# Patient Record
Sex: MALE | Race: WHITE | NOT HISPANIC OR LATINO | Employment: OTHER | URBAN - NONMETROPOLITAN AREA
[De-identification: names, ages, dates, MRNs, and addresses within clinical notes are randomized per-mention and may not be internally consistent; named-entity substitution may affect disease eponyms.]

---

## 2016-04-25 LAB
ALT SERPL-CCNC: 64 U/L (ref 18–65)
CREAT SERPL-MCNC: 0.85 MG/DL (ref 0.8–1.5)
HBA1C MFR BLD: 7.2 % (ref 4–6)
POTASSIUM SERPL-SCNC: 4.1 MEQ/L (ref 3.5–5.1)
TSH SERPL-ACNC: 0.34 MIU/ML (ref 0.35–4.8)

## 2017-03-22 ENCOUNTER — TRANSFERRED RECORDS (OUTPATIENT)
Dept: HEALTH INFORMATION MANAGEMENT | Facility: HOSPITAL | Age: 67
End: 2017-03-22

## 2017-04-12 ENCOUNTER — OFFICE VISIT (OUTPATIENT)
Dept: INTERNAL MEDICINE | Facility: OTHER | Age: 67
End: 2017-04-12
Attending: INTERNAL MEDICINE
Payer: COMMERCIAL

## 2017-04-12 VITALS
OXYGEN SATURATION: 95 % | HEIGHT: 74 IN | DIASTOLIC BLOOD PRESSURE: 60 MMHG | WEIGHT: 315 LBS | BODY MASS INDEX: 40.43 KG/M2 | SYSTOLIC BLOOD PRESSURE: 120 MMHG | HEART RATE: 85 BPM | TEMPERATURE: 99.9 F

## 2017-04-12 DIAGNOSIS — Z71.89 ACP (ADVANCE CARE PLANNING): Chronic | ICD-10-CM

## 2017-04-12 DIAGNOSIS — M25.562 CHRONIC PAIN OF BOTH KNEES: ICD-10-CM

## 2017-04-12 DIAGNOSIS — L98.9 SKIN LESION: Primary | ICD-10-CM

## 2017-04-12 DIAGNOSIS — G89.29 CHRONIC PAIN OF BOTH KNEES: ICD-10-CM

## 2017-04-12 DIAGNOSIS — M25.561 CHRONIC PAIN OF BOTH KNEES: ICD-10-CM

## 2017-04-12 PROBLEM — I10 BENIGN ESSENTIAL HYPERTENSION: Status: ACTIVE | Noted: 2017-04-12

## 2017-04-12 PROBLEM — E66.01 MORBID OBESITY DUE TO EXCESS CALORIES (H): Status: ACTIVE | Noted: 2017-04-12

## 2017-04-12 PROBLEM — E11.40 TYPE 2 DIABETES MELLITUS WITH DIABETIC NEUROPATHY (H): Status: ACTIVE | Noted: 2017-04-12

## 2017-04-12 PROBLEM — E89.0 POSTOPERATIVE HYPOTHYROIDISM: Status: ACTIVE | Noted: 2017-04-12

## 2017-04-12 PROCEDURE — 99213 OFFICE O/P EST LOW 20 MIN: CPT

## 2017-04-12 PROCEDURE — 99205 OFFICE O/P NEW HI 60 MIN: CPT | Performed by: INTERNAL MEDICINE

## 2017-04-12 RX ORDER — NAPROXEN 500 MG/1
500 TABLET ORAL 2 TIMES DAILY PRN
Qty: 60 TABLET | Refills: 1 | Status: SHIPPED | OUTPATIENT
Start: 2017-04-12 | End: 2017-07-18

## 2017-04-12 RX ORDER — ALLOPURINOL 300 MG/1
300 TABLET ORAL
COMMUNITY
End: 2021-08-16

## 2017-04-12 ASSESSMENT — ANXIETY QUESTIONNAIRES
2. NOT BEING ABLE TO STOP OR CONTROL WORRYING: NOT AT ALL
6. BECOMING EASILY ANNOYED OR IRRITABLE: NOT AT ALL
1. FEELING NERVOUS, ANXIOUS, OR ON EDGE: NOT AT ALL
5. BEING SO RESTLESS THAT IT IS HARD TO SIT STILL: NOT AT ALL
7. FEELING AFRAID AS IF SOMETHING AWFUL MIGHT HAPPEN: NOT AT ALL
3. WORRYING TOO MUCH ABOUT DIFFERENT THINGS: MORE THAN HALF THE DAYS
GAD7 TOTAL SCORE: 2
IF YOU CHECKED OFF ANY PROBLEMS ON THIS QUESTIONNAIRE, HOW DIFFICULT HAVE THESE PROBLEMS MADE IT FOR YOU TO DO YOUR WORK, TAKE CARE OF THINGS AT HOME, OR GET ALONG WITH OTHER PEOPLE: NOT DIFFICULT AT ALL

## 2017-04-12 ASSESSMENT — PATIENT HEALTH QUESTIONNAIRE - PHQ9: 5. POOR APPETITE OR OVEREATING: NOT AT ALL

## 2017-04-12 NOTE — PROGRESS NOTES
"SUBJECTIVE:    Chief Complaint   Patient presents with     Establish Care     no chest pain, some SOB due to \"being overweight and over exhersion\"      Derm Problem     right side bulging vein would like checked out, scab on right ear bleeds, pt also states has a \"bludge\" under right arm pit      Musculoskeletal Problem     pain in both knees - pain 10+ /10 has seen Dr. Esparza and has had cortisone shots along with gel injections- both wearing off        Gunnar SABINE Roberta is a 67 year old male with pmh of diabetes type 2 last A1C 6.6, Thyroid cancer with subsequent hypothyroidism, neuropathy along with OA of the knees, Obesity, HTN and alcohol abuse presents today for establishment of care.  He was previously seen by IM in [place] but no longer is able to make that drive routinely.  He has ongoing bilateral knee pain despite steroid and Synvisc injections.  He also has a lesion on his right ear for the pat 2 years that keeps scabbing but never resolves.  He also wants me to look at a lump under his right arm -he reports a hx of lipomas.  Lastly he asks for a refill of Naproxen- as he uses it quite sparingly and has no hx of ulcers but does have a hx of gastric polyps.           Past Medical History:   Diagnosis Date     Alcohol abuse      Diabetes mellitus with neuropathy (H)      Gastric polyps      Gout      HTN (hypertension)      Hx of thyroid cancer      Neuropathy (H)      Obesity      Osteoarthritis        Past Surgical History:   Procedure Laterality Date     HERNIA REPAIR  2014        Allergies   Allergen Reactions     Food      Onions, peppers, olives        Medications:  Current Outpatient Prescriptions   Medication Sig Dispense Refill     LEVOTHYROXINE SODIUM PO Take 300 mcg by mouth daily       LEVOTHYROXINE SODIUM PO Take 50 mcg by mouth Take one-half of 100 mcg tablet daily       POTASSIUM CITRATE PO Take 20 mEq by mouth Take one tablet twice daily       PANTOPRAZOLE SODIUM PO Take 40 mg by mouth Take " "one tablet daily       TORSEMIDE PO Take 20 mg by mouth Take tow tablets twice daily       ALLOPURINOL PO Take 300 mg by mouth Take one tablet daily       METOPROLOL SUCCINATE ER PO Take 100 mg by mouth daily       METFORMIN HCL PO Take 1,000 mg by mouth 2 times daily (with meals)       QUETIAPINE FUMARATE PO Take 50 mg by mouth Take one tablet every night       insulin glargine (LANTUS) 100 UNIT/ML injection Inject 18 Units Subcutaneous 18 units daily       Liraglutide (VICTOZA SC) Inject 0.6 mg Subcutaneous 18mg/3ml prefilled pen       naproxen (NAPROSYN) 500 MG tablet Take 1 tablet (500 mg) by mouth 2 times daily as needed for moderate pain 60 tablet 1       Family History   Problem Relation Age of Onset     Unknown/Adopted Sister        Social History   Substance Use Topics     Smoking status: Never Smoker     Smokeless tobacco: Not on file     Alcohol use Yes      Comment: beer daily      Social History     Social History Narrative     No narrative on file        Review Of Systems  Constitutional: Weight Gain  Eyes: negative  Ears/Nose/Throat: negative  Cardiovascular: negative  Respiratory: No shortness of breath, dyspnea on exertion, cough, or hemoptysis  Gastrointestinal: negative  Genitourinary: frequency  Musculoskeletal: arthritis, joint pain, joint swelling, joint stiffness and gout  Skin: as above  Neurologic: neuropathy and local weakness  Endocrine: Diabetes  Hematologic/Lymphatic/Immunologic: negative  Psychiatric: negative    OBJECTIVE:  /60 (BP Location: Left arm, Patient Position: Chair, Cuff Size: Adult Large)  Pulse 85  Temp 99.9  F (37.7  C) (Tympanic)  Ht 6' 1.5\" (1.867 m)  Wt (!) 322 lb (146.1 kg)  SpO2 95%  BMI 41.91 kg/m2  Body mass index is 41.91 kg/(m^2).   Gen: Obese-Well-developed, well-nourished and in no apparent distress  HEENT:  Normocephalic, atraumatic, PERRL, no scleral icterus, TMs visualized bilaterally without effusion/erythema, external auditory canals clear.  " Cranial nerves grossly intact.  Neck: supple, no LAD, no thyromegaly  CV: regular rate and rhythm, normal S1 S2, no S3 or S4 and no murmur, click, or rub  Resp: Clear to ausculation bilaterally, normal respiratory effort  Abd: Obese- Bowel sounds present, soft NT/ND,  no masses or hepatosplenomegaly  Ext: +2 LE edema.    Lymph: Lipoma under right axilla.    Neuro:  No focal deficits noted  Skin: warm and dry  Psych: normal mood/affect, appropriately oriented    ASSESSMENT/PLAN:  Pt is a 67 year old male here to establish care    Gunnar was seen today for establish care, derm problem and musculoskeletal problem.    Diagnoses and all orders for this visit:    Skin lesion:  Right ear lesion concerning for basal or squamous cell skin cancer.    -     DERMATOLOGY REFERRAL    ACP (advance care planning)    Chronic pain of both knees  -     ORTHOPEDICS ADULT REFERRAL  -     naproxen (NAPROSYN) 500 MG tablet; Take 1 tablet (500 mg) by mouth 2 times daily as needed for moderate pain      70 minutes was spent on this patient's care today.  Greater than 50% of the time was spent face to face with the patient and his wife regarding the concerns as noted in the HPI.  In addition we discussed a care plan and frequent follow up until we get a handle on his ongoing issues.  Questions were answered.  Approximately 30 additional minutes were spent reviewing records from [facility].      Return to clinic in 3 weeks.     Cc; Tru Bethea D.O.

## 2017-04-12 NOTE — PATIENT INSTRUCTIONS
3 weeks-Follow up        Clinic and Lab Hours:    I have office hours on Monday's and Wednesday's at the [facility].  I have office hours Tuesdays and Fridays at the [facility] in [facility] site.    OfficeHours:  8:00am- 4:00 pm    Following your visit, when your labs and diagnostic testing have returned and I have reviewed them, you will be contacted by my nurse.  If you are on My Chart on Epic, you can also view results there, and call or message me with questions and or concerns.    For refills, notify your pharmacy regarding what you need and the pharmacy will generate a refill request. Please plan ahead and allow 3-5 days for refill requests.    You will generally receive a reminder call the day prior to your appointment.  If you cannot attend your appointment, please cancel within days prior.  If there is a pattern of failure to present for your appointments, I cannot provide consistent, meaningful, ongoing care for you. It is very important to me that you come in for your care, so we can best assist you with your health care needs.    IMPORTANT:  Please note that it is my standard of practice to NOT participate in prescribing ongoing requested Narcotic Analgesic therapy, and/or participate in the prescribing of other controlled substances.  My nurse and I am happy to assist you with the process of referral for alternative pain management as needed, and other treatment modalities including but not limited to:  Physical Therapy, Physical Medicine and Rehab, Counseling, Chiropractic Care, Orthopedic Care, and non-narcotic medication management.     I am out of the office on Thursday's. If you have labs that return while I am out,   our office will contact you when I return on my next scheduled clinic day.  If there is a concerning lab, you will be contacted.  Medication refills will generally be addressed upon my return the next day.     In the event that you need to be seen for  emergent concerns and I am out of office,  please see one of my colleagues for acute concerns.  You may also present to UC or ER.    Thank you for allowing me to participate in your care.  I look forward to addressing your  Internal Medicine Level health care needs, and assisting you to achieve optimal health.     Your note and results will be copied to your referral source, and any other specialists involved in your care, as well as any providers you are referred to for additional care.        Sincerely,  Dr. You Bethea

## 2017-04-12 NOTE — NURSING NOTE
"Chief Complaint   Patient presents with     Establish Care     no chest pain, some SOB due to \"being overweight and over exhersion\"      Derm Problem     right side bulging vein would like checked out, scab on right ear bleeds, pt also states has a \"bludge\" under right arm pit      Musculoskeletal Problem     pain in both knees - pain 10+ /10 has seen Dr. Esparza and has had cortisone shots along with gel injections- both wearing off        Initial /60 (BP Location: Left arm, Patient Position: Chair, Cuff Size: Adult Large)  Pulse 85  Ht 6' 1.5\" (1.867 m)  Wt (!) 322 lb (146.1 kg)  SpO2 95%  BMI 41.91 kg/m2 Estimated body mass index is 41.91 kg/(m^2) as calculated from the following:    Height as of this encounter: 6' 1.5\" (1.867 m).    Weight as of this encounter: 322 lb (146.1 kg).  Medication Reconciliation: complete   Ginger Small LPN      "

## 2017-04-12 NOTE — MR AVS SNAPSHOT
After Visit Summary   4/12/2017    Gunnar Granados    MRN: [id number]           Patient Information     Date Of Birth          1950        Visit Information        Provider Department      4/12/2017 3:00 PM You Bethea, DO [facility]        Today's Diagnoses     Skin lesion    -  1    ACP (advance care planning)        Chronic pain of both knees          Care Instructions    3 weeks-Follow up        Clinic and Lab Hours:    I have office hours on Monday's and Wednesday's at the [facility].  I have office hours Tuesdays and Fridays at [facility] in [facility] site.    OfficeHours:  8:00am- 4:00 pm    Following your visit, when your labs and diagnostic testing have returned and I have reviewed them, you will be contacted by my nurse.  If you are on My Chart on Epic, you can also view results there, and call or message me with questions and or concerns.    For refills, notify your pharmacy regarding what you need and the pharmacy will generate a refill request. Please plan ahead and allow 3-5 days for refill requests.    You will generally receive a reminder call the day prior to your appointment.  If you cannot attend your appointment, please cancel within days prior.  If there is a pattern of failure to present for your appointments, I cannot provide consistent, meaningful, ongoing care for you. It is very important to me that you come in for your care, so we can best assist you with your health care needs.    IMPORTANT:  Please note that it is my standard of practice to NOT participate in prescribing ongoing requested Narcotic Analgesic therapy, and/or participate in the prescribing of other controlled substances.  My nurse and I am happy to assist you with the process of referral for alternative pain management as needed, and other treatment modalities including but not limited to:  Physical Therapy, Physical Medicine and Rehab,  Counseling, Chiropractic Care, Orthopedic Care, and non-narcotic medication management.     I am out of the office on Thursday's. If you have labs that return while I am out,   our office will contact you when I return on my next scheduled clinic day.  If there is a concerning lab, you will be contacted.  Medication refills will generally be addressed upon my return the next day.     In the event that you need to be seen for emergent concerns and I am out of office,  please see one of my colleagues for acute concerns.  You may also present to UC or ER.    Thank you for allowing me to participate in your care.  I look forward to addressing your  Internal Medicine Level health care needs, and assisting you to achieve optimal health.     Your note and results will be copied to your referral source, and any other specialists involved in your care, as well as any providers you are referred to for additional care.        Sincerely,  Dr. You Bethea            Follow-ups after your visit        Additional Services     DERMATOLOGY REFERRAL       Your provider has referred you to: [facility]   Recurrent scabbing lesion on ear.      Please be aware that coverage of these services is subject to the terms and limitations of your health insurance plan.  Call member services at your health plan with any benefit or coverage questions.      Please bring the following with you to your appointment:    (1) Any X-Rays, CTs or MRIs which have been performed.  Contact the facility where they were done to arrange for  prior to your scheduled appointment.    (2) List of current medications  (3) This referral request   (4) Any documents/labs given to you for this referral            ORTHOPEDICS ADULT REFERRAL       Your provider has referred you to: Dr Esparza-follow up.      Please be aware that coverage of these services is subject to the terms and limitations of your health insurance plan.  Call member services  "at your health plan with any benefit or coverage questions.      Please bring the following to your appointment:    >>   Any x-rays, CTs or MRIs which have been performed.  Contact the facility where they were done to arrange for  prior to your scheduled appointment.    >>   List of current medications   >>   This referral request   >>   Any documents/labs given to you for this referral                  Who to contact     If you have questions or need follow up information about today's clinic visit or your schedule please contact [facility] directly at [phone].  Normal or non-critical lab and imaging results will be communicated to you by [employer], letter or phone within 4 business days after the clinic has received the results. If you do not hear from us within 7 days, please contact the clinic through [employer] or phone. If you have a critical or abnormal lab result, we will notify you by phone as soon as possible.  Submit refill requests through [employer] or call your pharmacy and they will forward the refill request to us. Please allow 3 business days for your refill to be completed.          Additional Information About Your Visit        CPUsage Information     CPUsage lets you send messages to your doctor, view your test results, renew your prescriptions, schedule appointments and more. To sign up, go to [email or web address] . Click on \"Log in\" on the left side of the screen, which will take you to the Welcome page. Then click on \"Sign up Now\" on the right side of the page.     You will be asked to enter the access code listed below, as well as some personal information. Please follow the directions to create your username and password.     Your access code is: [email or web address]  Expires: 2017  3:55 PM     Your access code will  in 90 days. If you need help or a new code, please call your [facility] or [phone].        Care EveryWhere ID     This is your Care EveryWhere " "ID. This could be used by other organizations to access your [place] medical records  [id number]        Your Vitals Were     Pulse Temperature Height Pulse Oximetry BMI (Body Mass Index)       85 99.9  F (37.7  C) (Tympanic) 6' 1.5\" (1.867 m) 95% 41.91 kg/m2        Blood Pressure from Last 3 Encounters:   04/12/17 120/60    Weight from Last 3 Encounters:   04/12/17 (!) 322 lb (146.1 kg)              We Performed the Following     DERMATOLOGY REFERRAL     ORTHOPEDICS ADULT REFERRAL          Today's Medication Changes          These changes are accurate as of: 4/12/17  3:56 PM.  If you have any questions, ask your nurse or doctor.               Start taking these medicines.        Dose/Directions    naproxen 500 MG tablet   Commonly known as:  NAPROSYN   Used for:  Chronic pain of both knees   Started by:  You Bethea DO        Dose:  500 mg   Take 1 tablet (500 mg) by mouth 2 times daily as needed for moderate pain   Quantity:  60 tablet   Refills:  1            Where to get your medicines      These medications were sent to [facility] [address] [place] [address]  AT [facility] [address]  [address] [facility] [address]     Phone:  [phone]     naproxen 500 MG tablet                Primary Care Provider    None Specified       No primary provider on file.        Thank you!     Thank you for choosing [facility]  for your care. Our goal is always to provide you with excellent care. Hearing back from our patients is one way we can continue to improve our services. Please take a few minutes to complete the written survey that you may receive in the mail after your visit with us. Thank you!             Your Updated Medication List - Protect others around you: Learn how to safely use, store and throw away your medicines at www.disposemymeds.org.          This list is accurate as of: 4/12/17  3:56 PM.  Always use your most recent med list.             "       Brand Name Dispense Instructions for use    ALLOPURINOL PO      Take 300 mg by mouth Take one tablet daily       insulin glargine 100 UNIT/ML injection    LANTUS     Inject 18 Units Subcutaneous 18 units daily       * LEVOTHYROXINE SODIUM PO      Take 300 mcg by mouth daily       * LEVOTHYROXINE SODIUM PO      Take 50 mcg by mouth Take one-half of 100 mcg tablet daily       METFORMIN HCL PO      Take 1,000 mg by mouth 2 times daily (with meals)       METOPROLOL SUCCINATE ER PO      Take 100 mg by mouth daily       naproxen 500 MG tablet    NAPROSYN    60 tablet    Take 1 tablet (500 mg) by mouth 2 times daily as needed for moderate pain       PANTOPRAZOLE SODIUM PO      Take 40 mg by mouth Take one tablet daily       POTASSIUM CITRATE PO      Take 20 mEq by mouth Take one tablet twice daily       QUETIAPINE FUMARATE PO      Take 50 mg by mouth Take one tablet every night       TORSEMIDE PO      Take 20 mg by mouth Take tow tablets twice daily       VICTOZA SC      Inject 0.6 mg Subcutaneous 18mg/3ml prefilled pen       * Notice:  This list has 2 medication(s) that are the same as other medications prescribed for you. Read the directions carefully, and ask your doctor or other care provider to review them with you.

## 2017-04-13 ASSESSMENT — PATIENT HEALTH QUESTIONNAIRE - PHQ9: SUM OF ALL RESPONSES TO PHQ QUESTIONS 1-9: 11

## 2017-04-13 ASSESSMENT — ANXIETY QUESTIONNAIRES: GAD7 TOTAL SCORE: 2

## 2017-04-25 ENCOUNTER — TRANSFERRED RECORDS (OUTPATIENT)
Dept: HEALTH INFORMATION MANAGEMENT | Facility: HOSPITAL | Age: 67
End: 2017-04-25

## 2017-04-27 DIAGNOSIS — E11.40 TYPE 2 DIABETES MELLITUS WITH DIABETIC NEUROPATHY (H): Primary | ICD-10-CM

## 2017-04-27 NOTE — TELEPHONE ENCOUNTER
Accucheck Fastclix lancets 102's       Last Written Prescription Date: 03-27-17  Last Fill Quantity: 102, # refills: 0  Last Office Visit with G, P or [facility] prescribing provider:  04-12-17   Next 5 appointments (look out 90 days)     May 03, 2017  3:00 PM CDT   (Arrive by 2:45 PM)   Office Visit with You Bethea, [facility] ([facility] )    [address] [address] [address] [phone]                   BP Readings from Last 3 Encounters:   04/12/17 120/60     No results found for: MICROL  No results found for: UMALCR  No results found for: CR]  No results found for: GFRESTIMATED  No results found for: GFRESTBLACK  No results found for: CHOL  No results found for: HDL  No results found for: LDL  No results found for: TRIG  No results found for: CHOLHDLRATIO  No results found for: AST  No results found for: ALT  No results found for: A1C  No results found for: POTASSIUM

## 2017-04-28 ENCOUNTER — TRANSFERRED RECORDS (OUTPATIENT)
Dept: HEALTH INFORMATION MANAGEMENT | Facility: HOSPITAL | Age: 67
End: 2017-04-28

## 2017-04-28 RX ORDER — LANCETS
EACH MISCELLANEOUS
Qty: 1 BOX | Refills: 5 | Status: SHIPPED | OUTPATIENT
Start: 2017-04-28 | End: 2018-01-26

## 2017-05-03 ENCOUNTER — OFFICE VISIT (OUTPATIENT)
Dept: INTERNAL MEDICINE | Facility: OTHER | Age: 67
End: 2017-05-03
Attending: INTERNAL MEDICINE
Payer: MEDICARE

## 2017-05-03 VITALS
DIASTOLIC BLOOD PRESSURE: 68 MMHG | WEIGHT: 315 LBS | SYSTOLIC BLOOD PRESSURE: 132 MMHG | BODY MASS INDEX: 41.91 KG/M2 | HEART RATE: 70 BPM | OXYGEN SATURATION: 98 % | RESPIRATION RATE: 20 BRPM | TEMPERATURE: 98.9 F

## 2017-05-03 DIAGNOSIS — M17.0 PRIMARY OSTEOARTHRITIS OF BOTH KNEES: ICD-10-CM

## 2017-05-03 DIAGNOSIS — I10 BENIGN ESSENTIAL HYPERTENSION: ICD-10-CM

## 2017-05-03 DIAGNOSIS — Z12.5 SCREENING FOR PROSTATE CANCER: ICD-10-CM

## 2017-05-03 DIAGNOSIS — Z13.9 SCREENING FOR CONDITION: ICD-10-CM

## 2017-05-03 DIAGNOSIS — Z79.4 TYPE 2 DIABETES MELLITUS WITH COMPLICATION, WITH LONG-TERM CURRENT USE OF INSULIN (H): Primary | ICD-10-CM

## 2017-05-03 DIAGNOSIS — E89.0 POSTOPERATIVE HYPOTHYROIDISM: ICD-10-CM

## 2017-05-03 DIAGNOSIS — Z13.6 SCREENING FOR AAA (ABDOMINAL AORTIC ANEURYSM): ICD-10-CM

## 2017-05-03 DIAGNOSIS — C44.310 BCC (BASAL CELL CARCINOMA), FACE: ICD-10-CM

## 2017-05-03 DIAGNOSIS — E66.01 MORBID OBESITY, UNSPECIFIED OBESITY TYPE (H): ICD-10-CM

## 2017-05-03 DIAGNOSIS — E11.8 TYPE 2 DIABETES MELLITUS WITH COMPLICATION, WITH LONG-TERM CURRENT USE OF INSULIN (H): Primary | ICD-10-CM

## 2017-05-03 PROCEDURE — 40000788 ZZHCL STATISTIC ESTIMATED AVERAGE GLUCOSE: Mod: ZL | Performed by: INTERNAL MEDICINE

## 2017-05-03 PROCEDURE — 99214 OFFICE O/P EST MOD 30 MIN: CPT | Performed by: INTERNAL MEDICINE

## 2017-05-03 PROCEDURE — 84439 ASSAY OF FREE THYROXINE: CPT | Mod: ZL | Performed by: INTERNAL MEDICINE

## 2017-05-03 PROCEDURE — 99212 OFFICE O/P EST SF 10 MIN: CPT

## 2017-05-03 PROCEDURE — 36415 COLL VENOUS BLD VENIPUNCTURE: CPT | Mod: ZL | Performed by: INTERNAL MEDICINE

## 2017-05-03 PROCEDURE — 99000 SPECIMEN HANDLING OFFICE-LAB: CPT | Mod: ZL | Performed by: INTERNAL MEDICINE

## 2017-05-03 PROCEDURE — 84443 ASSAY THYROID STIM HORMONE: CPT | Mod: ZL | Performed by: INTERNAL MEDICINE

## 2017-05-03 PROCEDURE — 86803 HEPATITIS C AB TEST: CPT | Mod: ZL | Performed by: INTERNAL MEDICINE

## 2017-05-03 PROCEDURE — 80053 COMPREHEN METABOLIC PANEL: CPT | Mod: ZL | Performed by: INTERNAL MEDICINE

## 2017-05-03 PROCEDURE — 83036 HEMOGLOBIN GLYCOSYLATED A1C: CPT | Mod: ZL | Performed by: INTERNAL MEDICINE

## 2017-05-03 ASSESSMENT — PAIN SCALES - GENERAL: PAINLEVEL: EXTREME PAIN (9)

## 2017-05-03 NOTE — PROGRESS NOTES
Internal Medicine:    Chief Complaint   Patient presents with     Diabetes     Knee Pain     f/u knee referral     Cancer     basal cell carcinoma on head and ear     Gunnar presents today for follow up.  I first saw Gunnar 23 weeks ago and gave him a referral to Dermatology due to lesions on nose and right ear.  He was diagnosed with Basal cell cancer and will continue to follwo with them.  He was also seen by Dr. Esparza regarding ongoing OA of knees-injections were placed but no mention of replacements.  He would like a second opinion.  In regard to his other issues he remains stable  He has numerous questions pertaining to labs and tests today.  He denies any chest pain or change in SOB.  He does agree to Hep C screening and AAA screening today           Patient Active Problem List   Diagnosis     ACP (advance care planning)     Type 2 diabetes mellitus with diabetic neuropathy (H)     Morbid obesity due to excess calories (H)     Postoperative hypothyroidism     Benign essential hypertension            Past Medical History:   Diagnosis Date     Alcohol abuse      Diabetes mellitus with neuropathy (H)      Gastric polyps      Gout      HTN (hypertension)      Hx of thyroid cancer      Neuropathy (H)      Obesity      Osteoarthritis             Past Surgical History:   Procedure Laterality Date     basal cell carcinoma  2017     HERNIA REPAIR  2014            Social History   Substance Use Topics     Smoking status: Never Smoker     Smokeless tobacco: Not on file     Alcohol use Yes      Comment: beer daily             Family History   Problem Relation Age of Onset     Unknown/Adopted Sister                Allergies   Allergen Reactions     Food      Onions, peppers, olives             Current Outpatient Prescriptions   Medication Sig Dispense Refill     blood glucose monitoring (ACCU-CHEK FASTCLIX) lancets Use to test blood sugar 2 times daily or as directed. 1 Box 5     LEVOTHYROXINE SODIUM PO Take 300 mcg by  mouth daily       LEVOTHYROXINE SODIUM PO Take 50 mcg by mouth Take one-half of 100 mcg tablet daily       POTASSIUM CITRATE PO Take 20 mEq by mouth Take one tablet twice daily       PANTOPRAZOLE SODIUM PO Take 40 mg by mouth Take one tablet daily       TORSEMIDE PO Take 20 mg by mouth Take tow tablets twice daily       ALLOPURINOL PO Take 300 mg by mouth Take one tablet daily       METOPROLOL SUCCINATE ER PO Take 100 mg by mouth daily       METFORMIN HCL PO Take 1,000 mg by mouth 2 times daily (with meals)       QUETIAPINE FUMARATE PO Take 50 mg by mouth Take one tablet every night       insulin glargine (LANTUS) 100 UNIT/ML injection Inject 18 Units Subcutaneous 18 units daily       Liraglutide (VICTOZA SC) Inject 0.6 mg Subcutaneous 18mg/3ml prefilled pen       naproxen (NAPROSYN) 500 MG tablet Take 1 tablet (500 mg) by mouth 2 times daily as needed for moderate pain 60 tablet 1       Review Of Systems:    Skin: as above  Eyes: negative  Ears/Nose/Throat: negative  Respiratory: Shortness of breath- stable  Cardiovascular: negative  Gastrointestinal: negative  Genitourinary: frequency  Musculoskeletal: back pain and weakness  Neurologic: numbness or tingling of feet  Psychiatric: negative  Hematologic/Lymphatic/Immunologic: negative  Endocrine: DM    Objective:   /68 (BP Location: Left arm, Patient Position: Chair, Cuff Size: Adult Large)  Pulse 70  Temp 98.9  F (37.2  C) (Tympanic)  Resp 20  Wt (!) 322 lb (146.1 kg)  SpO2 98%  BMI 41.91 kg/m2  EXAM:  Constitutional: alert and no distress   Cardiovascular: PMI normal. No lifts, heaves, or thrills. RRR. No murmurs, clicks gallops or rub  Respiratory: Percussion normal. Good diaphragmatic excursion. Lungs clear  Psychiatric: mentation appears normal and affect normal/bright  Head: Normocephalic. No masses, lesions, tenderness or abnormalities  Abdomen: Abdomen soft, non-tender. BS normal. No masses, organomegaly  NEURO: Ambulates with walker.    SKIN:  Lesions removed on right ear, nose and scalp(dermatology)        Orders placed or performed in visit on 04/27/17     blood glucose monitoring (ACCU-CHEK FASTCLIX) lancets       Assessment and Plan:    (E11.8,  Z79.4) Type 2 diabetes mellitus with complication, with long-term current use of insulin (H)  (primary encounter diagnosis)  Comment:   Plan: Hemoglobin A1c, TSH with free T4 reflex,         Albumin Random Urine Quantitative      (I10) Benign essential hypertension  Comment:   Plan: Comprehensive metabolic panel      (Z13.9) Screening for condition  Comment:   Plan: Hepatitis C antibody      (E89.0) Postoperative hypothyroidism  Comment:   Plan: T4, free      (C44.310) BCC (basal cell carcinoma), face  Comment: Follows with [facility] dermatology   Plan: As above     (M17.0) Primary osteoarthritis of both knees  Comment: Second opinion  Plan: ORTHOPEDICS ADULT REFERRAL-2nd opinion        (Z12.5) Screening for prostate cancer  Comment: 1.7 March 2017 at [facility]    Plan: CANCELED: PSA, screen      (Z13.6) Screening for AAA (abdominal aortic aneurysm)  Comment:   Plan: US Aortic Imaging          You Bethea, DO

## 2017-05-03 NOTE — NURSING NOTE
"Chief Complaint   Patient presents with     Diabetes     Knee Pain     f/u knee referral     Cancer     basal cell carcinoma on head and ear       Initial /68 (BP Location: Left arm, Patient Position: Chair, Cuff Size: Adult Large)  Pulse 70  Temp 98.9  F (37.2  C) (Tympanic)  Resp 20  Wt (!) 322 lb (146.1 kg)  SpO2 98%  BMI 41.91 kg/m2 Estimated body mass index is 41.91 kg/(m^2) as calculated from the following:    Height as of 4/12/17: 6' 1.5\" (1.867 m).    Weight as of this encounter: 322 lb (146.1 kg).  Medication Reconciliation: complete   Nahomy Padilla LPN      "

## 2017-05-03 NOTE — MR AVS SNAPSHOT
After Visit Summary   5/3/2017    Gunnar Granados    MRN: [id number]           Patient Information     Date Of Birth          1950        Visit Information        Provider Department      5/3/2017 3:00 PM You Bethea DO [facility]        Today's Diagnoses     Type 2 diabetes mellitus with complication, with long-term current use of insulin (H)    -  1    Benign essential hypertension        Screening for condition        Morbid obesity, unspecified obesity type (H)        Postoperative hypothyroidism        BCC (basal cell carcinoma), face        Primary osteoarthritis of both knees        Screening for prostate cancer        Screening for AAA (abdominal aortic aneurysm)           Follow-ups after your visit        Additional Services     ORTHOPEDICS ADULT REFERRAL       Your provider has referred you to: Dr Freedman   [facility]      Please be aware that coverage of these services is subject to the terms and limitations of your health insurance plan.  Call member services at your health plan with any benefit or coverage questions.      Please bring the following to your appointment:    >>   Any x-rays, CTs or MRIs which have been performed.  Contact the facility where they were done to arrange for  prior to your scheduled appointment.    >>   List of current medications   >>   This referral request   >>   Any documents/labs given to you for this referral                  Who to contact     If you have questions or need follow up information about today's clinic visit or your schedule please contact [facility] directly at [phone].  Normal or non-critical lab and imaging results will be communicated to you by MyChart, letter or phone within 4 business days after the clinic has received the results. If you do not hear from us within 7 days, please contact the clinic through MyChart or phone. If you have a critical or abnormal lab result, we will notify  "you by phone as soon as possible.  Submit refill requests through [employer] or call your pharmacy and they will forward the refill request to us. Please allow 3 business days for your refill to be completed.          Additional Information About Your Visit        earthminehart Information     [employer] lets you send messages to your doctor, view your test results, renew your prescriptions, schedule appointments and more. To sign up, go to [email or web address] . Click on \"Log in\" on the left side of the screen, which will take you to the Welcome page. Then click on \"Sign up Now\" on the right side of the page.     You will be asked to enter the access code listed below, as well as some personal information. Please follow the directions to create your username and password.     Your access code is: [email or web address]  Expires: 2017  3:55 PM     Your access code will  in 90 days. If you need help or a new code, please call your [place] clinic or [phone].        Care EveryWhere ID     This is your Care EveryWhere ID. This could be used by other organizations to access your [place] medical records  [id number]        Your Vitals Were     Pulse Temperature Respirations Pulse Oximetry BMI (Body Mass Index)       70 98.9  F (37.2  C) (Tympanic) 20 98% 41.91 kg/m2        Blood Pressure from Last 3 Encounters:   17 132/68   17 120/60    Weight from Last 3 Encounters:   17 (!) 322 lb (146.1 kg)   17 (!) 322 lb (146.1 kg)              We Performed the Following     Albumin Random Urine Quantitative     Comprehensive metabolic panel     Hemoglobin A1c     Hepatitis C antibody     ORTHOPEDICS ADULT REFERRAL     T4, free     TSH with free T4 reflex     US Aortic Imaging        Primary Care Provider    None Specified       No primary provider on file.        Thank you!     Thank you for choosing [facility]  for your care. Our goal is always to provide you with excellent care. Hearing back " from our patients is one way we can continue to improve our services. Please take a few minutes to complete the written survey that you may receive in the mail after your visit with us. Thank you!             Your Updated Medication List - Protect others around you: Learn how to safely use, store and throw away your medicines at www.disposemymeds.org.          This list is accurate as of: 5/3/17  3:36 PM.  Always use your most recent med list.                   Brand Name Dispense Instructions for use    ALLOPURINOL PO      Take 300 mg by mouth Take one tablet daily       blood glucose monitoring lancets     1 Box    Use to test blood sugar 2 times daily or as directed.       insulin glargine 100 UNIT/ML injection    LANTUS     Inject 18 Units Subcutaneous 18 units daily       * LEVOTHYROXINE SODIUM PO      Take 300 mcg by mouth daily       * LEVOTHYROXINE SODIUM PO      Take 50 mcg by mouth Take one-half of 100 mcg tablet daily       METFORMIN HCL PO      Take 1,000 mg by mouth 2 times daily (with meals)       METOPROLOL SUCCINATE ER PO      Take 100 mg by mouth daily       naproxen 500 MG tablet    NAPROSYN    60 tablet    Take 1 tablet (500 mg) by mouth 2 times daily as needed for moderate pain       PANTOPRAZOLE SODIUM PO      Take 40 mg by mouth Take one tablet daily       POTASSIUM CITRATE PO      Take 20 mEq by mouth Take one tablet twice daily       QUETIAPINE FUMARATE PO      Take 50 mg by mouth Take one tablet every night       TORSEMIDE PO      Take 20 mg by mouth Take tow tablets twice daily       VICTOZA SC      Inject 0.6 mg Subcutaneous 18mg/3ml prefilled pen       * Notice:  This list has 2 medication(s) that are the same as other medications prescribed for you. Read the directions carefully, and ask your doctor or other care provider to review them with you.

## 2017-05-04 DIAGNOSIS — I10 BENIGN ESSENTIAL HYPERTENSION: ICD-10-CM

## 2017-05-04 LAB
ALBUMIN SERPL-MCNC: 3.1 G/DL (ref 3.4–5)
ALP SERPL-CCNC: 69 U/L (ref 40–150)
ALT SERPL W P-5'-P-CCNC: 37 U/L (ref 0–70)
ANION GAP SERPL CALCULATED.3IONS-SCNC: 14 MMOL/L (ref 3–14)
AST SERPL W P-5'-P-CCNC: 23 U/L (ref 0–45)
BILIRUB SERPL-MCNC: 0.6 MG/DL (ref 0.2–1.3)
BUN SERPL-MCNC: 8 MG/DL (ref 7–30)
CALCIUM SERPL-MCNC: 8.2 MG/DL (ref 8.5–10.1)
CHLORIDE SERPL-SCNC: 89 MMOL/L (ref 94–109)
CO2 SERPL-SCNC: 28 MMOL/L (ref 20–32)
CREAT SERPL-MCNC: 0.7 MG/DL (ref 0.66–1.25)
CREAT UR-MCNC: 35 MG/DL
EST. AVERAGE GLUCOSE BLD GHB EST-MCNC: 137 MG/DL
GFR SERPL CREATININE-BSD FRML MDRD: ABNORMAL ML/MIN/1.7M2
GLUCOSE SERPL-MCNC: 112 MG/DL (ref 70–99)
HBA1C MFR BLD: 6.4 % (ref 4.3–6)
MICROALBUMIN UR-MCNC: <5 MG/L
MICROALBUMIN/CREAT UR: NORMAL MG/G CR (ref 0–17)
POTASSIUM SERPL-SCNC: 3.3 MMOL/L (ref 3.4–5.3)
PROT SERPL-MCNC: 7.1 G/DL (ref 6.8–8.8)
SODIUM SERPL-SCNC: 131 MMOL/L (ref 133–144)
T4 FREE SERPL-MCNC: 1.84 NG/DL (ref 0.76–1.46)
TSH SERPL DL<=0.005 MIU/L-ACNC: 0.1 MU/L (ref 0.4–4)

## 2017-05-04 PROCEDURE — 82043 UR ALBUMIN QUANTITATIVE: CPT | Mod: ZL | Performed by: INTERNAL MEDICINE

## 2017-05-05 DIAGNOSIS — E87.6 HYPOKALEMIA: ICD-10-CM

## 2017-05-05 DIAGNOSIS — E87.1 HYPONATREMIA: ICD-10-CM

## 2017-05-05 DIAGNOSIS — E03.4 HYPOTHYROIDISM DUE TO ACQUIRED ATROPHY OF THYROID: Primary | ICD-10-CM

## 2017-05-05 LAB — HCV AB SERPL QL IA: NORMAL

## 2017-05-09 ENCOUNTER — OFFICE VISIT (OUTPATIENT)
Dept: ORTHOPEDICS | Facility: OTHER | Age: 67
End: 2017-05-09
Attending: INTERNAL MEDICINE
Payer: MEDICARE

## 2017-05-09 ENCOUNTER — APPOINTMENT (OUTPATIENT)
Dept: GENERAL RADIOLOGY | Facility: OTHER | Age: 67
End: 2017-05-09
Attending: ORTHOPAEDIC SURGERY
Payer: MEDICARE

## 2017-05-09 VITALS
DIASTOLIC BLOOD PRESSURE: 70 MMHG | TEMPERATURE: 99.1 F | RESPIRATION RATE: 18 BRPM | HEART RATE: 80 BPM | BODY MASS INDEX: 40.43 KG/M2 | OXYGEN SATURATION: 94 % | HEIGHT: 74 IN | SYSTOLIC BLOOD PRESSURE: 118 MMHG | WEIGHT: 315 LBS

## 2017-05-09 DIAGNOSIS — M25.562 ACUTE PAIN OF BOTH KNEES: Primary | ICD-10-CM

## 2017-05-09 DIAGNOSIS — M25.561 ACUTE PAIN OF BOTH KNEES: Primary | ICD-10-CM

## 2017-05-09 DIAGNOSIS — M17.32 POST-TRAUMATIC OSTEOARTHRITIS OF LEFT KNEE: ICD-10-CM

## 2017-05-09 DIAGNOSIS — M17.11 PRIMARY OSTEOARTHRITIS OF RIGHT KNEE: Primary | ICD-10-CM

## 2017-05-09 PROCEDURE — 99213 OFFICE O/P EST LOW 20 MIN: CPT

## 2017-05-09 PROCEDURE — 73562 X-RAY EXAM OF KNEE 3: CPT | Mod: TC

## 2017-05-09 PROCEDURE — 99203 OFFICE O/P NEW LOW 30 MIN: CPT | Performed by: ORTHOPAEDIC SURGERY

## 2017-05-09 ASSESSMENT — PAIN SCALES - GENERAL: PAINLEVEL: EXTREME PAIN (8)

## 2017-05-09 NOTE — PROGRESS NOTES
"New Patient Visit  Referral Source: Dr. Bethea  Chief Complaint: Bilateral knee arthritis    History of Present Illness/Injury: This 67-year-old right-handed retired  sustained an injury to his left knee while deer hunting in 1989 that required arthroscopic intervention which included a patellar chondroplasty.  Since then he has had progressively increasing anterior left knee pain.  For the last few years he has insidiously developed similar but less severe symptoms in the right knee.    Treatment up to now has consisted of the occasional use of Naprosyn 500 mg tablets (he uses them infrequently and sparingly because he does not think it is good to take \"pain medication\" regularly), steroid injections (which only helped for 3 weeks), ambulatory appliance is (cane in the house, walker out of the house), and to Synvisc injections on the left (the last given on 5/13/17) and 1 on the right (5/13/17).  The 1st Synvisc injection on the left gave him 5 months of benefit.  They were given by Dr. Esparza.  The patient recently switched his PCP to Dr. Bethea who recommended he see me for a \"2nd opinion\" on his knee arthritis.    His left knee pain is constant, anterior, often grade 10 in severity, sharp, and provoked by weightbearing.    His right knee pain is intermittent, anterior moderate in severity, sharp, and also provoked by activity.  Both knees buckle on him but neither knee locks.  He claims both knees swell and are swollen today, but on examination they are not.    His medical history was reviewed.  Of significance is he is a diabetic with peripheral neuropathy involving both his feet and hands, is morbidly obese, and has chronic lower extremity edema.     Past Medical History:   Diagnosis Date     Alcohol abuse      Diabetes mellitus with neuropathy (H)      Gastric polyps      Gout      HTN (hypertension)      Hx of thyroid cancer      Neuropathy (H)      Obesity      Osteoarthritis      Past " Surgical History:   Procedure Laterality Date     basal cell carcinoma  2017     HERNIA REPAIR  2014     Allergies   Allergen Reactions     Food      Onions, peppers, olives      Social History     Social History     Marital status:      Spouse name: N/A     Number of children: N/A     Years of education: N/A     Occupational History     Not on file.     Social History Main Topics     Smoking status: Never Smoker     Smokeless tobacco: Not on file     Alcohol use Yes      Comment: beer daily      Drug use: No     Sexual activity: Not on file     Other Topics Concern     Not on file     Social History Narrative     ROS: his relevant neurological and musculoskeletal review of systems elements are mentioned above.    Examination: Morbidly obese male with appropriate mood and affect.  He is awake, alert and oriented.  Vital signs are stable and he is afebrile.  He weighs 322 pounds with a BMI of 42.  He ambulated into the office with a walker.  The skin around each knee is intact.  Neither knee has an effusion.  No lymph node or Baker's cyst is palpable behind either knee.  Both knees are tender along both joint lines and over the patellar tendon.  Both have a range of motion of 0-120 .  Both are stable to ligamentous stressing.  Extension strength is grade 4+ bilaterally.  Both knees have negative Dayo's maneuvers.  Both lower extremities are swollen below the knee in a pattern suggesting venous stasis disease.  Light touch sensation is diminished in both feet.  Ankle pulses cannot be palpated due to his obesity and edema.    X-ray; plain films of both knees taken today show mild degenerative changes in all 3 compartments, slightly worse on the left.    Impression: Bilateral DJD of the knees: Posttraumatic on the left, primarily on the right.    Recommendations:  #1.  I educated him on Synvisc and gave him a brochure about it.  I pointed out that it takes one to 3 months to get all the benefit from a Synvisc  injection, so he needs to give the injection he received last week more time to work.  #2.  I suggested he accept a referral to a nutritional counselor but he refused.  #3.  I suggested he accept a referral to physical therapy to strengthen his quadriceps and core muscles but he declined.  I told him his PCP can order PT in the future if he changes his mind.  #4.  I suggested that he use his naproxen more regularly.  I explained that it is not a pain medication but an anti-inflammatory medication.  By decreasing inflammation he can decrease his pain.  I reassured him that he cannot become addicted to an NSAID.I also discussed with him the risks of NSAIDs.  He expressed a willingness to take his Naprosyn at least daily with food, and twice daily when his pains are worse.  #5.  I explained that his disease is not severe enough on x-ray to warrant arthroplasty.  His lack of mechanical symptoms makes arthroscopy unwarranted.  #6.  He would like me to do his next Synvisc injection in 6 months.  We will therefore return in November.

## 2017-05-09 NOTE — MR AVS SNAPSHOT
After Visit Summary   5/9/2017    Gunnar Granados    MRN: [id number]           Patient Information     Date Of Birth          1950        Visit Information        Provider Department      5/9/2017 10:20 AM Mateusz Dupree MD [facility]        Today's Diagnoses     Primary osteoarthritis of right knee    -  1    Post-traumatic osteoarthritis of left knee           Follow-ups after your visit        Follow-up notes from your care team     Return in about 6 months (around 11/9/2017).      Your next 10 appointments already scheduled     May 15, 2017  2:00 PM CDT   Radiology with [facility] ULTRASOUND 2   [facility] Ultrasound ([facility] )    [address] [place] [address]   [phone]            Nov 07, 2017 10:20 AM CST   (Arrive by 10:05 AM)   Return Visit with Mateusz Dupree MD   [facility] ([facility] )    [address] [facility] [address] [phone]              Who to contact     If you have questions or need follow up information about today's clinic visit or your schedule please contact [facility] directly at [phone].  Normal or non-critical lab and imaging results will be communicated to you by MyChart, letter or phone within 4 business days after the clinic has received the results. If you do not hear from us within 7 days, please contact the clinic through MyChart or phone. If you have a critical or abnormal lab result, we will notify you by phone as soon as possible.  Submit refill requests through [employer] or call your pharmacy and they will forward the refill request to us. Please allow 3 business days for your refill to be completed.          Additional Information About Your Visit        MyChart Information     [employer] lets you send messages to your doctor, view your test results, renew your prescriptions, schedule appointments and more. To sign up, go to [email or web address] . Click on  "\"Log in\" on the left side of the screen, which will take you to the Welcome page. Then click on \"Sign up Now\" on the right side of the page.     You will be asked to enter the access code listed below, as well as some personal information. Please follow the directions to create your username and password.     Your access code is: [email or web address]  Expires: 2017  3:55 PM     Your access code will  in 90 days. If you need help or a new code, please call your [place] clinic or [phone].        Care EveryWhere ID     This is your Care EveryWhere ID. This could be used by other organizations to access your [place] medical records  [id number]        Your Vitals Were     Pulse Temperature Respirations Height Pulse Oximetry BMI (Body Mass Index)    80 99.1  F (37.3  C) (Tympanic) 18 6' 1.5\" (1.867 m) 94% 41.91 kg/m2       Blood Pressure from Last 3 Encounters:   17 118/70   17 132/68   17 120/60    Weight from Last 3 Encounters:   17 (!) 322 lb (146.1 kg)   17 (!) 322 lb (146.1 kg)   17 (!) 322 lb (146.1 kg)              Today, you had the following     No orders found for display       Primary Care Provider    None Specified       No primary provider on file.        Thank you!     Thank you for choosing [facility]  for your care. Our goal is always to provide you with excellent care. Hearing back from our patients is one way we can continue to improve our services. Please take a few minutes to complete the written survey that you may receive in the mail after your visit with us. Thank you!             Your Updated Medication List - Protect others around you: Learn how to safely use, store and throw away your medicines at www.disposemymeds.org.          This list is accurate as of: 17 11:27 AM.  Always use your most recent med list.                   Brand Name Dispense Instructions for use    ALLOPURINOL PO      Take 300 mg by mouth Take one tablet daily "       blood glucose monitoring lancets     1 Box    Use to test blood sugar 2 times daily or as directed.       insulin glargine 100 UNIT/ML injection    LANTUS     Inject 18 Units Subcutaneous 18 units daily       * LEVOTHYROXINE SODIUM PO      Take 300 mcg by mouth daily       * LEVOTHYROXINE SODIUM PO      Take 50 mcg by mouth Take one-half of 100 mcg tablet daily       METFORMIN HCL PO      Take 1,000 mg by mouth 2 times daily (with meals)       METOPROLOL SUCCINATE ER PO      Take 100 mg by mouth daily       naproxen 500 MG tablet    NAPROSYN    60 tablet    Take 1 tablet (500 mg) by mouth 2 times daily as needed for moderate pain       PANTOPRAZOLE SODIUM PO      Take 40 mg by mouth Take one tablet daily       POTASSIUM CITRATE PO      Take 20 mEq by mouth Take one tablet twice daily       QUETIAPINE FUMARATE PO      Take 50 mg by mouth Take one tablet every night       TORSEMIDE PO      Take 20 mg by mouth Take tow tablets twice daily       VICTOZA SC      Inject 0.6 mg Subcutaneous 18mg/3ml prefilled pen       * Notice:  This list has 2 medication(s) that are the same as other medications prescribed for you. Read the directions carefully, and ask your doctor or other care provider to review them with you.

## 2017-05-09 NOTE — NURSING NOTE
"No chief complaint on file.      Initial /70 (BP Location: Right arm, Patient Position: Chair, Cuff Size: Adult Large)  Pulse 80  Temp 99.1  F (37.3  C) (Tympanic)  Resp 18  Ht 6' 1.5\" (1.867 m)  Wt (!) 322 lb (146.1 kg)  SpO2 94%  BMI 41.91 kg/m2 Estimated body mass index is 41.91 kg/(m^2) as calculated from the following:    Height as of this encounter: 6' 1.5\" (1.867 m).    Weight as of this encounter: 322 lb (146.1 kg).  Medication Reconciliation: unable or not appropriate to perform   Suzie Luna LPN      "

## 2017-05-15 ENCOUNTER — HOSPITAL ENCOUNTER (OUTPATIENT)
Dept: ULTRASOUND IMAGING | Facility: HOSPITAL | Age: 67
Discharge: HOME | End: 2017-05-15
Attending: INTERNAL MEDICINE | Admitting: INTERNAL MEDICINE
Payer: MEDICARE

## 2017-05-15 PROCEDURE — 76775 US EXAM ABDO BACK WALL LIM: CPT | Mod: TC

## 2017-05-18 ENCOUNTER — TELEPHONE (OUTPATIENT)
Dept: INTERNAL MEDICINE | Facility: OTHER | Age: 67
End: 2017-05-18

## 2017-05-18 DIAGNOSIS — K21.9 GASTROESOPHAGEAL REFLUX DISEASE WITHOUT ESOPHAGITIS: ICD-10-CM

## 2017-05-18 DIAGNOSIS — K21.9 GASTROESOPHAGEAL REFLUX DISEASE WITHOUT ESOPHAGITIS: Primary | ICD-10-CM

## 2017-05-18 RX ORDER — PANTOPRAZOLE SODIUM 40 MG/1
TABLET, DELAYED RELEASE ORAL
Qty: 90 TABLET | Refills: 1 | Status: SHIPPED | OUTPATIENT
Start: 2017-05-18 | End: 2017-06-23

## 2017-05-18 RX ORDER — PANTOPRAZOLE SODIUM 40 MG/1
40 TABLET, DELAYED RELEASE ORAL DAILY
Qty: 30 TABLET | Refills: 1 | Status: SHIPPED | OUTPATIENT
Start: 2017-05-18 | End: 2017-05-18

## 2017-05-18 NOTE — TELEPHONE ENCOUNTER
Patient called again, and would like to speak to Nathen Powell or her nurse regarding medication [phone]

## 2017-05-18 NOTE — TELEPHONE ENCOUNTER
Called the patient and let him know that Dr. Bethea was out of the office today, returning tomorrow.  Patient said that he will wait for a call back from Lake's nurse on Friday.

## 2017-05-18 NOTE — TELEPHONE ENCOUNTER
Called pt back- states he saw Dr. Fuentes yesterday and they found an expanded prostate - also he went to the dermatologist and needs to go back for a second carving- would like us to watch for his appointment notes from Dr. Fuentes and also make a follow up apt with Dr. Bethea in June. Advised pt to call back and make an appointment to get on his schedule (unable to transfer to scheduling from the phone I was using.) Ginger Small LPN

## 2017-05-18 NOTE — TELEPHONE ENCOUNTER
PCP Dr. Ham. Last office visit 05/03/17.  Protonix never filled in EPIC. Please associate dx and sign if appropriate.

## 2017-05-18 NOTE — TELEPHONE ENCOUNTER
10:41 AM    Reason for Call: Phone Call    Description: Pt called and would like to talk to the nurse about a urology appointment he had yesterday at [facility]    Was an appointment offered for this call? No    Preferred method for responding to this message: Telephone Call [phone]    If we cannot reach you directly, may we leave a detailed response at the number you provided? Yes    Can this message wait until your PCP/provider returns, if available today? Almita Hung

## 2017-05-26 DIAGNOSIS — E03.4 HYPOTHYROIDISM DUE TO ACQUIRED ATROPHY OF THYROID: Primary | ICD-10-CM

## 2017-05-26 NOTE — TELEPHONE ENCOUNTER
Levothyroxine      Last Written Prescription Date: 2/20/2017  Last Quantity: 90, # refills: 0  Last Office Visit with G, P or [facility] prescribing provider: 5/9/2017        TSH   Date Value Ref Range Status   05/03/2017 0.10 (L) 0.40 - 4.00 mU/L Final

## 2017-05-30 RX ORDER — LEVOTHYROXINE SODIUM 300 UG/1
300 TABLET ORAL DAILY
Qty: 90 TABLET | Refills: 3 | Status: SHIPPED | OUTPATIENT
Start: 2017-05-30 | End: 2018-02-14

## 2017-05-31 ENCOUNTER — TRANSFERRED RECORDS (OUTPATIENT)
Dept: HEALTH INFORMATION MANAGEMENT | Facility: HOSPITAL | Age: 67
End: 2017-05-31

## 2017-06-20 DIAGNOSIS — K21.9 GASTROESOPHAGEAL REFLUX DISEASE WITHOUT ESOPHAGITIS: ICD-10-CM

## 2017-06-22 RX ORDER — PANTOPRAZOLE SODIUM 40 MG/1
TABLET, DELAYED RELEASE ORAL
Qty: 90 TABLET | Refills: 0 | OUTPATIENT
Start: 2017-06-22

## 2017-06-22 RX ORDER — PANTOPRAZOLE SODIUM 40 MG/1
TABLET, DELAYED RELEASE ORAL
Qty: 90 TABLET | Refills: 2 | Status: SHIPPED | OUTPATIENT
Start: 2017-06-22 | End: 2018-08-28

## 2017-06-23 DIAGNOSIS — K21.9 GASTROESOPHAGEAL REFLUX DISEASE WITHOUT ESOPHAGITIS: ICD-10-CM

## 2017-06-23 DIAGNOSIS — E11.65 TYPE 2 DIABETES MELLITUS WITH HYPERGLYCEMIA, WITH LONG-TERM CURRENT USE OF INSULIN (H): Primary | ICD-10-CM

## 2017-06-23 DIAGNOSIS — Z79.4 TYPE 2 DIABETES MELLITUS WITH HYPERGLYCEMIA, WITH LONG-TERM CURRENT USE OF INSULIN (H): Primary | ICD-10-CM

## 2017-06-23 RX ORDER — PANTOPRAZOLE SODIUM 40 MG/1
TABLET, DELAYED RELEASE ORAL
Qty: 90 TABLET | Refills: 1 | Status: SHIPPED | OUTPATIENT
Start: 2017-06-23 | End: 2017-12-13

## 2017-06-23 RX ORDER — LIRAGLUTIDE 6 MG/ML
0.6 INJECTION SUBCUTANEOUS DAILY
Qty: 6 ML | Refills: 1 | Status: SHIPPED | OUTPATIENT
Start: 2017-06-23 | End: 2017-07-07

## 2017-06-23 NOTE — TELEPHONE ENCOUNTER
Anabella  Last office visit: 5/3/17  Last refill: 4/12/17 historically noted.  No diagnosis associated or frequency.  Please advise and sign if appropriate.  PCP [place].  Thank you.

## 2017-06-26 ENCOUNTER — TELEPHONE (OUTPATIENT)
Dept: WOUND CARE | Facility: OTHER | Age: 67
End: 2017-06-26

## 2017-06-26 NOTE — TELEPHONE ENCOUNTER
Patient is wondering if an appointment can be done over the phone? He is disabled and it is hard for him to get over here. He is willing to work with you, it's just a hardship for him to come to [place]. Please advise.  Megha Martinez LPN

## 2017-06-26 NOTE — TELEPHONE ENCOUNTER
----- Message from Ginger Contreras NP sent at 6/26/2017  8:25 AM CDT -----  Gunnar Conley needs an appointment, next available (appt: Victoza adjust).      Thanks.      Ginger

## 2017-06-26 NOTE — TELEPHONE ENCOUNTER
Patient would like to know why he needs to see Ginger Contreras NP? He was never notified that he needed to see Ginger. He did have diabetes education at [facility] in the past. He just started seeing Dr Bethea a couple of months ago. Patient wants to speak to either the diabetes staff or Dr Bethea nurse regarding this.

## 2017-06-29 ENCOUNTER — TRANSFERRED RECORDS (OUTPATIENT)
Dept: HEALTH INFORMATION MANAGEMENT | Facility: HOSPITAL | Age: 67
End: 2017-06-29

## 2017-07-05 DIAGNOSIS — G47.00 INSOMNIA, UNSPECIFIED TYPE: Primary | ICD-10-CM

## 2017-07-05 RX ORDER — QUETIAPINE FUMARATE 50 MG/1
TABLET, FILM COATED ORAL
Qty: 135 TABLET | Refills: 0 | Status: SHIPPED | OUTPATIENT
Start: 2017-07-05 | End: 2017-10-12

## 2017-07-05 NOTE — TELEPHONE ENCOUNTER
Seroquel  Last visit: 5.3.17  Last refill: Never filled in epic. Please advise. Thank you  PCP [place]

## 2017-07-06 ENCOUNTER — TELEPHONE (OUTPATIENT)
Dept: INTERNAL MEDICINE | Facility: OTHER | Age: 67
End: 2017-07-06

## 2017-07-06 NOTE — TELEPHONE ENCOUNTER
NO RESTRICTIONS/APPROVAL - On 7/6/2017, received PA request from [facility] for Quetiapine Fumurate 50 mg tablets.  Submitted PA request thru CoverMyMeds on 7/6/2017.  Received Notice of Approved Formulary No Restriction of Medicare Prescription Drug Coverage.  Depending on the strength and/or formulation of the drug prescribed by your physician, quantity limits apply.  Called [facility] with information of no restrictions.  Forms scanned to Epic.  Chloe Francis, HIS Specialist.

## 2017-07-07 DIAGNOSIS — E11.40 TYPE 2 DIABETES MELLITUS WITH DIABETIC NEUROPATHY, WITH LONG-TERM CURRENT USE OF INSULIN (H): Primary | ICD-10-CM

## 2017-07-07 DIAGNOSIS — E11.65 TYPE 2 DIABETES MELLITUS WITH HYPERGLYCEMIA, WITH LONG-TERM CURRENT USE OF INSULIN (H): ICD-10-CM

## 2017-07-07 DIAGNOSIS — Z79.4 TYPE 2 DIABETES MELLITUS WITH DIABETIC NEUROPATHY, WITH LONG-TERM CURRENT USE OF INSULIN (H): Primary | ICD-10-CM

## 2017-07-07 DIAGNOSIS — I10 BENIGN ESSENTIAL HYPERTENSION: ICD-10-CM

## 2017-07-07 DIAGNOSIS — Z79.4 TYPE 2 DIABETES MELLITUS WITH HYPERGLYCEMIA, WITH LONG-TERM CURRENT USE OF INSULIN (H): ICD-10-CM

## 2017-07-07 DIAGNOSIS — I10 BENIGN ESSENTIAL HYPERTENSION: Primary | ICD-10-CM

## 2017-07-07 DIAGNOSIS — G47.00 INSOMNIA, UNSPECIFIED TYPE: ICD-10-CM

## 2017-07-07 RX ORDER — LIRAGLUTIDE 6 MG/ML
1.2 INJECTION SUBCUTANEOUS DAILY
Qty: 6 ML | Refills: 3 | Status: SHIPPED | OUTPATIENT
Start: 2017-07-07 | End: 2017-11-20

## 2017-07-07 RX ORDER — TORSEMIDE 20 MG/1
40 TABLET ORAL 2 TIMES DAILY
Qty: 120 TABLET | Refills: 1 | Status: SHIPPED | OUTPATIENT
Start: 2017-07-07 | End: 2018-06-27

## 2017-07-07 NOTE — TELEPHONE ENCOUNTER
Torsemide  Last office visit: 05/03/17  Last refill: Never filled in EPIC  Medication never filled in EPIC. Please   PCP Dr. Ham  Thank you.

## 2017-07-07 NOTE — TELEPHONE ENCOUNTER
"Called Gunnar.      DM medications:  1.  Metformin 1000 mg twice a day  2.  Victoza 0.6 mg at 2100.  Started 4-5 years ago.      3.  Lantus 18 units at 2100.      BGs have been running higher, 130-170s, sometimes in 200s.    \"Chicken fat\" placed in both knees, over one month ago.      Last A1c:  Lab Results   Component Value Date    A1C 6.4 05/03/2017       Eats one meal a day.      Recommendations:  1.  Increase Victoza 1.2 mg daily  2.  Try to eat more than one meal.      Please call if any questions/concerns and/or problems.      Ginger Contreras RN FNP-BC  Disease Management      "

## 2017-07-10 RX ORDER — TORSEMIDE 20 MG/1
TABLET ORAL
Qty: 360 TABLET | Refills: 1 | OUTPATIENT
Start: 2017-07-10

## 2017-07-14 ENCOUNTER — TELEPHONE (OUTPATIENT)
Dept: INTERNAL MEDICINE | Facility: OTHER | Age: 67
End: 2017-07-14

## 2017-07-14 DIAGNOSIS — E11.8 TYPE 2 DIABETES MELLITUS WITH COMPLICATION, UNSPECIFIED LONG TERM INSULIN USE STATUS: Primary | ICD-10-CM

## 2017-07-14 NOTE — TELEPHONE ENCOUNTER
Reason for call:  Medication    1. Medication Name? LAntus   2. Is this request for a refill? yes  3. What Pharmacy do you use? [facility]  4. Have you contacted your pharmacy? Yes    5. If yes, when?  (Please note that the turn-around-time for prescriptions is 72 business hours; I am sending your request at this time. SEND TO  [facility]  )  Description: LAntus changed providers needs new  Was an appointment offered for this a call? No   Preferred method for responding to this messagephone call  If we cannot reach you directly, may we leave a detailed response at the number you provided? Yes  Can this message wait until your PCP/Provider returns if not available today? no

## 2017-07-18 ENCOUNTER — MEDICAL CORRESPONDENCE (OUTPATIENT)
Dept: HEALTH INFORMATION MANAGEMENT | Facility: HOSPITAL | Age: 67
End: 2017-07-18

## 2017-07-18 DIAGNOSIS — E03.4 HYPOTHYROIDISM DUE TO ACQUIRED ATROPHY OF THYROID: Primary | ICD-10-CM

## 2017-07-18 DIAGNOSIS — G89.29 CHRONIC PAIN OF BOTH KNEES: ICD-10-CM

## 2017-07-18 DIAGNOSIS — M25.561 CHRONIC PAIN OF BOTH KNEES: ICD-10-CM

## 2017-07-18 DIAGNOSIS — M25.562 CHRONIC PAIN OF BOTH KNEES: ICD-10-CM

## 2017-07-18 RX ORDER — NAPROXEN 500 MG/1
500 TABLET ORAL 2 TIMES DAILY PRN
Qty: 60 TABLET | Refills: 5 | Status: SHIPPED | OUTPATIENT
Start: 2017-07-18 | End: 2018-06-04

## 2017-07-18 RX ORDER — LEVOTHYROXINE SODIUM 50 UG/1
50 CAPSULE ORAL DAILY
Qty: 30 CAPSULE | Refills: 0 | Status: SHIPPED | OUTPATIENT
Start: 2017-07-18 | End: 2017-12-06

## 2017-07-18 NOTE — TELEPHONE ENCOUNTER
Levothyroxine 50 MG  Last office visit: 05/03/17  Last refill: Historical  Medication historically entered on patient's current medication list.   Levothyroxine 300 MG also listed on patient's current medication list. Please advise.    Thank you.

## 2017-08-02 ENCOUNTER — TELEPHONE (OUTPATIENT)
Dept: INTERNAL MEDICINE | Facility: OTHER | Age: 67
End: 2017-08-02

## 2017-08-02 DIAGNOSIS — E03.4 HYPOTHYROIDISM DUE TO ACQUIRED ATROPHY OF THYROID: Primary | ICD-10-CM

## 2017-08-02 RX ORDER — LEVOTHYROXINE SODIUM 100 UG/1
100 TABLET ORAL DAILY
Qty: 90 TABLET | Refills: 1 | Status: SHIPPED | OUTPATIENT
Start: 2017-08-02 | End: 2018-07-11

## 2017-08-02 NOTE — TELEPHONE ENCOUNTER
Received a phone call from pt- would like my and Dr. Bethea's personal email addressed. Pt was told we can not give those out and suggested My Chart is a more appropriate way to communicate. Gave him his My Chart code along with the web site. Pt would also like his levothyroxine prescription changed back to 100mcg tablets- he is taking 50mcg and he cuts the 100mcgs in half to get 180 day supply- would like 90 tablets of the 100mcg. He would like this sent to [facility]. Please sign prescription. Thank you. Ginger Small LPN

## 2017-08-14 ENCOUNTER — DOCUMENTATION ONLY (OUTPATIENT)
Dept: OTHER | Facility: CLINIC | Age: 67
End: 2017-08-14

## 2017-08-14 DIAGNOSIS — Z71.89 ACP (ADVANCE CARE PLANNING): Chronic | ICD-10-CM

## 2017-08-16 DIAGNOSIS — E11.40 TYPE 2 DIABETES MELLITUS WITH DIABETIC NEUROPATHY (H): Primary | ICD-10-CM

## 2017-08-16 DIAGNOSIS — E11.40 TYPE 2 DIABETES MELLITUS WITH DIABETIC NEUROPATHY, UNSPECIFIED LONG TERM INSULIN USE STATUS: ICD-10-CM

## 2017-08-16 NOTE — TELEPHONE ENCOUNTER
Metformin request from pharmacy. Last office visit on 5.9.17. Last A1C 6.4 on 5.3.17. Medication historical in Epic medication list. Medication pended at this time. Thank you

## 2017-08-17 DIAGNOSIS — E11.40 TYPE 2 DIABETES MELLITUS WITH DIABETIC NEUROPATHY, UNSPECIFIED LONG TERM INSULIN USE STATUS: ICD-10-CM

## 2017-08-17 NOTE — TELEPHONE ENCOUNTER
metformin would like a 90 day supply        Last Written Prescription Date: 8/16/17  Last Fill Quantity: 60, # refills: 0  Last Office Visit with FMG, [facility] or [facility] prescribing provider:  5/9/17   Next 5 appointments (look out 90 days)     Nov 07, 2017 10:20 AM CST   (Arrive by 10:05 AM)   Return Visit with Mateusz Dupree MD   [facility] ([facility] )    [address]  [facility] [id number] [phone]                   BP Readings from Last 3 Encounters:   05/09/17 118/70   05/03/17 132/68   04/12/17 120/60     Lab Results   Component Value Date    MICROL <5 05/04/2017     Lab Results   Component Value Date    UMALCR Unable to calculate due to low value 05/04/2017     Creatinine   Date Value Ref Range Status   05/03/2017 0.70 0.66 - 1.25 mg/dL Final   ]  GFR Estimate   Date Value Ref Range Status   05/03/2017 >90  Non  GFR Calc   >60 mL/min/1.7m2 Final     GFR Estimate If Black   Date Value Ref Range Status   05/03/2017 >90   GFR Calc   >60 mL/min/1.7m2 Final     No results found for: CHOL  No results found for: HDL  No results found for: LDL  No results found for: TRIG  No results found for: CHOLHDLRATIO  Lab Results   Component Value Date    AST 23 05/03/2017     Lab Results   Component Value Date    ALT 37 05/03/2017     Lab Results   Component Value Date    A1C 6.4 05/03/2017     Potassium   Date Value Ref Range Status   05/03/2017 3.3 (L) 3.4 - 5.3 mmol/L Final

## 2017-08-20 ENCOUNTER — MYC MEDICAL ADVICE (OUTPATIENT)
Dept: INTERNAL MEDICINE | Facility: OTHER | Age: 67
End: 2017-08-20

## 2017-08-20 DIAGNOSIS — E11.40 TYPE 2 DIABETES MELLITUS WITH DIABETIC NEUROPATHY, UNSPECIFIED LONG TERM INSULIN USE STATUS: ICD-10-CM

## 2017-08-22 NOTE — TELEPHONE ENCOUNTER
Please see notes below.  Glucophage 1000 mg, 1 tablet 2 times daily #180 pended.  Patient due for lipid profile.  Please review and sign if appropriate.  Thank you.

## 2017-09-05 DIAGNOSIS — E11.8 TYPE 2 DIABETES MELLITUS WITH COMPLICATION, UNSPECIFIED LONG TERM INSULIN USE STATUS: Primary | ICD-10-CM

## 2017-10-02 DIAGNOSIS — E11.40 TYPE 2 DIABETES MELLITUS WITH DIABETIC NEUROPATHY, WITH LONG-TERM CURRENT USE OF INSULIN (H): ICD-10-CM

## 2017-10-02 DIAGNOSIS — Z79.4 TYPE 2 DIABETES MELLITUS WITH DIABETIC NEUROPATHY, WITH LONG-TERM CURRENT USE OF INSULIN (H): ICD-10-CM

## 2017-10-12 DIAGNOSIS — G47.00 INSOMNIA, UNSPECIFIED TYPE: ICD-10-CM

## 2017-10-13 NOTE — TELEPHONE ENCOUNTER
Quetipine      Last Written Prescription Date: 7/5/17  Last Fill Quantity: 135,  # refills: 0   Last Office Visit with FMG, [facility] or [facility] prescribing provider: 5/9/17                                         Next 5 appointments (look out 90 days)     Nov 07, 2017 10:20 AM CST   (Arrive by 10:05 AM)   Return Visit with Mateusz Dupree MD   [facility] ([facility] )    [address]  [place] [address] [phone]

## 2017-10-16 RX ORDER — QUETIAPINE FUMARATE 50 MG/1
TABLET, FILM COATED ORAL
Qty: 135 TABLET | Refills: 0 | Status: SHIPPED | OUTPATIENT
Start: 2017-10-16 | End: 2018-02-14

## 2017-10-16 NOTE — TELEPHONE ENCOUNTER
Please see note below.  5/3/17 last office visit.  Please see below as patient is due for labs.    Medication pended.  PCP [place].  Please advise.  Thank you.    Next 5 appointments (look out 90 days)     Nov 07, 2017 10:20 AM CST   (Arrive by 10:05 AM)   Return Visit with Mateusz Dupree MD   [facility] ([facility] [place] [facility] )    [address]  [facility] [address]   [phone]                   BP Readings from Last 3 Encounters:   05/09/17 118/70   05/03/17 132/68   04/12/17 120/60     Pulse Readings from Last 2 Encounters:   05/09/17 80   05/03/17 70     Lab Results   Component Value Date     05/03/2017     No results found for: WBC  No results found for: RBC  No results found for: HGB  No results found for: HCT  No components found for: MCT  No results found for: MCV  No results found for: MCH  No results found for: MCHC  No results found for: RDW  No results found for: PLT  No results found for: CHOL  No results found for: HDL  No results found for: LDL  No results found for: TRIG  No results found for: CHOLHDLRATIO

## 2017-11-06 ENCOUNTER — TELEPHONE (OUTPATIENT)
Dept: INTERNAL MEDICINE | Facility: OTHER | Age: 67
End: 2017-11-06

## 2017-11-06 DIAGNOSIS — Z12.5 SCREENING FOR PROSTATE CANCER: ICD-10-CM

## 2017-11-06 DIAGNOSIS — E78.49 OTHER HYPERLIPIDEMIA: Primary | ICD-10-CM

## 2017-11-06 DIAGNOSIS — E11.40 TYPE 2 DIABETES MELLITUS WITH DIABETIC NEUROPATHY, WITHOUT LONG-TERM CURRENT USE OF INSULIN (H): ICD-10-CM

## 2017-11-06 NOTE — TELEPHONE ENCOUNTER
Patient will be seeing Dr Dupree tomorrow morning and patient was wondering if their is lab orders from Dr Bethea that he can have done on the same day? Juan F would like a return call.

## 2017-11-06 NOTE — TELEPHONE ENCOUNTER
Patient notified lab orders placed via [employer] as phones are down in [place] today. Ginger Small LPN

## 2017-11-07 ENCOUNTER — RADIANT APPOINTMENT (OUTPATIENT)
Dept: GENERAL RADIOLOGY | Facility: OTHER | Age: 67
End: 2017-11-07
Attending: ORTHOPAEDIC SURGERY
Payer: MEDICARE

## 2017-11-07 ENCOUNTER — OFFICE VISIT (OUTPATIENT)
Dept: ORTHOPEDICS | Facility: OTHER | Age: 67
End: 2017-11-07
Attending: ORTHOPAEDIC SURGERY
Payer: MEDICARE

## 2017-11-07 VITALS
OXYGEN SATURATION: 93 % | TEMPERATURE: 98.7 F | DIASTOLIC BLOOD PRESSURE: 62 MMHG | HEIGHT: 74 IN | HEART RATE: 75 BPM | SYSTOLIC BLOOD PRESSURE: 128 MMHG | RESPIRATION RATE: 18 BRPM

## 2017-11-07 DIAGNOSIS — E03.4 HYPOTHYROIDISM DUE TO ACQUIRED ATROPHY OF THYROID: ICD-10-CM

## 2017-11-07 DIAGNOSIS — Z12.5 SCREENING FOR PROSTATE CANCER: ICD-10-CM

## 2017-11-07 DIAGNOSIS — M17.32 POST-TRAUMATIC OSTEOARTHRITIS OF LEFT KNEE: ICD-10-CM

## 2017-11-07 DIAGNOSIS — E11.40 TYPE 2 DIABETES MELLITUS WITH DIABETIC NEUROPATHY, WITHOUT LONG-TERM CURRENT USE OF INSULIN (H): ICD-10-CM

## 2017-11-07 DIAGNOSIS — E87.1 HYPONATREMIA: ICD-10-CM

## 2017-11-07 DIAGNOSIS — M79.642 BILATERAL HAND PAIN: Primary | ICD-10-CM

## 2017-11-07 DIAGNOSIS — E78.49 OTHER HYPERLIPIDEMIA: ICD-10-CM

## 2017-11-07 DIAGNOSIS — M17.11 PRIMARY OSTEOARTHRITIS OF RIGHT KNEE: ICD-10-CM

## 2017-11-07 DIAGNOSIS — M79.641 BILATERAL HAND PAIN: Primary | ICD-10-CM

## 2017-11-07 DIAGNOSIS — E87.6 HYPOKALEMIA: ICD-10-CM

## 2017-11-07 LAB
ALBUMIN SERPL-MCNC: 2.9 G/DL (ref 3.4–5)
ALP SERPL-CCNC: 69 U/L (ref 40–150)
ALT SERPL W P-5'-P-CCNC: 36 U/L (ref 0–70)
ANION GAP SERPL CALCULATED.3IONS-SCNC: 8 MMOL/L (ref 3–14)
AST SERPL W P-5'-P-CCNC: 33 U/L (ref 0–45)
BILIRUB DIRECT SERPL-MCNC: 0.2 MG/DL (ref 0–0.2)
BILIRUB SERPL-MCNC: 0.6 MG/DL (ref 0.2–1.3)
BUN SERPL-MCNC: 9 MG/DL (ref 7–30)
CALCIUM SERPL-MCNC: 8.5 MG/DL (ref 8.5–10.1)
CHLORIDE SERPL-SCNC: 97 MMOL/L (ref 94–109)
CHOLEST SERPL-MCNC: 191 MG/DL
CO2 SERPL-SCNC: 30 MMOL/L (ref 20–32)
CREAT SERPL-MCNC: 0.84 MG/DL (ref 0.66–1.25)
EST. AVERAGE GLUCOSE BLD GHB EST-MCNC: 131 MG/DL
GFR SERPL CREATININE-BSD FRML MDRD: >90 ML/MIN/1.7M2
GLUCOSE SERPL-MCNC: 122 MG/DL (ref 70–99)
HBA1C MFR BLD: 6.2 % (ref 4.3–6)
HDLC SERPL-MCNC: 51 MG/DL
LDLC SERPL CALC-MCNC: 106 MG/DL
NONHDLC SERPL-MCNC: 140 MG/DL
POTASSIUM SERPL-SCNC: 4 MMOL/L (ref 3.4–5.3)
PROT SERPL-MCNC: 6.8 G/DL (ref 6.8–8.8)
PSA SERPL-ACNC: 2.24 UG/L (ref 0–4)
SODIUM SERPL-SCNC: 135 MMOL/L (ref 133–144)
T4 FREE SERPL-MCNC: 1.89 NG/DL (ref 0.76–1.46)
TRIGL SERPL-MCNC: 171 MG/DL
TSH SERPL DL<=0.005 MIU/L-ACNC: 0.16 MU/L (ref 0.4–4)

## 2017-11-07 PROCEDURE — 80048 BASIC METABOLIC PNL TOTAL CA: CPT | Mod: ZL | Performed by: INTERNAL MEDICINE

## 2017-11-07 PROCEDURE — 80061 LIPID PANEL: CPT | Mod: ZL | Performed by: INTERNAL MEDICINE

## 2017-11-07 PROCEDURE — 20610 DRAIN/INJ JOINT/BURSA W/O US: CPT | Mod: 50 | Performed by: ORTHOPAEDIC SURGERY

## 2017-11-07 PROCEDURE — 83036 HEMOGLOBIN GLYCOSYLATED A1C: CPT | Mod: ZL | Performed by: INTERNAL MEDICINE

## 2017-11-07 PROCEDURE — 36415 COLL VENOUS BLD VENIPUNCTURE: CPT | Mod: ZL | Performed by: INTERNAL MEDICINE

## 2017-11-07 PROCEDURE — 73130 X-RAY EXAM OF HAND: CPT | Mod: TC,RT

## 2017-11-07 PROCEDURE — G0103 PSA SCREENING: HCPCS | Mod: ZL | Performed by: INTERNAL MEDICINE

## 2017-11-07 PROCEDURE — 84439 ASSAY OF FREE THYROXINE: CPT | Mod: ZL | Performed by: INTERNAL MEDICINE

## 2017-11-07 PROCEDURE — 84443 ASSAY THYROID STIM HORMONE: CPT | Mod: ZL | Performed by: INTERNAL MEDICINE

## 2017-11-07 PROCEDURE — 80076 HEPATIC FUNCTION PANEL: CPT | Mod: ZL | Performed by: INTERNAL MEDICINE

## 2017-11-07 PROCEDURE — 40000788 ZZHCL STATISTIC ESTIMATED AVERAGE GLUCOSE: Mod: ZL | Performed by: INTERNAL MEDICINE

## 2017-11-07 PROCEDURE — 99212 OFFICE O/P EST SF 10 MIN: CPT | Mod: 25 | Performed by: ORTHOPAEDIC SURGERY

## 2017-11-07 PROCEDURE — 99212 OFFICE O/P EST SF 10 MIN: CPT

## 2017-11-07 RX ORDER — TORSEMIDE 5 MG
40 TABLET ORAL
COMMUNITY
End: 2018-08-28

## 2017-11-07 ASSESSMENT — PAIN SCALES - GENERAL: PAINLEVEL: EXTREME PAIN (9)

## 2017-11-07 NOTE — NURSING NOTE
"Chief Complaint   Patient presents with     Musculoskeletal Problem     Bilateral knee pain follow up        Initial /62 (BP Location: Right arm, Patient Position: Sitting, Cuff Size: Adult Large)  Pulse 75  Temp 98.7  F (37.1  C) (Tympanic)  Resp 18  Ht 6' 2\" (1.88 m)  SpO2 93% Estimated body mass index is 41.91 kg/(m^2) as calculated from the following:    Height as of 5/9/17: 6' 1.5\" (1.867 m).    Weight as of 5/9/17: 322 lb (146.1 kg).  Medication Reconciliation: complete   Suzie Luna LPN      "

## 2017-11-07 NOTE — MR AVS SNAPSHOT
After Visit Summary   11/7/2017    Gunnar Granados    MRN: [id number]           Patient Information     Date Of Birth          1950        Visit Information        Provider Department      11/7/2017 10:20 AM Mateusz Dupree MD [facility]        Today's Diagnoses     Bilateral hand pain    -  1    Primary osteoarthritis of right knee        Post-traumatic osteoarthritis of left knee        Hypothyroidism due to acquired atrophy of thyroid        Hypokalemia        Hyponatremia        Other hyperlipidemia        Type 2 diabetes mellitus with diabetic neuropathy, without long-term current use of insulin (H)        Screening for prostate cancer           Follow-ups after your visit        Follow-up notes from your care team     Return in about 6 months (around 5/7/2018).      Your next 10 appointments already scheduled     Nov 28, 2017  9:20 AM CST   (Arrive by 9:05 AM)   New Visit with Mateusz Dupree MD   [facility] ([facility] )    [address]  [place] [id number] [phone]            May 15, 2018  9:00 AM CDT   (Arrive by 8:45 AM)   Return Visit with Mateusz Dupree MD   [facility] ([facility] )    [address]  [place] [id number] [phone]              Who to contact     If you have questions or need follow up information about today's clinic visit or your schedule please contact [facility] directly at [phone].  Normal or non-critical lab and imaging results will be communicated to you by MyChart, letter or phone within 4 business days after the clinic has received the results. If you do not hear from us within 7 days, please contact the clinic through MyChart or phone. If you have a critical or abnormal lab result, we will notify you by phone as soon as possible.  Submit refill requests through [employer] or call your pharmacy  "and they will forward the refill request to us. Please allow 3 business days for your refill to be completed.          Additional Information About Your Visit        [employer] Information     [employer] gives you secure access to your electronic health record. If you see a primary care provider, you can also send messages to your care team and make appointments. If you have questions, please call your primary care clinic.  If you do not have a primary care provider, please call [phone] and they will assist you.        Care EveryWhere ID     This is your Care EveryWhere ID. This could be used by other organizations to access your [facility] medical records  [id number]        Your Vitals Were     Pulse Temperature Respirations Height Pulse Oximetry       75 98.7  F (37.1  C) (Tympanic) 18 6' 2\" (1.88 m) 93%        Blood Pressure from Last 3 Encounters:   11/07/17 128/62   05/09/17 118/70   05/03/17 132/68    Weight from Last 3 Encounters:   05/09/17 (!) 322 lb (146.1 kg)   05/03/17 (!) 322 lb (146.1 kg)   04/12/17 (!) 322 lb (146.1 kg)              We Performed the Following     Basic metabolic panel     Hemoglobin A1c     Hepatic panel (Albumin, ALT, AST, Bili, Alk Phos, TP)     Large Joint/Bursa injection and/or drainage (Shoulder, Knee)     Lipid Profile (Chol, Trig, HDL, LDL calc)     PSA, screen     T4, free     TSH     XR HAND LT G/E 3 VW (Clinic Performed)     XR HAND RT G/E 3 VW (Clinic Performed)          Today's Medication Changes          These changes are accurate as of: 11/7/17 11:45 AM.  If you have any questions, ask your nurse or doctor.               Start taking these medicines.        Dose/Directions    hyaluronate 30 MG/3ML injection   Commonly known as:  GEL-ONE   Used for:  Primary osteoarthritis of right knee, Post-traumatic osteoarthritis of left knee   Started by:  Mateusz Dupree MD        Dose:  30 mg   3 mLs (30 mg) by INTRA-ARTICULAR route once for 1 dose   Quantity:  6 mL   Refills:  " 0            Where to get your medicines      Some of these will need a paper prescription and others can be bought over the counter.  Ask your nurse if you have questions.     You don't need a prescription for these medications     hyaluronate 30 MG/3ML injection                Primary Care Provider Office Phone # Fax #    You Bethea [phone]       [facility] [address] [address]        Equal Access to Services     VICKI RASHEED : Hadii aad ku hadasho Soomaali, waaxda luqadaha, qaybta kaalmada adeegyada, waxay idiin hayaan adeeg kharash karen gonzalez. So [facility] [phone].    ATENCIÓN: Si hola roblero, tiene a basurto disposición servicios gratuitos de asistencia lingüística. Peter al [phone].    We comply with applicable federal civil rights laws and [place] laws. We do not discriminate on the basis of race, color, national origin, age, disability, sex, sexual orientation, or gender identity.            Thank you!     Thank you for choosing [facility]  for your care. Our goal is always to provide you with excellent care. Hearing back from our patients is one way we can continue to improve our services. Please take a few minutes to complete the written survey that you may receive in the mail after your visit with us. Thank you!             Your Updated Medication List - Protect others around you: Learn how to safely use, store and throw away your medicines at www.disposemymeds.org.          This list is accurate as of: 11/7/17 11:45 AM.  Always use your most recent med list.                   Brand Name Dispense Instructions for use Diagnosis    ALLOPURINOL PO      Take 300 mg by mouth Take one tablet daily        blood glucose monitoring lancets     1 Box    Use to test blood sugar 2 times daily or as directed.    Type 2 diabetes mellitus with diabetic neuropathy (H)       cholecalciferol 200 unit Tabs half-tab    vitamin D     Take 2,000 Int'l  Units by mouth        hyaluronate 30 MG/3ML injection    GEL-ONE    6 mL    3 mLs (30 mg) by INTRA-ARTICULAR route once for 1 dose    Primary osteoarthritis of right knee, Post-traumatic osteoarthritis of left knee       insulin glargine 100 UNIT/ML injection    LANTUS    30 mL    Inject 18 Units Subcutaneous At Bedtime 18 units daily    Type 2 diabetes mellitus with complication, unspecified long term insulin use status (H)       insulin pen needle 32G X 5 MM    NOVOTWIST    100 each    FOR USE WITH VICTOZA PEN    Type 2 diabetes mellitus with diabetic neuropathy, with long-term current use of insulin (H)       * levothyroxine 300 MCG tablet    SYNTHROID/LEVOTHROID    90 tablet    Take 1 tablet (300 mcg) by mouth daily    Hypothyroidism due to acquired atrophy of thyroid       * Levothyroxine Sodium 50 MCG Caps     30 capsule    Take 50 mcg by mouth daily Take one-half of 100 mcg tablet daily    Hypothyroidism due to acquired atrophy of thyroid       * levothyroxine 100 MCG tablet    SYNTHROID/LEVOTHROID    90 tablet    Take 1 tablet (100 mcg) by mouth daily    Hypothyroidism due to acquired atrophy of thyroid       liraglutide 18 MG/3ML soln    VICTOZA PEN    6 mL    Inject 1.2 mg Subcutaneous daily 18mg/3ml prefilled pen    Type 2 diabetes mellitus with hyperglycemia, with long-term current use of insulin (H)       metFORMIN 1000 MG tablet    GLUCOPHAGE    180 tablet    Take 1 tablet (1,000 mg) by mouth 2 times daily (with meals)    Type 2 diabetes mellitus with diabetic neuropathy, unspecified long term insulin use status (H)       METOPROLOL SUCCINATE ER PO      Take 100 mg by mouth daily        MULTI VITAMIN PO      Take 1 Tab by mouth one time a day.        naproxen 500 MG tablet    NAPROSYN    60 tablet    Take 1 tablet (500 mg) by mouth 2 times daily as needed for moderate pain    Chronic pain of both knees       * pantoprazole 40 MG EC tablet    PROTONIX    90 tablet    TAKE 1 TABLET(40 MG) BY MOUTH DAILY  30 TO 60 MINUTES BEFORE A MEAL    Gastroesophageal reflux disease without esophagitis       * pantoprazole 40 MG EC tablet    PROTONIX    90 tablet    TAKE 1 TABLET(40 MG) BY MOUTH DAILY 30 TO 60 MINUTES BEFORE A MEAL    Gastroesophageal reflux disease without esophagitis       POTASSIUM CITRATE PO      Take 20 mEq by mouth Take one tablet twice daily        QUEtiapine 50 MG tablet    SEROquel    135 tablet    TAKE 1/2 - 1 TABLET BY MOUTH AT BEDTIME    Insomnia, unspecified type       STATIN NOT PRESCRIBED (INTENTIONAL)      Please choose reason not prescribed, below    Type 2 diabetes mellitus with complication, unspecified long term insulin use status (H)       * torsemide suspension    DEMADEX     Take 40 mg by mouth        * torsemide 20 MG tablet    DEMADEX    120 tablet    Take 2 tablets (40 mg) by mouth 2 times daily    Benign essential hypertension       * Notice:  This list has 7 medication(s) that are the same as other medications prescribed for you. Read the directions carefully, and ask your doctor or other care provider to review them with you.

## 2017-11-07 NOTE — PROGRESS NOTES
Chief complaint: Bilateral DJD of the knees    Patient profile: 67-year-old right-handed morbidly obese insulin-dependent diabetic nonsmoking retired , patient of Dr. Bethea.    HPI: Gunnar injured his left knee while deer hunting in 1989.  The injury resulted in an arthroscopic procedure performed which included a patellar chondroplasty.  Subsequently he had  progressively increasing bilateral anterior knee pain.  Before presenting here for the 1st time on 5/9/17 he saw Dr. Esparza who treated him on 2 occasions 6 months apart with bilateral Synvisc injections, the last in each knee performed on 5/4/17 (my note of 5/9/17 has a typographical error stating that the injections had been on 5/13/17).  Also, his PCP had put him on naproxen. X-rays taken on 5/9/17 showed mild to moderate DJD involving all 3 compartments of each knee.    I suggested he continue conservative treatment.    Subjective: Today he reports that the Synvisc injections helped both knees very much but are starting to wear off.  He feels intermittent bilateral medial knee pain.  He denies a recent injury to either knee.  He desires repeat Visco supplementation injections.  He also states that another physician told him he may also have rheumatoid arthritis but the diagnosis was not made by a blood test.  His stomach is tolerating the Naprosyn well.    His musculoskeletal and neurologic review of systems were reviewed.  He states that his neuropathy is progressing rapidly.  He states his legs are weakening quickly and his hands are numb bilaterally.  He also complains of increasing pain and deformity at the basilar joint of each thumb and asked to have those joints evaluated today.  He was not scheduled for a new problem evaluation today but we told him he could set up an appointment for another day with x-rays taken in advance.    Examination: Morbidly obese male sitting in a wheelchair in no obvious distress.  Neither knee has an  effusion.  He was unable to get onto the exam table due to leg weakness. Both lower legs have skin changes consistent with venous stasis disease.    Impression:  #1.  Bilateral DJD of the knees  #2.  New problem not yet evaluated: Bilateral thumb pain    Procedure: After obtaining informed written consent and conducting a timeout, sterile technique was employed as the right knee was injected with 2 mL of Gel 1 through an anterolateral approach.  Topical ethyl chloride spray was used as a local anesthetic.  Following removal of the needle a Band-Aid was applied.  The patient tolerated the procedure well and there were no complications.    Procedure: After obtaining informed written consent and conducting a timeout, sterile technique was employed as the left knee was injected with 2 mL of Gel 1 through an anterolateral approach.  Topical ethyl chloride spray was used as a local anesthetic.  Following removal of the needle a Band-Aid was applied.  The patient tolerated the procedure well and there were no complications.    Gel 1 was used in both knees today rather than Synvisc for multiple reasons.  1st, I anticipate he will need multiple future viscosupplementation injections, and multiple Synvisc injections (being an alexis product ) run the risk (15% at each injection) of causing a Synvisc synovitis but Gel 1, being a non-alexis product, does not.  While the risk of Synvisc synovitis can be lessened by mixing the Synvisc with steroid, steroid increases blood sugars in diabetics, especially when bilateral injections are given.  Secondly, Gel 1 is a smaller volume (3ml) and can be injected into the anterior aspect of a flexed knee of a seated patient whereas Synvisc is a larger volume (6ml)  requiring injection into the suprapatellar pouch so the patient needs to lie supine on the examination table, which he could not do today.    Today's injections were the 3rd Visco supplementation injection into each  knee.    Plan:  #1.  Knees: He'll return in a little over 6 months for repeat injections.  #2.  Hands: We will set up an appointment for bilateral hand evaluations with x-rays prior to arrival. He chooses to have those x-rays take on his way out.

## 2017-11-20 DIAGNOSIS — E11.40 TYPE 2 DIABETES MELLITUS WITH DIABETIC NEUROPATHY, UNSPECIFIED LONG TERM INSULIN USE STATUS: ICD-10-CM

## 2017-11-20 DIAGNOSIS — Z79.4 TYPE 2 DIABETES MELLITUS WITH HYPERGLYCEMIA, WITH LONG-TERM CURRENT USE OF INSULIN (H): ICD-10-CM

## 2017-11-20 DIAGNOSIS — E11.65 TYPE 2 DIABETES MELLITUS WITH HYPERGLYCEMIA, WITH LONG-TERM CURRENT USE OF INSULIN (H): ICD-10-CM

## 2017-11-21 RX ORDER — LIRAGLUTIDE 6 MG/ML
INJECTION SUBCUTANEOUS
Qty: 6 ML | Refills: 1 | Status: SHIPPED | OUTPATIENT
Start: 2017-11-21 | End: 2018-02-19

## 2017-11-21 NOTE — TELEPHONE ENCOUNTER
Victoza Pen      Last Written Prescription Date: 7/07/2017  Last Fill Quantity: 6mL,  # refills: 3   Last Office Visit with FMG, [facility] or [facility] prescribing provider: 5/03/2017                                         Next 5 appointments (look out 90 days)     Dec 06, 2017  3:00 PM CST   (Arrive by 2:45 PM)   Office Visit with You Bethea,    [facility] ([facility] )    [address]  [place] [address] [phone]

## 2017-11-21 NOTE — TELEPHONE ENCOUNTER
Metformin       Last Written Prescription Date: 8/22/2017  Last Fill Quantity: 180,  # refills: 1   Last Office Visit with FMG, [facility] or [facility] prescribing provider: 5/03/2017                                         Next 5 appointments (look out 90 days)     Dec 06, 2017  3:00 PM CST   (Arrive by 2:45 PM)   Office Visit with You Bethea,    [facility] ([facility] )    [address]  [place] [address] [phone]

## 2017-11-22 DIAGNOSIS — I10 BENIGN ESSENTIAL HYPERTENSION: Primary | ICD-10-CM

## 2017-11-22 RX ORDER — METOPROLOL SUCCINATE 100 MG/1
100 TABLET, EXTENDED RELEASE ORAL DAILY
Qty: 30 TABLET | Refills: 5 | Status: SHIPPED | OUTPATIENT
Start: 2017-11-22 | End: 2018-02-14

## 2017-11-22 NOTE — TELEPHONE ENCOUNTER
Metoprolol  Last office visit: 05/03/17  Last refill: historical  Medication never filled in EPIC.  Medication last filled by [facility] provider.     Thank you.    Office visit note from 05/03/17 states the following.  BP at 05/03/17 office visit was 132/68.     (I10) Benign essential hypertension  Comment:   Plan: Comprehensive metabolic panel  PCP Dr. Ham  Please review and sign if appropriate.

## 2017-11-28 ENCOUNTER — OFFICE VISIT (OUTPATIENT)
Dept: ORTHOPEDICS | Facility: OTHER | Age: 67
End: 2017-11-28
Attending: ORTHOPAEDIC SURGERY
Payer: COMMERCIAL

## 2017-11-28 VITALS
HEART RATE: 81 BPM | WEIGHT: 315 LBS | DIASTOLIC BLOOD PRESSURE: 64 MMHG | TEMPERATURE: 98.2 F | OXYGEN SATURATION: 90 % | BODY MASS INDEX: 40.43 KG/M2 | HEIGHT: 74 IN | RESPIRATION RATE: 18 BRPM | SYSTOLIC BLOOD PRESSURE: 117 MMHG

## 2017-11-28 DIAGNOSIS — M19.041 PRIMARY OSTEOARTHRITIS OF BOTH HANDS: Primary | ICD-10-CM

## 2017-11-28 DIAGNOSIS — M19.042 PRIMARY OSTEOARTHRITIS OF BOTH HANDS: Primary | ICD-10-CM

## 2017-11-28 PROCEDURE — 99213 OFFICE O/P EST LOW 20 MIN: CPT

## 2017-11-28 PROCEDURE — 99213 OFFICE O/P EST LOW 20 MIN: CPT | Performed by: ORTHOPAEDIC SURGERY

## 2017-11-28 ASSESSMENT — PAIN SCALES - GENERAL: PAINLEVEL: WORST PAIN (10)

## 2017-11-28 NOTE — MR AVS SNAPSHOT
After Visit Summary   11/28/2017    Gunnar Granados    MRN: [id number]           Patient Information     Date Of Birth          1950        Visit Information        Provider Department      11/28/2017 9:20 AM Mateusz Dupree MD [facility]        Today's Diagnoses     Primary osteoarthritis of both hands    -  1       Follow-ups after your visit        Follow-up notes from your care team     Return if symptoms worsen or fail to improve.      Your next 10 appointments already scheduled     Dec 06, 2017  3:00 PM CST   (Arrive by 2:45 PM)   Office Visit with You Bethea DO   [facility] ([facility] )    [address]  [facility] [id number] [phone]           Bring a current list of meds and any records pertaining to this visit. For Physicals, please bring immunization records and any forms needing to be filled out. Please arrive 10 minutes early to complete paperwork.            May 15, 2018  9:00 AM CDT   (Arrive by 8:45 AM)   Return Visit with Mateusz Dupree MD   [facility] ([facility] )    [address]  [facility] [id number] [phone]              Who to contact     If you have questions or need follow up information about today's clinic visit or your schedule please contact [facility] directly at [phone].  Normal or non-critical lab and imaging results will be communicated to you by MyChart, letter or phone within 4 business days after the clinic has received the results. If you do not hear from us within 7 days, please contact the clinic through MyChart or phone. If you have a critical or abnormal lab result, we will notify you by phone as soon as possible.  Submit refill requests through [employer] or call your pharmacy and they will forward the refill request to us. Please allow 3 business days for your refill to be completed.  "         Additional Information About Your Visit        MyChart Information     [employer] gives you secure access to your electronic health record. If you see a primary care provider, you can also send messages to your care team and make appointments. If you have questions, please call your primary care clinic.  If you do not have a primary care provider, please call [phone] and they will assist you.        Care EveryWhere ID     This is your Care EveryWhere ID. This could be used by other organizations to access your [facility] medical records  [id number]        Your Vitals Were     Pulse Temperature Respirations Height Pulse Oximetry BMI (Body Mass Index)    81 98.2  F (36.8  C) (Tympanic) 18 6' 2\" (1.88 m) 90% 41.21 kg/m2       Blood Pressure from Last 3 Encounters:   11/28/17 117/64   11/07/17 128/62   05/09/17 118/70    Weight from Last 3 Encounters:   11/28/17 (!) 321 lb (145.6 kg)   05/09/17 (!) 322 lb (146.1 kg)   05/03/17 (!) 322 lb (146.1 kg)              Today, you had the following     No orders found for display         Today's Medication Changes          These changes are accurate as of: 11/28/17 12:17 PM.  If you have any questions, ask your nurse or doctor.               Start taking these medicines.        Dose/Directions    order for DME   Used for:  Primary osteoarthritis of both hands   Started by:  Mateusz Dupree MD        Equipment being ordered: Left Rebecca Splint   Quantity:  1 Units   Refills:  0            Where to get your medicines      Some of these will need a paper prescription and others can be bought over the counter.  Ask your nurse if you have questions.     Bring a paper prescription for each of these medications     order for DME                Primary Care Provider Office Phone # Fax #    You Bethea [phone]       [facility] [address] [place] [address]        Equal Access to Services     VICKI RASHEED AH: Vincent bhatt " cullen Bennett, waderrickda lufabianadaha, qaybta kalambert chavez, jacob hinds horaciotheresa darwintk lacieramingo lisa. So [facility] [phone].    ATENCIÓN: Si habla annika, tiene a basurto disposición servicios gratuitos de asistencia lingüística. Peter al [phone].    We comply with applicable federal civil rights laws and [place] laws. We do not discriminate on the basis of race, color, national origin, age, disability, sex, sexual orientation, or gender identity.            Thank you!     Thank you for choosing [facility]  for your care. Our goal is always to provide you with excellent care. Hearing back from our patients is one way we can continue to improve our services. Please take a few minutes to complete the written survey that you may receive in the mail after your visit with us. Thank you!             Your Updated Medication List - Protect others around you: Learn how to safely use, store and throw away your medicines at www.disposemymeds.org.          This list is accurate as of: 11/28/17 12:17 PM.  Always use your most recent med list.                   Brand Name Dispense Instructions for use Diagnosis    ALLOPURINOL PO      Take 300 mg by mouth Take one tablet daily        blood glucose monitoring lancets     1 Box    Use to test blood sugar 2 times daily or as directed.    Type 2 diabetes mellitus with diabetic neuropathy (H)       cholecalciferol 200 unit Tabs half-tab    vitamin D     Take 2,000 Int'l Units by mouth        insulin glargine 100 UNIT/ML injection    LANTUS    30 mL    Inject 18 Units Subcutaneous At Bedtime 18 units daily    Type 2 diabetes mellitus with complication, unspecified long term insulin use status (H)       insulin pen needle 32G X 5 MM    NOVOTWIST    100 each    FOR USE WITH VICTOZA PEN    Type 2 diabetes mellitus with diabetic neuropathy, with long-term current use of insulin (H)       * levothyroxine 300 MCG tablet    SYNTHROID/LEVOTHROID    90 tablet    Take 1 tablet  (300 mcg) by mouth daily    Hypothyroidism due to acquired atrophy of thyroid       * Levothyroxine Sodium 50 MCG Caps     30 capsule    Take 50 mcg by mouth daily Take one-half of 100 mcg tablet daily    Hypothyroidism due to acquired atrophy of thyroid       * levothyroxine 100 MCG tablet    SYNTHROID/LEVOTHROID    90 tablet    Take 1 tablet (100 mcg) by mouth daily    Hypothyroidism due to acquired atrophy of thyroid       metFORMIN 1000 MG tablet    GLUCOPHAGE    180 tablet    TAKE 1 TABLET (1,000 MG) BY MOUTH 2 TIMES DAILY (WITH MEALS)    Type 2 diabetes mellitus with diabetic neuropathy, unspecified long term insulin use status (H)       metoprolol 100 MG 24 hr tablet    TOPROL-XL    30 tablet    Take 1 tablet (100 mg) by mouth daily    Benign essential hypertension       MULTI VITAMIN PO      Take 1 Tab by mouth one time a day.        naproxen 500 MG tablet    NAPROSYN    60 tablet    Take 1 tablet (500 mg) by mouth 2 times daily as needed for moderate pain    Chronic pain of both knees       order for DME     1 Units    Equipment being ordered: Left Rebecca Splint    Primary osteoarthritis of both hands       * pantoprazole 40 MG EC tablet    PROTONIX    90 tablet    TAKE 1 TABLET(40 MG) BY MOUTH DAILY 30 TO 60 MINUTES BEFORE A MEAL    Gastroesophageal reflux disease without esophagitis       * pantoprazole 40 MG EC tablet    PROTONIX    90 tablet    TAKE 1 TABLET(40 MG) BY MOUTH DAILY 30 TO 60 MINUTES BEFORE A MEAL    Gastroesophageal reflux disease without esophagitis       POTASSIUM CITRATE PO      Take 20 mEq by mouth Take one tablet twice daily        QUEtiapine 50 MG tablet    SEROquel    135 tablet    TAKE 1/2 - 1 TABLET BY MOUTH AT BEDTIME    Insomnia, unspecified type       STATIN NOT PRESCRIBED (INTENTIONAL)      Please choose reason not prescribed, below    Type 2 diabetes mellitus with complication, unspecified long term insulin use status (H)       * torsemide suspension    DEMADEX     Take 40 mg  by mouth        * torsemide 20 MG tablet    DEMADEX    120 tablet    Take 2 tablets (40 mg) by mouth 2 times daily    Benign essential hypertension       VICTOZA PEN 18 MG/3ML soln   Generic drug:  liraglutide     6 mL    INJECT 1.2 MG SUBCUTANEOUS DAILY 18MG/3ML PREFILLED PEN    Type 2 diabetes mellitus with hyperglycemia, with long-term current use of insulin (H)       * Notice:  This list has 7 medication(s) that are the same as other medications prescribed for you. Read the directions carefully, and ask your doctor or other care provider to review them with you.

## 2017-11-28 NOTE — PROGRESS NOTES
Established Patient, New Problem    Chief complaint: New problem Bilateral hand pain    Patient profile: 67-year-old right-handed morbidly obese insulin-dependent diabetic nonsmoking retired , patient of Dr. Bethea.  He has a history of gout.    HPI: He has had  3 years of bilateral hand pain, greater on the left, of insidious onset and slow progression.  The pains involve multiple joints in both hands, but are worse at the basilar joint of each thumb.  At present he reportss constant burning up to grade 10 pain, greatest with activity, least with rest.  He also feels his hands are weak.  The symptoms affect activities of daily living.    He has a history of gout and believes he has had gouty flareups in his hands.  He is also status post bilateral 5th finger Dupuytren's contracture surgery by Dr. Rivas, but still has a rigid flexion contracture of the left 5th finger.    He takes prescription Naprosyn PRN for other joint problems, but is afraid to take it regularly, so does not take it often.  When he takes it his stomach tolerates it well and it helps his hands.    Nothing else has changed with respect to his musculoskeletal or neurologic review of systems since seen last.    Examination: Morbidly obese male in a wheelchair, in no obvious distress.  Vital signs stable and afebrile.  He is alert and oriented.  The skin around each hand is intact.  His fingers show stigmata of osteoarthritis with ubiquitous Heberden's and Bety's nodes.  There is a flexion contracture of the left 5th finger PIP joint.  There is tenderness to palpation at the basilar joint of the left thumb but not the right.  The grind test is negative bilaterally at the thumb basilar joints.  The range of motion of the right thumb is normal.  The left thumb has only 60  of flexion of the MCP joints but normal range of motion of the IP joint.   strength is weak in both hands.  There is no thenar eminence atrophy or  hyperthenar eminence atrophy in either hand.  Capillary refill is intact in all the fingers of both hands.  Sensation is intact in all the fingers of both hands.    X-ray; films of both hands taken today show diffuse PIP and DIP degenerative changes, flexion of the left 5th PIP joint, and mild degenerative changes of the thumb MCP joints bilaterally, greater on the left.    Impression: Primary osteoarthritis both hands including bilateral thumb CMC joint    Plan: I educated him and his wife on his treatment options.  These include taking his naproxen more frequently, doing warm water soaks, attending occupational therapy, wearing Rebecca splints, and steroid injections.  His basilar joint arthritis is not severe enough to warrant surgery.    He said that logistically he cannot make it to occupational therapy.  He is willing to increase the frequency of the Naprosyn, do warm water soaks, and wear a Rebecca splint on the left.  If it helps on the left he may opt to get one for the right later.  We'll reserve steroid injections as a future option.  He'll return as needed.

## 2017-11-28 NOTE — NURSING NOTE
"Chief Complaint   Patient presents with     Musculoskeletal Problem     Bilateral Hand pain        Initial /64 (BP Location: Right arm, Patient Position: Sitting, Cuff Size: Adult Large)  Pulse 81  Temp 98.2  F (36.8  C) (Tympanic)  Resp 18  Ht 6' 2\" (1.88 m)  Wt (!) 321 lb (145.6 kg)  SpO2 90%  BMI 41.21 kg/m2 Estimated body mass index is 41.21 kg/(m^2) as calculated from the following:    Height as of this encounter: 6' 2\" (1.88 m).    Weight as of this encounter: 321 lb (145.6 kg).  Medication Reconciliation: complete   Suzie Luna LPN      "

## 2017-11-29 ENCOUNTER — TELEPHONE (OUTPATIENT)
Dept: ORTHOPEDICS | Facility: OTHER | Age: 67
End: 2017-11-29

## 2017-11-29 NOTE — TELEPHONE ENCOUNTER
Patient notified that Left Rebecca splint has been shipped to the [facility] and writer called [facility] and verified , patient notified to call [facility] to make sure they received brace.  Suzie Luna LPN

## 2017-12-06 ENCOUNTER — OFFICE VISIT (OUTPATIENT)
Dept: INTERNAL MEDICINE | Facility: OTHER | Age: 67
End: 2017-12-06
Attending: INTERNAL MEDICINE
Payer: COMMERCIAL

## 2017-12-06 VITALS
TEMPERATURE: 98.6 F | WEIGHT: 315 LBS | SYSTOLIC BLOOD PRESSURE: 126 MMHG | BODY MASS INDEX: 40.43 KG/M2 | HEIGHT: 74 IN | OXYGEN SATURATION: 96 % | HEART RATE: 80 BPM | DIASTOLIC BLOOD PRESSURE: 68 MMHG

## 2017-12-06 DIAGNOSIS — Z74.09 MOBILITY IMPAIRED: ICD-10-CM

## 2017-12-06 DIAGNOSIS — M79.645 THUMB PAIN, LEFT: ICD-10-CM

## 2017-12-06 DIAGNOSIS — M25.562 CHRONIC PAIN OF LEFT KNEE: ICD-10-CM

## 2017-12-06 DIAGNOSIS — G89.29 CHRONIC PAIN OF LEFT KNEE: ICD-10-CM

## 2017-12-06 DIAGNOSIS — E11.40 TYPE 2 DIABETES MELLITUS WITH DIABETIC NEUROPATHY, UNSPECIFIED LONG TERM INSULIN USE STATUS: Primary | ICD-10-CM

## 2017-12-06 DIAGNOSIS — E03.8 OTHER SPECIFIED HYPOTHYROIDISM: ICD-10-CM

## 2017-12-06 PROCEDURE — 99212 OFFICE O/P EST SF 10 MIN: CPT

## 2017-12-06 PROCEDURE — 36415 COLL VENOUS BLD VENIPUNCTURE: CPT | Mod: ZL | Performed by: INTERNAL MEDICINE

## 2017-12-06 PROCEDURE — 84439 ASSAY OF FREE THYROXINE: CPT | Mod: ZL | Performed by: INTERNAL MEDICINE

## 2017-12-06 PROCEDURE — 84443 ASSAY THYROID STIM HORMONE: CPT | Mod: ZL | Performed by: INTERNAL MEDICINE

## 2017-12-06 PROCEDURE — 99215 OFFICE O/P EST HI 40 MIN: CPT | Performed by: INTERNAL MEDICINE

## 2017-12-06 RX ORDER — GABAPENTIN 300 MG/1
300 CAPSULE ORAL 3 TIMES DAILY
Qty: 90 CAPSULE | Refills: 1 | Status: SHIPPED | OUTPATIENT
Start: 2017-12-06 | End: 2018-04-23

## 2017-12-06 ASSESSMENT — ANXIETY QUESTIONNAIRES
5. BEING SO RESTLESS THAT IT IS HARD TO SIT STILL: NOT AT ALL
GAD7 TOTAL SCORE: 0
2. NOT BEING ABLE TO STOP OR CONTROL WORRYING: NOT AT ALL
1. FEELING NERVOUS, ANXIOUS, OR ON EDGE: NOT AT ALL
3. WORRYING TOO MUCH ABOUT DIFFERENT THINGS: NOT AT ALL
7. FEELING AFRAID AS IF SOMETHING AWFUL MIGHT HAPPEN: NOT AT ALL
6. BECOMING EASILY ANNOYED OR IRRITABLE: NOT AT ALL

## 2017-12-06 ASSESSMENT — PAIN SCALES - GENERAL: PAINLEVEL: EXTREME PAIN (9)

## 2017-12-06 ASSESSMENT — PATIENT HEALTH QUESTIONNAIRE - PHQ9
SUM OF ALL RESPONSES TO PHQ QUESTIONS 1-9: 0
5. POOR APPETITE OR OVEREATING: NOT AT ALL

## 2017-12-06 NOTE — NURSING NOTE
"Chief Complaint   Patient presents with     RECHECK     no conerns today        Initial /68 (BP Location: Left arm, Patient Position: Supine, Cuff Size: Adult Large)  Pulse 80  Temp 98.6  F (37  C) (Tympanic)  Ht 6' 2\" (1.88 m)  Wt (!) 321 lb (145.6 kg)  SpO2 96%  BMI 41.21 kg/m2 Estimated body mass index is 41.21 kg/(m^2) as calculated from the following:    Height as of this encounter: 6' 2\" (1.88 m).    Weight as of this encounter: 321 lb (145.6 kg).  Medication Reconciliation: complete   Ginger Small LPN      "

## 2017-12-06 NOTE — PROGRESS NOTES
Internal Medicine:    Chief Complaint   Patient presents with     RECHECK     no conerns today      Fall     pt fell 3 times and hit head- no injury- was not assessed after any fall      Gunnar presents today for routine follow up of his recent labs.  In addition he reports some falls as of late due to his knees giving out.  He reports ongoing bilateral knee pain.  He also reports neuropathic pain in his left hand and LEs which also contribute to his falls.  He states he has never tried anything for the neuropathy and he doesn't think the injections in his knees are helping.  He denies chest pain and SOB.  He has questions as to medication refills, left thumb brace and knee replacement surgery today.  In regard to his diabetes he states his glucose has been stable in 100s.  They also have various questions about insurance coverage and knee replacement.  We reviewed labs today and discussed repeat thyroid tests.          Patient Active Problem List   Diagnosis     ACP (advance care planning)     Type 2 diabetes mellitus with diabetic neuropathy (H)     Morbid obesity due to excess calories (H)     Postoperative hypothyroidism     Benign essential hypertension            Past Medical History:   Diagnosis Date     Alcohol abuse      Diabetes mellitus with neuropathy (H)      Gastric polyps      Gout      HTN (hypertension)      Hx of thyroid cancer      Neuropathy      Obesity      Osteoarthritis             Past Surgical History:   Procedure Laterality Date     basal cell carcinoma  2017     HERNIA REPAIR  2014            Social History   Substance Use Topics     Smoking status: Never Smoker     Smokeless tobacco: Never Used     Alcohol use Yes      Comment: beer daily             Family History   Problem Relation Age of Onset     Unknown/Adopted Sister                Allergies   Allergen Reactions     Food      Onions, peppers, olives             Current Outpatient Prescriptions   Medication Sig Dispense Refill      gabapentin (NEURONTIN) 300 MG capsule Take 1 capsule (300 mg) by mouth 3 times daily 90 capsule 1     order for DME Equipment being ordered: Left Rebecca Splint 1 Units 0     metoprolol (TOPROL-XL) 100 MG 24 hr tablet Take 1 tablet (100 mg) by mouth daily 30 tablet 5     VICTOZA PEN 18 MG/3ML soln INJECT 1.2 MG SUBCUTANEOUS DAILY 18MG/3ML PREFILLED PEN 6 mL 1     metFORMIN (GLUCOPHAGE) 1000 MG tablet TAKE 1 TABLET (1,000 MG) BY MOUTH 2 TIMES DAILY (WITH MEALS) 180 tablet 0     cholecalciferol (VITAMIN D) 200 unit TABS half-tab Take 2,000 Int'l Units by mouth       Multiple Vitamin (MULTI VITAMIN PO) Take 1 Tab by mouth one time a day.       torsemide (DEMADEX) suspension Take 40 mg by mouth       QUEtiapine (SEROQUEL) 50 MG tablet TAKE 1/2 - 1 TABLET BY MOUTH AT BEDTIME 135 tablet 0     insulin pen needle (NOVOTWIST) 32G X 5 MM FOR USE WITH VICTOZA  each 2     STATIN NOT PRESCRIBED, INTENTIONAL, Please choose reason not prescribed, below       levothyroxine (SYNTHROID/LEVOTHROID) 100 MCG tablet Take 1 tablet (100 mcg) by mouth daily (Patient taking differently: Take 100 mcg by mouth daily Take 1/2 tablet (50mcg) daily) 90 tablet 1     naproxen (NAPROSYN) 500 MG tablet Take 1 tablet (500 mg) by mouth 2 times daily as needed for moderate pain 60 tablet 5     insulin glargine (LANTUS) 100 UNIT/ML injection Inject 18 Units Subcutaneous At Bedtime 18 units daily 30 mL 3     torsemide (DEMADEX) 20 MG tablet Take 2 tablets (40 mg) by mouth 2 times daily 120 tablet 1     pantoprazole (PROTONIX) 40 MG EC tablet TAKE 1 TABLET(40 MG) BY MOUTH DAILY 30 TO 60 MINUTES BEFORE A MEAL 90 tablet 1     pantoprazole (PROTONIX) 40 MG EC tablet TAKE 1 TABLET(40 MG) BY MOUTH DAILY 30 TO 60 MINUTES BEFORE A MEAL 90 tablet 2     levothyroxine (SYNTHROID/LEVOTHROID) 300 MCG tablet Take 1 tablet (300 mcg) by mouth daily 90 tablet 3     blood glucose monitoring (ACCU-CHEK FASTCLIX) lancets Use to test blood sugar 2 times daily or as  "directed. 1 Box 5     POTASSIUM CITRATE PO Take 20 mEq by mouth Take one tablet twice daily       ALLOPURINOL PO Take 300 mg by mouth Take one tablet daily       [DISCONTINUED] Levothyroxine Sodium 50 MCG CAPS Take 50 mcg by mouth daily Take one-half of 100 mcg tablet daily 30 capsule 0       Review Of Systems:    Skin: negative  Respiratory: Shortness of breath  Cardiovascular: negative  Gastrointestinal: negative  Genitourinary: negative  Musculoskeletal: Osteoarthritis    Neurologic: neuropathy    Psychiatric: negative  Hematologic/Lymphatic/Immunologic: negative  Endocrine: DM and hypothyroid    Objective:   /68 (BP Location: Left arm, Patient Position: Supine, Cuff Size: Adult Large)  Pulse 80  Temp 98.6  F (37  C) (Tympanic)  Ht 6' 2\" (1.88 m)  Wt (!) 321 lb (145.6 kg)  SpO2 96%  BMI 41.21 kg/m2  EXAM:  Constitutional: alert and no distress   Cardiovascular:RRR. No murmurs, clicks gallops or rub  Respiratory:Lungs clear  Psychiatric: mentation appears normal and affect normal/bright  SKIN: no suspicious lesions or rashes  JOINT/EXTREMITIES: +2 LE edema,  OA of knees and hands.          Orders placed or performed in visit on 12/06/17     gabapentin (NEURONTIN) 300 MG capsule       Assessment and Plan:    (E11.40) Type 2 diabetes mellitus with diabetic neuropathy, unspecified long term insulin use status (H)  (primary encounter diagnosis)  Comment: Trial of Neurontin    Plan: gabapentin (NEURONTIN) 300 MG capsule    (E03.8) Other specified hypothyroidism  Comment: Repeat tests today.  If remains over replaced consider dropping Synthroid to 325 mcg PO daily.    Plan: TSH, T4, free    (M79.645) Thumb pain, left  Comment: Osteoarthritis of left thumb.    Plan: Brace prn if desired.      (M25.562,  G89.29) Chronic pain of left knee  Comment: See below.  Currently schedule for knee replacement classes.    Plan: See below-Mobility impaired.      (Z74.09) Mobility impaired  Comment: Wife questions need for " wheelchair.    Plan: Discussed the fact that I would prefer he have surgery and try his best to maintain mobility as if he become more wheelchair dependent he will be more deconditioned affecting his DM and ability to participate in Rehab.      40 minutes was spent on this patient's care today.  Greater than 50% of the time was spent face to face with the patient and his wife.  Numerous questions were answered regarding knee pain, left thumb pain and need for thumb brace and need for knee surgery.  In addition we discussed trial of Neurontin.  We also discussed the recent labs that were completed in detail and lastly discussed his Thyroid testing and current Synthroid dosing.         You Bethea, DO

## 2017-12-06 NOTE — MR AVS SNAPSHOT
After Visit Summary   12/6/2017    Gunnar Granados    MRN: [id number]           Patient Information     Date Of Birth          1950        Visit Information        Provider Department      12/6/2017 2:00 PM You Bethea DO [facility]        Today's Diagnoses     Type 2 diabetes mellitus with diabetic neuropathy, unspecified long term insulin use status (H)    -  1    Other specified hypothyroidism          Care Instructions    Repeat thyroid tests today  Trial of Neurontin 300 mg PO three times daily            Follow-ups after your visit        Your next 10 appointments already scheduled     May 15, 2018  9:00 AM CDT   (Arrive by 8:45 AM)   Return Visit with Mateusz Dupree MD   [facility] ([facility] )    [address]  [facility] [address] [phone]              Who to contact     If you have questions or need follow up information about today's clinic visit or your schedule please contact [facility] directly at [phone].  Normal or non-critical lab and imaging results will be communicated to you by FanKavehart, letter or phone within 4 business days after the clinic has received the results. If you do not hear from us within 7 days, please contact the clinic through FanKavehart or phone. If you have a critical or abnormal lab result, we will notify you by phone as soon as possible.  Submit refill requests through ezNetPay or call your pharmacy and they will forward the refill request to us. Please allow 3 business days for your refill to be completed.          Additional Information About Your Visit        FanKavehart Information     ezNetPay gives you secure access to your electronic health record. If you see a primary care provider, you can also send messages to your care team and make appointments. If you have questions, please call your primary care clinic.  If you do not have a primary care  "provider, please call [phone] and they will assist you.        Care EveryWhere ID     This is your Care EveryWhere ID. This could be used by other organizations to access your [facility] medical records  [id number]        Your Vitals Were     Pulse Temperature Height Pulse Oximetry BMI (Body Mass Index)       80 98.6  F (37  C) (Tympanic) 6' 2\" (1.88 m) 96% 41.21 kg/m2        Blood Pressure from Last 3 Encounters:   12/06/17 126/68   11/28/17 117/64   11/07/17 128/62    Weight from Last 3 Encounters:   12/06/17 (!) 321 lb (145.6 kg)   11/28/17 (!) 321 lb (145.6 kg)   05/09/17 (!) 322 lb (146.1 kg)              We Performed the Following     T4, free     TSH          Today's Medication Changes          These changes are accurate as of: 12/6/17  2:38 PM.  If you have any questions, ask your nurse or doctor.               Start taking these medicines.        Dose/Directions    gabapentin 300 MG capsule   Commonly known as:  NEURONTIN   Used for:  Type 2 diabetes mellitus with diabetic neuropathy, unspecified long term insulin use status (H)   Started by:  You Bethea DO        Dose:  300 mg   Take 1 capsule (300 mg) by mouth 3 times daily   Quantity:  90 capsule   Refills:  1         These medicines have changed or have updated prescriptions.        Dose/Directions    * levothyroxine 300 MCG tablet   Commonly known as:  SYNTHROID/LEVOTHROID   This may have changed:  Another medication with the same name was changed. Make sure you understand how and when to take each.   Used for:  Hypothyroidism due to acquired atrophy of thyroid   Changed by:  You Bethea DO        Dose:  300 mcg   Take 1 tablet (300 mcg) by mouth daily   Quantity:  90 tablet   Refills:  3       * levothyroxine 100 MCG tablet   Commonly known as:  SYNTHROID/LEVOTHROID   This may have changed:  additional instructions   Used for:  Hypothyroidism due to acquired atrophy of thyroid   Changed by:  You Bethea DO     "    Dose:  100 mcg   Take 1 tablet (100 mcg) by mouth daily   Quantity:  90 tablet   Refills:  1       * Notice:  This list has 2 medication(s) that are the same as other medications prescribed for you. Read the directions carefully, and ask your doctor or other care provider to review them with you.         Where to get your medicines      These medications were sent to [facility] - [place], [place] - [address]  [address] [place] [address]     Phone:  [phone]     gabapentin 300 MG capsule                Primary Care Provider Office Phone # Fax #    You ABDI Bethea, [phone]       [facility] [address]  [place] [address]        Equal Access to Services     VICKI RASHEED : Hadii lali mejiao Sophan, waaxda luqadaha, qaybta kaalmada adeegyada, jacob gonzalez. So [facility] [phone].    ATENCIÓN: Si habla español, tiene a basurto disposición servicios gratuitos de asistencia lingüística. Llame al [phone].    We comply with applicable federal civil rights laws and [place] laws. We do not discriminate on the basis of race, color, national origin, age, disability, sex, sexual orientation, or gender identity.            Thank you!     Thank you for choosing [facility]  for your care. Our goal is always to provide you with excellent care. Hearing back from our patients is one way we can continue to improve our services. Please take a few minutes to complete the written survey that you may receive in the mail after your visit with us. Thank you!             Your Updated Medication List - Protect others around you: Learn how to safely use, store and throw away your medicines at www.disposemymeds.org.          This list is accurate as of: 12/6/17  2:38 PM.  Always use your most recent med list.                   Brand Name Dispense Instructions for use Diagnosis    ALLOPURINOL PO      Take 300 mg by mouth Take one tablet daily         blood glucose monitoring lancets     1 Box    Use to test blood sugar 2 times daily or as directed.    Type 2 diabetes mellitus with diabetic neuropathy (H)       cholecalciferol 200 unit Tabs half-tab    vitamin D     Take 2,000 Int'l Units by mouth        gabapentin 300 MG capsule    NEURONTIN    90 capsule    Take 1 capsule (300 mg) by mouth 3 times daily    Type 2 diabetes mellitus with diabetic neuropathy, unspecified long term insulin use status (H)       insulin glargine 100 UNIT/ML injection    LANTUS    30 mL    Inject 18 Units Subcutaneous At Bedtime 18 units daily    Type 2 diabetes mellitus with complication, unspecified long term insulin use status (H)       insulin pen needle 32G X 5 MM    NOVOTWIST    100 each    FOR USE WITH VICTOZA PEN    Type 2 diabetes mellitus with diabetic neuropathy, with long-term current use of insulin (H)       * levothyroxine 300 MCG tablet    SYNTHROID/LEVOTHROID    90 tablet    Take 1 tablet (300 mcg) by mouth daily    Hypothyroidism due to acquired atrophy of thyroid       * levothyroxine 100 MCG tablet    SYNTHROID/LEVOTHROID    90 tablet    Take 1 tablet (100 mcg) by mouth daily    Hypothyroidism due to acquired atrophy of thyroid       metFORMIN 1000 MG tablet    GLUCOPHAGE    180 tablet    TAKE 1 TABLET (1,000 MG) BY MOUTH 2 TIMES DAILY (WITH MEALS)    Type 2 diabetes mellitus with diabetic neuropathy, unspecified long term insulin use status (H)       metoprolol 100 MG 24 hr tablet    TOPROL-XL    30 tablet    Take 1 tablet (100 mg) by mouth daily    Benign essential hypertension       MULTI VITAMIN PO      Take 1 Tab by mouth one time a day.        naproxen 500 MG tablet    NAPROSYN    60 tablet    Take 1 tablet (500 mg) by mouth 2 times daily as needed for moderate pain    Chronic pain of both knees       order for DME     1 Units    Equipment being ordered: Left Rebecca Splint    Primary osteoarthritis of both hands       * pantoprazole 40 MG EC tablet    PROTONIX     90 tablet    TAKE 1 TABLET(40 MG) BY MOUTH DAILY 30 TO 60 MINUTES BEFORE A MEAL    Gastroesophageal reflux disease without esophagitis       * pantoprazole 40 MG EC tablet    PROTONIX    90 tablet    TAKE 1 TABLET(40 MG) BY MOUTH DAILY 30 TO 60 MINUTES BEFORE A MEAL    Gastroesophageal reflux disease without esophagitis       POTASSIUM CITRATE PO      Take 20 mEq by mouth Take one tablet twice daily        QUEtiapine 50 MG tablet    SEROquel    135 tablet    TAKE 1/2 - 1 TABLET BY MOUTH AT BEDTIME    Insomnia, unspecified type       STATIN NOT PRESCRIBED (INTENTIONAL)      Please choose reason not prescribed, below    Type 2 diabetes mellitus with complication, unspecified long term insulin use status (H)       * torsemide suspension    DEMADEX     Take 40 mg by mouth        * torsemide 20 MG tablet    DEMADEX    120 tablet    Take 2 tablets (40 mg) by mouth 2 times daily    Benign essential hypertension       VICTOZA PEN 18 MG/3ML soln   Generic drug:  liraglutide     6 mL    INJECT 1.2 MG SUBCUTANEOUS DAILY 18MG/3ML PREFILLED PEN    Type 2 diabetes mellitus with hyperglycemia, with long-term current use of insulin (H)       * Notice:  This list has 6 medication(s) that are the same as other medications prescribed for you. Read the directions carefully, and ask your doctor or other care provider to review them with you.

## 2017-12-07 LAB
T4 FREE SERPL-MCNC: 1.69 NG/DL (ref 0.76–1.46)
TSH SERPL DL<=0.005 MIU/L-ACNC: 0.18 MU/L (ref 0.4–4)

## 2017-12-07 ASSESSMENT — ANXIETY QUESTIONNAIRES: GAD7 TOTAL SCORE: 0

## 2017-12-13 DIAGNOSIS — K21.9 GASTROESOPHAGEAL REFLUX DISEASE WITHOUT ESOPHAGITIS: ICD-10-CM

## 2017-12-14 RX ORDER — PANTOPRAZOLE SODIUM 40 MG/1
TABLET, DELAYED RELEASE ORAL
Qty: 90 TABLET | Refills: 3 | Status: SHIPPED | OUTPATIENT
Start: 2017-12-14 | End: 2018-09-17

## 2018-01-26 DIAGNOSIS — E11.40 TYPE 2 DIABETES MELLITUS WITH DIABETIC NEUROPATHY (H): ICD-10-CM

## 2018-01-26 RX ORDER — LANCETS
EACH MISCELLANEOUS
Qty: 102 EACH | Refills: 4 | Status: SHIPPED | OUTPATIENT
Start: 2018-01-26 | End: 2018-11-01

## 2018-02-14 DIAGNOSIS — E03.4 HYPOTHYROIDISM DUE TO ACQUIRED ATROPHY OF THYROID: ICD-10-CM

## 2018-02-14 DIAGNOSIS — I10 BENIGN ESSENTIAL HYPERTENSION: ICD-10-CM

## 2018-02-14 DIAGNOSIS — G47.00 INSOMNIA, UNSPECIFIED TYPE: ICD-10-CM

## 2018-02-14 RX ORDER — LEVOTHYROXINE SODIUM 300 UG/1
TABLET ORAL
Qty: 90 TABLET | Refills: 0 | Status: SHIPPED | OUTPATIENT
Start: 2018-02-14 | End: 2018-08-29

## 2018-02-14 NOTE — TELEPHONE ENCOUNTER
Metoprolol      Last Written Prescription Date:  11/22/2017  Last Fill Quantity: 30,   # refills: 5  Last Office Visit: 12/06/2017  Future Office visit:    Next 5 appointments (look out 90 days)     May 15, 2018  9:00 AM CDT   (Arrive by 8:45 AM)   Return Visit with Mateusz Dupree MD   [facility] ([facility] )    [address]  [place] [address]   [phone]                 Seroquel      Last Written Prescription Date:  10/16/2017  Last Fill Quantity: 135,   # refills: 0  Last Office Visit: 12/06/2017  /Future Office visit:    /Next 5 appointments (look out 90 days)     May 15, 2018  9:00 AM CDT   (Arrive by 8:45 AM)   Return Visit with Mateusz Dupree MD   [facility] ([facility] )    [address]  [place] [address]   [phone]

## 2018-02-14 NOTE — TELEPHONE ENCOUNTER
Pharmacy request for Synthroid 300mcg.Current Epic order is for 100mcg. Last TSH and office visit on 12.6.17.Medication pended per pharmacy request.Thank you.

## 2018-02-16 RX ORDER — METOPROLOL SUCCINATE 100 MG/1
TABLET, EXTENDED RELEASE ORAL
Qty: 30 TABLET | Refills: 5 | Status: SHIPPED | OUTPATIENT
Start: 2018-02-16 | End: 2018-08-03

## 2018-02-16 RX ORDER — QUETIAPINE FUMARATE 50 MG/1
TABLET, FILM COATED ORAL
Qty: 90 TABLET | Refills: 0 | Status: SHIPPED | OUTPATIENT
Start: 2018-02-16 | End: 2018-05-18

## 2018-02-19 DIAGNOSIS — Z79.4 TYPE 2 DIABETES MELLITUS WITH HYPERGLYCEMIA, WITH LONG-TERM CURRENT USE OF INSULIN (H): ICD-10-CM

## 2018-02-19 DIAGNOSIS — E11.65 TYPE 2 DIABETES MELLITUS WITH HYPERGLYCEMIA, WITH LONG-TERM CURRENT USE OF INSULIN (H): ICD-10-CM

## 2018-02-19 NOTE — TELEPHONE ENCOUNTER
Victoza  Last Written Prescription Date: 11/21/17  Last Fill Quantity: 6ml # of Refills: 1  Last Office Visit: 12/6/17

## 2018-02-21 RX ORDER — LIRAGLUTIDE 6 MG/ML
INJECTION SUBCUTANEOUS
Qty: 6 ML | Refills: 0 | Status: SHIPPED | OUTPATIENT
Start: 2018-02-21 | End: 2018-02-23

## 2018-02-23 DIAGNOSIS — E11.8 TYPE 2 DIABETES MELLITUS WITH COMPLICATION, UNSPECIFIED LONG TERM INSULIN USE STATUS: ICD-10-CM

## 2018-02-23 DIAGNOSIS — Z79.4 TYPE 2 DIABETES MELLITUS WITH HYPERGLYCEMIA, WITH LONG-TERM CURRENT USE OF INSULIN (H): ICD-10-CM

## 2018-02-23 DIAGNOSIS — E11.65 TYPE 2 DIABETES MELLITUS WITH HYPERGLYCEMIA, WITH LONG-TERM CURRENT USE OF INSULIN (H): ICD-10-CM

## 2018-02-23 RX ORDER — LIRAGLUTIDE 6 MG/ML
INJECTION SUBCUTANEOUS
Qty: 18 ML | Refills: 0 | Status: SHIPPED | OUTPATIENT
Start: 2018-02-23 | End: 2018-05-18

## 2018-02-23 NOTE — TELEPHONE ENCOUNTER
insulin glargine (LANTUS) 100 UNIT/ML injection     Last Written Prescription Date:  7/14/17  Last Fill Quantity: 30ml,   # refills: 3  Last Office Visit: 12/6/17  Future Office visit:    Next 5 appointments (look out 90 days)     May 15, 2018  9:00 AM CDT   (Arrive by 8:45 AM)   Return Visit with Mateusz Dupree MD   [facility] ([facility] )    [address] [place] [address] [address]  [facility] [id number] [phone]

## 2018-02-23 NOTE — PROGRESS NOTES
Received a message from [employer].     Patient wants a 90 day supply.     Order sent.      Ginger ALANIS [facility]  Diabetes and Wound Care

## 2018-04-23 ENCOUNTER — MYC MEDICAL ADVICE (OUTPATIENT)
Dept: INTERNAL MEDICINE | Facility: OTHER | Age: 68
End: 2018-04-23

## 2018-04-23 DIAGNOSIS — E87.6 HYPOKALEMIA: Primary | ICD-10-CM

## 2018-04-23 DIAGNOSIS — E11.40 TYPE 2 DIABETES MELLITUS WITH DIABETIC NEUROPATHY, UNSPECIFIED LONG TERM INSULIN USE STATUS: ICD-10-CM

## 2018-04-23 RX ORDER — POTASSIUM CHLORIDE 1500 MG/1
20 TABLET, EXTENDED RELEASE ORAL DAILY
Qty: 90 TABLET | Refills: 3 | Status: SHIPPED | OUTPATIENT
Start: 2018-04-23 | End: 2018-04-23

## 2018-04-23 RX ORDER — POTASSIUM CHLORIDE 1500 MG/1
20 TABLET, EXTENDED RELEASE ORAL
Qty: 90 TABLET | Refills: 3 | Status: SHIPPED | OUTPATIENT
Start: 2018-04-23 | End: 2018-09-19

## 2018-04-23 NOTE — TELEPHONE ENCOUNTER
It is twice a day per patient-called and updated patient that is sent to pharmacy.Re-pended.Thank you

## 2018-04-24 NOTE — TELEPHONE ENCOUNTER
gabapentin (NEURONTIN) 300 MG capsule     Last Written Prescription Date:  12/6/17  Last Fill Quantity: 90,   # refills: 1  Last Office Visit: 12/6/17  Future Office visit:    Next 5 appointments (look out 90 days)     May 15, 2018  9:00 AM CDT   (Arrive by 8:45 AM)   Return Visit with Mateusz Dupree MD   [facility] ([facility] )    [address]  [place] [address] [phone]

## 2018-04-25 RX ORDER — GABAPENTIN 300 MG/1
CAPSULE ORAL
Qty: 90 CAPSULE | Refills: 0 | Status: SHIPPED | OUTPATIENT
Start: 2018-04-25 | End: 2018-06-06

## 2018-05-15 ENCOUNTER — OFFICE VISIT (OUTPATIENT)
Dept: ORTHOPEDICS | Facility: OTHER | Age: 68
End: 2018-05-15
Attending: ORTHOPAEDIC SURGERY
Payer: MEDICARE

## 2018-05-15 VITALS
DIASTOLIC BLOOD PRESSURE: 62 MMHG | SYSTOLIC BLOOD PRESSURE: 118 MMHG | TEMPERATURE: 97.3 F | RESPIRATION RATE: 18 BRPM | HEART RATE: 80 BPM | HEIGHT: 74 IN | OXYGEN SATURATION: 94 %

## 2018-05-15 DIAGNOSIS — M72.0 DUPUYTREN CONTRACTURE: ICD-10-CM

## 2018-05-15 DIAGNOSIS — M19.042 PRIMARY OSTEOARTHRITIS OF BOTH HANDS: Primary | ICD-10-CM

## 2018-05-15 DIAGNOSIS — M19.041 PRIMARY OSTEOARTHRITIS OF BOTH HANDS: Primary | ICD-10-CM

## 2018-05-15 DIAGNOSIS — M17.32 POST-TRAUMATIC OSTEOARTHRITIS OF LEFT KNEE: ICD-10-CM

## 2018-05-15 DIAGNOSIS — M17.11 PRIMARY OSTEOARTHRITIS OF RIGHT KNEE: ICD-10-CM

## 2018-05-15 PROCEDURE — G0463 HOSPITAL OUTPT CLINIC VISIT: HCPCS | Mod: 25

## 2018-05-15 PROCEDURE — 20610 DRAIN/INJ JOINT/BURSA W/O US: CPT | Mod: 50 | Performed by: ORTHOPAEDIC SURGERY

## 2018-05-15 PROCEDURE — 99213 OFFICE O/P EST LOW 20 MIN: CPT | Mod: 25 | Performed by: ORTHOPAEDIC SURGERY

## 2018-05-15 ASSESSMENT — PAIN SCALES - GENERAL: PAINLEVEL: EXTREME PAIN (9)

## 2018-05-15 NOTE — MR AVS SNAPSHOT
After Visit Summary   5/15/2018    Gunnar Granados    MRN: [id number]           Patient Information     Date Of Birth          1950        Visit Information        Provider Department      5/15/2018 9:00 AM Mateusz Dupree MD [facility]        Today's Diagnoses     Primary osteoarthritis of both hands    -  1    Dupuytren contracture        Primary osteoarthritis of right knee        Post-traumatic osteoarthritis of left knee           Follow-ups after your visit        Additional Services     ORTHOPEDICS ADULT REFERRAL       Your provider has referred you to: Dr Rivas of [facility]    Please be aware that coverage of these services is subject to the terms and limitations of your health insurance plan.  Call member services at your health plan with any benefit or coverage questions.      Please bring the following to your appointment:    >>   Any x-rays, CTs or MRIs which have been performed.  Contact the facility where they were done to arrange for  prior to your scheduled appointment.    >>   List of current medications   >>   This referral request   >>   Any documents/labs given to you for this referral                  Follow-up notes from your care team     Return in about 6 months (around 11/15/2018).      Your next 10 appointments already scheduled     Nov 20, 2018 10:00 AM CST   (Arrive by 9:45 AM)   Return Visit with Mateusz Dupree MD   [facility] ([facility]    [address]  [place] [address] [phone]              Who to contact     If you have questions or need follow up information about today's clinic visit or your schedule please contact [facility] directly at [phone].  Normal or non-critical lab and imaging results will be communicated to you by MyChart, letter or phone within 4 business days after the clinic has received the results. If you do not hear from  "us within 7 days, please contact the clinic through ePod Solar or phone. If you have a critical or abnormal lab result, we will notify you by phone as soon as possible.  Submit refill requests through [employer] Solar or call your pharmacy and they will forward the refill request to us. Please allow 3 business days for your refill to be completed.          Additional Information About Your Visit        [email or web address] Information     [employer] gives you secure access to your electronic health record. If you see a primary care provider, you can also send messages to your care team and make appointments. If you have questions, please call your primary care clinic.  If you do not have a primary care provider, please call [phone] and they will assist you.        Care EveryWhere ID     This is your Care EveryWhere ID. This could be used by other organizations to access your [facility] medical records  [id number]        Your Vitals Were     Pulse Temperature Respirations Height Pulse Oximetry       80 97.3  F (36.3  C) (Tympanic) 18 6' 1.5\" (1.867 m) 94%        Blood Pressure from Last 3 Encounters:   05/15/18 118/62   12/06/17 126/68   11/28/17 117/64    Weight from Last 3 Encounters:   12/06/17 (!) 321 lb (145.6 kg)   11/28/17 (!) 321 lb (145.6 kg)   05/09/17 (!) 322 lb (146.1 kg)              We Performed the Following     Large Joint/Bursa injection and/or drainage (Shoulder, Knee)     ORTHOPEDICS ADULT REFERRAL          Today's Medication Changes          These changes are accurate as of 5/15/18 10:00 AM.  If you have any questions, ask your nurse or doctor.               Start taking these medicines.        Dose/Directions    hylan 48 MG/6ML injection   Commonly known as:  SYNVISC ONE   Used for:  Primary osteoarthritis of right knee, Post-traumatic osteoarthritis of left knee   Started by:  Mateusz Dupree MD        Dose:  16 mg   2 mLs (16 mg) by INTRA-ARTICULAR route once for 1 dose   Quantity:  4 mL   Refills:  0       "   These medicines have changed or have updated prescriptions.        Dose/Directions    * levothyroxine 100 MCG tablet   Commonly known as:  SYNTHROID/LEVOTHROID   This may have changed:  additional instructions   Used for:  Hypothyroidism due to acquired atrophy of thyroid        Dose:  100 mcg   Take 1 tablet (100 mcg) by mouth daily   Quantity:  90 tablet   Refills:  1       * levothyroxine 300 MCG tablet   Commonly known as:  SYNTHROID/LEVOTHROID   This may have changed:  Another medication with the same name was changed. Make sure you understand how and when to take each.   Used for:  Hypothyroidism due to acquired atrophy of thyroid        TAKE 1 TABLET BY MOUTH EVERY DAY   Quantity:  90 tablet   Refills:  0       * Notice:  This list has 2 medication(s) that are the same as other medications prescribed for you. Read the directions carefully, and ask your doctor or other care provider to review them with you.         Where to get your medicines      Some of these will need a paper prescription and others can be bought over the counter.  Ask your nurse if you have questions.     You don't need a prescription for these medications     hylan 48 MG/6ML injection                Primary Care Provider Office Phone # Fax #    You Bethea,  [phone]       [address] [place] [address]        Equal Access to Services     [facility] KATHYA : Hadii lali Bennett, waderrickda praveena, qamargotta kaalhenry chavez, jacob gonzalez. So [facility] [phone].    ATENCIÓN: Si habla español, tiene a basurto disposición servicios gratuitos de asistencia lingüística. [facility] [phone].    We comply with applicable federal civil rights laws and [place] laws. We do not discriminate on the basis of race, color, national origin, age, disability, sex, sexual orientation, or gender identity.            Thank you!     Thank you for choosing [facility]  for your  care. Our goal is always to provide you with excellent care. Hearing back from our patients is one way we can continue to improve our services. Please take a few minutes to complete the written survey that you may receive in the mail after your visit with us. Thank you!             Your Updated Medication List - Protect others around you: Learn how to safely use, store and throw away your medicines at www.disposemymeds.org.          This list is accurate as of 5/15/18 10:00 AM.  Always use your most recent med list.                   Brand Name Dispense Instructions for use Diagnosis    ALLOPURINOL PO      Take 300 mg by mouth Take one tablet daily        blood glucose monitoring lancets     102 each    USE TO TEST BLOOD SUGAR TWICE A DAY OR AS DIRECTED    Type 2 diabetes mellitus with diabetic neuropathy (H)       cholecalciferol 200 unit Tabs half-tab    vitamin D     Take 2,000 Int'l Units by mouth        gabapentin 300 MG capsule    NEURONTIN    90 capsule    TAKE 1 CAPSULE (300 MG) BY MOUTH 3 TIMES DAILY    Type 2 diabetes mellitus with diabetic neuropathy, unspecified long term insulin use status (H)       hylan 48 MG/6ML injection    SYNVISC ONE    4 mL    2 mLs (16 mg) by INTRA-ARTICULAR route once for 1 dose    Primary osteoarthritis of right knee, Post-traumatic osteoarthritis of left knee       insulin glargine 100 UNIT/ML injection    LANTUS    30 mL    Inject 18 Units Subcutaneous At Bedtime 18 units daily    Type 2 diabetes mellitus with complication, unspecified long term insulin use status (H)       insulin pen needle 32G X 5 MM    NOVOTWIST    100 each    FOR USE WITH VICTOZA PEN    Type 2 diabetes mellitus with diabetic neuropathy, with long-term current use of insulin (H)       * levothyroxine 100 MCG tablet    SYNTHROID/LEVOTHROID    90 tablet    Take 1 tablet (100 mcg) by mouth daily    Hypothyroidism due to acquired atrophy of thyroid       * levothyroxine 300 MCG tablet    SYNTHROID/LEVOTHROID     90 tablet    TAKE 1 TABLET BY MOUTH EVERY DAY    Hypothyroidism due to acquired atrophy of thyroid       liraglutide 18 MG/3ML soln    VICTOZA PEN    18 mL    INJECT 1.2 MG SUBCUTANEOUS DAILY 18MG/3ML PREFILLED PEN    Type 2 diabetes mellitus with hyperglycemia, with long-term current use of insulin (H)       metFORMIN 1000 MG tablet    GLUCOPHAGE    180 tablet    TAKE 1 TABLET (1,000 MG) BY MOUTH 2 TIMES DAILY (WITH MEALS)    Type 2 diabetes mellitus with diabetic neuropathy, unspecified long term insulin use status (H)       metoprolol succinate 100 MG 24 hr tablet    TOPROL-XL    30 tablet    TAKE 1 TABLET (100 MG) BY MOUTH DAILY    Benign essential hypertension       MULTI VITAMIN PO      Take 1 Tab by mouth one time a day.        naproxen 500 MG tablet    NAPROSYN    60 tablet    Take 1 tablet (500 mg) by mouth 2 times daily as needed for moderate pain    Chronic pain of both knees       order for DME     1 Units    Equipment being ordered: Left Rebecca Splint    Primary osteoarthritis of both hands       * pantoprazole 40 MG EC tablet    PROTONIX    90 tablet    TAKE 1 TABLET(40 MG) BY MOUTH DAILY 30 TO 60 MINUTES BEFORE A MEAL    Gastroesophageal reflux disease without esophagitis       * pantoprazole 40 MG EC tablet    PROTONIX    90 tablet    TAKE 1 TABLET BY MOUTH EVERY DAY 30-60 MIN BEFORE A MEAL    Gastroesophageal reflux disease without esophagitis       Potassium Chloride ER 20 MEQ Tbcr     90 tablet    Take 1 tablet (20 mEq) by mouth 2 times daily    Hypokalemia       POTASSIUM CITRATE PO      Take 20 mEq by mouth Take one tablet twice daily        QUEtiapine 50 MG tablet    SEROquel    90 tablet    TAKE 1/2 - 1 TABLET BY MOUTH AT BEDTIME    Insomnia, unspecified type       STATIN NOT PRESCRIBED (INTENTIONAL)      Please choose reason not prescribed, below    Type 2 diabetes mellitus with complication, unspecified long term insulin use status (H)       * torsemide suspension    DEMADEX     Take 40  mg by mouth        * torsemide 20 MG tablet    DEMADEX    120 tablet    Take 2 tablets (40 mg) by mouth 2 times daily    Benign essential hypertension       * Notice:  This list has 6 medication(s) that are the same as other medications prescribed for you. Read the directions carefully, and ask your doctor or other care provider to review them with you.

## 2018-05-15 NOTE — PROGRESS NOTES
Chief complaints:  #1.  Bilateral DJD of the knees  #2.  Bilateral DJD thumb CMC joints  #3.  Recurrent Dupuytren's contracture both hands    Patient profile: 68-year-old right-handed morbidly obese non-smoking insulin-dependent diabetic, retired , patient of Dr. Btehea.    Bilateral knees: Gunnar injured his left knee while deer hunting in 1989.  The injury required an arthroscopic procedure which included a patellar chondroplasty.  Subsequently he had progressively increasing bilateral knee pain.  Before presenting here for the first time on 5/9/2017 he saw Dr. Esparza who treated him on 2 occasions 6 months apart with bilateral Synvisc injections, and oral naproxen, with good results.  Upon presentation here on 5/9/2017 x-ray showed mild to moderate DJD involving all 3 compartments of each knee.  I repeated the Synvisc injections.  6 months later the benefits of the injection had worn off and both knees were injected again, this time with Gel 1 as the patient was beginning to consider TKA.  Today he reports that the Gel 1 injections did not help as much as the Synvisc injections did.  Since seen last he attended the joint class of the hospital.  At this point he states he is not ready to commit to knee replacement.  He would like both knees injected again, this time with Synvisc.  He understands that Synvisc has a significant risk of postinjection synovitis which can be minimized with steroid but the steroid causes blood sugar spikes and, due to localized immune system impairment would prevent him from having knee replacements for the next 6 months.  He stated an understanding.    Bilateral hands: He is status post fifth finger Dupuytren's cord excisions from the fifth ray of both hands by Dr. Rivas in the past.  The disease has recurred in both sides.  He is interested in having the surgery repeated.    In addition, when I saw him last on 11/28/2017, he was complaining of bilateral thumb CMC  joint pain.  X-ray showed arthritis at both joints.  I prescribed a column splint which he states did not help.    With respect to his musculoskeletal and neurologic review of systems, he claims that his legs are becoming noticeably weaker as time goes by.  He states he ambulates very little.  He is unwilling, however, to go to physical therapy.    Examination: Morbidly obese (BMI 41) male in a wheelchair in no obvious distress.  His vital signs are stable and he is afebrile.  His respiratory efforts are unlabored.  Neither knee has an effusion.  Both hands have step-off deformities at the CMC joint of the thumb.  Both hands have pretendinous cords running from the palm into the fifth finger under surgical scars.  The neurovascular status of both fifth fingers is intact.    Impression:  #1.  Posttraumatic DJD left knee  #2.  Primary osteoarthritis right knee  #3.  Primary osteoarthritis bilateral thumb CMC joints  #4.  Constrict fracture fifth finger both hands, recurrent  #5.  Generalized deconditioning    Plan:  #1.  Both knees will be injected with Synvisc and steroid.  He will return in 6 months to see if he is in need of repeat injections. Today's injection were the fourth Visco supplementation in each knee.  #2.  He was referred back to Dr. Rivas for surgical consideration for his recurrent bilateral Dupuytren's disease, and possibly for simultaneous procedures on the thumb CMC joints.  #3.  I offered to refer him to physical therapy but he declined stating it is too hard for him to get there.  He is not motivated to exercise on his own.    Procedure: After obtaining written informed consent both knees were sterilely prepped.  A timeout was held.  The skin over each knee was anesthetized with topical ethyl chloride spray followed by 1% Xylocaine without epinephrine.  Each knee was injected with 6 mL of Synvisc 1 and 4 mg of dexamethasone through a superolateral approach.  The needles were removed and  Band-Aids were applied.  The patient tolerated the procedure well and there were no complications.  The dexamethasone was to prevent a Synvisc synovitis which can occur after 15-20% of Synvisc 1 injections.

## 2018-05-15 NOTE — NURSING NOTE
"Chief Complaint   Patient presents with     Musculoskeletal Problem     bilateral knee Gel 1 injection today and follow up on bilateral hand pain       Initial /62 (BP Location: Right arm, Patient Position: Sitting, Cuff Size: Adult Large)  Pulse 80  Temp 97.3  F (36.3  C) (Tympanic)  Resp 18  Ht 6' 1.5\" (1.867 m)  SpO2 94% Estimated body mass index is 41.21 kg/(m^2) as calculated from the following:    Height as of 12/6/17: 6' 2\" (1.88 m).    Weight as of 12/6/17: 321 lb (145.6 kg).  Medication Reconciliation: complete    Suzie Luna LPN    "

## 2018-05-23 DIAGNOSIS — G47.00 INSOMNIA, UNSPECIFIED TYPE: ICD-10-CM

## 2018-05-23 RX ORDER — QUETIAPINE FUMARATE 50 MG/1
TABLET, FILM COATED ORAL
Qty: 90 TABLET | Refills: 3 | Status: SHIPPED | OUTPATIENT
Start: 2018-05-23 | End: 2019-05-21

## 2018-05-23 NOTE — TELEPHONE ENCOUNTER
"Pt states he would like a 360 day supply of all medications. He would like a prescription sent to [facility] for Seroquel for 1 year. Note to pharmacy should read- to \"put on file not to be filled at this time\" per pt. Ginger Small LPN    "

## 2018-06-05 ENCOUNTER — TRANSFERRED RECORDS (OUTPATIENT)
Dept: HEALTH INFORMATION MANAGEMENT | Facility: CLINIC | Age: 68
End: 2018-06-05

## 2018-06-05 DIAGNOSIS — E11.40 TYPE 2 DIABETES MELLITUS WITH DIABETIC NEUROPATHY, UNSPECIFIED LONG TERM INSULIN USE STATUS: ICD-10-CM

## 2018-06-05 RX ORDER — GABAPENTIN 300 MG/1
CAPSULE ORAL
Qty: 90 CAPSULE | Refills: 0 | Status: CANCELLED | OUTPATIENT
Start: 2018-06-05

## 2018-06-05 NOTE — TELEPHONE ENCOUNTER
Gabapentin  Last Written Prescription Date: 4/25/18  Last Fill Quantity: 90 # of Refills: 0  Last Office Visit: 12/6/17  Pt would like 3 month supply

## 2018-06-06 ENCOUNTER — TELEPHONE (OUTPATIENT)
Dept: INTERNAL MEDICINE | Facility: OTHER | Age: 68
End: 2018-06-06

## 2018-06-06 DIAGNOSIS — E11.40 TYPE 2 DIABETES MELLITUS WITH DIABETIC NEUROPATHY, UNSPECIFIED LONG TERM INSULIN USE STATUS: ICD-10-CM

## 2018-06-06 RX ORDER — GABAPENTIN 300 MG/1
300 CAPSULE ORAL 3 TIMES DAILY
Qty: 270 CAPSULE | Refills: 3 | Status: SHIPPED | OUTPATIENT
Start: 2018-06-06 | End: 2019-03-07

## 2018-06-06 NOTE — TELEPHONE ENCOUNTER
"Received voicemail from patient stating he needs a refill of his gabapentin. Message states he called refill to [facility] and also states someone from [place] called him and said it was ready. Pt states \"ball was dropped again\" with the refills. Patient also states \"I don't care what the policy is down the cities with [place] but I am willing to work with you but this will not work if this is only one sided.\" Ginger Small LPN    "

## 2018-06-06 NOTE — TELEPHONE ENCOUNTER
Patient has been advised of refill process 3 times before, I remain getting angered voice mails from patient almost monthly. Supervisors have been notified of this issue twice before. Ginger Small LPN

## 2018-06-27 DIAGNOSIS — I10 BENIGN ESSENTIAL HYPERTENSION: ICD-10-CM

## 2018-06-27 RX ORDER — TORSEMIDE 20 MG/1
40 TABLET ORAL 2 TIMES DAILY
Qty: 120 TABLET | Refills: 3 | Status: SHIPPED | OUTPATIENT
Start: 2018-06-27 | End: 2018-07-10

## 2018-07-09 ENCOUNTER — TELEPHONE (OUTPATIENT)
Dept: INTERNAL MEDICINE | Facility: OTHER | Age: 68
End: 2018-07-09

## 2018-07-09 NOTE — TELEPHONE ENCOUNTER
1:53 PM    Reason for Call: Phone Call    Description: Marcos from Dr Chilel's office called and would like to get the office notes from pt's last visit. She stated it just needs to be within the past year. She would like this faxed to them at [phone]. If you have any questions you can reach her back at [phone]    Was an appointment offered for this call? No  If yes : Appointment type              Date    Preferred method for responding to this message: Telephone Call  What is your phone number ?    If we cannot reach you directly, may we leave a detailed response at the number you provided? Yes    Can this message wait until your PCP/provider returns, if available today? Not applicable    Jenny Grimes

## 2018-07-10 ENCOUNTER — TELEPHONE (OUTPATIENT)
Dept: INTERNAL MEDICINE | Facility: OTHER | Age: 68
End: 2018-07-10

## 2018-07-10 DIAGNOSIS — I10 BENIGN ESSENTIAL HYPERTENSION: ICD-10-CM

## 2018-07-10 DIAGNOSIS — E11.40 TYPE 2 DIABETES MELLITUS WITH DIABETIC NEUROPATHY, UNSPECIFIED LONG TERM INSULIN USE STATUS: Primary | ICD-10-CM

## 2018-07-10 RX ORDER — TORSEMIDE 20 MG/1
40 TABLET ORAL 2 TIMES DAILY
Qty: 120 TABLET | Refills: 11 | Status: SHIPPED | OUTPATIENT
Start: 2018-07-10 | End: 2019-04-11

## 2018-07-11 DIAGNOSIS — E03.4 HYPOTHYROIDISM DUE TO ACQUIRED ATROPHY OF THYROID: ICD-10-CM

## 2018-07-12 RX ORDER — LEVOTHYROXINE SODIUM 100 UG/1
TABLET ORAL
Qty: 90 TABLET | Refills: 0 | Status: SHIPPED | OUTPATIENT
Start: 2018-07-12 | End: 2018-09-13

## 2018-07-19 ENCOUNTER — TRANSFERRED RECORDS (OUTPATIENT)
Dept: HEALTH INFORMATION MANAGEMENT | Facility: CLINIC | Age: 68
End: 2018-07-19

## 2018-08-03 DIAGNOSIS — Z79.4 TYPE 2 DIABETES MELLITUS WITH DIABETIC NEUROPATHY, WITH LONG-TERM CURRENT USE OF INSULIN (H): ICD-10-CM

## 2018-08-03 DIAGNOSIS — E11.40 TYPE 2 DIABETES MELLITUS WITH DIABETIC NEUROPATHY, WITH LONG-TERM CURRENT USE OF INSULIN (H): ICD-10-CM

## 2018-08-03 DIAGNOSIS — I10 BENIGN ESSENTIAL HYPERTENSION: ICD-10-CM

## 2018-08-03 RX ORDER — METOPROLOL SUCCINATE 100 MG/1
TABLET, EXTENDED RELEASE ORAL
Qty: 30 TABLET | Refills: 3 | Status: SHIPPED | OUTPATIENT
Start: 2018-08-03 | End: 2018-08-28

## 2018-08-03 NOTE — TELEPHONE ENCOUNTER
Metoprolol succinate      Last Written Prescription Date:  2/16/18  Last Fill Quantity: 30,   # refills: 5  Last Office Visit: 12/6/17  Future Office visit:

## 2018-08-11 ENCOUNTER — MYC MEDICAL ADVICE (OUTPATIENT)
Dept: INTERNAL MEDICINE | Facility: OTHER | Age: 68
End: 2018-08-11

## 2018-08-14 ENCOUNTER — OFFICE VISIT (OUTPATIENT)
Dept: INTERNAL MEDICINE | Facility: OTHER | Age: 68
End: 2018-08-14
Attending: INTERNAL MEDICINE
Payer: MEDICARE

## 2018-08-14 VITALS
TEMPERATURE: 98.6 F | OXYGEN SATURATION: 93 % | HEART RATE: 75 BPM | DIASTOLIC BLOOD PRESSURE: 70 MMHG | SYSTOLIC BLOOD PRESSURE: 138 MMHG | BODY MASS INDEX: 43.21 KG/M2 | WEIGHT: 315 LBS

## 2018-08-14 DIAGNOSIS — E11.40 TYPE 2 DIABETES MELLITUS WITH DIABETIC NEUROPATHY, UNSPECIFIED LONG TERM INSULIN USE STATUS: ICD-10-CM

## 2018-08-14 DIAGNOSIS — E03.9 ACQUIRED HYPOTHYROIDISM: Primary | ICD-10-CM

## 2018-08-14 DIAGNOSIS — R60.1 GENERALIZED EDEMA: ICD-10-CM

## 2018-08-14 LAB
BASOPHILS # BLD AUTO: 0.1 10E9/L (ref 0–0.2)
BASOPHILS NFR BLD AUTO: 0.7 %
DIFFERENTIAL METHOD BLD: ABNORMAL
EOSINOPHIL # BLD AUTO: 1 10E9/L (ref 0–0.7)
EOSINOPHIL NFR BLD AUTO: 11.8 %
ERYTHROCYTE [DISTWIDTH] IN BLOOD BY AUTOMATED COUNT: 17.2 % (ref 10–15)
HCT VFR BLD AUTO: 33.9 % (ref 40–53)
HGB BLD-MCNC: 10.7 G/DL (ref 13.3–17.7)
LYMPHOCYTES # BLD AUTO: 1.9 10E9/L (ref 0.8–5.3)
LYMPHOCYTES NFR BLD AUTO: 21.6 %
MCH RBC QN AUTO: 23.9 PG (ref 26.5–33)
MCHC RBC AUTO-ENTMCNC: 31.6 G/DL (ref 31.5–36.5)
MCV RBC AUTO: 76 FL (ref 78–100)
MONOCYTES # BLD AUTO: 1.1 10E9/L (ref 0–1.3)
MONOCYTES NFR BLD AUTO: 12.9 %
NEUTROPHILS # BLD AUTO: 4.7 10E9/L (ref 1.6–8.3)
NEUTROPHILS NFR BLD AUTO: 53 %
PLATELET # BLD AUTO: 282 10E9/L (ref 150–450)
RBC # BLD AUTO: 4.47 10E12/L (ref 4.4–5.9)
WBC # BLD AUTO: 8.8 10E9/L (ref 4–11)

## 2018-08-14 PROCEDURE — G0463 HOSPITAL OUTPT CLINIC VISIT: HCPCS

## 2018-08-14 PROCEDURE — 36415 COLL VENOUS BLD VENIPUNCTURE: CPT | Mod: ZL | Performed by: INTERNAL MEDICINE

## 2018-08-14 PROCEDURE — 80053 COMPREHEN METABOLIC PANEL: CPT | Mod: ZL | Performed by: INTERNAL MEDICINE

## 2018-08-14 PROCEDURE — 83036 HEMOGLOBIN GLYCOSYLATED A1C: CPT | Mod: ZL | Performed by: INTERNAL MEDICINE

## 2018-08-14 PROCEDURE — 99214 OFFICE O/P EST MOD 30 MIN: CPT | Performed by: INTERNAL MEDICINE

## 2018-08-14 PROCEDURE — 85025 COMPLETE CBC W/AUTO DIFF WBC: CPT | Mod: ZL | Performed by: INTERNAL MEDICINE

## 2018-08-14 PROCEDURE — 40000788 ZZHCL STATISTIC ESTIMATED AVERAGE GLUCOSE: Mod: ZL | Performed by: INTERNAL MEDICINE

## 2018-08-14 PROCEDURE — 84443 ASSAY THYROID STIM HORMONE: CPT | Mod: ZL | Performed by: INTERNAL MEDICINE

## 2018-08-14 PROCEDURE — 84439 ASSAY OF FREE THYROXINE: CPT | Mod: ZL | Performed by: INTERNAL MEDICINE

## 2018-08-14 RX ORDER — METOLAZONE 5 MG/1
5 TABLET ORAL DAILY
Qty: 30 TABLET | Refills: 3 | Status: SHIPPED | OUTPATIENT
Start: 2018-08-14 | End: 2018-09-04

## 2018-08-14 RX ORDER — METOLAZONE 5 MG/1
5 TABLET ORAL DAILY
Qty: 30 TABLET | Refills: 3 | Status: SHIPPED | OUTPATIENT
Start: 2018-08-14 | End: 2018-08-14

## 2018-08-14 ASSESSMENT — ANXIETY QUESTIONNAIRES
3. WORRYING TOO MUCH ABOUT DIFFERENT THINGS: NOT AT ALL
5. BEING SO RESTLESS THAT IT IS HARD TO SIT STILL: SEVERAL DAYS
2. NOT BEING ABLE TO STOP OR CONTROL WORRYING: NOT AT ALL
6. BECOMING EASILY ANNOYED OR IRRITABLE: NOT AT ALL
7. FEELING AFRAID AS IF SOMETHING AWFUL MIGHT HAPPEN: NOT AT ALL
4. TROUBLE RELAXING: NEARLY EVERY DAY
1. FEELING NERVOUS, ANXIOUS, OR ON EDGE: NOT AT ALL
GAD7 TOTAL SCORE: 4

## 2018-08-14 ASSESSMENT — PAIN SCALES - GENERAL: PAINLEVEL: MODERATE PAIN (5)

## 2018-08-14 NOTE — MR AVS SNAPSHOT
After Visit Summary   8/14/2018    Gunnar Granados    MRN: [id number]           Patient Information     Date Of Birth          1950        Visit Information        Provider Department      8/14/2018 3:30 PM You Bethea,  [facility]        Today's Diagnoses     Acquired hypothyroidism    -  1    Generalized edema        Type 2 diabetes mellitus with diabetic neuropathy, unspecified long term insulin use status (H)           Follow-ups after your visit        Your next 10 appointments already scheduled     Aug 15, 2018 12:00 PM CDT   (Arrive by 11:45 AM)   Ech Complete with HIECH1   [facility] ([facility] )    [address] [facility] [id number] [phone]           1.  Please bring or wear a comfortable two-piece outfit. 2.  You may eat, drink and take your normal medicines. 3.  For any questions that cannot be answered, please contact the ordering physician 4.  Please do not wear perfumes or scented lotions on the day of your exam.            Nov 20, 2018 10:00 AM CST   (Arrive by 9:45 AM)   Return Visit with Mateusz Dupree MD   [facility] ([facility] )    [address]  [facility] [id number] [phone]              Future tests that were ordered for you today     Open Future Orders        Priority Expected Expires Ordered    Echocardiogram Complete ASAP 8/15/2018 8/14/2019 8/14/2018            Who to contact     If you have questions or need follow up information about today's clinic visit or your schedule please contact [facility] directly at [phone].  Normal or non-critical lab and imaging results will be communicated to you by MyChart, letter or phone within 4 business days after the clinic has received the results. If you do not hear from us within 7 days, please contact the clinic through MyChart or phone. If you have a critical or abnormal lab result, we will notify  you by phone as soon as possible.  Submit refill requests through [employer] or call your pharmacy and they will forward the refill request to us. Please allow 3 business days for your refill to be completed.          Additional Information About Your Visit        [employer] Information     [employer] gives you secure access to your electronic health record. If you see a primary care provider, you can also send messages to your care team and make appointments. If you have questions, please call your primary care clinic.  If you do not have a primary care provider, please call [phone] and they will assist you.        Care EveryWhere ID     This is your Care EveryWhere ID. This could be used by other organizations to access your [place] medical records  [id number]        Your Vitals Were     Pulse Temperature Pulse Oximetry BMI (Body Mass Index)          75 98.6  F (37  C) (Tympanic) 93% 43.21 kg/m2         Blood Pressure from Last 3 Encounters:   08/14/18 138/70   05/15/18 118/62   12/06/17 126/68    Weight from Last 3 Encounters:   08/14/18 (!) 332 lb (150.6 kg)   12/06/17 (!) 321 lb (145.6 kg)   11/28/17 (!) 321 lb (145.6 kg)              We Performed the Following     CBC with platelets and differential     Comprehensive metabolic panel (BMP + Alb, Alk Phos, ALT, AST, Total. Bili, TP)     Hemoglobin A1c     T4, free     TSH     UA with Microscopic reflex to Culture - [facility]          Today's Medication Changes          These changes are accurate as of 8/14/18  3:37 PM.  If you have any questions, ask your nurse or doctor.               Start taking these medicines.        Dose/Directions    metolazone 5 MG tablet   Commonly known as:  ZAROXOLYN   Used for:  Generalized edema   Started by:  You Bethea DO        Dose:  5 mg   Take 1 tablet (5 mg) by mouth daily Take 30 minutes prior to first dose of Demadex daily  Please deliver today to home   Quantity:  30 tablet   Refills:  3            Where  to get your medicines      Some of these will need a paper prescription and others can be bought over the counter.  Ask your nurse if you have questions.     Bring a paper prescription for each of these medications     metolazone 5 MG tablet                Primary Care Provider Office Phone # Fax #    You Bethea [phone]       [address] [place] [address]        Equal Access to Services     VICKI RASHEED : Hadii aad ku hadasho Soomaali, waaxda luqadaha, qaybta kaalmada adeegyada, waxay idiin hayaan adeeg kharash lajessica ah. So [facility] [phone].    ATENCIÓN: Si habla espalexia, tiene a basurto disposición servicios gratuitos de asistencia lingüística. Ll[facility] [phone].    We comply with applicable federal civil rights laws and [place] laws. We do not discriminate on the basis of race, color, national origin, age, disability, sex, sexual orientation, or gender identity.            Thank you!     Thank you for choosing [facility]  for your care. Our goal is always to provide you with excellent care. Hearing back from our patients is one way we can continue to improve our services. Please take a few minutes to complete the written survey that you may receive in the mail after your visit with us. Thank you!             Your Updated Medication List - Protect others around you: Learn how to safely use, store and throw away your medicines at www.disposemymeds.org.          This list is accurate as of 8/14/18  3:37 PM.  Always use your most recent med list.                   Brand Name Dispense Instructions for use Diagnosis    ALLOPURINOL PO      Take 300 mg by mouth Take one tablet daily        blood glucose monitoring lancets     102 each    USE TO TEST BLOOD SUGAR TWICE A DAY OR AS DIRECTED    Type 2 diabetes mellitus with diabetic neuropathy (H)       blood glucose monitoring test strip    no brand specified    100 strip    Use to test blood sugars 2 times daily or  as directed (accu check smart view)    Type 2 diabetes mellitus with diabetic neuropathy, unspecified long term insulin use status (H)       cholecalciferol 200 unit Tabs half-tab    vitamin D     Take 2,000 Int'l Units by mouth        gabapentin 300 MG capsule    NEURONTIN    270 capsule    Take 1 capsule (300 mg) by mouth 3 times daily    Type 2 diabetes mellitus with diabetic neuropathy, unspecified long term insulin use status (H)       insulin glargine 100 UNIT/ML injection    LANTUS    30 mL    Inject 18 Units Subcutaneous At Bedtime 18 units daily    Type 2 diabetes mellitus with complication, unspecified long term insulin use status (H)       * levothyroxine 300 MCG tablet    SYNTHROID/LEVOTHROID    90 tablet    TAKE 1 TABLET BY MOUTH EVERY DAY    Hypothyroidism due to acquired atrophy of thyroid       * levothyroxine 100 MCG tablet    SYNTHROID/LEVOTHROID    90 tablet    TAKE 1 TABLET (100 MCG) BY MOUTH DAILY    Hypothyroidism due to acquired atrophy of thyroid       metFORMIN 1000 MG tablet    GLUCOPHAGE    180 tablet    TAKE 1 TABLET (1,000 MG) BY MOUTH 2 TIMES DAILY (WITH MEALS)    Type 2 diabetes mellitus with diabetic neuropathy, unspecified long term insulin use status (H)       metolazone 5 MG tablet    ZAROXOLYN    30 tablet    Take 1 tablet (5 mg) by mouth daily Take 30 minutes prior to first dose of Demadex daily  Please deliver today to home    Generalized edema       metoprolol succinate 100 MG 24 hr tablet    TOPROL-XL    30 tablet    TAKE 1 TABLET (100 MG) BY MOUTH DAILY    Benign essential hypertension       MULTI VITAMIN PO      Take 1 Tab by mouth one time a day.        naproxen 500 MG tablet    NAPROSYN    60 tablet    TAKE 1 TABLET (500 MG) BY MOUTH 2 TIMES DAILY AS NEEDED FOR MODERATE PAIN    Chronic pain of both knees       NOVOTWIST 32G X 5 MM   Generic drug:  insulin pen needle     100 each    FOR USE WITH VICTOZA PEN    Type 2 diabetes mellitus with diabetic neuropathy, with  long-term current use of insulin (H)       order for DME     1 Units    Equipment being ordered: Left Rebecca Splint    Primary osteoarthritis of both hands       * pantoprazole 40 MG EC tablet    PROTONIX    90 tablet    TAKE 1 TABLET(40 MG) BY MOUTH DAILY 30 TO 60 MINUTES BEFORE A MEAL    Gastroesophageal reflux disease without esophagitis       * pantoprazole 40 MG EC tablet    PROTONIX    90 tablet    TAKE 1 TABLET BY MOUTH EVERY DAY 30-60 MIN BEFORE A MEAL    Gastroesophageal reflux disease without esophagitis       Potassium Chloride ER 20 MEQ Tbcr     90 tablet    Take 1 tablet (20 mEq) by mouth 2 times daily    Hypokalemia       POTASSIUM CITRATE PO      Take 20 mEq by mouth Take one tablet twice daily        QUEtiapine 50 MG tablet    SEROquel    90 tablet    Take 1/2- 1 tablet by mouth at bedtime    Insomnia, unspecified type       STATIN NOT PRESCRIBED (INTENTIONAL)      Please choose reason not prescribed, below    Type 2 diabetes mellitus with complication, unspecified long term insulin use status (H)       * torsemide suspension    DEMADEX     Take 40 mg by mouth        * torsemide 20 MG tablet    DEMADEX    120 tablet    Take 2 tablets (40 mg) by mouth 2 times daily    Benign essential hypertension       VICTOZA PEN 18 MG/3ML soln   Generic drug:  liraglutide     18 mL    INJECT 1.2 MG SUBCUTANEOUS DAILY 18MG/3ML PREFILLED PEN    Type 2 diabetes mellitus with hyperglycemia, with long-term current use of insulin (H)       * Notice:  This list has 6 medication(s) that are the same as other medications prescribed for you. Read the directions carefully, and ask your doctor or other care provider to review them with you.

## 2018-08-14 NOTE — PROGRESS NOTES
SUBJECTIVE:   Gunnar Granados is a 68 year old male who presents to clinic today for the following health issues:      swelling      Duration: 2 weeks     Description (location/character/radiation): feet, ankles and calves    Intensity:  severe    Accompanying signs and symptoms: decreased urination     History (similar episodes/previous evaluation): has been hospitalized in the past for edema    Precipitating or alleviating factors: None    Therapies tried and outcome: lasix- has added an additional dose daily- no relief      Gunnar presents today with worsening edema.  He denies any associated chest pain or SOB.  He reports his edema has been worsening over the last 2-3 weeks.  HE is unable to keep his feet elevated most of the time.  He does report decreased urine output as of late also along with some abdominal distension.  Denies any palpitations.  He does drink about 24 beers a day. He does report about 12# weight gain as of late.      Problem list and histories reviewed & adjusted, as indicated.  Additional history: as documented    Patient Active Problem List   Diagnosis     ACP (advance care planning)     Type 2 diabetes mellitus with diabetic neuropathy (H)     Morbid obesity due to excess calories (H)     Postoperative hypothyroidism     Benign essential hypertension     Past Surgical History:   Procedure Laterality Date     basal cell carcinoma  2017     HERNIA REPAIR  2014       Social History   Substance Use Topics     Smoking status: Never Smoker     Smokeless tobacco: Never Used     Alcohol use Yes      Comment: beer daily      Family History   Problem Relation Age of Onset     Unknown/Adopted Sister          Current Outpatient Prescriptions   Medication Sig Dispense Refill     metolazone (ZAROXOLYN) 5 MG tablet Take 1 tablet (5 mg) by mouth daily Take 30 minutes prior to first dose of Demadex daily    Please deliver today to home 30 tablet 3     ALLOPURINOL PO Take 300 mg by mouth Take one tablet  daily       blood glucose monitoring (ACCU-CHEK FASTCLIX) lancets USE TO TEST BLOOD SUGAR TWICE A DAY OR AS DIRECTED 102 each 4     blood glucose monitoring (NO BRAND SPECIFIED) test strip Use to test blood sugars 2 times daily or as directed (accu check smart view) 100 strip 11     cholecalciferol (VITAMIN D) 200 unit TABS half-tab Take 2,000 Int'l Units by mouth       gabapentin (NEURONTIN) 300 MG capsule Take 1 capsule (300 mg) by mouth 3 times daily 270 capsule 3     insulin glargine (LANTUS) 100 UNIT/ML injection Inject 18 Units Subcutaneous At Bedtime 18 units daily 30 mL 2     levothyroxine (SYNTHROID/LEVOTHROID) 100 MCG tablet TAKE 1 TABLET (100 MCG) BY MOUTH DAILY 90 tablet 0     levothyroxine (SYNTHROID/LEVOTHROID) 300 MCG tablet TAKE 1 TABLET BY MOUTH EVERY DAY 90 tablet 0     metFORMIN (GLUCOPHAGE) 1000 MG tablet TAKE 1 TABLET (1,000 MG) BY MOUTH 2 TIMES DAILY (WITH MEALS) 180 tablet 0     metoprolol succinate (TOPROL-XL) 100 MG 24 hr tablet TAKE 1 TABLET (100 MG) BY MOUTH DAILY 30 tablet 3     Multiple Vitamin (MULTI VITAMIN PO) Take 1 Tab by mouth one time a day.       naproxen (NAPROSYN) 500 MG tablet TAKE 1 TABLET (500 MG) BY MOUTH 2 TIMES DAILY AS NEEDED FOR MODERATE PAIN 60 tablet 1     NOVOTWIST 32G X 5 MM insulin pen needle FOR USE WITH VICTOZA  each 0     order for DME Equipment being ordered: Left Rebecca Splint 1 Units 0     pantoprazole (PROTONIX) 40 MG EC tablet TAKE 1 TABLET BY MOUTH EVERY DAY 30-60 MIN BEFORE A MEAL 90 tablet 3     pantoprazole (PROTONIX) 40 MG EC tablet TAKE 1 TABLET(40 MG) BY MOUTH DAILY 30 TO 60 MINUTES BEFORE A MEAL 90 tablet 2     Potassium Chloride ER 20 MEQ TBCR Take 1 tablet (20 mEq) by mouth 2 times daily 90 tablet 3     POTASSIUM CITRATE PO Take 20 mEq by mouth Take one tablet twice daily       QUEtiapine (SEROQUEL) 50 MG tablet Take 1/2- 1 tablet by mouth at bedtime 90 tablet 3     STATIN NOT PRESCRIBED, INTENTIONAL, Please choose reason not prescribed,  below       torsemide (DEMADEX) 20 MG tablet Take 2 tablets (40 mg) by mouth 2 times daily 120 tablet 11     torsemide (DEMADEX) suspension Take 40 mg by mouth       VICTOZA PEN 18 MG/3ML soln INJECT 1.2 MG SUBCUTANEOUS DAILY 18MG/3ML PREFILLED PEN 18 mL 0     Allergies   Allergen Reactions     Food      Onions, peppers, olives      Recent Labs   Lab Test  12/06/17   1442  11/07/17   1100  05/03/17   1541 04/25/16   A1C   --   6.2*  6.4*   --    LDL   --   106*   --    --    HDL   --   51   --    --    TRIG   --   171*   --    --    ALT   --   36  37  64   CR   --   0.84  0.70  0.86   GFRESTIMATED   --   >90  >90  Non African American GFR Calc     --    GFRESTBLACK   --   >90  >90  African American GFR Calc     --    POTASSIUM   --   4.0  3.3*  4.1   TSH  0.18*  0.16*  0.10*  0.338*      BP Readings from Last 3 Encounters:   08/14/18 138/70   05/15/18 118/62   12/06/17 126/68    Wt Readings from Last 3 Encounters:   08/14/18 (!) 332 lb (150.6 kg)   12/06/17 (!) 321 lb (145.6 kg)   11/28/17 (!) 321 lb (145.6 kg)           Reviewed and updated as needed this visit by clinical staff       Reviewed and updated as needed this visit by Provider       ROS:  Constitutional, HEENT, cardiovascular, pulmonary, GI, , musculoskeletal, neuro, skin, endocrine and psych systems are negative, except as otherwise noted.    OBJECTIVE:     /70 (BP Location: Left arm, Patient Position: Sitting, Cuff Size: Adult Large)  Pulse 75  Temp 98.6  F (37  C) (Tympanic)  Wt (!) 332 lb (150.6 kg)  SpO2 93%  BMI 43.21 kg/m2  Body mass index is 43.21 kg/(m^2).   GENERAL: Alert and no distress  NECK: No JVD.    RESP: lungs clear to auscultation - no rales, rhonchi or wheezes  CV: regular rate and rhythm, normal S1 S2, no S3 or S4, no murmur, click or rub, no peripheral edema and peripheral pulses strong  ABDOMEN: Obese-slightly distended with positive BS.  MS: +4 LE edema up to his thighs bilaterally.    SKIN: Actinic keratosis of  anterior calves.    NEURO: Normal strength and tone, mentation intact and speech normal  PSYCH: mentation appears normal, affect normal/bright    Diagnostic Test Results:  Pending    ASSESSMENT/PLAN:     Problem List Items Addressed This Visit     Type 2 diabetes mellitus with diabetic neuropathy (H)    Relevant Orders    Hemoglobin A1c (Completed)    UA with Microscopic reflex to Culture - [facility]      Other Visit Diagnoses     Acquired hypothyroidism    -  Primary    Relevant Orders    TSH (Completed)    T4, free (Completed)    Generalized edema        Relevant Medications    metolazone (ZAROXOLYN) 5 MG tablet-to start today and take 30 minutes prior to his Demadex.    Other Relevant Orders    Comprehensive metabolic panel (BMP + Alb, Alk Phos, ALT, AST, Total. Bili, TP) (Completed)    CBC with platelets and differential (Completed)    Echocardiogram Complete           You Bethea,   [facility]

## 2018-08-15 ENCOUNTER — HOSPITAL ENCOUNTER (OUTPATIENT)
Dept: CARDIOLOGY | Facility: HOSPITAL | Age: 68
Discharge: HOME | End: 2018-08-15
Attending: INTERNAL MEDICINE | Admitting: INTERNAL MEDICINE
Payer: MEDICARE

## 2018-08-15 ENCOUNTER — MYC MEDICAL ADVICE (OUTPATIENT)
Dept: INTERNAL MEDICINE | Facility: OTHER | Age: 68
End: 2018-08-15

## 2018-08-15 DIAGNOSIS — E11.40 TYPE 2 DIABETES MELLITUS WITH DIABETIC NEUROPATHY, UNSPECIFIED LONG TERM INSULIN USE STATUS: ICD-10-CM

## 2018-08-15 DIAGNOSIS — R60.1 GENERALIZED EDEMA: ICD-10-CM

## 2018-08-15 LAB
ALBUMIN SERPL-MCNC: 3.4 G/DL (ref 3.4–5)
ALBUMIN UR-MCNC: NEGATIVE MG/DL
ALP SERPL-CCNC: 60 U/L (ref 40–150)
ALT SERPL W P-5'-P-CCNC: 29 U/L (ref 0–70)
ANION GAP SERPL CALCULATED.3IONS-SCNC: 12 MMOL/L (ref 3–14)
APPEARANCE UR: CLEAR
AST SERPL W P-5'-P-CCNC: 22 U/L (ref 0–45)
BILIRUB SERPL-MCNC: 0.6 MG/DL (ref 0.2–1.3)
BILIRUB UR QL STRIP: NEGATIVE
BUN SERPL-MCNC: 11 MG/DL (ref 7–30)
CALCIUM SERPL-MCNC: 8.2 MG/DL (ref 8.5–10.1)
CHLORIDE SERPL-SCNC: 95 MMOL/L (ref 94–109)
CO2 SERPL-SCNC: 25 MMOL/L (ref 20–32)
COLOR UR AUTO: YELLOW
CREAT SERPL-MCNC: 0.82 MG/DL (ref 0.66–1.25)
EST. AVERAGE GLUCOSE BLD GHB EST-MCNC: 128 MG/DL
GFR SERPL CREATININE-BSD FRML MDRD: >90 ML/MIN/1.7M2
GLUCOSE SERPL-MCNC: 100 MG/DL (ref 70–99)
GLUCOSE UR STRIP-MCNC: NEGATIVE MG/DL
HBA1C MFR BLD: 6.1 % (ref 0–5.6)
HGB UR QL STRIP: NEGATIVE
KETONES UR STRIP-MCNC: NEGATIVE MG/DL
LEUKOCYTE ESTERASE UR QL STRIP: NEGATIVE
NITRATE UR QL: NEGATIVE
PH UR STRIP: 6.5 PH (ref 5–7)
POTASSIUM SERPL-SCNC: 3.9 MMOL/L (ref 3.4–5.3)
PROT SERPL-MCNC: 7 G/DL (ref 6.8–8.8)
RBC #/AREA URNS AUTO: NORMAL /HPF
SODIUM SERPL-SCNC: 132 MMOL/L (ref 133–144)
SOURCE: NORMAL
SP GR UR STRIP: 1.01 (ref 1–1.03)
T4 FREE SERPL-MCNC: 1.56 NG/DL (ref 0.76–1.46)
TSH SERPL DL<=0.005 MIU/L-ACNC: 0.09 MU/L (ref 0.4–4)
UROBILINOGEN UR STRIP-ACNC: 0.2 EU/DL (ref 0.2–1)
WBC #/AREA URNS AUTO: NORMAL /HPF

## 2018-08-15 PROCEDURE — 81001 URINALYSIS AUTO W/SCOPE: CPT | Mod: ZL | Performed by: INTERNAL MEDICINE

## 2018-08-15 PROCEDURE — 93306 TTE W/DOPPLER COMPLETE: CPT | Mod: TC

## 2018-08-15 PROCEDURE — 93306 TTE W/DOPPLER COMPLETE: CPT | Mod: 26 | Performed by: INTERNAL MEDICINE

## 2018-08-15 ASSESSMENT — PATIENT HEALTH QUESTIONNAIRE - PHQ9: SUM OF ALL RESPONSES TO PHQ QUESTIONS 1-9: 13

## 2018-08-15 ASSESSMENT — ANXIETY QUESTIONNAIRES: GAD7 TOTAL SCORE: 4

## 2018-08-15 NOTE — TELEPHONE ENCOUNTER
Called patient and he states that he is okay with that but just wanted you to be aware that UA  Is being dropped off today

## 2018-08-20 ENCOUNTER — TELEPHONE (OUTPATIENT)
Dept: INTERNAL MEDICINE | Facility: OTHER | Age: 68
End: 2018-08-20

## 2018-08-20 NOTE — TELEPHONE ENCOUNTER
"Pt states he can not get off the couch, his knees are giving him problems, he is nauseous, swelling went down but he also states he has not taken his metolazone since Thursday thinking that is why he was so nauseous but it is not going away. Was offered apt tomorrow morning but pt states he can not physically get of the couch, he needs help. Pt was notified to call EMS and go to ER for labs and possible CT scan. Pt states he will \"figure it out\". Pt was again notified that he will need to be evaluated and it is important due to the liver failure.  Ginger Small LPN     "

## 2018-08-21 ENCOUNTER — TRANSFERRED RECORDS (OUTPATIENT)
Dept: HEALTH INFORMATION MANAGEMENT | Facility: CLINIC | Age: 68
End: 2018-08-21

## 2018-08-23 ENCOUNTER — MYC MEDICAL ADVICE (OUTPATIENT)
Dept: INTERNAL MEDICINE | Facility: OTHER | Age: 68
End: 2018-08-23

## 2018-08-24 ENCOUNTER — TELEPHONE (OUTPATIENT)
Dept: INTERNAL MEDICINE | Facility: OTHER | Age: 68
End: 2018-08-24

## 2018-08-24 NOTE — TELEPHONE ENCOUNTER
4:31 PM    Reason for Call: Phone Call    Description: pt's wife called and was wondering if pt could get an afternoon appt on Tuesday 08/28/2018 instead of the morning appt. He has now.    Was an appointment offered for this call? No  If yes : Appointment type              Date    Preferred method for responding to this message: Telephone Call  What is your phone number ?[phone]    If we cannot reach you directly, may we leave a detailed response at the number you provided? Yes    Can this message wait until your PCP/provider returns, if available today? Not applicable, provider is in    Isabel Quesada

## 2018-08-28 ENCOUNTER — OFFICE VISIT (OUTPATIENT)
Dept: INTERNAL MEDICINE | Facility: OTHER | Age: 68
End: 2018-08-28
Attending: INTERNAL MEDICINE
Payer: COMMERCIAL

## 2018-08-28 VITALS
BODY MASS INDEX: 40.43 KG/M2 | HEIGHT: 74 IN | SYSTOLIC BLOOD PRESSURE: 130 MMHG | HEART RATE: 81 BPM | OXYGEN SATURATION: 98 % | TEMPERATURE: 98.6 F | WEIGHT: 315 LBS | DIASTOLIC BLOOD PRESSURE: 68 MMHG

## 2018-08-28 DIAGNOSIS — R60.0 LOCALIZED EDEMA: ICD-10-CM

## 2018-08-28 DIAGNOSIS — F10.230 ALCOHOL DEPENDENCE WITH UNCOMPLICATED WITHDRAWAL (H): ICD-10-CM

## 2018-08-28 DIAGNOSIS — E87.6 HYPOKALEMIA: Primary | ICD-10-CM

## 2018-08-28 DIAGNOSIS — K70.30 ALCOHOLIC CIRRHOSIS OF LIVER WITHOUT ASCITES (H): ICD-10-CM

## 2018-08-28 LAB
ALBUMIN SERPL-MCNC: 3.4 G/DL (ref 3.4–5)
ALP SERPL-CCNC: 69 U/L (ref 40–150)
ALT SERPL W P-5'-P-CCNC: 32 U/L (ref 0–70)
ANION GAP SERPL CALCULATED.3IONS-SCNC: 11 MMOL/L (ref 3–14)
AST SERPL W P-5'-P-CCNC: 13 U/L (ref 0–45)
BILIRUB SERPL-MCNC: 0.4 MG/DL (ref 0.2–1.3)
BUN SERPL-MCNC: 11 MG/DL (ref 7–30)
CALCIUM SERPL-MCNC: 7.8 MG/DL (ref 8.5–10.1)
CHLORIDE SERPL-SCNC: 100 MMOL/L (ref 94–109)
CO2 SERPL-SCNC: 27 MMOL/L (ref 20–32)
CREAT SERPL-MCNC: 0.93 MG/DL (ref 0.66–1.25)
GFR SERPL CREATININE-BSD FRML MDRD: 81 ML/MIN/1.7M2
GLUCOSE SERPL-MCNC: 86 MG/DL (ref 70–99)
POTASSIUM SERPL-SCNC: 4.5 MMOL/L (ref 3.4–5.3)
PROT SERPL-MCNC: 7.2 G/DL (ref 6.8–8.8)
SODIUM SERPL-SCNC: 138 MMOL/L (ref 133–144)

## 2018-08-28 PROCEDURE — 99215 OFFICE O/P EST HI 40 MIN: CPT | Performed by: INTERNAL MEDICINE

## 2018-08-28 PROCEDURE — 80053 COMPREHEN METABOLIC PANEL: CPT | Mod: ZL | Performed by: INTERNAL MEDICINE

## 2018-08-28 PROCEDURE — 36415 COLL VENOUS BLD VENIPUNCTURE: CPT | Mod: ZL | Performed by: INTERNAL MEDICINE

## 2018-08-28 PROCEDURE — G0463 HOSPITAL OUTPT CLINIC VISIT: HCPCS

## 2018-08-28 RX ORDER — METOPROLOL SUCCINATE 100 MG/1
50 TABLET, EXTENDED RELEASE ORAL
COMMUNITY
Start: 2018-08-24 | End: 2019-07-29

## 2018-08-28 ASSESSMENT — ANXIETY QUESTIONNAIRES
4. TROUBLE RELAXING: NOT AT ALL
7. FEELING AFRAID AS IF SOMETHING AWFUL MIGHT HAPPEN: NOT AT ALL
5. BEING SO RESTLESS THAT IT IS HARD TO SIT STILL: NOT AT ALL
6. BECOMING EASILY ANNOYED OR IRRITABLE: NOT AT ALL
1. FEELING NERVOUS, ANXIOUS, OR ON EDGE: NOT AT ALL
3. WORRYING TOO MUCH ABOUT DIFFERENT THINGS: NOT AT ALL
2. NOT BEING ABLE TO STOP OR CONTROL WORRYING: NOT AT ALL
GAD7 TOTAL SCORE: 0

## 2018-08-28 ASSESSMENT — PAIN SCALES - GENERAL: PAINLEVEL: NO PAIN (0)

## 2018-08-28 NOTE — PROGRESS NOTES
SUBJECTIVE:   Gunnar Granados is a 68 year old male who presents to clinic today for the following health issues:          Hospital Follow-up Visit:    Hospital/Nursing Home/IP Rehab Facility: [facility]   Date of Admission: 8/21/18  Date of Discharge: 8/24/18  Reason(s) for Admission: hyponatriema; hypokalemia weakness             Problems taking medications regularly:  None       Medication changes since discharge: 1/2 tablet of metoprolol 100mg daily and two tablets of potassium CLERE 20 MEQ twice daily        Problems adhering to non-medication therapy:  None    Summary of hospitalization:  CareEverywhere information obtained and reviewed  Diagnostic Tests/Treatments reviewed.  Follow up needed: none  Other Healthcare Providers Involved in Patient s Care:         None  Update since discharge: improved.     Post Discharge Medication Reconciliation: discharge medications reconciled, continue medications without change.  Plan of care communicated with patient and family     Coding guidelines for this visit:  Type of Medical   Decision Making Face-to-Face Visit       within 7 Days of discharge Face-to-Face Visit        within 14 days of discharge   Moderate Complexity [id number]   High Complexity 60582 [id number]            Gunnar presents today for follow up from recent hospitalization at [facility].  He initially presented to clinic here with profoundly increased edema in his LEs up to his thighs.  He was noted to continue to drink 20+ beers daily but edema was getting worse.  He did complete an Echo and routine labs which did not clearly identify the cause of the edema.  We started him on Metolazone and there was no improvement, he became week at home and could not get up.  He was instructed to go to the ED and call EMTs but he didn't do so for a couple days.  Upon his arrival to [place] ED he was noted to be hyponatremic and hypokalemic.  The electrolyte abnormalities were addressed and he  was discharged home on his baseline Demadex dose and Potassium supplements.  Metolazone was discontinued.  However no cause as to the edema was identified.  He present to clinic today with +4 LE which remains.  No chest pain and no SOB.  HE is still drinking beer.        Problem list and histories reviewed & adjusted, as indicated.  Additional history: as documented    Patient Active Problem List   Diagnosis     ACP (advance care planning)     Type 2 diabetes mellitus with diabetic neuropathy (H)     Morbid obesity due to excess calories (H)     Postoperative hypothyroidism     Benign essential hypertension     Past Surgical History:   Procedure Laterality Date     basal cell carcinoma  2017     HERNIA REPAIR  2014       Social History   Substance Use Topics     Smoking status: Never Smoker     Smokeless tobacco: Never Used     Alcohol use Yes      Comment: beer daily      Family History   Problem Relation Age of Onset     Unknown/Adopted Sister          Current Outpatient Prescriptions   Medication Sig Dispense Refill     ALLOPURINOL PO Take 300 mg by mouth Take one tablet daily       blood glucose monitoring (ACCU-CHEK FASTCLIX) lancets USE TO TEST BLOOD SUGAR TWICE A DAY OR AS DIRECTED 102 each 4     blood glucose monitoring (NO BRAND SPECIFIED) test strip Use to test blood sugars 2 times daily or as directed (accu check smart view) 100 strip 11     cholecalciferol (VITAMIN D) 200 unit TABS half-tab Take 2,000 Int'l Units by mouth       gabapentin (NEURONTIN) 300 MG capsule Take 1 capsule (300 mg) by mouth 3 times daily 270 capsule 3     insulin glargine (LANTUS) 100 UNIT/ML injection Inject 18 Units Subcutaneous At Bedtime 18 units daily 30 mL 2     levothyroxine (SYNTHROID/LEVOTHROID) 100 MCG tablet TAKE 1 TABLET (100 MCG) BY MOUTH DAILY 90 tablet 0     levothyroxine (SYNTHROID/LEVOTHROID) 300 MCG tablet TAKE 1 TABLET BY MOUTH EVERY DAY 90 tablet 0     metFORMIN (GLUCOPHAGE) 1000 MG tablet TAKE 1 TABLET (1,000  MG) BY MOUTH 2 TIMES DAILY (WITH MEALS) 180 tablet 0     metolazone (ZAROXOLYN) 5 MG tablet Take 1 tablet (5 mg) by mouth daily Take 30 minutes prior to first dose of Demadex daily    Please deliver today to home 30 tablet 3     metoprolol succinate (TOPROL-XL) 100 MG 24 hr tablet Take 50 mg by mouth       Multiple Vitamin (MULTI VITAMIN PO) Take 1 Tab by mouth one time a day.       naproxen (NAPROSYN) 500 MG tablet TAKE 1 TABLET (500 MG) BY MOUTH 2 TIMES DAILY AS NEEDED FOR MODERATE PAIN 60 tablet 1     NOVOTWIST 32G X 5 MM insulin pen needle FOR USE WITH VICTOZA  each 0     order for DME Equipment being ordered: Left Rebecca Splint 1 Units 0     pantoprazole (PROTONIX) 40 MG EC tablet TAKE 1 TABLET BY MOUTH EVERY DAY 30-60 MIN BEFORE A MEAL 90 tablet 3     Potassium Chloride ER 20 MEQ TBCR Take 1 tablet (20 mEq) by mouth 2 times daily 90 tablet 3     QUEtiapine (SEROQUEL) 50 MG tablet Take 1/2- 1 tablet by mouth at bedtime 90 tablet 3     STATIN NOT PRESCRIBED, INTENTIONAL, Please choose reason not prescribed, below       torsemide (DEMADEX) 20 MG tablet Take 2 tablets (40 mg) by mouth 2 times daily 120 tablet 11     VICTOZA PEN 18 MG/3ML soln INJECT 1.2 MG SUBCUTANEOUS DAILY 18MG/3ML PREFILLED PEN 18 mL 0     [DISCONTINUED] metoprolol succinate (TOPROL-XL) 100 MG 24 hr tablet TAKE 1 TABLET (100 MG) BY MOUTH DAILY 30 tablet 3     [DISCONTINUED] torsemide (DEMADEX) suspension Take 40 mg by mouth       Allergies   Allergen Reactions     Food      Onions, peppers, olives      Trazodone      Other reaction(s): Dizziness     BP Readings from Last 3 Encounters:   08/28/18 130/68   08/14/18 138/70   05/15/18 118/62    Wt Readings from Last 3 Encounters:   08/28/18 322 lb (146.1 kg)   08/14/18 (!) 332 lb (150.6 kg)   12/06/17 (!) 321 lb (145.6 kg)                    Reviewed and updated as needed this visit by clinical staff       Reviewed and updated as needed this visit by Provider         ROS:  Constitutional,  "HEENT, cardiovascular, pulmonary, gi and gu systems are negative, except as otherwise noted.    OBJECTIVE:     /68 (BP Location: Right arm, Patient Position: Sitting, Cuff Size: Adult Large)  Pulse 81  Temp 98.6  F (37  C) (Tympanic)  Ht 6' 1.5\" (1.867 m)  Wt 322 lb (146.1 kg)  SpO2 98%  BMI 41.91 kg/m2  Body mass index is 41.91 kg/(m^2).   GENERAL: Alert and no distress  RESP: lungs clear to auscultation - no rales, rhonchi or wheezes  CV: regular rate and rhythm, normal S1 S2, no S3 or S4, no murmur, click or rub, no peripheral edema and peripheral pulses strong  ABDOMEN: soft, nontender, no hepatosplenomegaly, no masses and bowel sounds normal  MS: +4 LE edema up to the thighs.    SKIN: no suspicious lesions or rashes  NEURO: No focal deficits.   PSYCH: mentation appears normal, affect normal/bright    Diagnostic Test Results:  No results found for this or any previous visit (from the past 24 hour(s)).  Results for orders placed or performed in visit on 08/15/18   UA with Microscopic reflex to Culture - [facility]   Result Value Ref Range    Color Urine Yellow     Appearance Urine Clear     Glucose Urine Negative NEG^Negative mg/dL    Bilirubin Urine Negative NEG^Negative    Ketones Urine Negative NEG^Negative mg/dL    Specific Gravity Urine 1.010 1.003 - 1.035    pH Urine 6.5 5.0 - 7.0 pH    Protein Albumin Urine Negative NEG^Negative mg/dL    Urobilinogen Urine 0.2 0.2 - 1.0 EU/dL    Nitrite Urine Negative NEG^Negative    Blood Urine Negative NEG^Negative    Leukocyte Esterase Urine Negative NEG^Negative    Source Midstream Urine     WBC Urine 0 - 5 OTO5^0 - 5 /HPF    RBC Urine O - 2 OTO2^O - 2 /HPF       ASSESSMENT/PLAN:     Problem List Items Addressed This Visit     None      Visit Diagnoses     Hypokalemia    -  Primary    Relevant Orders    Comprehensive metabolic panel (BMP + Alb, Alk Phos, ALT, AST, Total. Bili, TP) (Completed)    US Abdomen Complete w Doppler Complete    Localized " edema        Relevant Orders    US Abdomen Complete w Doppler Complete    Alcohol dependence with uncomplicated withdrawal (H)        Relevant Orders    US Abdomen Complete w Doppler Complete    Alcoholic cirrhosis of liver without ascites (H)         Relevant Orders    US Abdomen Complete w Doppler Complete         40 minutes was spent on this patient's care today.  Greater than 50% of the time was spent face to face with the patient and his wife.  [facility] records were reviewed and care plan was discussed.  Patient was also assisted to void.  All questions were answered today.    You Bethea, [facility]

## 2018-08-28 NOTE — MR AVS SNAPSHOT
After Visit Summary   8/28/2018    Gunnar Granados    MRN: [id number]           Patient Information     Date Of Birth          1950        Visit Information        Provider Department      8/28/2018 2:30 PM You Bethea DO [facility]        Today's Diagnoses     Hypokalemia    -  1    Localized edema        Alcohol dependence with uncomplicated withdrawal (H)        Alcoholic cirrhosis of liver without ascites (H)            Follow-ups after your visit        Your next 10 appointments already scheduled     Nov 20, 2018 10:00 AM CST   (Arrive by 9:45 AM)   Return Visit with Mateusz Dupree MD   [facility] ([facility] )    [address]  [facility] [address] [phone]              Future tests that were ordered for you today     Open Future Orders        Priority Expected Expires Ordered    US Abdomen Complete w Doppler Complete Routine  8/28/2019 8/28/2018            Who to contact     If you have questions or need follow up information about today's clinic visit or your schedule please contact [facility] directly at [phone].  Normal or non-critical lab and imaging results will be communicated to you by G[employer]oy[employer]art, letter or phone within 4 business days after the clinic has received the results. If you do not hear from us within 7 days, please contact the clinic through G[employer]art or phone. If you have a critical or abnormal lab result, we will notify you by phone as soon as possible.  Submit refill requests through [employer]a or call your pharmacy and they will forward the refill request to us. Please allow 3 business days for your refill to be completed.          Additional Information About Your Visit        G[facility]art Information     [employer]a gives you secure access to your electronic health record. If you see a primary care provider, you can also send messages to your care team and make  "appointments. If you have questions, please call your primary care clinic.  If you do not have a primary care provider, please call [phone] and they will assist you.        Care EveryWhere ID     This is your Care EveryWhere ID. This could be used by other organizations to access your [facility] medical records  [id number]        Your Vitals Were     Pulse Temperature Height Pulse Oximetry BMI (Body Mass Index)       81 98.6  F (37  C) (Tympanic) 6' 1.5\" (1.867 m) 98% 41.91 kg/m2        Blood Pressure from Last 3 Encounters:   08/28/18 130/68   08/14/18 138/70   05/15/18 118/62    Weight from Last 3 Encounters:   08/28/18 322 lb (146.1 kg)   08/14/18 (!) 332 lb (150.6 kg)   12/06/17 (!) 321 lb (145.6 kg)              We Performed the Following     Comprehensive metabolic panel (BMP + Alb, Alk Phos, ALT, AST, Total. Bili, TP)          Today's Medication Changes          These changes are accurate as of 8/28/18  3:01 PM.  If you have any questions, ask your nurse or doctor.               These medicines have changed or have updated prescriptions.        Dose/Directions    metoprolol succinate 100 MG 24 hr tablet   Commonly known as:  TOPROL-XL   This may have changed:  Another medication with the same name was removed. Continue taking this medication, and follow the directions you see here.   Changed by:  You Bethea DO        Dose:  50 mg   Take 50 mg by mouth   Refills:  0       pantoprazole 40 MG EC tablet   Commonly known as:  PROTONIX   This may have changed:  Another medication with the same name was removed. Continue taking this medication, and follow the directions you see here.   Used for:  Gastroesophageal reflux disease without esophagitis   Changed by:  You Bethea DO        TAKE 1 TABLET BY MOUTH EVERY DAY 30-60 MIN BEFORE A MEAL   Quantity:  90 tablet   Refills:  3       torsemide 20 MG tablet   Commonly known as:  DEMADEX   This may have changed:  Another medication with the " same name was removed. Continue taking this medication, and follow the directions you see here.   Used for:  Benign essential hypertension   Changed by:  You Bethea DO        Dose:  40 mg   Take 2 tablets (40 mg) by mouth 2 times daily   Quantity:  120 tablet   Refills:  11         Stop taking these medicines if you haven't already. Please contact your care team if you have questions.     POTASSIUM CITRATE PO   Stopped by:  You Bethea DO                    Primary Care Provider Office Phone # Fax #    You Bethea [phone]       [address] [place] [address]        Equal Access to Services     [facility]: Hadii lali bhatt hadasho Somariluzali, waaxda luqadaha, qaybta kaalmada adetheresayaelham, jacob jones . So [facility] [phone].    ATENCIÓN: Si habla español, tiene a basurto disposición servicios gratuitos de asistencia lingüística. [facility] [phone].    We comply with applicable federal civil rights laws and [place] laws. We do not discriminate on the basis of race, color, national origin, age, disability, sex, sexual orientation, or gender identity.            Thank you!     Thank you for choosing [facility]  for your care. Our goal is always to provide you with excellent care. Hearing back from our patients is one way we can continue to improve our services. Please take a few minutes to complete the written survey that you may receive in the mail after your visit with us. Thank you!             Your Updated Medication List - Protect others around you: Learn how to safely use, store and throw away your medicines at www.disposemymeds.org.          This list is accurate as of 8/28/18  3:01 PM.  Always use your most recent med list.                   Brand Name Dispense Instructions for use Diagnosis    ALLOPURINOL PO      Take 300 mg by mouth Take one tablet daily        blood glucose monitoring lancets     102 each     USE TO TEST BLOOD SUGAR TWICE A DAY OR AS DIRECTED    Type 2 diabetes mellitus with diabetic neuropathy (H)       blood glucose monitoring test strip    no brand specified    100 strip    Use to test blood sugars 2 times daily or as directed (accu check smart view)    Type 2 diabetes mellitus with diabetic neuropathy, unspecified long term insulin use status (H)       cholecalciferol 200 unit Tabs half-tab    vitamin D     Take 2,000 Int'l Units by mouth        gabapentin 300 MG capsule    NEURONTIN    270 capsule    Take 1 capsule (300 mg) by mouth 3 times daily    Type 2 diabetes mellitus with diabetic neuropathy, unspecified long term insulin use status (H)       insulin glargine 100 UNIT/ML injection    LANTUS    30 mL    Inject 18 Units Subcutaneous At Bedtime 18 units daily    Type 2 diabetes mellitus with complication, unspecified long term insulin use status (H)       * levothyroxine 300 MCG tablet    SYNTHROID/LEVOTHROID    90 tablet    TAKE 1 TABLET BY MOUTH EVERY DAY    Hypothyroidism due to acquired atrophy of thyroid       * levothyroxine 100 MCG tablet    SYNTHROID/LEVOTHROID    90 tablet    TAKE 1 TABLET (100 MCG) BY MOUTH DAILY    Hypothyroidism due to acquired atrophy of thyroid       metFORMIN 1000 MG tablet    GLUCOPHAGE    180 tablet    TAKE 1 TABLET (1,000 MG) BY MOUTH 2 TIMES DAILY (WITH MEALS)    Type 2 diabetes mellitus with diabetic neuropathy, unspecified long term insulin use status (H)       metolazone 5 MG tablet    ZAROXOLYN    30 tablet    Take 1 tablet (5 mg) by mouth daily Take 30 minutes prior to first dose of Demadex daily  Please deliver today to home    Generalized edema       metoprolol succinate 100 MG 24 hr tablet    TOPROL-XL     Take 50 mg by mouth        MULTI VITAMIN PO      Take 1 Tab by mouth one time a day.        naproxen 500 MG tablet    NAPROSYN    60 tablet    TAKE 1 TABLET (500 MG) BY MOUTH 2 TIMES DAILY AS NEEDED FOR MODERATE PAIN    Chronic pain of both knees        NOVOTWIST 32G X 5 MM   Generic drug:  insulin pen needle     100 each    FOR USE WITH VICTOZA PEN    Type 2 diabetes mellitus with diabetic neuropathy, with long-term current use of insulin (H)       order for DME     1 Units    Equipment being ordered: Left Rebecca Splint    Primary osteoarthritis of both hands       pantoprazole 40 MG EC tablet    PROTONIX    90 tablet    TAKE 1 TABLET BY MOUTH EVERY DAY 30-60 MIN BEFORE A MEAL    Gastroesophageal reflux disease without esophagitis       Potassium Chloride ER 20 MEQ Tbcr     90 tablet    Take 1 tablet (20 mEq) by mouth 2 times daily    Hypokalemia       QUEtiapine 50 MG tablet    SEROquel    90 tablet    Take 1/2- 1 tablet by mouth at bedtime    Insomnia, unspecified type       STATIN NOT PRESCRIBED (INTENTIONAL)      Please choose reason not prescribed, below    Type 2 diabetes mellitus with complication, unspecified long term insulin use status (H)       torsemide 20 MG tablet    DEMADEX    120 tablet    Take 2 tablets (40 mg) by mouth 2 times daily    Benign essential hypertension       VICTOZA PEN 18 MG/3ML soln   Generic drug:  liraglutide     18 mL    INJECT 1.2 MG SUBCUTANEOUS DAILY 18MG/3ML PREFILLED PEN    Type 2 diabetes mellitus with hyperglycemia, with long-term current use of insulin (H)       * Notice:  This list has 2 medication(s) that are the same as other medications prescribed for you. Read the directions carefully, and ask your doctor or other care provider to review them with you.

## 2018-08-29 DIAGNOSIS — Z79.4 TYPE 2 DIABETES MELLITUS WITH HYPERGLYCEMIA, WITH LONG-TERM CURRENT USE OF INSULIN (H): ICD-10-CM

## 2018-08-29 DIAGNOSIS — E11.40 TYPE 2 DIABETES MELLITUS WITH DIABETIC NEUROPATHY, UNSPECIFIED LONG TERM INSULIN USE STATUS: ICD-10-CM

## 2018-08-29 DIAGNOSIS — E03.4 HYPOTHYROIDISM DUE TO ACQUIRED ATROPHY OF THYROID: ICD-10-CM

## 2018-08-29 DIAGNOSIS — E11.65 TYPE 2 DIABETES MELLITUS WITH HYPERGLYCEMIA, WITH LONG-TERM CURRENT USE OF INSULIN (H): ICD-10-CM

## 2018-08-29 ASSESSMENT — ANXIETY QUESTIONNAIRES: GAD7 TOTAL SCORE: 0

## 2018-08-29 ASSESSMENT — PATIENT HEALTH QUESTIONNAIRE - PHQ9: SUM OF ALL RESPONSES TO PHQ QUESTIONS 1-9: 0

## 2018-08-29 NOTE — LETTER
August 31, 2018      Gunnar Granados  [address] [place] [id number]      We are concerned about your health care.  We recently provided you with medication refills.  Lab tests (Microalbumin) are required every 12 months in order to continue refilling your Metformin.  Orders have been placed in our computer and should be accessible at most [facility] labs. You WILL NOT need to be fasting for this lab. Please complete this as soon as possible. If you have any questions please call us at [phone].            Sincerely,        You Bethea, DO

## 2018-08-29 NOTE — LETTER
August 31, 2018      Gunnar Granados  [address] [place] [address]        We are concerned about your health care.  We recently provided you with medication refills.  Lab tests (TSH) are required every 12 months in order to continue refilling your Levothyroxine.  Orders have been placed in our computer and should be accessible at most [facility] labs. You WILL NOT need to be fasting for this lab. Please complete this as soon as possible. If you have any questions please call us at [phone].          Sincerely,        You Bethea, DO

## 2018-08-30 RX ORDER — LIRAGLUTIDE 6 MG/ML
INJECTION SUBCUTANEOUS
Qty: 18 ML | Refills: 3 | Status: SHIPPED | OUTPATIENT
Start: 2018-08-30 | End: 2019-07-05

## 2018-08-30 NOTE — TELEPHONE ENCOUNTER
Anabella       Last Written Prescription Date:  5/18/2018  Last Fill Quantity: 18mL,   # refills: 0  Last Office Visit: 8/28/2018  Future Office visit:    Next 5 appointments (look out 90 days)     Nov 20, 2018 10:00 AM CST   (Arrive by 9:45 AM)   Return Visit with Mateusz Dupree MD   [facility] ([facility] )    [address]  [facility] [id number] [phone]

## 2018-08-30 NOTE — TELEPHONE ENCOUNTER
Metformin       Last Written Prescription Date:  11/21/2017  Last Fill Quantity: 180,   # refills: 0  Last Office Visit: 8/14/2018    Levothyroxine       Last Written Prescription Date:  7/12/2018  Last Fill Quantity: 90,   # refills: 0  Last Office Visit: 8/14/2018  Future Office visit:    Next 5 appointments (look out 90 days)     Nov 20, 2018 10:00 AM CST   (Arrive by 9:45 AM)   Return Visit with Mateusz Dupree MD   [facility] ([facility] )    [address]  [facility] [id number] [phone]

## 2018-08-31 ENCOUNTER — TELEPHONE (OUTPATIENT)
Dept: INTERNAL MEDICINE | Facility: OTHER | Age: 68
End: 2018-08-31

## 2018-08-31 RX ORDER — LEVOTHYROXINE SODIUM 300 UG/1
TABLET ORAL
Qty: 90 TABLET | Refills: 1 | Status: SHIPPED | OUTPATIENT
Start: 2018-08-31 | End: 2018-11-28

## 2018-08-31 NOTE — TELEPHONE ENCOUNTER
Patient called requesting your help Ginger. Patient said is having issues with med refills. Not sure what is happening. Please call [phone]  Thank you

## 2018-09-07 ENCOUNTER — HOSPITAL ENCOUNTER (OUTPATIENT)
Dept: ULTRASOUND IMAGING | Facility: HOSPITAL | Age: 68
Discharge: HOME | End: 2018-09-07
Attending: INTERNAL MEDICINE | Admitting: INTERNAL MEDICINE
Payer: MEDICARE

## 2018-09-07 DIAGNOSIS — E87.6 HYPOKALEMIA: ICD-10-CM

## 2018-09-07 DIAGNOSIS — F10.230 ALCOHOL DEPENDENCE WITH UNCOMPLICATED WITHDRAWAL (H): ICD-10-CM

## 2018-09-07 DIAGNOSIS — R60.0 LOCALIZED EDEMA: ICD-10-CM

## 2018-09-07 DIAGNOSIS — K70.30 ALCOHOLIC CIRRHOSIS OF LIVER WITHOUT ASCITES (H): ICD-10-CM

## 2018-09-07 PROCEDURE — 76700 US EXAM ABDOM COMPLETE: CPT | Mod: TC,59

## 2018-09-12 ENCOUNTER — OFFICE VISIT (OUTPATIENT)
Dept: INTERNAL MEDICINE | Facility: OTHER | Age: 68
End: 2018-09-12
Attending: INTERNAL MEDICINE
Payer: COMMERCIAL

## 2018-09-12 VITALS
HEIGHT: 73 IN | SYSTOLIC BLOOD PRESSURE: 138 MMHG | OXYGEN SATURATION: 97 % | DIASTOLIC BLOOD PRESSURE: 72 MMHG | TEMPERATURE: 98.9 F | HEART RATE: 89 BPM | BODY MASS INDEX: 41.75 KG/M2 | WEIGHT: 315 LBS

## 2018-09-12 DIAGNOSIS — E11.40 TYPE 2 DIABETES MELLITUS WITH DIABETIC NEUROPATHY, WITHOUT LONG-TERM CURRENT USE OF INSULIN (H): ICD-10-CM

## 2018-09-12 DIAGNOSIS — K70.30 ALCOHOLIC CIRRHOSIS OF LIVER WITHOUT ASCITES (H): Primary | ICD-10-CM

## 2018-09-12 DIAGNOSIS — E03.8 OTHER SPECIFIED HYPOTHYROIDISM: ICD-10-CM

## 2018-09-12 LAB
ALBUMIN SERPL-MCNC: 3.5 G/DL (ref 3.4–5)
ALP SERPL-CCNC: 74 U/L (ref 40–150)
ALT SERPL W P-5'-P-CCNC: 24 U/L (ref 0–70)
ANION GAP SERPL CALCULATED.3IONS-SCNC: 10 MMOL/L (ref 3–14)
AST SERPL W P-5'-P-CCNC: 16 U/L (ref 0–45)
BILIRUB SERPL-MCNC: 0.6 MG/DL (ref 0.2–1.3)
BUN SERPL-MCNC: 11 MG/DL (ref 7–30)
CALCIUM SERPL-MCNC: 8.6 MG/DL (ref 8.5–10.1)
CHLORIDE SERPL-SCNC: 95 MMOL/L (ref 94–109)
CO2 SERPL-SCNC: 27 MMOL/L (ref 20–32)
CREAT SERPL-MCNC: 0.82 MG/DL (ref 0.66–1.25)
GFR SERPL CREATININE-BSD FRML MDRD: >90 ML/MIN/1.7M2
GLUCOSE SERPL-MCNC: 95 MG/DL (ref 70–99)
POTASSIUM SERPL-SCNC: 4.5 MMOL/L (ref 3.4–5.3)
PROT SERPL-MCNC: 7.1 G/DL (ref 6.8–8.8)
SODIUM SERPL-SCNC: 132 MMOL/L (ref 133–144)
T4 FREE SERPL-MCNC: 1.56 NG/DL (ref 0.76–1.46)
TSH SERPL DL<=0.005 MIU/L-ACNC: 0.1 MU/L (ref 0.4–4)

## 2018-09-12 PROCEDURE — 99214 OFFICE O/P EST MOD 30 MIN: CPT | Performed by: INTERNAL MEDICINE

## 2018-09-12 PROCEDURE — 80053 COMPREHEN METABOLIC PANEL: CPT | Mod: ZL | Performed by: INTERNAL MEDICINE

## 2018-09-12 PROCEDURE — 36415 COLL VENOUS BLD VENIPUNCTURE: CPT | Mod: ZL | Performed by: INTERNAL MEDICINE

## 2018-09-12 PROCEDURE — 84439 ASSAY OF FREE THYROXINE: CPT | Mod: ZL | Performed by: INTERNAL MEDICINE

## 2018-09-12 PROCEDURE — G0463 HOSPITAL OUTPT CLINIC VISIT: HCPCS

## 2018-09-12 PROCEDURE — 84443 ASSAY THYROID STIM HORMONE: CPT | Mod: ZL | Performed by: INTERNAL MEDICINE

## 2018-09-12 ASSESSMENT — ANXIETY QUESTIONNAIRES
2. NOT BEING ABLE TO STOP OR CONTROL WORRYING: NOT AT ALL
GAD7 TOTAL SCORE: 0
6. BECOMING EASILY ANNOYED OR IRRITABLE: NOT AT ALL
4. TROUBLE RELAXING: NOT AT ALL
3. WORRYING TOO MUCH ABOUT DIFFERENT THINGS: NOT AT ALL
5. BEING SO RESTLESS THAT IT IS HARD TO SIT STILL: NOT AT ALL
7. FEELING AFRAID AS IF SOMETHING AWFUL MIGHT HAPPEN: NOT AT ALL
1. FEELING NERVOUS, ANXIOUS, OR ON EDGE: NOT AT ALL

## 2018-09-12 ASSESSMENT — PAIN SCALES - GENERAL: PAINLEVEL: NO PAIN (0)

## 2018-09-12 NOTE — PROGRESS NOTES
Internal Medicine:    Chief Complaint   Patient presents with     RECHECK     Gunnar presents today for follow up of his edema.  We tried to diurese him outpatient which led to worsening and weakness.  He was instructed to go to the ED but he delayed this for a couple days and when he arrived he was found to be profoundly hyponatremic and hypokalemic. He followed up with me a short time ago and has remained relatively stable since.  He does report some increasing edema today.  He is taking Demadex 40 mg BID.  Denies chest pain, SOB, Falls or abdominal pain.  He is here today to discuss Abdominal US results.           Patient Active Problem List   Diagnosis     ACP (advance care planning)     Type 2 diabetes mellitus with diabetic neuropathy (H)     Morbid obesity due to excess calories (H)     Postoperative hypothyroidism     Benign essential hypertension            Past Medical History:   Diagnosis Date     Alcohol abuse      Diabetes mellitus with neuropathy (H)      Gastric polyps      Gout      HTN (hypertension)      Hx of thyroid cancer      Neuropathy      Obesity      Osteoarthritis             Past Surgical History:   Procedure Laterality Date     basal cell carcinoma  2017     HERNIA REPAIR  2014            Social History   Substance Use Topics     Smoking status: Never Smoker     Smokeless tobacco: Never Used     Alcohol use Yes      Comment: beer daily             Family History   Problem Relation Age of Onset     Unknown/Adopted Sister                Allergies   Allergen Reactions     Food      Onions, peppers, olives      Trazodone      Other reaction(s): Dizziness            Current Outpatient Prescriptions   Medication Sig Dispense Refill     ALLOPURINOL PO Take 300 mg by mouth Take one tablet daily       blood glucose monitoring (ACCU-CHEK FASTCLIX) lancets USE TO TEST BLOOD SUGAR TWICE A DAY OR AS DIRECTED 102 each 4     blood glucose monitoring (NO BRAND SPECIFIED) test strip Use to test blood  sugars 2 times daily or as directed (accu check smart view) 100 strip 11     cholecalciferol (VITAMIN D) 200 unit TABS half-tab Take 2,000 Int'l Units by mouth       gabapentin (NEURONTIN) 300 MG capsule Take 1 capsule (300 mg) by mouth 3 times daily 270 capsule 3     insulin glargine (LANTUS) 100 UNIT/ML injection Inject 18 Units Subcutaneous At Bedtime 18 units daily 30 mL 2     levothyroxine (SYNTHROID/LEVOTHROID) 100 MCG tablet TAKE 1 TABLET (100 MCG) BY MOUTH DAILY 90 tablet 0     levothyroxine (SYNTHROID/LEVOTHROID) 300 MCG tablet TAKE 1 TABLET BY MOUTH EVERY DAY 90 tablet 1     metFORMIN (GLUCOPHAGE) 1000 MG tablet TAKE 1 TABLET (1,000 MG) BY MOUTH 2 TIMES DAILY (WITH MEALS) 60 tablet 0     metoprolol succinate (TOPROL-XL) 100 MG 24 hr tablet Take 50 mg by mouth       Multiple Vitamin (MULTI VITAMIN PO) Take 1 Tab by mouth one time a day.       naproxen (NAPROSYN) 500 MG tablet TAKE 1 TABLET (500 MG) BY MOUTH 2 TIMES DAILY AS NEEDED FOR MODERATE PAIN 60 tablet 1     NOVOTWIST 32G X 5 MM insulin pen needle FOR USE WITH VICTOZA  each 0     order for DME Equipment being ordered: Left Rebecca Splint 1 Units 0     pantoprazole (PROTONIX) 40 MG EC tablet TAKE 1 TABLET BY MOUTH EVERY DAY 30-60 MIN BEFORE A MEAL 90 tablet 3     Potassium Chloride ER 20 MEQ TBCR Take 1 tablet (20 mEq) by mouth 2 times daily (Patient taking differently: Take 40 mEq by mouth 2 times daily ) 90 tablet 3     QUEtiapine (SEROQUEL) 50 MG tablet Take 1/2- 1 tablet by mouth at bedtime 90 tablet 3     STATIN NOT PRESCRIBED, INTENTIONAL, Please choose reason not prescribed, below       torsemide (DEMADEX) 20 MG tablet Take 2 tablets (40 mg) by mouth 2 times daily 120 tablet 11     VICTOZA PEN 18 MG/3ML soln INJECT 1.2 MG SUBCUTANEOUS DAILY 18MG/3ML PREFILLED PEN 18 mL 3       Review Of Systems:    Skin: negative  Eyes: negative  Ears/Nose/Throat: negative  Respiratory: No shortness of breath, dyspnea on exertion, cough, or  "hemoptysis  Cardiovascular: negative  Gastrointestinal: as above  Genitourinary: negative  Musculoskeletal: negative  Neurologic: negative  Psychiatric: negative  Hematologic/Lymphatic/Immunologic: negative  Endocrine: thyroid disorder    Objective:   /72  Pulse 89  Temp 98.9  F (37.2  C) (Tympanic)  Ht 6' 1\" (1.854 m)  Wt (!) 333 lb (151 kg)  SpO2 97%  BMI 43.93 kg/m2  EXAM:  Constitutional: alert and no distress   Cardiovascular:  RRR. No murmurs, clicks gallops or rub  Respiratory:  Lungs clear  Psychiatric: mentation appears normal and affect normal/bright  Abdomen: Obese-Abdomen soft, non-tender.   NEURO: Ambulates with walker.   SKIN: Venous stasis changes  JOINT/EXTREMITIES: +3 LE edema up to knees       Orders placed or performed in visit on 08/29/18     metFORMIN (GLUCOPHAGE) 1000 MG tablet     levothyroxine (SYNTHROID/LEVOTHROID) 300 MCG tablet     Results for orders placed or performed during the hospital encounter of 09/07/18   US Abdomen Complete w Doppler Complete    Narrative    PROCEDURE: US ABDOMEN COMPLETE WITH DOPPLER COMPLETE 9/7/2018 9:47 AM    HISTORY: US liver with doppler and velocities; Alcoholic cirrhosis of  liver without ascites (H); Localized edema; Hypokalemia; Alcohol  dependence with uncomplicated withdrawal (H)    COMPARISONS: None.    TECHNIQUE: Routine ultrasound of the abdomen.    FINDINGS: There are multiple stones within the gallbladder. Bladder  wall measures 4 mm. Common duct measures 4 mm.    Liver is enlarged measuring 22.7 cm. It is coarsened in echotexture  consistent with a history of cirrhosis. No focal hepatic mass is seen.    Hepatic vessels were evaluated. Main hepatic artery shows antegrade  flow with PSV of 144 cm/s and RI of 0.79. Flow in the splenic vein is  antegrade with PSV of 17.1 Flow in the main portal vein is antegrade  with PSV of 9.3. Antegrade flow is seen in right and left portal veins  with PSV of 8.2 and 11.1.    Middle, left and right " hepatic veins are patent as is the IVC.    Aorta is partially obscured. Spleen is borderline in size measuring  11.5 cm. Kidneys are normal in size with no mass or hydronephrosis.    Pancreas is partially obscured.         Impression    IMPRESSION:   1. Multiple gallstones.  2. Enlarged cirrhotic liver without focal mass or vascular  abnormality.    KYE ESPINOSA MD     Assessment and Plan:    (K70.30) Alcoholic cirrhosis of liver without ascites (H)  (primary encounter diagnosis)  Comment:  Patient was educated on the results of the US today. This is felt to be the cause of his edema.  Recent EGD without varices.  Patient agrees with referral to GI and ETOH cessation discussed.    Plan: GASTROENTEROLOGY ADULT REF CONSULT ONLY,         Comprehensive metabolic panel (BMP + Alb, Alk         Phos, ALT, AST, Total. Bili, TP) AFP    (E03.8) Other specified hypothyroidism  Comment:  Recheck values   Plan: TSH, T4, free    (E11.40) Type 2 diabetes mellitus with diabetic neuropathy, without long-term current use of insulin (H)  Comment: Recent A1C 6.1  Plan: NO changes at this time        You Bethea, DO

## 2018-09-12 NOTE — MR AVS SNAPSHOT
After Visit Summary   9/12/2018    Gunnar Granados    MRN: [id number]           Patient Information     Date Of Birth          1950        Visit Information        Provider Department      9/12/2018 2:00 PM You Bethea DO [facility]        Today's Diagnoses     Alcoholic cirrhosis of liver without ascites (H)    -  1    Other specified hypothyroidism           Follow-ups after your visit        Additional Services     GASTROENTEROLOGY ADULT REF CONSULT ONLY       Preferred Location: Dr Wong       Please be aware that coverage of these services is subject to the terms and limitations of your health insurance plan.  Call member services at your health plan with any benefit or coverage questions.  Any procedures must be performed at a [facility] facility OR coordinated by your clinic's referral office.    Please bring the following with you to your appointment:    (1) Any X-Rays, CTs or MRIs which have been performed.  Contact the facility where they were done to arrange for  prior to your scheduled appointment.    (2) List of current medications   (3) This referral request   (4) Any documents/labs given to you for this referral                  Your next 10 appointments already scheduled     Nov 20, 2018 10:00 AM CST   (Arrive by 9:45 AM)   Return Visit with Mateusz Dupree MD   [facility] ([facility] )    [address]  [place] [address] [phone]              Who to contact     If you have questions or need follow up information about today's clinic visit or your schedule please contact [facility] directly at [phone].  Normal or non-critical lab and imaging results will be communicated to you by MyChart, letter or phone within 4 business days after the clinic has received the results. If you do not hear from us within 7 days, please contact the clinic through MyChart or phone. If  "you have a critical or abnormal lab result, we will notify you by phone as soon as possible.  Submit refill requests through [employer] or call your pharmacy and they will forward the refill request to us. Please allow 3 business days for your refill to be completed.          Additional Information About Your Visit        [employer] Information     [employer] gives you secure access to your electronic health record. If you see a primary care provider, you can also send messages to your care team and make appointments. If you have questions, please call your primary care clinic.  If you do not have a primary care provider, please call [phone] and they will assist you.        Care EveryWhere ID     This is your Care EveryWhere ID. This could be used by other organizations to access your [facility] medical records  [id number]        Your Vitals Were     Pulse Temperature Height Pulse Oximetry BMI (Body Mass Index)       89 98.9  F (37.2  C) (Tympanic) 6' 1\" (1.854 m) 97% 43.93 kg/m2        Blood Pressure from Last 3 Encounters:   09/12/18 138/72   08/28/18 130/68   08/14/18 138/70    Weight from Last 3 Encounters:   09/12/18 (!) 333 lb (151 kg)   08/28/18 322 lb (146.1 kg)   08/14/18 (!) 332 lb (150.6 kg)              We Performed the Following     Comprehensive metabolic panel (BMP + Alb, Alk Phos, ALT, AST, Total. Bili, TP)     GASTROENTEROLOGY ADULT REF CONSULT ONLY     T4, free     TSH          Today's Medication Changes          These changes are accurate as of 9/12/18  2:23 PM.  If you have any questions, ask your nurse or doctor.               These medicines have changed or have updated prescriptions.        Dose/Directions    Potassium Chloride ER 20 MEQ Tbcr   This may have changed:  how much to take   Used for:  Hypokalemia        Dose:  20 mEq   Take 1 tablet (20 mEq) by mouth 2 times daily   Quantity:  90 tablet   Refills:  3                Primary Care Provider Office Phone # Fax #    You Bethea, " [phone]       [facility] [address]        Equal Access to Services     VICKI RASHEED : Hadii aad ku hadolelisa Sabrina, lynnetteda tanaurelia, christian chavez, jacob theodorein hayaan horaciotheresa rendon karen gonzalezSamantha So [facility] [phone].    ATENCIÓN: Si habla español, tiene a basurto disposición servicios gratuitos de asistencia lingüística. Llame al [phone].    We comply with applicable federal civil rights laws and [place] laws. We do not discriminate on the basis of race, color, national origin, age, disability, sex, sexual orientation, or gender identity.            Thank you!     Thank you for choosing [facility]  for your care. Our goal is always to provide you with excellent care. Hearing back from our patients is one way we can continue to improve our services. Please take a few minutes to complete the written survey that you may receive in the mail after your visit with us. Thank you!             Your Updated Medication List - Protect others around you: Learn how to safely use, store and throw away your medicines at www.disposemymeds.org.          This list is accurate as of 9/12/18  2:23 PM.  Always use your most recent med list.                   Brand Name Dispense Instructions for use Diagnosis    ALLOPURINOL PO      Take 300 mg by mouth Take one tablet daily        blood glucose monitoring lancets     102 each    USE TO TEST BLOOD SUGAR TWICE A DAY OR AS DIRECTED    Type 2 diabetes mellitus with diabetic neuropathy (H)       blood glucose monitoring test strip    no brand specified    100 strip    Use to test blood sugars 2 times daily or as directed (accu check smart view)    Type 2 diabetes mellitus with diabetic neuropathy, unspecified long term insulin use status (H)       cholecalciferol 200 unit Tabs half-tab    vitamin D     Take 2,000 Int'l Units by mouth        gabapentin 300 MG capsule    NEURONTIN    270 capsule    Take 1 capsule (300 mg) by mouth  3 times daily    Type 2 diabetes mellitus with diabetic neuropathy, unspecified long term insulin use status (H)       insulin glargine 100 UNIT/ML injection    LANTUS    30 mL    Inject 18 Units Subcutaneous At Bedtime 18 units daily    Type 2 diabetes mellitus with complication, unspecified long term insulin use status (H)       * levothyroxine 100 MCG tablet    SYNTHROID/LEVOTHROID    90 tablet    TAKE 1 TABLET (100 MCG) BY MOUTH DAILY    Hypothyroidism due to acquired atrophy of thyroid       * levothyroxine 300 MCG tablet    SYNTHROID/LEVOTHROID    90 tablet    TAKE 1 TABLET BY MOUTH EVERY DAY    Hypothyroidism due to acquired atrophy of thyroid       metFORMIN 1000 MG tablet    GLUCOPHAGE    60 tablet    TAKE 1 TABLET (1,000 MG) BY MOUTH 2 TIMES DAILY (WITH MEALS)    Type 2 diabetes mellitus with diabetic neuropathy, unspecified long term insulin use status (H)       metoprolol succinate 100 MG 24 hr tablet    TOPROL-XL     Take 50 mg by mouth        MULTI VITAMIN PO      Take 1 Tab by mouth one time a day.        naproxen 500 MG tablet    NAPROSYN    60 tablet    TAKE 1 TABLET (500 MG) BY MOUTH 2 TIMES DAILY AS NEEDED FOR MODERATE PAIN    Chronic pain of both knees       NOVOTWIST 32G X 5 MM   Generic drug:  insulin pen needle     100 each    FOR USE WITH VICTOZA PEN    Type 2 diabetes mellitus with diabetic neuropathy, with long-term current use of insulin (H)       order for DME     1 Units    Equipment being ordered: Left Rebecca Splint    Primary osteoarthritis of both hands       pantoprazole 40 MG EC tablet    PROTONIX    90 tablet    TAKE 1 TABLET BY MOUTH EVERY DAY 30-60 MIN BEFORE A MEAL    Gastroesophageal reflux disease without esophagitis       Potassium Chloride ER 20 MEQ Tbcr     90 tablet    Take 1 tablet (20 mEq) by mouth 2 times daily    Hypokalemia       QUEtiapine 50 MG tablet    SEROquel    90 tablet    Take 1/2- 1 tablet by mouth at bedtime    Insomnia, unspecified type       STATIN NOT  PRESCRIBED (INTENTIONAL)      Please choose reason not prescribed, below    Type 2 diabetes mellitus with complication, unspecified long term insulin use status (H)       torsemide 20 MG tablet    DEMADEX    120 tablet    Take 2 tablets (40 mg) by mouth 2 times daily    Benign essential hypertension       VICTOZA PEN 18 MG/3ML soln   Generic drug:  liraglutide     18 mL    INJECT 1.2 MG SUBCUTANEOUS DAILY 18MG/3ML PREFILLED PEN    Type 2 diabetes mellitus with hyperglycemia, with long-term current use of insulin (H)       * Notice:  This list has 2 medication(s) that are the same as other medications prescribed for you. Read the directions carefully, and ask your doctor or other care provider to review them with you.

## 2018-09-13 ENCOUNTER — MYC MEDICAL ADVICE (OUTPATIENT)
Dept: INTERNAL MEDICINE | Facility: OTHER | Age: 68
End: 2018-09-13

## 2018-09-13 ENCOUNTER — HOSPITAL ENCOUNTER (EMERGENCY)
Facility: HOSPITAL | Age: 68
Discharge: OTHER INSTITUTION | End: 2018-09-14
Attending: INTERNAL MEDICINE | Admitting: INTERNAL MEDICINE
Payer: MEDICARE

## 2018-09-13 DIAGNOSIS — K70.31 ALCOHOLIC CIRRHOSIS OF LIVER WITH ASCITES (H): ICD-10-CM

## 2018-09-13 DIAGNOSIS — K81.0 ACUTE CHOLECYSTITIS: ICD-10-CM

## 2018-09-13 LAB
ALBUMIN UR-MCNC: NEGATIVE MG/DL
APPEARANCE UR: CLEAR
BACTERIA #/AREA URNS HPF: ABNORMAL /HPF
BILIRUB UR QL STRIP: NEGATIVE
COLOR UR AUTO: ABNORMAL
GLUCOSE UR STRIP-MCNC: NEGATIVE MG/DL
HGB UR QL STRIP: NEGATIVE
KETONES UR STRIP-MCNC: NEGATIVE MG/DL
LEUKOCYTE ESTERASE UR QL STRIP: NEGATIVE
NITRATE UR QL: NEGATIVE
PH UR STRIP: 6.5 PH (ref 4.7–8)
RBC #/AREA URNS AUTO: 0 /HPF (ref 0–2)
SOURCE: ABNORMAL
SP GR UR STRIP: 1 (ref 1–1.03)
UROBILINOGEN UR STRIP-MCNC: NORMAL MG/DL (ref 0–2)
WBC #/AREA URNS AUTO: 0 /HPF (ref 0–5)

## 2018-09-13 PROCEDURE — 99285 EMERGENCY DEPT VISIT HI MDM: CPT | Performed by: INTERNAL MEDICINE

## 2018-09-13 PROCEDURE — 25000128 H RX IP 250 OP 636: Performed by: INTERNAL MEDICINE

## 2018-09-13 PROCEDURE — 99285 EMERGENCY DEPT VISIT HI MDM: CPT | Mod: 25

## 2018-09-13 PROCEDURE — 81001 URINALYSIS AUTO W/SCOPE: CPT | Performed by: INTERNAL MEDICINE

## 2018-09-13 PROCEDURE — 96375 TX/PRO/DX INJ NEW DRUG ADDON: CPT

## 2018-09-13 PROCEDURE — 96365 THER/PROPH/DIAG IV INF INIT: CPT

## 2018-09-13 RX ORDER — MORPHINE SULFATE 4 MG/ML
4 INJECTION, SOLUTION INTRAMUSCULAR; INTRAVENOUS
Status: DISCONTINUED | OUTPATIENT
Start: 2018-09-13 | End: 2018-09-14 | Stop reason: HOSPADM

## 2018-09-13 RX ORDER — ONDANSETRON 2 MG/ML
4 INJECTION INTRAMUSCULAR; INTRAVENOUS ONCE
Status: COMPLETED | OUTPATIENT
Start: 2018-09-13 | End: 2018-09-13

## 2018-09-13 RX ADMIN — ONDANSETRON 4 MG: 2 INJECTION INTRAMUSCULAR; INTRAVENOUS at 23:55

## 2018-09-13 ASSESSMENT — ANXIETY QUESTIONNAIRES: GAD7 TOTAL SCORE: 0

## 2018-09-14 ENCOUNTER — APPOINTMENT (OUTPATIENT)
Dept: ULTRASOUND IMAGING | Facility: HOSPITAL | Age: 68
End: 2018-09-14
Attending: INTERNAL MEDICINE
Payer: MEDICARE

## 2018-09-14 ENCOUNTER — TRANSFERRED RECORDS (OUTPATIENT)
Dept: HEALTH INFORMATION MANAGEMENT | Facility: CLINIC | Age: 68
End: 2018-09-14

## 2018-09-14 VITALS
RESPIRATION RATE: 21 BRPM | DIASTOLIC BLOOD PRESSURE: 93 MMHG | SYSTOLIC BLOOD PRESSURE: 130 MMHG | OXYGEN SATURATION: 91 % | TEMPERATURE: 99.7 F | HEART RATE: 85 BPM

## 2018-09-14 LAB
ALBUMIN SERPL-MCNC: 3.3 G/DL (ref 3.4–5)
ALP SERPL-CCNC: 147 U/L (ref 40–150)
ALT SERPL W P-5'-P-CCNC: 45 U/L (ref 0–70)
AMYLASE SERPL-CCNC: 55 U/L (ref 30–110)
ANION GAP SERPL CALCULATED.3IONS-SCNC: 9 MMOL/L (ref 3–14)
AST SERPL W P-5'-P-CCNC: 57 U/L (ref 0–45)
BASOPHILS # BLD AUTO: 0 10E9/L (ref 0–0.2)
BASOPHILS NFR BLD AUTO: 0.3 %
BILIRUB SERPL-MCNC: 1.5 MG/DL (ref 0.2–1.3)
BUN SERPL-MCNC: 10 MG/DL (ref 7–30)
CALCIUM SERPL-MCNC: 8.6 MG/DL (ref 8.5–10.1)
CHLORIDE SERPL-SCNC: 98 MMOL/L (ref 94–109)
CO2 SERPL-SCNC: 27 MMOL/L (ref 20–32)
CREAT SERPL-MCNC: 0.82 MG/DL (ref 0.66–1.25)
CRP SERPL-MCNC: 13.9 MG/L (ref 0–8)
DIFFERENTIAL METHOD BLD: ABNORMAL
EOSINOPHIL # BLD AUTO: 0.5 10E9/L (ref 0–0.7)
EOSINOPHIL NFR BLD AUTO: 3.5 %
ERYTHROCYTE [DISTWIDTH] IN BLOOD BY AUTOMATED COUNT: 18.1 % (ref 10–15)
GFR SERPL CREATININE-BSD FRML MDRD: >90 ML/MIN/1.7M2
GLUCOSE SERPL-MCNC: 128 MG/DL (ref 70–99)
HCT VFR BLD AUTO: 34.9 % (ref 40–53)
HGB BLD-MCNC: 11.1 G/DL (ref 13.3–17.7)
IMM GRANULOCYTES # BLD: 0.1 10E9/L (ref 0–0.4)
IMM GRANULOCYTES NFR BLD: 0.5 %
LACTATE SERPL-SCNC: 3.4 MMOL/L (ref 0.4–2)
LIPASE SERPL-CCNC: 231 U/L (ref 73–393)
LYMPHOCYTES # BLD AUTO: 1.6 10E9/L (ref 0.8–5.3)
LYMPHOCYTES NFR BLD AUTO: 11.9 %
MCH RBC QN AUTO: 24 PG (ref 26.5–33)
MCHC RBC AUTO-ENTMCNC: 31.8 G/DL (ref 31.5–36.5)
MCV RBC AUTO: 76 FL (ref 78–100)
MONOCYTES # BLD AUTO: 0.6 10E9/L (ref 0–1.3)
MONOCYTES NFR BLD AUTO: 4.9 %
NEUTROPHILS # BLD AUTO: 10.3 10E9/L (ref 1.6–8.3)
NEUTROPHILS NFR BLD AUTO: 78.9 %
NRBC # BLD AUTO: 0 10*3/UL
NRBC BLD AUTO-RTO: 0 /100
PLATELET # BLD AUTO: 319 10E9/L (ref 150–450)
POTASSIUM SERPL-SCNC: 4.1 MMOL/L (ref 3.4–5.3)
PROT SERPL-MCNC: 7.1 G/DL (ref 6.8–8.8)
RBC # BLD AUTO: 4.62 10E12/L (ref 4.4–5.9)
SODIUM SERPL-SCNC: 134 MMOL/L (ref 133–144)
WBC # BLD AUTO: 13.1 10E9/L (ref 4–11)

## 2018-09-14 PROCEDURE — 87077 CULTURE AEROBIC IDENTIFY: CPT | Performed by: INTERNAL MEDICINE

## 2018-09-14 PROCEDURE — 83690 ASSAY OF LIPASE: CPT | Performed by: INTERNAL MEDICINE

## 2018-09-14 PROCEDURE — 85025 COMPLETE CBC W/AUTO DIFF WBC: CPT | Performed by: INTERNAL MEDICINE

## 2018-09-14 PROCEDURE — 25000125 ZZHC RX 250: Performed by: INTERNAL MEDICINE

## 2018-09-14 PROCEDURE — 76705 ECHO EXAM OF ABDOMEN: CPT | Mod: TC

## 2018-09-14 PROCEDURE — 80053 COMPREHEN METABOLIC PANEL: CPT | Performed by: INTERNAL MEDICINE

## 2018-09-14 PROCEDURE — 87040 BLOOD CULTURE FOR BACTERIA: CPT | Mod: 59 | Performed by: INTERNAL MEDICINE

## 2018-09-14 PROCEDURE — 25000128 H RX IP 250 OP 636: Performed by: INTERNAL MEDICINE

## 2018-09-14 PROCEDURE — 82150 ASSAY OF AMYLASE: CPT | Performed by: INTERNAL MEDICINE

## 2018-09-14 PROCEDURE — 86140 C-REACTIVE PROTEIN: CPT | Performed by: INTERNAL MEDICINE

## 2018-09-14 PROCEDURE — 36415 COLL VENOUS BLD VENIPUNCTURE: CPT | Performed by: INTERNAL MEDICINE

## 2018-09-14 PROCEDURE — 87184 SC STD DISK METHOD PER PLATE: CPT | Performed by: INTERNAL MEDICINE

## 2018-09-14 PROCEDURE — 87186 SC STD MICRODIL/AGAR DIL: CPT | Performed by: INTERNAL MEDICINE

## 2018-09-14 PROCEDURE — 83605 ASSAY OF LACTIC ACID: CPT | Performed by: INTERNAL MEDICINE

## 2018-09-14 RX ADMIN — MORPHINE SULFATE 4 MG: 4 INJECTION INTRAVENOUS at 00:09

## 2018-09-14 RX ADMIN — TAZOBACTAM SODIUM AND PIPERACILLIN SODIUM 3.38 G: 375; 3 INJECTION, SOLUTION INTRAVENOUS at 01:42

## 2018-09-14 RX ADMIN — FAMOTIDINE 20 MG: 10 INJECTION, SOLUTION INTRAVENOUS at 00:06

## 2018-09-14 ASSESSMENT — ENCOUNTER SYMPTOMS
BACK PAIN: 0
NECK PAIN: 0
DIZZINESS: 0
CONFUSION: 0
CHILLS: 0
MYALGIAS: 0
WHEEZING: 0
BLOOD IN STOOL: 0
NUMBNESS: 0
SLEEP DISTURBANCE: 0
FEVER: 0
HEADACHES: 0
ABDOMINAL DISTENTION: 1
CHEST TIGHTNESS: 0
NAUSEA: 1
VOICE CHANGE: 0
WEAKNESS: 0
ANAL BLEEDING: 0
SHORTNESS OF BREATH: 0
FREQUENCY: 0
FLANK PAIN: 0
VOMITING: 0
COUGH: 0
PALPITATIONS: 0
ABDOMINAL PAIN: 1
DYSURIA: 0
COLOR CHANGE: 0
DIAPHORESIS: 0
LIGHT-HEADEDNESS: 0

## 2018-09-14 NOTE — ED NOTES
Noted pt's walker still here after pt transported to [facility] and pt's wife left for home. Telephone call placed to pt's wife, message left on her voice mail for to let her know that the walker is here, labeled and will need to be picked up.

## 2018-09-14 NOTE — ED PROVIDER NOTES
History     Chief Complaint   Patient presents with     Abdominal Pain     Patient is a 68 year old male presenting with abdominal pain. The history is provided by the patient.   Abdominal Pain   Pain location:  RUQ  Pain quality: aching    Pain radiates to:  Does not radiate  Pain severity:  Severe  Onset quality:  Gradual  Timing:  Constant  Progression:  Worsening  Chronicity:  New  Associated symptoms: nausea    Associated symptoms: no chest pain, no chills, no cough, no dysuria, no fever, no shortness of breath and no vomiting    Problem List:    Patient Active Problem List    Diagnosis Date Noted     ACP (advance care planning) 04/12/2017     Priority: Medium     Advance Care Planning 4/12/2017: ACP Review of Chart / Resources Provided:  Reviewed chart for advance care plan.  Gunnar REGALADO Gileszachpamargi has been provided information and resources to begin or update their advance care plan.  Added by Ginger Small         Type 2 diabetes mellitus with diabetic neuropathy (H) 04/12/2017     Priority: Medium     Morbid obesity due to excess calories (H) 04/12/2017     Priority: Medium     Postoperative hypothyroidism 04/12/2017     Priority: Medium     Benign essential hypertension 04/12/2017     Priority: Medium        Past Medical History:    Past Medical History:   Diagnosis Date     Alcohol abuse      Diabetes mellitus with neuropathy (H)      Gastric polyps      Gout      HTN (hypertension)      Hx of thyroid cancer      Neuropathy      Obesity      Osteoarthritis        Past Surgical History:    Past Surgical History:   Procedure Laterality Date     basal cell carcinoma  2017     HERNIA REPAIR  2014       Family History:    Family History   Problem Relation Age of Onset     Unknown/Adopted Sister        Social History:  Marital Status:   [2]  Social History   Substance Use Topics     Smoking status: Never Smoker     Smokeless tobacco: Never Used     Alcohol use Yes      Comment: beer daily          Medications:      ALLOPURINOL PO   cholecalciferol (VITAMIN D) 200 unit TABS half-tab   gabapentin (NEURONTIN) 300 MG capsule   levothyroxine (SYNTHROID/LEVOTHROID) 300 MCG tablet   metFORMIN (GLUCOPHAGE) 1000 MG tablet   metoprolol succinate (TOPROL-XL) 100 MG 24 hr tablet   Multiple Vitamin (MULTI VITAMIN PO)   naproxen (NAPROSYN) 500 MG tablet   Potassium Chloride ER 20 MEQ TBCR   QUEtiapine (SEROQUEL) 50 MG tablet   torsemide (DEMADEX) 20 MG tablet   VICTOZA PEN 18 MG/3ML soln   blood glucose monitoring (ACCU-CHEK FASTCLIX) lancets   blood glucose monitoring (NO BRAND SPECIFIED) test strip   insulin glargine (LANTUS) 100 UNIT/ML injection   NOVOTWIST 32G X 5 MM insulin pen needle   order for DME   pantoprazole (PROTONIX) 40 MG EC tablet   STATIN NOT PRESCRIBED, INTENTIONAL,         Review of Systems   Constitutional: Negative for chills, diaphoresis and fever.   HENT: Negative for voice change.    Eyes: Negative for visual disturbance.   Respiratory: Negative for cough, chest tightness, shortness of breath and wheezing.    Cardiovascular: Negative for chest pain, palpitations and leg swelling.   Gastrointestinal: Positive for abdominal distention, abdominal pain and nausea. Negative for anal bleeding, blood in stool and vomiting.   Genitourinary: Negative for decreased urine volume, dysuria, flank pain and frequency.   Musculoskeletal: Negative for back pain, gait problem, myalgias and neck pain.   Skin: Negative for color change, pallor and rash.   Neurological: Negative for dizziness, syncope, weakness, light-headedness, numbness and headaches.   Psychiatric/Behavioral: Negative for confusion, sleep disturbance and suicidal ideas.       Physical Exam   BP: 145/84  Pulse: 85  Heart Rate: 85  Temp: 98.7  F (37.1  C)  Resp: 13  SpO2: 94 %      Physical Exam   Constitutional: He is oriented to person, place, and time. He appears well-developed and well-nourished.   HENT:   Head: Normocephalic and atraumatic.    Mouth/Throat: No oropharyngeal exudate.   Eyes: Conjunctivae are normal. Pupils are equal, round, and reactive to light.   Neck: Normal range of motion. Neck supple. No JVD present. No tracheal deviation present. No thyromegaly present.   Cardiovascular: Normal rate, regular rhythm, normal heart sounds and intact distal pulses.  Exam reveals no gallop and no friction rub.    No murmur heard.  Pulmonary/Chest: Effort normal and breath sounds normal. No stridor. No respiratory distress. He has no wheezes. He has no rales. He exhibits no tenderness.   Abdominal: Soft. Bowel sounds are normal. He exhibits distension. He exhibits no mass. There is tenderness in the right upper quadrant. There is positive Alberts's sign. There is no rebound and no guarding.   Musculoskeletal: Normal range of motion. He exhibits edema. He exhibits no tenderness.   Lymphadenopathy:     He has no cervical adenopathy.   Neurological: He is alert and oriented to person, place, and time.   Skin: Skin is warm and dry. No rash noted. No erythema. No pallor.   Psychiatric: His behavior is normal.   Nursing note and vitals reviewed.      ED Course     ED Course     Procedures           Results for orders placed or performed during the hospital encounter of 09/13/18 (from the past 24 hour(s))   UA with Microscopic reflex to Culture   Result Value Ref Range    Color Urine Light Yellow     Appearance Urine Clear     Glucose Urine Negative NEG^Negative mg/dL    Bilirubin Urine Negative NEG^Negative    Ketones Urine Negative NEG^Negative mg/dL    Specific Gravity Urine 1.003 1.003 - 1.035    Blood Urine Negative NEG^Negative    pH Urine 6.5 4.7 - 8.0 pH    Protein Albumin Urine Negative NEG^Negative mg/dL    Urobilinogen mg/dL Normal 0.0 - 2.0 mg/dL    Nitrite Urine Negative NEG^Negative    Leukocyte Esterase Urine Negative NEG^Negative    Source Unspecified Urine     WBC Urine 0 0 - 5 /HPF    RBC Urine 0 0 - 2 /HPF    Bacteria Urine None (A)  NEG^Negative /HPF   Amylase   Result Value Ref Range    Amylase 55 30 - 110 U/L   CBC with platelets differential   Result Value Ref Range    WBC 13.1 (H) 4.0 - 11.0 10e9/L    RBC Count 4.62 4.4 - 5.9 10e12/L    Hemoglobin 11.1 (L) 13.3 - 17.7 g/dL    Hematocrit 34.9 (L) 40.0 - 53.0 %    MCV 76 (L) 78 - 100 fl    MCH 24.0 (L) 26.5 - 33.0 pg    MCHC 31.8 31.5 - 36.5 g/dL    RDW 18.1 (H) 10.0 - 15.0 %    Platelet Count 319 150 - 450 10e9/L    Diff Method Automated Method     % Neutrophils 78.9 %    % Lymphocytes 11.9 %    % Monocytes 4.9 %    % Eosinophils 3.5 %    % Basophils 0.3 %    % Immature Granulocytes 0.5 %    Nucleated RBCs 0 0 /100    Absolute Neutrophil 10.3 (H) 1.6 - 8.3 10e9/L    Absolute Lymphocytes 1.6 0.8 - 5.3 10e9/L    Absolute Monocytes 0.6 0.0 - 1.3 10e9/L    Absolute Eosinophils 0.5 0.0 - 0.7 10e9/L    Absolute Basophils 0.0 0.0 - 0.2 10e9/L    Abs Immature Granulocytes 0.1 0 - 0.4 10e9/L    Absolute Nucleated RBC 0.0    Comprehensive metabolic panel   Result Value Ref Range    Sodium 134 133 - 144 mmol/L    Potassium 4.1 3.4 - 5.3 mmol/L    Chloride 98 94 - 109 mmol/L    Carbon Dioxide 27 20 - 32 mmol/L    Anion Gap 9 3 - 14 mmol/L    Glucose 128 (H) 70 - 99 mg/dL    Urea Nitrogen 10 7 - 30 mg/dL    Creatinine 0.82 0.66 - 1.25 mg/dL    GFR Estimate >90 >60 mL/min/1.7m2    GFR Estimate If Black >90 >60 mL/min/1.7m2    Calcium 8.6 8.5 - 10.1 mg/dL    Bilirubin Total 1.5 (H) 0.2 - 1.3 mg/dL    Albumin 3.3 (L) 3.4 - 5.0 g/dL    Protein Total 7.1 6.8 - 8.8 g/dL    Alkaline Phosphatase 147 40 - 150 U/L    ALT 45 0 - 70 U/L    AST 57 (H) 0 - 45 U/L   CRP inflammation   Result Value Ref Range    CRP Inflammation 13.9 (H) 0.0 - 8.0 mg/L   Lactic acid   Result Value Ref Range    Lactic Acid 3.4 (H) 0.4 - 2.0 mmol/L   Lipase   Result Value Ref Range    Lipase 231 73 - 393 U/L       Medications   morphine (PF) injection 4 mg (4 mg Intravenous Given 9/14/18 0009)   piperacillin-tazobactam (ZOSYN) infusion  3.375 g (3.375 g Intravenous New Bag 9/14/18 0142)   famotidine (PEPCID) injection 20 mg (20 mg Intravenous Given 9/14/18 0006)   ondansetron (ZOFRAN) injection 4 mg (4 mg Intravenous Given 9/13/18 2333)       Assessments & Plan (with Medical Decision Making)   RUQ pain since AM , gradually became worse , he could not tolerate any more, nauseated  Hx of CHF, alcoholic cirrhosis, DM, HTN  Labs; elvated lactate, WBC  US RUQ prelem: multiple gallstone with wall tickening  I spoke to Dr Teran, he reviewed his chart, he recommended transfering to a center with GI support due hx of liver cirrhosis, pt is high risk for surgery and would need ICU after surgery.  Pt preferred [facility], I spoke DR Charles, accepted for transfer.   I have reviewed the nursing notes.    I have reviewed the findings, diagnosis, plan and need for follow up with the patient.      New Prescriptions    No medications on file       Final diagnoses:   Acute cholecystitis   Alcoholic cirrhosis of liver with ascites (H)       9/13/2018   [facility]     Rosendo Maurer MD  09/14/18 0206

## 2018-09-14 NOTE — ED NOTES
Lab called with the need for phone number to where this pt was transferred as she needed to report off a result. Phone number provided.

## 2018-09-14 NOTE — ED NOTES
[facility] here, report given to paramedic, pt loaded onto stretcher and into ambulance and is now en route to [facility].

## 2018-09-14 NOTE — ED NOTES
Co severe abd pain right sided today at about 5pm. Began with a bloated feeling, vomitted x2.  Normal BM this AM.  Denies dysuria

## 2018-09-14 NOTE — ED NOTES
"Pt irritable, \"I don't understand, why isn't the bus here to take me to [place], what is the hold up, the doctor was in here an hour ago and told me I would be going to [place] so why am I still here.\"  Transfer process and all entailed explained to pt, pt remains irritable. \"I just don't understand why it is taking so long,\" and continues to voice complaint. Pt is oriented. VSS, pt rates pain at 2/10, has recently voided and declines comfort measures. Pt's wife remains at bedside.   "

## 2018-09-14 NOTE — ED NOTES
DATE:  9/14/2018   TIME OF RECEIPT FROM LAB:  0029  LAB TEST:  Lactic acid  LAB VALUE:  3.4  RESULTS GIVEN WITH READ-BACK TO (PROVIDER):  Dr Taylor  TIME LAB VALUE REPORTED TO PROVIDER:  0029

## 2018-09-15 ENCOUNTER — TRANSFERRED RECORDS (OUTPATIENT)
Dept: HEALTH INFORMATION MANAGEMENT | Facility: CLINIC | Age: 68
End: 2018-09-15

## 2018-09-16 LAB
ALT SERPL-CCNC: 98 U/L (ref 18–65)
AST SERPL-CCNC: 63 U/L (ref 10–40)
CREAT SERPL-MCNC: 1.23 MG/DL (ref 0.8–1.5)
GFR SERPL CREATININE-BSD FRML MDRD: 59 ML/MIN/1.73M2
GLUCOSE SERPL-MCNC: 113 MG/DL (ref 60–99)
POTASSIUM SERPL-SCNC: 3.8 MEQ/L (ref 3.5–5.1)

## 2018-09-17 ENCOUNTER — MYC MEDICAL ADVICE (OUTPATIENT)
Dept: INTERNAL MEDICINE | Facility: OTHER | Age: 68
End: 2018-09-17

## 2018-09-19 ENCOUNTER — OFFICE VISIT (OUTPATIENT)
Dept: INTERNAL MEDICINE | Facility: OTHER | Age: 68
End: 2018-09-19
Attending: INTERNAL MEDICINE
Payer: COMMERCIAL

## 2018-09-19 VITALS
OXYGEN SATURATION: 96 % | SYSTOLIC BLOOD PRESSURE: 132 MMHG | DIASTOLIC BLOOD PRESSURE: 72 MMHG | TEMPERATURE: 98.3 F | HEART RATE: 69 BPM

## 2018-09-19 DIAGNOSIS — R10.11 ABDOMINAL PAIN, RIGHT UPPER QUADRANT: Primary | ICD-10-CM

## 2018-09-19 DIAGNOSIS — K80.50 CALCULUS OF BILE DUCT WITHOUT CHOLANGITIS OR BILIARY OBSTRUCTION: ICD-10-CM

## 2018-09-19 DIAGNOSIS — K70.30 ALCOHOLIC CIRRHOSIS OF LIVER WITHOUT ASCITES (H): ICD-10-CM

## 2018-09-19 DIAGNOSIS — D50.0 IRON DEFICIENCY ANEMIA DUE TO CHRONIC BLOOD LOSS: ICD-10-CM

## 2018-09-19 LAB
ALBUMIN SERPL-MCNC: 2.9 G/DL (ref 3.4–5)
ALP SERPL-CCNC: 192 U/L (ref 40–150)
ALT SERPL W P-5'-P-CCNC: 70 U/L (ref 0–70)
ANION GAP SERPL CALCULATED.3IONS-SCNC: 9 MMOL/L (ref 3–14)
AST SERPL W P-5'-P-CCNC: 40 U/L (ref 0–45)
BACTERIA SPEC CULT: ABNORMAL
BASOPHILS # BLD AUTO: 0.1 10E9/L (ref 0–0.2)
BASOPHILS NFR BLD AUTO: 0.7 %
BILIRUB SERPL-MCNC: 0.8 MG/DL (ref 0.2–1.3)
BUN SERPL-MCNC: 8 MG/DL (ref 7–30)
CALCIUM SERPL-MCNC: 8.6 MG/DL (ref 8.5–10.1)
CHLORIDE SERPL-SCNC: 95 MMOL/L (ref 94–109)
CO2 SERPL-SCNC: 29 MMOL/L (ref 20–32)
CREAT SERPL-MCNC: 0.82 MG/DL (ref 0.66–1.25)
DIFFERENTIAL METHOD BLD: ABNORMAL
EOSINOPHIL # BLD AUTO: 0.7 10E9/L (ref 0–0.7)
EOSINOPHIL NFR BLD AUTO: 8.5 %
ERYTHROCYTE [DISTWIDTH] IN BLOOD BY AUTOMATED COUNT: 18.3 % (ref 10–15)
GFR SERPL CREATININE-BSD FRML MDRD: >90 ML/MIN/1.7M2
GLUCOSE SERPL-MCNC: 98 MG/DL (ref 70–99)
HCT VFR BLD AUTO: 34.6 % (ref 40–53)
HGB BLD-MCNC: 11.1 G/DL (ref 13.3–17.7)
LYMPHOCYTES # BLD AUTO: 1.9 10E9/L (ref 0.8–5.3)
LYMPHOCYTES NFR BLD AUTO: 23.3 %
Lab: ABNORMAL
MCH RBC QN AUTO: 24.3 PG (ref 26.5–33)
MCHC RBC AUTO-ENTMCNC: 32.1 G/DL (ref 31.5–36.5)
MCV RBC AUTO: 76 FL (ref 78–100)
MONOCYTES # BLD AUTO: 1 10E9/L (ref 0–1.3)
MONOCYTES NFR BLD AUTO: 12 %
NEUTROPHILS # BLD AUTO: 4.6 10E9/L (ref 1.6–8.3)
NEUTROPHILS NFR BLD AUTO: 55.5 %
PLATELET # BLD AUTO: 311 10E9/L (ref 150–450)
PLATELET # BLD EST: ABNORMAL 10*3/UL
POTASSIUM SERPL-SCNC: 3.9 MMOL/L (ref 3.4–5.3)
PROT SERPL-MCNC: 6.8 G/DL (ref 6.8–8.8)
RBC # BLD AUTO: 4.57 10E12/L (ref 4.4–5.9)
RBC MORPH BLD: NORMAL
SODIUM SERPL-SCNC: 133 MMOL/L (ref 133–144)
SPECIMEN SOURCE: ABNORMAL
WBC # BLD AUTO: 8.3 10E9/L (ref 4–11)

## 2018-09-19 PROCEDURE — 99214 OFFICE O/P EST MOD 30 MIN: CPT | Performed by: INTERNAL MEDICINE

## 2018-09-19 PROCEDURE — 80053 COMPREHEN METABOLIC PANEL: CPT | Mod: ZL | Performed by: INTERNAL MEDICINE

## 2018-09-19 PROCEDURE — 36415 COLL VENOUS BLD VENIPUNCTURE: CPT | Mod: ZL | Performed by: INTERNAL MEDICINE

## 2018-09-19 PROCEDURE — G0463 HOSPITAL OUTPT CLINIC VISIT: HCPCS

## 2018-09-19 PROCEDURE — 82105 ALPHA-FETOPROTEIN SERUM: CPT | Mod: ZL | Performed by: INTERNAL MEDICINE

## 2018-09-19 PROCEDURE — 85025 COMPLETE CBC W/AUTO DIFF WBC: CPT | Mod: ZL | Performed by: INTERNAL MEDICINE

## 2018-09-19 PROCEDURE — 99000 SPECIMEN HANDLING OFFICE-LAB: CPT | Performed by: INTERNAL MEDICINE

## 2018-09-19 ASSESSMENT — ANXIETY QUESTIONNAIRES
4. TROUBLE RELAXING: NOT AT ALL
2. NOT BEING ABLE TO STOP OR CONTROL WORRYING: NOT AT ALL
3. WORRYING TOO MUCH ABOUT DIFFERENT THINGS: NOT AT ALL
6. BECOMING EASILY ANNOYED OR IRRITABLE: NOT AT ALL
5. BEING SO RESTLESS THAT IT IS HARD TO SIT STILL: NOT AT ALL
7. FEELING AFRAID AS IF SOMETHING AWFUL MIGHT HAPPEN: NOT AT ALL
GAD7 TOTAL SCORE: 0
1. FEELING NERVOUS, ANXIOUS, OR ON EDGE: NOT AT ALL

## 2018-09-19 ASSESSMENT — PAIN SCALES - GENERAL: PAINLEVEL: NO PAIN (0)

## 2018-09-19 NOTE — MR AVS SNAPSHOT
After Visit Summary   9/19/2018    Gunnar Granados    MRN: [id number]           Patient Information     Date Of Birth          1950        Visit Information        Provider Department      9/19/2018 1:30 PM You Bethea DO [facility]        Today's Diagnoses     Abdominal pain, right upper quadrant    -  1    Iron deficiency anemia due to chronic blood loss           Follow-ups after your visit        Your next 10 appointments already scheduled     Nov 20, 2018 10:00 AM CST   (Arrive by 9:45 AM)   Return Visit with Mateusz Dupree MD   [facility] ([facility] )    [address]  [facility] [address] [phone]              Who to contact     If you have questions or need follow up information about today's clinic visit or your schedule please contact [facility] directly at [phone].  Normal or non-critical lab and imaging results will be communicated to you by MyChart, letter or phone within 4 business days after the clinic has received the results. If you do not hear from us within 7 days, please contact the clinic through PRX[employer] or phone. If you have a critical or abnormal lab result, we will notify you by phone as soon as possible.  Submit refill requests through [employer] or call your pharmacy and they will forward the refill request to us. Please allow 3 business days for your refill to be completed.          Additional Information About Your Visit        MyChart Information     [employer] gives you secure access to your electronic health record. If you see a primary care provider, you can also send messages to your care team and make appointments. If you have questions, please call your primary care clinic.  If you do not have a primary care provider, please call [phone] and they will assist you.        Care EveryWhere ID     This is your Care EveryWhere ID.  This could be used by other organizations to access your [facility] medical records  [id number]        Your Vitals Were     Pulse Temperature Pulse Oximetry             69 98.3  F (36.8  C) (Tympanic) 96%          Blood Pressure from Last 3 Encounters:   09/19/18 132/72   09/14/18 130/93   09/12/18 138/72    Weight from Last 3 Encounters:   09/12/18 (!) 333 lb (151 kg)   08/28/18 322 lb (146.1 kg)   08/14/18 (!) 332 lb (150.6 kg)              We Performed the Following     CBC with platelets and differential     Comprehensive metabolic panel (BMP + Alb, Alk Phos, ALT, AST, Total. Bili, TP)          Today's Medication Changes          These changes are accurate as of 9/19/18  1:51 PM.  If you have any questions, ask your nurse or doctor.               These medicines have changed or have updated prescriptions.        Dose/Directions    POTASSIUM CHLORIDE ER PO   This may have changed:  Another medication with the same name was removed. Continue taking this medication, and follow the directions you see here.   Changed by:  You Bethea DO        Dose:  40 mEq   Take 40 mEq by mouth 2 times daily   Refills:  0                Primary Care Provider Office Phone # Fax #    You Bethea [phone]       [address] [place] [address]        Equal Access to Services     [facility] AH: Hadii lali bhatt hadasho Soomaali, waaxda luqadaha, qaybta kaalmada adeegyada, jacob jones . So [facility] [phone].    ATENCIÓN: Si habla español, tiene a basurto disposición servicios gratuitos de asistencia lingüística. Llame al [phone].    We comply with applicable federal civil rights laws and [place] laws. We do not discriminate on the basis of race, color, national origin, age, disability, sex, sexual orientation, or gender identity.            Thank you!     Thank you for choosing [facility]  for your care. Our goal is always to provide you  with excellent care. Hearing back from our patients is one way we can continue to improve our services. Please take a few minutes to complete the written survey that you may receive in the mail after your visit with us. Thank you!             Your Updated Medication List - Protect others around you: Learn how to safely use, store and throw away your medicines at www.disposemymeds.org.          This list is accurate as of 9/19/18  1:51 PM.  Always use your most recent med list.                   Brand Name Dispense Instructions for use Diagnosis    ALLOPURINOL PO      Take 300 mg by mouth Take one tablet daily        amoxicillin-clavulanate 875-125 MG per tablet    AUGMENTIN     TAKE 1 TABLET BY MOUTH TWICE A DAY        blood glucose monitoring lancets     102 each    USE TO TEST BLOOD SUGAR TWICE A DAY OR AS DIRECTED    Type 2 diabetes mellitus with diabetic neuropathy (H)       blood glucose monitoring test strip    no brand specified    100 strip    Use to test blood sugars 2 times daily or as directed (accu check smart view)    Type 2 diabetes mellitus with diabetic neuropathy, unspecified long term insulin use status (H)       cholecalciferol 200 unit Tabs half-tab    vitamin D     Take 2,000 Int'l Units by mouth        gabapentin 300 MG capsule    NEURONTIN    270 capsule    Take 1 capsule (300 mg) by mouth 3 times daily    Type 2 diabetes mellitus with diabetic neuropathy, unspecified long term insulin use status (H)       insulin glargine 100 UNIT/ML injection    LANTUS    30 mL    Inject 18 Units Subcutaneous At Bedtime 18 units daily    Type 2 diabetes mellitus with complication, unspecified long term insulin use status (H)       levothyroxine 300 MCG tablet    SYNTHROID/LEVOTHROID    90 tablet    TAKE 1 TABLET BY MOUTH EVERY DAY    Hypothyroidism due to acquired atrophy of thyroid       metFORMIN 1000 MG tablet    GLUCOPHAGE    60 tablet    TAKE 1 TABLET (1,000 MG) BY MOUTH 2 TIMES DAILY (WITH MEALS)     Type 2 diabetes mellitus with diabetic neuropathy, unspecified long term insulin use status (H)       metoprolol succinate 100 MG 24 hr tablet    TOPROL-XL     Take 50 mg by mouth        MULTI VITAMIN PO      Take 1 Tab by mouth one time a day.        naproxen 500 MG tablet    NAPROSYN    60 tablet    TAKE 1 TABLET (500 MG) BY MOUTH 2 TIMES DAILY AS NEEDED FOR MODERATE PAIN    Chronic pain of both knees       NOVOTWIST 32G X 5 MM   Generic drug:  insulin pen needle     100 each    FOR USE WITH VICTOZA PEN    Type 2 diabetes mellitus with diabetic neuropathy, with long-term current use of insulin (H)       order for DME     1 Units    Equipment being ordered: Left Rebecca Splint    Primary osteoarthritis of both hands       pantoprazole 40 MG EC tablet    PROTONIX    90 tablet    TAKE 1 TABLET BY MOUTH EVERY DAY 30-60 MIN BEFORE A MEAL    Gastroesophageal reflux disease without esophagitis       POTASSIUM CHLORIDE ER PO      Take 40 mEq by mouth 2 times daily        QUEtiapine 50 MG tablet    SEROquel    90 tablet    Take 1/2- 1 tablet by mouth at bedtime    Insomnia, unspecified type       STATIN NOT PRESCRIBED (INTENTIONAL)      Please choose reason not prescribed, below    Type 2 diabetes mellitus with complication, unspecified long term insulin use status (H)       torsemide 20 MG tablet    DEMADEX    120 tablet    Take 2 tablets (40 mg) by mouth 2 times daily    Benign essential hypertension       VICTOZA PEN 18 MG/3ML soln   Generic drug:  liraglutide     18 mL    INJECT 1.2 MG SUBCUTANEOUS DAILY 18MG/3ML PREFILLED PEN    Type 2 diabetes mellitus with hyperglycemia, with long-term current use of insulin (H)

## 2018-09-19 NOTE — PROGRESS NOTES
SUBJECTIVE:   Gunnar Granados is a 68 year old male who presents to clinic today for the following health issues:          Hospital Follow-up Visit:    Hospital/Nursing Home/IP Rehab Facility: [facility]   Date of Admission: 9/14/18  Date of Discharge: 9/16/18  Reason(s) for Admission: acute cholecystitis             Problems taking medications regularly:  None       Medication changes since discharge: amox-clav       Problems adhering to non-medication therapy:  Not elevating feet     Summary of hospitalization:  [facility] discharge summary reviewed  See outside records, reviewed and scanned  Diagnostic Tests/Treatments reviewed.  Follow up needed: none  Other Healthcare Providers Involved in Patient s Care:         None  Update since discharge: improved.     Post Discharge Medication Reconciliation: discharge medications reconciled, continue medications without change.  Plan of care communicated with patient and family     Coding guidelines for this visit:  Type of Medical   Decision Making Face-to-Face Visit       within 7 Days of discharge Face-to-Face Visit        within 14 days of discharge   Moderate Complexity [id number]   High Complexity [id number]            Gunnar presents today for follow up of recent hospitalization for RUQ pain.  Recent US did reveal cholelithiasis and cirrhosis however he had not reported any abdominal pain.  Then shortly after our visit he began to experience abdominal pain.  He was taken to [facility] in [place] were he was seen by Surgery and GI.  He also had blood cultures obtained which was 1/2 bottles + for Strep Salivarius which was pan sensitive. He was discharged on Augmentin for 10 days.  While in the hospital he did have an ERCP but did not have a cholocystectomy and was subsequently discharged home and told to follow up with myself and his GI provider.  Since returning home he has not had any fevers or chills and no abdominal pain.      Problem list and  histories reviewed & adjusted, as indicated.  Additional history: as documented    Patient Active Problem List   Diagnosis     ACP (advance care planning)     Type 2 diabetes mellitus with diabetic neuropathy (H)     Morbid obesity due to excess calories (H)     Postoperative hypothyroidism     Benign essential hypertension     Past Surgical History:   Procedure Laterality Date     basal cell carcinoma  2017     HERNIA REPAIR  2014       Social History   Substance Use Topics     Smoking status: Never Smoker     Smokeless tobacco: Never Used     Alcohol use Yes      Comment: beer daily      Family History   Problem Relation Age of Onset     Unknown/Adopted Sister          Current Outpatient Prescriptions   Medication Sig Dispense Refill     ALLOPURINOL PO Take 300 mg by mouth Take one tablet daily       amoxicillin-clavulanate (AUGMENTIN) 875-125 MG per tablet TAKE 1 TABLET BY MOUTH TWICE A DAY  0     blood glucose monitoring (ACCU-CHEK FASTCLIX) lancets USE TO TEST BLOOD SUGAR TWICE A DAY OR AS DIRECTED 102 each 4     blood glucose monitoring (NO BRAND SPECIFIED) test strip Use to test blood sugars 2 times daily or as directed (accu check smart view) 100 strip 11     cholecalciferol (VITAMIN D) 200 unit TABS half-tab Take 2,000 Int'l Units by mouth       gabapentin (NEURONTIN) 300 MG capsule Take 1 capsule (300 mg) by mouth 3 times daily 270 capsule 3     insulin glargine (LANTUS) 100 UNIT/ML injection Inject 18 Units Subcutaneous At Bedtime 18 units daily 30 mL 2     levothyroxine (SYNTHROID/LEVOTHROID) 300 MCG tablet TAKE 1 TABLET BY MOUTH EVERY DAY 90 tablet 1     metFORMIN (GLUCOPHAGE) 1000 MG tablet TAKE 1 TABLET (1,000 MG) BY MOUTH 2 TIMES DAILY (WITH MEALS) 60 tablet 0     metoprolol succinate (TOPROL-XL) 100 MG 24 hr tablet Take 50 mg by mouth       Multiple Vitamin (MULTI VITAMIN PO) Take 1 Tab by mouth one time a day.       naproxen (NAPROSYN) 500 MG tablet TAKE 1 TABLET (500 MG) BY MOUTH 2 TIMES DAILY AS  NEEDED FOR MODERATE PAIN 60 tablet 1     NOVOTWIST 32G X 5 MM insulin pen needle FOR USE WITH VICTOZA  each 0     order for DME Equipment being ordered: Left Rebecca Splint 1 Units 0     pantoprazole (PROTONIX) 40 MG EC tablet TAKE 1 TABLET BY MOUTH EVERY DAY 30-60 MIN BEFORE A MEAL 90 tablet 1     POTASSIUM CHLORIDE ER PO Take 40 mEq by mouth 2 times daily       QUEtiapine (SEROQUEL) 50 MG tablet Take 1/2- 1 tablet by mouth at bedtime 90 tablet 3     STATIN NOT PRESCRIBED, INTENTIONAL, Please choose reason not prescribed, below       torsemide (DEMADEX) 20 MG tablet Take 2 tablets (40 mg) by mouth 2 times daily 120 tablet 11     VICTOZA PEN 18 MG/3ML soln INJECT 1.2 MG SUBCUTANEOUS DAILY 18MG/3ML PREFILLED PEN 18 mL 3     Allergies   Allergen Reactions     Food      Onions, peppers, olives      Trazodone      Other reaction(s): Dizziness     Recent Labs   Lab Test  09/14/18   0001  09/12/18   1430  08/28/18   1510  08/14/18   1546   11/07/17   1100  05/03/17   1541   A1C   --    --    --   6.1*   --   6.2*  6.4*   LDL   --    --    --    --    --   106*   --    HDL   --    --    --    --    --   51   --    TRIG   --    --    --    --    --   171*   --    ALT  45  24  32  29   --   36  37   CR  0.82  0.82  0.93  0.82   --   0.84  0.70   GFRESTIMATED  >90  >90  81  >90   --   >90  >90  Non African American GFR Calc     GFRESTBLACK  >90  >90  >90  >90   --   >90  >90  African American GFR Calc     POTASSIUM  4.1  4.5  4.5  3.9   --   4.0  3.3*   TSH   --   0.10*   --   0.09*   < >  0.16*  0.10*    < > = values in this interval not displayed.      BP Readings from Last 3 Encounters:   09/19/18 132/72   09/14/18 130/93   09/12/18 138/72    Wt Readings from Last 3 Encounters:   09/12/18 (!) 333 lb (151 kg)   08/28/18 322 lb (146.1 kg)   08/14/18 (!) 332 lb (150.6 kg)                    Reviewed and updated as needed this visit by clinical staff       Reviewed and updated as needed this visit by Provider          ROS:  Constitutional, HEENT, cardiovascular, pulmonary, gi and gu systems are negative, except as otherwise noted.    OBJECTIVE:     /72  Pulse 69  Temp 98.3  F (36.8  C) (Tympanic)  SpO2 96%  There is no height or weight on file to calculate BMI.   GENERAL: Alert and no distress  RESP: lungs clear to auscultation - no rales, rhonchi or wheezes  CV: regular rate and rhythm, normal S1 S2, no S3 or S4, no murmur, click or rub, no peripheral edema and peripheral pulses strong  ABDOMEN: Obese-soft, nontender, with positive BS  MS: +3 LE edema.    SKIN: no suspicious lesions or rashes  NEURO: Normal strength and tone, mentation intact and speech normal  PSYCH: mentation appears normal, affect normal/bright    Diagnostic Test Results:  Results for orders placed or performed in visit on 09/19/18 (from the past 24 hour(s))   CBC with platelets and differential   Result Value Ref Range    WBC 8.3 4.0 - 11.0 10e9/L    RBC Count 4.57 4.4 - 5.9 10e12/L    Hemoglobin 11.1 (L) 13.3 - 17.7 g/dL    Hematocrit 34.6 (L) 40.0 - 53.0 %    MCV 76 (L) 78 - 100 fl    MCH 24.3 (L) 26.5 - 33.0 pg    MCHC 32.1 31.5 - 36.5 g/dL    RDW 18.3 (H) 10.0 - 15.0 %    Platelet Count 311 150 - 450 10e9/L    Diff Method PENDING      Results for orders placed or performed in visit on 09/19/18   CBC with platelets and differential   Result Value Ref Range    WBC 8.3 4.0 - 11.0 10e9/L    RBC Count 4.57 4.4 - 5.9 10e12/L    Hemoglobin 11.1 (L) 13.3 - 17.7 g/dL    Hematocrit 34.6 (L) 40.0 - 53.0 %    MCV 76 (L) 78 - 100 fl    MCH 24.3 (L) 26.5 - 33.0 pg    MCHC 32.1 31.5 - 36.5 g/dL    RDW 18.3 (H) 10.0 - 15.0 %    Platelet Count 311 150 - 450 10e9/L    Diff Method PENDING        ASSESSMENT/PLAN:     Problem List Items Addressed This Visit     None      Visit Diagnoses     Abdominal pain, right upper quadrant    -  Primary    Relevant Orders    Comprehensive metabolic panel (BMP + Alb, Alk Phos, ALT, AST, Total. Bili, TP) (Completed)    Iron  deficiency anemia due to chronic blood loss        Relevant Orders    CBC with platelets and differential (Completed)    Alcoholic cirrhosis of liver without ascites (H)-AFP today        Calculus of bile duct without cholangitis or biliary obstruction             30 minutes was spent on this patient's care today.  Greater than 50% of the time was spent face to face with the patient and his wife.  All questions were answered.  In addition records from the ED and his hospitalization at [facility] were also reviewed in detail today.    You Bethea,   [facility]

## 2018-09-20 LAB
BACTERIA SPEC CULT: ABNORMAL
Lab: ABNORMAL
SPECIMEN SOURCE: ABNORMAL

## 2018-09-20 ASSESSMENT — ANXIETY QUESTIONNAIRES: GAD7 TOTAL SCORE: 0

## 2018-09-20 ASSESSMENT — PATIENT HEALTH QUESTIONNAIRE - PHQ9: SUM OF ALL RESPONSES TO PHQ QUESTIONS 1-9: 0

## 2018-09-21 LAB — AFP SERPL-MCNC: <1.5 UG/L (ref 0–8)

## 2018-10-01 ENCOUNTER — TRANSFERRED RECORDS (OUTPATIENT)
Dept: HEALTH INFORMATION MANAGEMENT | Facility: CLINIC | Age: 68
End: 2018-10-01

## 2018-10-04 DIAGNOSIS — K74.60 CIRRHOSIS OF LIVER (H): ICD-10-CM

## 2018-10-04 DIAGNOSIS — D50.9 ANEMIA, IRON DEFICIENCY: Primary | ICD-10-CM

## 2018-10-04 LAB — INR PPP: 0.97 (ref 0.8–1.2)

## 2018-10-04 PROCEDURE — 36415 COLL VENOUS BLD VENIPUNCTURE: CPT | Mod: ZL | Performed by: INTERNAL MEDICINE

## 2018-10-04 PROCEDURE — 85610 PROTHROMBIN TIME: CPT | Mod: ZL | Performed by: INTERNAL MEDICINE

## 2018-10-09 ENCOUNTER — TRANSFERRED RECORDS (OUTPATIENT)
Dept: HEALTH INFORMATION MANAGEMENT | Facility: CLINIC | Age: 68
End: 2018-10-09

## 2018-10-25 ENCOUNTER — MYC MEDICAL ADVICE (OUTPATIENT)
Dept: INTERNAL MEDICINE | Facility: OTHER | Age: 68
End: 2018-10-25

## 2018-10-25 DIAGNOSIS — E87.6 HYPOKALEMIA: Primary | ICD-10-CM

## 2018-10-26 RX ORDER — POTASSIUM CHLORIDE 1500 MG/1
40 TABLET, EXTENDED RELEASE ORAL 2 TIMES DAILY
Qty: 360 TABLET | Refills: 3 | Status: SHIPPED | OUTPATIENT
Start: 2018-10-26 | End: 2019-08-29

## 2018-10-31 ENCOUNTER — TRANSFERRED RECORDS (OUTPATIENT)
Dept: HEALTH INFORMATION MANAGEMENT | Facility: CLINIC | Age: 68
End: 2018-10-31

## 2018-11-01 DIAGNOSIS — E11.40 TYPE 2 DIABETES MELLITUS WITH DIABETIC NEUROPATHY (H): ICD-10-CM

## 2018-11-01 RX ORDER — LANCETS
EACH MISCELLANEOUS
Qty: 102 EACH | Refills: 1 | Status: SHIPPED | OUTPATIENT
Start: 2018-11-01 | End: 2019-02-04

## 2018-11-02 DIAGNOSIS — D50.9 ANEMIA, IRON DEFICIENCY: Primary | ICD-10-CM

## 2018-11-06 DIAGNOSIS — D51.8 OTHER VITAMIN B12 DEFICIENCY ANEMIA: ICD-10-CM

## 2018-11-06 DIAGNOSIS — E87.6 HYPOKALEMIA: ICD-10-CM

## 2018-11-06 DIAGNOSIS — D50.9 ANEMIA, IRON DEFICIENCY: ICD-10-CM

## 2018-11-06 DIAGNOSIS — D64.9 ANEMIA: ICD-10-CM

## 2018-11-06 LAB
ANION GAP SERPL CALCULATED.3IONS-SCNC: 13 MMOL/L (ref 3–14)
BUN SERPL-MCNC: 11 MG/DL (ref 7–30)
CALCIUM SERPL-MCNC: 8.4 MG/DL (ref 8.5–10.1)
CHLORIDE SERPL-SCNC: 91 MMOL/L (ref 94–109)
CO2 SERPL-SCNC: 28 MMOL/L (ref 20–32)
CREAT SERPL-MCNC: 0.81 MG/DL (ref 0.66–1.25)
GFR SERPL CREATININE-BSD FRML MDRD: >90 ML/MIN/1.7M2
GLUCOSE SERPL-MCNC: 102 MG/DL (ref 70–99)
HGB BLD-MCNC: 10.5 G/DL (ref 13.3–17.7)
POTASSIUM SERPL-SCNC: 3.8 MMOL/L (ref 3.4–5.3)
SODIUM SERPL-SCNC: 132 MMOL/L (ref 133–144)

## 2018-11-06 PROCEDURE — 82607 VITAMIN B-12: CPT | Mod: ZL | Performed by: INTERNAL MEDICINE

## 2018-11-06 PROCEDURE — 36415 COLL VENOUS BLD VENIPUNCTURE: CPT | Mod: ZL | Performed by: INTERNAL MEDICINE

## 2018-11-06 PROCEDURE — 85018 HEMOGLOBIN: CPT | Mod: ZL | Performed by: INTERNAL MEDICINE

## 2018-11-06 PROCEDURE — 99000 SPECIMEN HANDLING OFFICE-LAB: CPT | Performed by: INTERNAL MEDICINE

## 2018-11-06 PROCEDURE — 74018 RADEX ABDOMEN 1 VIEW: CPT | Mod: TC,FY

## 2018-11-06 PROCEDURE — 80048 BASIC METABOLIC PNL TOTAL CA: CPT | Mod: ZL | Performed by: INTERNAL MEDICINE

## 2018-11-07 LAB — VIT B12 SERPL-MCNC: 218 PG/ML (ref 193–986)

## 2018-11-09 ENCOUNTER — TELEPHONE (OUTPATIENT)
Dept: INTERNAL MEDICINE | Facility: OTHER | Age: 68
End: 2018-11-09

## 2018-11-09 NOTE — TELEPHONE ENCOUNTER
Faxed Xray Abd 11/6/18 and labs 11/6/18 to Dr. Tru Chilel, [facility] ph. [phone] fx. [phone]

## 2018-11-15 ENCOUNTER — TRANSFERRED RECORDS (OUTPATIENT)
Dept: HEALTH INFORMATION MANAGEMENT | Facility: CLINIC | Age: 68
End: 2018-11-15

## 2018-11-20 ENCOUNTER — OFFICE VISIT (OUTPATIENT)
Dept: ORTHOPEDICS | Facility: OTHER | Age: 68
End: 2018-11-20
Attending: ORTHOPAEDIC SURGERY
Payer: MEDICARE

## 2018-11-20 VITALS
WEIGHT: 315 LBS | HEART RATE: 81 BPM | HEIGHT: 74 IN | SYSTOLIC BLOOD PRESSURE: 118 MMHG | BODY MASS INDEX: 40.43 KG/M2 | OXYGEN SATURATION: 97 % | RESPIRATION RATE: 18 BRPM | TEMPERATURE: 98.8 F | DIASTOLIC BLOOD PRESSURE: 72 MMHG

## 2018-11-20 DIAGNOSIS — M17.10 ARTHRITIS OF KNEE: Primary | ICD-10-CM

## 2018-11-20 PROCEDURE — G0463 HOSPITAL OUTPT CLINIC VISIT: HCPCS | Mod: 25

## 2018-11-20 PROCEDURE — 20610 DRAIN/INJ JOINT/BURSA W/O US: CPT | Performed by: ORTHOPAEDIC SURGERY

## 2018-11-20 RX ORDER — CHLORAL HYDRATE 500 MG
3000 CAPSULE ORAL
COMMUNITY
Start: 2017-03-22 | End: 2019-01-03

## 2018-11-20 RX ORDER — FERROUS SULFATE 325(65) MG
TABLET ORAL
Refills: 3 | COMMUNITY
Start: 2018-11-08 | End: 2023-08-28

## 2018-11-20 RX ORDER — TAMSULOSIN HYDROCHLORIDE 0.4 MG/1
CAPSULE ORAL
COMMUNITY
Start: 2018-04-30 | End: 2021-04-22

## 2018-11-20 RX ORDER — HYDROCORTISONE ACETATE 0.5 %
300 CREAM (GRAM) TOPICAL 2 TIMES DAILY
COMMUNITY

## 2018-11-20 RX ORDER — ASCORBIC ACID 500 MG
500 TABLET ORAL
COMMUNITY

## 2018-11-20 RX ORDER — FLURBIPROFEN SODIUM 0.3 MG/ML
SOLUTION/ DROPS OPHTHALMIC 2 TIMES DAILY
Refills: 7 | COMMUNITY
Start: 2018-10-11 | End: 2019-03-18

## 2018-11-20 ASSESSMENT — PAIN SCALES - GENERAL: PAINLEVEL: EXTREME PAIN (8)

## 2018-11-20 NOTE — MR AVS SNAPSHOT
After Visit Summary   11/20/2018    Gunnar Granados    MRN: [id number]           Patient Information     Date Of Birth          1950        Visit Information        Provider Department      11/20/2018 10:00 AM Mateusz Dupree MD [facility]        Today's Diagnoses     Arthritis of knee    -  1       Follow-ups after your visit        Your next 10 appointments already scheduled     May 21, 2019 11:20 AM CDT   (Arrive by 11:05 AM)   Return Visit with Mateusz Dupree MD   [facility] ([facility] )    [address]  [facility] [id number] [phone]              Who to contact     If you have questions or need follow up information about today's clinic visit or your schedule please contact [facility] directly at [phone].  Normal or non-critical lab and imaging results will be communicated to you by MyChart, letter or phone within 4 business days after the clinic has received the results. If you do not hear from us within 7 days, please contact the clinic through [email or web address] or phone. If you have a critical or abnormal lab result, we will notify you by phone as soon as possible.  Submit refill requests through [employer] or call your pharmacy and they will forward the refill request to us. Please allow 3 business days for your refill to be completed.          Additional Information About Your Visit        MyChart Information     [employer] gives you secure access to your electronic health record. If you see a primary care provider, you can also send messages to your care team and make appointments. If you have questions, please call your primary care clinic.  If you do not have a primary care provider, please call [phone] and they will assist you.        Care EveryWhere ID     This is your Care EveryWhere ID. This could be used by other organizations to access your [facility]  "records  [id number]        Your Vitals Were     Pulse Temperature Respirations Height Pulse Oximetry BMI (Body Mass Index)    81 98.8  F (37.1  C) (Tympanic) 18 6' 1.5\" (1.867 m) 97% 43.34 kg/m2       Blood Pressure from Last 3 Encounters:   11/20/18 118/72   09/19/18 132/72   09/14/18 130/93    Weight from Last 3 Encounters:   11/20/18 (!) 333 lb (151 kg)   09/12/18 (!) 333 lb (151 kg)   08/28/18 322 lb (146.1 kg)              Today, you had the following     No orders found for display       Primary Care Provider Office Phone # Fax #    You ABDI Bethea,  [phone]       [facility] [place] [address]        Equal Access to Services     [facility] : Hadii aad ku hadashlisa Sophan, waaxda luqadaha, qaybta kaalmada adeegyada, jacob jones . [facility] [phone].    ATENCIÓN: Si habla español, tiene a basurto disposición servicios gratuitos de asistencia lingüística. Llame al [phone].    We comply with applicable federal civil rights laws and [place] laws. We do not discriminate on the basis of race, color, national origin, age, disability, sex, sexual orientation, or gender identity.            Thank you!     Thank you for choosing [facility]  for your care. Our goal is always to provide you with excellent care. Hearing back from our patients is one way we can continue to improve our services. Please take a few minutes to complete the written survey that you may receive in the mail after your visit with us. Thank you!             Your Updated Medication List - Protect others around you: Learn how to safely use, store and throw away your medicines at www.disposemymeds.org.          This list is accurate as of 11/20/18 10:34 AM.  Always use your most recent med list.                   Brand Name Dispense Instructions for use Diagnosis    ALLOPURINOL PO      Take 300 mg by mouth Take one tablet daily        amoxicillin-clavulanate " 875-125 MG per tablet    AUGMENTIN     TAKE 1 TABLET BY MOUTH TWICE A DAY        ascorbic acid 500 MG tablet    VITAMIN C     Take 500 mg by mouth        blood glucose monitoring lancets     102 each    USE TO TEST BLOOD SUGAR TWICE A DAY OR AS DIRECTED    Type 2 diabetes mellitus with diabetic neuropathy (H)       blood glucose monitoring test strip    no brand specified    100 strip    Use to test blood sugars 2 times daily or as directed (accu check smart view)    Type 2 diabetes mellitus with diabetic neuropathy, unspecified long term insulin use status       cholecalciferol 200 unit Tabs half-tab    vitamin D     Take 2,000 Int'l Units by mouth        FEROSUL 325 (65 Fe) MG tablet   Generic drug:  ferrous sulfate      TAKE 1 TABLET BY MOUTH ONCE DAILY  BEST IF TAKEN WITH SOME FOOD        fish oil-omega-3 fatty acids 1000 MG capsule      Take 3,000 mg by mouth        gabapentin 300 MG capsule    NEURONTIN    270 capsule    Take 1 capsule (300 mg) by mouth 3 times daily    Type 2 diabetes mellitus with diabetic neuropathy, unspecified long term insulin use status       horse chestnut 300 MG Caps      Take 300 mg by mouth        insulin glargine 100 UNIT/ML injection    LANTUS    30 mL    Inject 18 Units Subcutaneous At Bedtime 18 units daily    Type 2 diabetes mellitus with complication, unspecified long term insulin use status       levothyroxine 300 MCG tablet    SYNTHROID/LEVOTHROID    90 tablet    TAKE 1 TABLET BY MOUTH EVERY DAY    Hypothyroidism due to acquired atrophy of thyroid       metFORMIN 1000 MG tablet    GLUCOPHAGE    180 tablet    Take 1 tablet (1,000 mg) by mouth 2 times daily (with meals)    Type 2 diabetes mellitus with diabetic neuropathy, unspecified whether long term insulin use (H)       metoprolol succinate 100 MG 24 hr tablet    TOPROL-XL     Take 50 mg by mouth        MULTI VITAMIN PO      Take 1 Tab by mouth one time a day.        naproxen 500 MG tablet    NAPROSYN    60 tablet    TAKE  1 TABLET (500 MG) BY MOUTH 2 TIMES DAILY AS NEEDED FOR MODERATE PAIN    Chronic pain of both knees       * NOVOTWIST 32G X 5 MM   Generic drug:  insulin pen needle     100 each    FOR USE WITH VICTOZA PEN    Type 2 diabetes mellitus with diabetic neuropathy, with long-term current use of insulin (H)       * B-D U/F 31G X 5 MM miscellaneous   Generic drug:  insulin pen needle      2 times daily        order for DME     1 Units    Equipment being ordered: Left Rebecca Splint    Primary osteoarthritis of both hands       pantoprazole 40 MG EC tablet    PROTONIX    90 tablet    TAKE 1 TABLET BY MOUTH EVERY DAY 30-60 MIN BEFORE A MEAL    Gastroesophageal reflux disease without esophagitis       potassium chloride ER 20 MEQ ER tablet    K-TAB    360 tablet    Take 2 tablets (40 mEq) by mouth 2 times daily    Hypokalemia       QUEtiapine 50 MG tablet    SEROquel    90 tablet    Take 1/2- 1 tablet by mouth at bedtime    Insomnia, unspecified type       STATIN NOT PRESCRIBED (INTENTIONAL)      Please choose reason not prescribed, below    Type 2 diabetes mellitus with complication, unspecified long term insulin use status       tamsulosin 0.4 MG capsule    FLOMAX     TAKE ONE CAPSULE BY MOUTH EVERY DAY. SWALLOW WHOLE. DO NOT CRUSH OR CHEW        torsemide 20 MG tablet    DEMADEX    120 tablet    Take 2 tablets (40 mg) by mouth 2 times daily    Benign essential hypertension       VICTOZA PEN 18 MG/3ML solution   Generic drug:  liraglutide     18 mL    INJECT 1.2 MG SUBCUTANEOUS DAILY 18MG/3ML PREFILLED PEN    Type 2 diabetes mellitus with hyperglycemia, with long-term current use of insulin (H)       * Notice:  This list has 2 medication(s) that are the same as other medications prescribed for you. Read the directions carefully, and ask your doctor or other care provider to review them with you.

## 2018-11-20 NOTE — NURSING NOTE
"Chief Complaint   Patient presents with     Musculoskeletal Problem     Bilateral knee pain       Initial /72 (BP Location: Right arm, Patient Position: Sitting, Cuff Size: Adult Large)  Pulse 81  Temp 98.8  F (37.1  C) (Tympanic)  Resp 18  Ht 6' 1.5\" (1.867 m)  Wt (!) 333 lb (151 kg)  SpO2 97%  BMI 43.34 kg/m2 Estimated body mass index is 43.34 kg/(m^2) as calculated from the following:    Height as of this encounter: 6' 1.5\" (1.867 m).    Weight as of this encounter: 333 lb (151 kg).  Medication Reconciliation: complete    Suzie Luna LPN    "

## 2018-11-20 NOTE — PROGRESS NOTES
Chief Complaint: Bilateral DJD of the Knees    Patient profile: 68-year-old right-handed morbidly obese non-smoking insulin-dependent diabetic with neuropathy, retired , patient of Dr. Bethea.     Bilateral knees: Gunnar injured his left knee while deer hunting in 1989.  The injury required an arthroscopic procedure which included a patellar chondroplasty.  Subsequently he had progressively increasing bilateral knee pain.  Before presenting here for the first time on 5/9/2017 he saw Dr. Esparza who treated him on 2 occasions 6 months apart with bilateral Synvisc injections and oral naproxen, with good results.      Upon presentation here on 5/9/2017 x-rays showed mild to moderate DJD involving all 3 compartments of each knee.  I repeated the Synvisc injections.  On 11/28/2017 Visco supplementation was repeated, but this time with Gel 1.  Although he benefited from the injections, he did not think Gel 1 worked as well as Synvisc had, so on 5/15/2018 he was reinjected with Synvisc.    Subjective: He stated the last round of injections helped his knees significantly but the benefits are starting to wear off.  He desires repeat bilateral knee Visco supplementation injections.    Objective: Morbidly obese male no obvious distress.  Neither knee has an effusion.  He ambulates with a walker.    Procedure: After obtaining written informed consent both knees were sterilely prepped.  A timeout was held.  The skin over each knee was anesthetized with topical ethyl chloride spray followed by  1% Xylocaine without epinephrine.  Each knee was injected with 6 mL of Synvisc 1 and 4 mg of dexamethasone through a superolateral approach.  The needles were removed and Band-Aids were applied.  The patient tolerated the procedure well and there were no complications.  The dexamethasone was to prevent a Synvisc synovitis which can occur after 15-20% of Synvisc 1 injections.  This was his fourth round of bilateral Visco  supplementation injections in this office; his sixth round including those given by Dr. Esparza.    Impression: Bilateral DJD of the knees    Plan: He will return in 6 months for repeat injections.  He will need x-rays on arrival as his prior films will then be 2 years old.

## 2018-11-28 DIAGNOSIS — E03.4 HYPOTHYROIDISM DUE TO ACQUIRED ATROPHY OF THYROID: ICD-10-CM

## 2018-11-29 DIAGNOSIS — E03.9 ACQUIRED HYPOTHYROIDISM: Primary | ICD-10-CM

## 2018-11-29 RX ORDER — LEVOTHYROXINE SODIUM 300 UG/1
TABLET ORAL
Qty: 30 TABLET | Refills: 0 | Status: SHIPPED | OUTPATIENT
Start: 2018-11-29 | End: 2019-03-07

## 2018-11-29 NOTE — TELEPHONE ENCOUNTER
levothyroxine (SYNTHROID/LEVOTHROID) 300 MCG tablet     Last Written Prescription Date:  8/31/18  Last Fill Quantity: 90,   # refills: 1  Last Office Visit: 9/19/18  Future Office visit:

## 2018-12-17 DIAGNOSIS — K13.70 ORAL LESION: Primary | ICD-10-CM

## 2018-12-18 DIAGNOSIS — D64.9 ANEMIA: Primary | ICD-10-CM

## 2018-12-18 DIAGNOSIS — E03.9 ACQUIRED HYPOTHYROIDISM: ICD-10-CM

## 2018-12-18 LAB — HGB BLD-MCNC: 13.4 G/DL (ref 13.3–17.7)

## 2018-12-18 PROCEDURE — 84443 ASSAY THYROID STIM HORMONE: CPT | Mod: ZL | Performed by: INTERNAL MEDICINE

## 2018-12-18 PROCEDURE — 84439 ASSAY OF FREE THYROXINE: CPT | Mod: ZL | Performed by: INTERNAL MEDICINE

## 2018-12-18 PROCEDURE — 36415 COLL VENOUS BLD VENIPUNCTURE: CPT | Mod: ZL | Performed by: INTERNAL MEDICINE

## 2018-12-18 PROCEDURE — 85018 HEMOGLOBIN: CPT | Mod: ZL | Performed by: INTERNAL MEDICINE

## 2018-12-19 LAB
T4 FREE SERPL-MCNC: 1.46 NG/DL (ref 0.76–1.46)
TSH SERPL DL<=0.005 MIU/L-ACNC: 0.28 MU/L (ref 0.4–4)

## 2018-12-20 ENCOUNTER — MYC MEDICAL ADVICE (OUTPATIENT)
Dept: INTERNAL MEDICINE | Facility: OTHER | Age: 68
End: 2018-12-20

## 2019-01-03 ENCOUNTER — OFFICE VISIT (OUTPATIENT)
Dept: OTOLARYNGOLOGY | Facility: OTHER | Age: 69
End: 2019-01-03
Attending: INTERNAL MEDICINE
Payer: MEDICARE

## 2019-01-03 VITALS
HEART RATE: 72 BPM | TEMPERATURE: 98.9 F | BODY MASS INDEX: 43.34 KG/M2 | SYSTOLIC BLOOD PRESSURE: 134 MMHG | OXYGEN SATURATION: 95 % | DIASTOLIC BLOOD PRESSURE: 76 MMHG | HEIGHT: 74 IN

## 2019-01-03 DIAGNOSIS — K13.70 ORAL LESION: ICD-10-CM

## 2019-01-03 DIAGNOSIS — K13.21 ORAL LEUKOPLAKIA: Primary | ICD-10-CM

## 2019-01-03 DIAGNOSIS — D10.30 FIBROMA OF INTRAORAL REGION: ICD-10-CM

## 2019-01-03 DIAGNOSIS — F10.10 ALCOHOL ABUSE: ICD-10-CM

## 2019-01-03 DIAGNOSIS — G47.33 OSA ON CPAP: ICD-10-CM

## 2019-01-03 PROCEDURE — 99203 OFFICE O/P NEW LOW 30 MIN: CPT | Mod: 25 | Performed by: OTOLARYNGOLOGY

## 2019-01-03 PROCEDURE — 40812 EXCISE/REPAIR MOUTH LESION: CPT | Performed by: OTOLARYNGOLOGY

## 2019-01-03 PROCEDURE — G0463 HOSPITAL OUTPT CLINIC VISIT: HCPCS | Mod: 25

## 2019-01-03 PROCEDURE — 88305 TISSUE EXAM BY PATHOLOGIST: CPT | Mod: TC | Performed by: OTOLARYNGOLOGY

## 2019-01-03 ASSESSMENT — PAIN SCALES - GENERAL: PAINLEVEL: EXTREME PAIN (8)

## 2019-01-03 NOTE — PATIENT INSTRUCTIONS
Thank you for allowing Dr. Knight and our ENT team to participate in your care.  If your medications are too expensive, please give the nurse a call.  We can possibly change this medication.  If you have a scheduling or an appointment question please contact our Health Unit Coordinator at their direct line [phone].   ALL nursing questions or concerns can be directed to your ENT nurse at: [phone] - [place]      VINEGAR AND DISTILLED WATER IRRIGATION FOR EARS    This solution should only be used if the ears have noted drainage, or debris.      Using a sterile cup, mix 1/2 cup of white vinegar with 1/2 cup distilled water.    Irrigate the ear(s) using 15mL of solution every other day.    Completely dry the ear after irrigation, using a blow dryer on a low heat setting.       **If you are using ear drops, use the drops in the morning and the irrigation solution in the evening.           POST PROCEDURE INSTRUCTIONS      For oral lesions, rinse your mouth with a mixture of half water and half hydrogen peroxide 3 times daily, after meals and before bed.       For lip lesions, apply Aquaphor Healing Ointment to the wound 3 times daily after cleaning with above mixture(Unless specified Bacitracin).      You can apply ice to the surgical area to help reduce swelling. (no longer than 20 minutes at a time)       You can use acetaminophen(Tylenol) or the prescription you received for pain.       If you have sutures in place these are dissolvable. They will fall out on their own. DO NOT play with them as this will cause more irritation to the surgical area.       If cautery was used, that is the blackened area you see. This will fade away and can become a white patchy area. This is normal! Continue to clean wound as described above.     SIGNS OF INFECTION ARE:    Redness, swelling, red streaks, pus, drainage, warmth, fever, increased pain, foul smell.     Contact your primary health care provider if you notice any of  the warning signs.     FOLLOW - UP    Follow-up as needed in clinic.     Pathology results will be called to you when they are back. This can take approx. 7-10 days.

## 2019-01-03 NOTE — PROGRESS NOTES
Otolaryngology Consultation    Patient: Gunnar Granados  : 1950    Referral: HELLEN Roche DDS    HPI:  Gunnar Granados is a 68 year old male seen today for an oral lesion.  He was evaluated by his dentist MIYA Roche in October, who noted a 12x8 mm leukoplakia on the left soft palate.    He has no associated symptoms this is noted incidentally.    He does have a separate area in his left inner cheek that he frequently bites this does bother him    No ulceration  No otalgia,  pharyngitis or weight loss   no noted preceding trauma to the area    He has been steadily gaining weightNever smoker    History of alcohol abuse  States he drinks as much Keystone light as it takes    History obstructive sleep apnea he is compliant with CPAP    Current Outpatient Rx   Medication Sig Dispense Refill     ALLOPURINOL PO Take 300 mg by mouth Take one tablet daily       ascorbic acid (VITAMIN C) 500 MG tablet Take 500 mg by mouth       B-D U/F insulin pen needle 2 times daily  7     blood glucose monitoring (ACCU-CHEK FASTCLIX) lancets USE TO TEST BLOOD SUGAR TWICE A DAY OR AS DIRECTED 102 each 1     blood glucose monitoring (NO BRAND SPECIFIED) test strip Use to test blood sugars 2 times daily or as directed (accu check smart view) 100 strip 11     cholecalciferol (VITAMIN D) 200 unit TABS half-tab Take 2,000 Units by mouth daily        FEROSUL 325 (65 Fe) MG tablet TAKE 1 TABLET BY MOUTH ONCE DAILY  BEST IF TAKEN WITH SOME FOOD  3     fish oil-omega-3 fatty acids 1000 MG capsule Take 3,000 mg by mouth       gabapentin (NEURONTIN) 300 MG capsule Take 1 capsule (300 mg) by mouth 3 times daily 270 capsule 3     horse chestnut 300 MG CAPS Take 300 mg by mouth       insulin glargine (LANTUS) 100 UNIT/ML injection Inject 18 Units Subcutaneous At Bedtime 18 units daily 30 mL 2     levothyroxine (SYNTHROID/LEVOTHROID) 300 MCG tablet TAKE 1 TABLET BY MOUTH EVERY DAY 30 tablet 0     metFORMIN (GLUCOPHAGE) 1000 MG tablet Take 1  tablet (1,000 mg) by mouth 2 times daily (with meals) 180 tablet 3     metoprolol succinate (TOPROL-XL) 100 MG 24 hr tablet Take 50 mg by mouth       Multiple Vitamin (MULTI VITAMIN PO) Take 1 Tab by mouth one time a day.       naproxen (NAPROSYN) 500 MG tablet TAKE 1 TABLET (500 MG) BY MOUTH 2 TIMES DAILY AS NEEDED FOR MODERATE PAIN 60 tablet 1     NOVOTWIST 32G X 5 MM insulin pen needle FOR USE WITH VICTOZA PEN  0     order for DME Equipment being ordered: Left Rebecca Splint 1 Units 0     pantoprazole (PROTONIX) 40 MG EC tablet TAKE 1 TABLET BY MOUTH EVERY DAY 30-60 MIN BEFORE A MEAL 90 tablet 1     Potassium Chloride ER 20 MEQ TBCR Take 2 tablets (40 mEq) by mouth 2 times daily 360 tablet 3     QUEtiapine (SEROQUEL) 50 MG tablet Take 1/2- 1 tablet by mouth at bedtime 90 tablet 3     STATIN NOT PRESCRIBED, INTENTIONAL, Please choose reason not prescribed, below       tamsulosin (FLOMAX) 0.4 MG capsule TAKE ONE CAPSULE BY MOUTH EVERY DAY. SWALLOW WHOLE. DO NOT CRUSH OR CHEW       torsemide (DEMADEX) 20 MG tablet Take 2 tablets (40 mg) by mouth 2 times daily 120 tablet 11     UNABLE TO FIND Take 1 tablet by mouth       VICTOZA PEN 18 MG/3ML soln INJECT 1.2 MG SUBCUTANEOUS DAILY 18MG/3ML PREFILLED PEN 18 mL 3       Allergies: Food and Trazodone     Past Medical History:   Diagnosis Date     Alcohol abuse      Diabetes mellitus with neuropathy (H)      Gastric polyps      Gout      HTN (hypertension)      Hx of thyroid cancer      Neuropathy      Obesity      Osteoarthritis        Past Surgical History:   Procedure Laterality Date     basal cell carcinoma  2017     COLONOSCOPY - HIM SCAN  12/01/2012    Colonoscopy due to bleeding, no polyps 12-12     HERNIA REPAIR  2014       ENT family history reviewed    Social History     Tobacco Use     Smoking status: Never Smoker     Smokeless tobacco: Never Used   Substance Use Topics     Alcohol use: Yes     Comment: beer daily      Drug use: No       Review of Systems  ROS:  "10 point ROS neg other than the symptoms noted above in the HPI and change in bowel habits heartburn-like cramps diarrhea eye irritation shortness of breath on exertion and shortness of breath lying down, nasal congestion rhinorrhea neck and joint pain muscle weakness tingling and numbness of the extremities, night sweats hair loss weight gain dry skin new moles anemia and areas of swelling    Physical Exam  /76   Pulse 72   Temp 98.9  F (37.2  C) (Tympanic)   Ht 1.867 m (6' 1.5\")   SpO2 95%   BMI 43.34 kg/m      General - The patient is well nourished and well developed, and appears to have good nutritional status.  Alert and oriented to person and place, answers questions and cooperates with examination appropriately.   Head and Face - Normocephalic and atraumatic, with no gross asymmetry noted.  The facial nerve is intact, with strong symmetric movements.  Voice and Breathing - The patient was breathing comfortably without the use of accessory muscles. There was no wheezing, stridor, or stertor.  The patients voice was clear and strong, and had appropriate pitch and quality.  No jose e peripheral digital clubbing or cyanosis   Ears -The external auditory canals are patent, the tympanic membranes are intact without effusion, retraction or mass.  Bony landmarks are intact.  Eyes - Extraocular movements intact, and the pupils were reactive to light.  Sclera were not icteric or injected, conjunctiva were pink and moist.  Mouth - Examination of the oral cavity showed pink, healthy oral mucosa. No ulcerations noted.  The tongue was mobile and midline, and the dentition were in fair condition.    There is a left buccal mucosa raised bite fibroma less than a centimeter without ulceration  Throat - The walls of the oropharynx were smooth, pink, moist, symmetric, and had no lesions or ulcerations.  The tonsillar pillars and soft palate were symmetric.  The uvula was midline on elevation.  Bills Tongue Position " III, large tongue, grade 2 tonsils bilaterlaly  Left soft palate 1 cm patch of leukoplakia without ulceration  Neck - No palpable enlarged fixed cervical lymph nodes.  No neck cysts or unusual tenderness to palpation.   No palpable fixed thyroid nodules or concerning goiter.  The trachea is grossly midline.   Nose - External contour is symmetric, no gross deflection or scars.  Nasal mucosa is pink and moist with no abnormal mucus.  The septum and turbinates were evaluated: non obstructive.  No polyps, masses, or purulence noted on examination.    Procedure - Oral cavity lesion Removal    Informed consent was discussed and signed, and risks of the procedure were discussed, including bleeding, anesthesia, infection, scar formation, numbness, need for additional surgery, injury to salivary tissue.  I then infiltrated the mucosal tissues with 1% lidocaine with 1:200,000 epinephrine.  I then used a 15-blade to create an thin elliptical incision around the left buccal mucosa bite fibroma lesion.  I then elevated the  ellipse and a thin cuff of underlying normal appearing mucosa with the scalpel.  I proceeded to use a fine iris scissor to undermine the lesion and excise it.   Hemostasis was achieved with direct pressure and silver nitrate cautery.  The total length of the incision was 1.0 cm.  The specimen(s) was placed in formalin and sent to pathology.  The mucosal edges were then reapproximated using 4-0 chromic, simple interrupted sutures .     I then turned my attention to the soft palate leukoplakia in the left this is similarly anesthetized and I obtained representative biopsies which are handed off in formalin hemostasis was similar to earlier achieved no sutures were necessary both wounds were irrigated and gently suctioned.    the patient tolerated the procedure without any difficulty.          Impression and Plan- Gunnar Granados is a 68 year old male with:    ICD-10-CM    1. 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 Oral lesion K13.70 EXCISION LESION MOUTH W SIMPLE REPAIR   3. Fibroma of intraoral region D10.30 EXCISION LESION MOUTH W SIMPLE REPAIR   4. Alcohol abuse F10.10    5. CALLIE on CPAP G47.33     Z99.89        Alcohol cessation reinforced risk of head neck including esophageal cancer    Congratulated on CPAP use    This patient has had removal of an oral cavity lesion today.  I have instructed the patient on wound care with 1/2 strength peroxide rinses for 1 week, and  Diet.  Ibuprofen alternated with tylenol prn pain.  The patient will be called with pathology results.            Ayana Knight D.O.  Otolaryngology/Head and Neck Surgery  Allergy

## 2019-01-03 NOTE — LETTER
1/3/2019         RE: Gunnar Granados  [address] [address]        Dear Colleague,    Thank you for referring your patient, Gunnar Granados, to the [facility]. Please see a copy of my visit note below.    Otolaryngology Consultation    Patient: Gunnar Granados  : 1950    Referral: HELLEN Roche DDS    HPI:  Gunnar Granados is a 68 year old male seen today for an oral lesion.  He was evaluated by his dentist MIYA Roche in October, who noted a 12x8 mm leukoplakia on the left soft palate.    He has no associated symptoms this is noted incidentally.    He does have a separate area in his left inner cheek that he frequently bites this does bother him    No ulceration  No otalgia,  pharyngitis or weight loss   no noted preceding trauma to the area    He has been steadily gaining weightNever smoker    History of alcohol abuse  States he drinks as much [place] light as it takes    History obstructive sleep apnea he is compliant with CPAP    Current Outpatient Rx   Medication Sig Dispense Refill     ALLOPURINOL PO Take 300 mg by mouth Take one tablet daily       ascorbic acid (VITAMIN C) 500 MG tablet Take 500 mg by mouth       B-D U/F insulin pen needle 2 times daily  7     blood glucose monitoring (ACCU-CHEK FASTCLIX) lancets USE TO TEST BLOOD SUGAR TWICE A DAY OR AS DIRECTED 102 each 1     blood glucose monitoring (NO BRAND SPECIFIED) test strip Use to test blood sugars 2 times daily or as directed (accu check smart view) 100 strip 11     cholecalciferol (VITAMIN D) 200 unit TABS half-tab Take 2,000 Units by mouth daily        FEROSUL 325 (65 Fe) MG tablet TAKE 1 TABLET BY MOUTH ONCE DAILY  BEST IF TAKEN WITH SOME FOOD  3     fish oil-omega-3 fatty acids 1000 MG capsule Take 3,000 mg by mouth       gabapentin (NEURONTIN) 300 MG capsule Take 1 capsule (300 mg) by mouth 3 times daily 270 capsule 3     horse chestnut 300 MG CAPS Take 300 mg by mouth       insulin glargine  (LANTUS) 100 UNIT/ML injection Inject 18 Units Subcutaneous At Bedtime 18 units daily 30 mL 2     levothyroxine (SYNTHROID/LEVOTHROID) 300 MCG tablet TAKE 1 TABLET BY MOUTH EVERY DAY 30 tablet 0     metFORMIN (GLUCOPHAGE) 1000 MG tablet Take 1 tablet (1,000 mg) by mouth 2 times daily (with meals) 180 tablet 3     metoprolol succinate (TOPROL-XL) 100 MG 24 hr tablet Take 50 mg by mouth       Multiple Vitamin (MULTI VITAMIN PO) Take 1 Tab by mouth one time a day.       naproxen (NAPROSYN) 500 MG tablet TAKE 1 TABLET (500 MG) BY MOUTH 2 TIMES DAILY AS NEEDED FOR MODERATE PAIN 60 tablet 1     NOVOTWIST 32G X 5 MM insulin pen needle FOR USE WITH VICTOZA PEN  0     order for DME Equipment being ordered: Left Rebecca Splint 1 Units 0     pantoprazole (PROTONIX) 40 MG EC tablet TAKE 1 TABLET BY MOUTH EVERY DAY 30-60 MIN BEFORE A MEAL 90 tablet 1     Potassium Chloride ER 20 MEQ TBCR Take 2 tablets (40 mEq) by mouth 2 times daily 360 tablet 3     QUEtiapine (SEROQUEL) 50 MG tablet Take 1/2- 1 tablet by mouth at bedtime 90 tablet 3     STATIN NOT PRESCRIBED, INTENTIONAL, Please choose reason not prescribed, below       tamsulosin (FLOMAX) 0.4 MG capsule TAKE ONE CAPSULE BY MOUTH EVERY DAY. SWALLOW WHOLE. DO NOT CRUSH OR CHEW       torsemide (DEMADEX) 20 MG tablet Take 2 tablets (40 mg) by mouth 2 times daily 120 tablet 11     UNABLE TO FIND Take 1 tablet by mouth       VICTOZA PEN 18 MG/3ML soln INJECT 1.2 MG SUBCUTANEOUS DAILY 18MG/3ML PREFILLED PEN 18 mL 3       Allergies: Food and Trazodone     Past Medical History:   Diagnosis Date     Alcohol abuse      Diabetes mellitus with neuropathy (H)      Gastric polyps      Gout      HTN (hypertension)      Hx of thyroid cancer      Neuropathy      Obesity      Osteoarthritis        Past Surgical History:   Procedure Laterality Date     basal cell carcinoma  2017     COLONOSCOPY - HIM SCAN  12/01/2012    Colonoscopy due to bleeding, no polyps 12-12     HERNIA REPAIR  2014  "      ENT family history reviewed    Social History     Tobacco Use     Smoking status: Never Smoker     Smokeless tobacco: Never Used   Substance Use Topics     Alcohol use: Yes     Comment: beer daily      Drug use: No       Review of Systems  ROS: 10 point ROS neg other than the symptoms noted above in the HPI and change in bowel habits heartburn-like cramps diarrhea eye irritation shortness of breath on exertion and shortness of breath lying down, nasal congestion rhinorrhea neck and joint pain muscle weakness tingling and numbness of the extremities, night sweats hair loss weight gain dry skin new moles anemia and areas of swelling    Physical Exam  /76   Pulse 72   Temp 98.9  F (37.2  C) (Tympanic)   Ht 1.867 m (6' 1.5\")   SpO2 95%   BMI 43.34 kg/m       General - The patient is well nourished and well developed, and appears to have good nutritional status.  Alert and oriented to person and place, answers questions and cooperates with examination appropriately.   Head and Face - Normocephalic and atraumatic, with no gross asymmetry noted.  The facial nerve is intact, with strong symmetric movements.  Voice and Breathing - The patient was breathing comfortably without the use of accessory muscles. There was no wheezing, stridor, or stertor.  The patients voice was clear and strong, and had appropriate pitch and quality.  No jose e peripheral digital clubbing or cyanosis   Ears -The external auditory canals are patent, the tympanic membranes are intact without effusion, retraction or mass.  Bony landmarks are intact.  Eyes - Extraocular movements intact, and the pupils were reactive to light.  Sclera were not icteric or injected, conjunctiva were pink and moist.  Mouth - Examination of the oral cavity showed pink, healthy oral mucosa. No ulcerations noted.  The tongue was mobile and midline, and the dentition were in fair condition.    There is a left buccal mucosa raised bite fibroma less than a " centimeter without ulceration  Throat - The walls of the oropharynx were smooth, pink, moist, symmetric, and had no lesions or ulcerations.  The tonsillar pillars and soft palate were symmetric.  The uvula was midline on elevation.  Bills Tongue Position III, large tongue, grade 2 tonsils bilaterlaly  Left soft palate 1 cm patch of leukoplakia without ulceration  Neck - No palpable enlarged fixed cervical lymph nodes.  No neck cysts or unusual tenderness to palpation.   No palpable fixed thyroid nodules or concerning goiter.  The trachea is grossly midline.   Nose - External contour is symmetric, no gross deflection or scars.  Nasal mucosa is pink and moist with no abnormal mucus.  The septum and turbinates were evaluated: non obstructive.  No polyps, masses, or purulence noted on examination.    Procedure - Oral cavity lesion Removal    Informed consent was discussed and signed, and risks of the procedure were discussed, including bleeding, anesthesia, infection, scar formation, numbness, need for additional surgery, injury to salivary tissue.  I then infiltrated the mucosal tissues with 1% lidocaine with 1:200,000 epinephrine.  I then used a 15-blade to create an thin elliptical incision around the left buccal mucosa bite fibroma lesion.  I then elevated the  ellipse and a thin cuff of underlying normal appearing mucosa with the scalpel.  I proceeded to use a fine iris scissor to undermine the lesion and excise it.   Hemostasis was achieved with direct pressure and silver nitrate cautery.  The total length of the incision was 1.0 cm.  The specimen(s) was placed in formalin and sent to pathology.  The mucosal edges were then reapproximated using 4-0 chromic, simple interrupted sutures .     I then turned my attention to the soft palate leukoplakia in the left this is similarly anesthetized and I obtained representative biopsies which are handed off in formalin hemostasis was similar to earlier achieved no  sutures were necessary both wounds were irrigated and gently suctioned.    the patient tolerated the procedure without any difficulty.          Impression and Plan- Gunnar Granados is a 68 year old male with:    ICD-10-CM    1. 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 Oral lesion K13.70 EXCISION LESION MOUTH W SIMPLE REPAIR   3. Fibroma of intraoral region D10.30 EXCISION LESION MOUTH W SIMPLE REPAIR   4. Alcohol abuse F10.10    5. CALLIE on CPAP G47.33     Z99.89        Alcohol cessation reinforced risk of head neck including esophageal cancer    Congratulated on CPAP use    This patient has had removal of an oral cavity lesion today.  I have instructed the patient on wound care with 1/2 strength peroxide rinses for 1 week, and  Diet.  Ibuprofen alternated with tylenol prn pain.  The patient will be called with pathology results.            Ayana Knight D.O.  Otolaryngology/Head and Neck Surgery  Allergy      Again, thank you for allowing me to participate in the care of your patient.        Sincerely,        Ayana Knight MD

## 2019-01-03 NOTE — NURSING NOTE
"Chief Complaint   Patient presents with     Consult     oral lesion, alma referring       Initial /76   Pulse 72   Temp 98.9  F (37.2  C) (Tympanic)   Ht 1.867 m (6' 1.5\")   SpO2 95%   BMI 43.34 kg/m   Estimated body mass index is 43.34 kg/m  as calculated from the following:    Height as of this encounter: 1.867 m (6' 1.5\").    Weight as of 11/20/18: 151 kg (333 lb).  Medication Reconciliation: complete    Erna Solomon LPN  "

## 2019-01-07 LAB — COPATH REPORT: NORMAL

## 2019-02-04 ENCOUNTER — MYC MEDICAL ADVICE (OUTPATIENT)
Dept: INTERNAL MEDICINE | Facility: OTHER | Age: 69
End: 2019-02-04

## 2019-02-04 DIAGNOSIS — E11.9 DM2 (DIABETES MELLITUS, TYPE 2) (H): Primary | ICD-10-CM

## 2019-02-04 DIAGNOSIS — E11.40 TYPE 2 DIABETES MELLITUS WITH DIABETIC NEUROPATHY (H): ICD-10-CM

## 2019-02-05 RX ORDER — LANCETS
EACH MISCELLANEOUS
Qty: 102 EACH | Refills: 2 | Status: SHIPPED | OUTPATIENT
Start: 2019-02-05 | End: 2019-07-05

## 2019-03-07 ENCOUNTER — MYC MEDICAL ADVICE (OUTPATIENT)
Dept: INTERNAL MEDICINE | Facility: OTHER | Age: 69
End: 2019-03-07

## 2019-03-07 DIAGNOSIS — E03.4 HYPOTHYROIDISM DUE TO ACQUIRED ATROPHY OF THYROID: ICD-10-CM

## 2019-03-07 RX ORDER — LEVOTHYROXINE SODIUM 300 UG/1
300 TABLET ORAL DAILY
Qty: 90 TABLET | Refills: 3 | Status: SHIPPED | OUTPATIENT
Start: 2019-03-07 | End: 2019-11-21

## 2019-03-11 DIAGNOSIS — M25.561 CHRONIC PAIN OF BOTH KNEES: ICD-10-CM

## 2019-03-11 DIAGNOSIS — M25.562 CHRONIC PAIN OF BOTH KNEES: ICD-10-CM

## 2019-03-11 DIAGNOSIS — G89.29 CHRONIC PAIN OF BOTH KNEES: ICD-10-CM

## 2019-03-11 RX ORDER — NAPROXEN 500 MG/1
500 TABLET ORAL 2 TIMES DAILY WITH MEALS
Qty: 180 TABLET | Refills: 3 | Status: SHIPPED | OUTPATIENT
Start: 2019-03-11 | End: 2020-07-07

## 2019-03-18 DIAGNOSIS — E11.9 DM2 (DIABETES MELLITUS, TYPE 2) (H): Primary | ICD-10-CM

## 2019-03-18 DIAGNOSIS — E11.40 TYPE 2 DIABETES MELLITUS WITH DIABETIC NEUROPATHY, UNSPECIFIED WHETHER LONG TERM INSULIN USE (H): ICD-10-CM

## 2019-03-18 NOTE — TELEPHONE ENCOUNTER
BD UF Mini pen needle       Last Written Prescription Date:  10/11/2018  Last Fill Quantity: 100,   # refills: 7  Last Office Visit: 1/3/2019  Future Office visit:    Next 5 appointments (look out 90 days)    Apr 25, 2019  1:45 PM CDT  (Arrive by 1:30 PM)  Return Visit with Ayana Knight MD  [facility] ([facility] ) [address]  [place] [facility] [address]  [phone]

## 2019-03-19 RX ORDER — FLURBIPROFEN SODIUM 0.3 MG/ML
SOLUTION/ DROPS OPHTHALMIC 2 TIMES DAILY
Qty: 100 EACH | Refills: 0 | Status: SHIPPED | OUTPATIENT
Start: 2019-03-19 | End: 2019-08-13

## 2019-03-21 ENCOUNTER — MYC MEDICAL ADVICE (OUTPATIENT)
Dept: INTERNAL MEDICINE | Facility: OTHER | Age: 69
End: 2019-03-21

## 2019-03-28 DIAGNOSIS — E11.8 TYPE 2 DIABETES MELLITUS WITH COMPLICATION, WITH LONG-TERM CURRENT USE OF INSULIN (H): Primary | ICD-10-CM

## 2019-03-28 DIAGNOSIS — E11.8 TYPE 2 DIABETES MELLITUS WITH COMPLICATION (H): ICD-10-CM

## 2019-03-28 DIAGNOSIS — Z79.4 TYPE 2 DIABETES MELLITUS WITH COMPLICATION, WITH LONG-TERM CURRENT USE OF INSULIN (H): Primary | ICD-10-CM

## 2019-03-28 RX ORDER — INSULIN GLARGINE 100 [IU]/ML
INJECTION, SOLUTION SUBCUTANEOUS
Qty: 30 ML | Refills: 0 | Status: SHIPPED | OUTPATIENT
Start: 2019-03-28 | End: 2019-09-24

## 2019-03-28 NOTE — TELEPHONE ENCOUNTER
My chart message sent to notify pt that he is due for a diabetic follow up with Dr. Bethea. Ginger Small LPN

## 2019-03-28 NOTE — TELEPHONE ENCOUNTER
Per protocol pt due for LDL and a1c.     insulin glargine (LANTUS) 100 UNIT/ML injection      Last Written Prescription Date:  2/26/18  Last Fill Quantity: 30,   # refills: 2  Last Office Visit: 9/19/18  Future Office visit:    Next 5 appointments (look out 90 days)    Apr 25, 2019  1:45 PM CDT  (Arrive by 1:30 PM)  Return Visit with Ayana Knight MD  [facility] ([facility] ) [address]  [facility] [address]  [phone]           Routing refill request to provider for review/approval because:  Lab work. pcp out routed to Nathen to advise.

## 2019-04-01 NOTE — PROGRESS NOTES
"  SUBJECTIVE:   Gunnar Granados is a 69 year old male who presents to clinic today for the following health issues:      Diabetes Follow-up      Patient is checking blood sugars: { :943889}    Diabetic concerns: {Diabetic Concerns:459643::\"None\"}     Symptoms of hypoglycemia (low blood sugar): { :695226::\"none\"}     Paresthesias (numbness or burning in feet) or sores: { :849524::\"No\"}     Date of last diabetic eye exam: ***    BP Readings from Last 2 Encounters:   01/03/19 134/76   11/20/18 118/72     Hemoglobin A1C (%)   Date Value   08/14/2018 6.1 (H)   11/07/2017 6.2 (H)     LDL Cholesterol Calculated (mg/dL)   Date Value   11/07/2017 106 (H)       Diabetes Management Resources    Amount of exercise or physical activity: {Exercise frequency days per week:272923}    Problems taking medications regularly: {Med Problems:647691::\"No\"}    Medication side effects: {CHRONIC MED SIDE EFFECTS:773307::\"none\"}    Diet: { :301907}        {additional problems for provider to add:400744}    Problem list and histories reviewed & adjusted, as indicated.  Additional history: {NONE - AS DOCUMENTED:278590::\"as documented\"}    {HIST REVIEW/ LINKS 2:886667}    Reviewed and updated as needed this visit by clinical staff       Reviewed and updated as needed this visit by Provider         {PROVIDER CHARTING PREFERENCE:149856}  "

## 2019-04-11 ENCOUNTER — ANCILLARY PROCEDURE (OUTPATIENT)
Dept: GENERAL RADIOLOGY | Facility: OTHER | Age: 69
End: 2019-04-11
Attending: INTERNAL MEDICINE
Payer: MEDICARE

## 2019-04-11 ENCOUNTER — APPOINTMENT (OUTPATIENT)
Dept: LAB | Facility: OTHER | Age: 69
End: 2019-04-11
Attending: INTERNAL MEDICINE
Payer: MEDICARE

## 2019-04-11 ENCOUNTER — OFFICE VISIT (OUTPATIENT)
Dept: INTERNAL MEDICINE | Facility: OTHER | Age: 69
End: 2019-04-11
Attending: INTERNAL MEDICINE
Payer: COMMERCIAL

## 2019-04-11 VITALS
WEIGHT: 315 LBS | OXYGEN SATURATION: 96 % | HEART RATE: 70 BPM | SYSTOLIC BLOOD PRESSURE: 132 MMHG | TEMPERATURE: 98.3 F | BODY MASS INDEX: 44.38 KG/M2 | DIASTOLIC BLOOD PRESSURE: 80 MMHG

## 2019-04-11 DIAGNOSIS — Z79.4 TYPE 2 DIABETES MELLITUS WITH DIABETIC NEUROPATHY, WITH LONG-TERM CURRENT USE OF INSULIN (H): ICD-10-CM

## 2019-04-11 DIAGNOSIS — Z01.818 PREOP GENERAL PHYSICAL EXAM: ICD-10-CM

## 2019-04-11 DIAGNOSIS — E11.9 WELL CONTROLLED TYPE 2 DIABETES MELLITUS (H): ICD-10-CM

## 2019-04-11 DIAGNOSIS — R60.1 GENERALIZED EDEMA: ICD-10-CM

## 2019-04-11 DIAGNOSIS — E11.40 TYPE 2 DIABETES MELLITUS WITH DIABETIC NEUROPATHY, WITH LONG-TERM CURRENT USE OF INSULIN (H): ICD-10-CM

## 2019-04-11 DIAGNOSIS — Z01.818 PREOP GENERAL PHYSICAL EXAM: Primary | ICD-10-CM

## 2019-04-11 DIAGNOSIS — I10 BENIGN ESSENTIAL HYPERTENSION: ICD-10-CM

## 2019-04-11 DIAGNOSIS — E89.0 POSTOPERATIVE HYPOTHYROIDISM: ICD-10-CM

## 2019-04-11 LAB
ALBUMIN SERPL-MCNC: 3.8 G/DL (ref 3.4–5)
ALP SERPL-CCNC: 56 U/L (ref 40–150)
ALT SERPL W P-5'-P-CCNC: 31 U/L (ref 0–70)
ANION GAP SERPL CALCULATED.3IONS-SCNC: 8 MMOL/L (ref 3–14)
AST SERPL W P-5'-P-CCNC: 17 U/L (ref 0–45)
BASOPHILS # BLD AUTO: 0 10E9/L (ref 0–0.2)
BASOPHILS NFR BLD AUTO: 0.5 %
BILIRUB SERPL-MCNC: 0.7 MG/DL (ref 0.2–1.3)
BUN SERPL-MCNC: 13 MG/DL (ref 7–30)
CALCIUM SERPL-MCNC: 9 MG/DL (ref 8.5–10.1)
CHLORIDE SERPL-SCNC: 96 MMOL/L (ref 94–109)
CO2 SERPL-SCNC: 30 MMOL/L (ref 20–32)
CREAT SERPL-MCNC: 0.92 MG/DL (ref 0.66–1.25)
CREAT UR-MCNC: 88 MG/DL
DIFFERENTIAL METHOD BLD: ABNORMAL
EOSINOPHIL # BLD AUTO: 0.7 10E9/L (ref 0–0.7)
EOSINOPHIL NFR BLD AUTO: 9.6 %
ERYTHROCYTE [DISTWIDTH] IN BLOOD BY AUTOMATED COUNT: 12.9 % (ref 10–15)
EST. AVERAGE GLUCOSE BLD GHB EST-MCNC: 114 MG/DL
GFR SERPL CREATININE-BSD FRML MDRD: 85 ML/MIN/{1.73_M2}
GLUCOSE SERPL-MCNC: 104 MG/DL (ref 70–99)
HBA1C MFR BLD: 5.6 % (ref 0–5.6)
HCT VFR BLD AUTO: 42.7 % (ref 40–53)
HGB BLD-MCNC: 14.7 G/DL (ref 13.3–17.7)
LYMPHOCYTES # BLD AUTO: 2 10E9/L (ref 0.8–5.3)
LYMPHOCYTES NFR BLD AUTO: 27.6 %
MCH RBC QN AUTO: 33.9 PG (ref 26.5–33)
MCHC RBC AUTO-ENTMCNC: 34.4 G/DL (ref 31.5–36.5)
MCV RBC AUTO: 99 FL (ref 78–100)
MICROALBUMIN UR-MCNC: 7 MG/L
MICROALBUMIN/CREAT UR: 8.2 MG/G CR (ref 0–17)
MONOCYTES # BLD AUTO: 0.9 10E9/L (ref 0–1.3)
MONOCYTES NFR BLD AUTO: 12 %
NEUTROPHILS # BLD AUTO: 3.7 10E9/L (ref 1.6–8.3)
NEUTROPHILS NFR BLD AUTO: 50.3 %
PLATELET # BLD AUTO: 201 10E9/L (ref 150–450)
POTASSIUM SERPL-SCNC: 4.1 MMOL/L (ref 3.4–5.3)
PROT SERPL-MCNC: 7.1 G/DL (ref 6.8–8.8)
RBC # BLD AUTO: 4.33 10E12/L (ref 4.4–5.9)
SODIUM SERPL-SCNC: 134 MMOL/L (ref 133–144)
T4 FREE SERPL-MCNC: 1.34 NG/DL (ref 0.76–1.46)
TSH SERPL DL<=0.005 MIU/L-ACNC: 0.84 MU/L (ref 0.4–4)
WBC # BLD AUTO: 7.3 10E9/L (ref 4–11)

## 2019-04-11 PROCEDURE — 80053 COMPREHEN METABOLIC PANEL: CPT | Mod: ZL | Performed by: INTERNAL MEDICINE

## 2019-04-11 PROCEDURE — 85025 COMPLETE CBC W/AUTO DIFF WBC: CPT | Mod: ZL | Performed by: INTERNAL MEDICINE

## 2019-04-11 PROCEDURE — 83036 HEMOGLOBIN GLYCOSYLATED A1C: CPT | Mod: ZL | Performed by: INTERNAL MEDICINE

## 2019-04-11 PROCEDURE — 93010 ELECTROCARDIOGRAM REPORT: CPT | Performed by: INTERNAL MEDICINE

## 2019-04-11 PROCEDURE — 84439 ASSAY OF FREE THYROXINE: CPT | Mod: ZL | Performed by: INTERNAL MEDICINE

## 2019-04-11 PROCEDURE — 93005 ELECTROCARDIOGRAM TRACING: CPT

## 2019-04-11 PROCEDURE — 99215 OFFICE O/P EST HI 40 MIN: CPT | Mod: 25 | Performed by: INTERNAL MEDICINE

## 2019-04-11 PROCEDURE — 71046 X-RAY EXAM CHEST 2 VIEWS: CPT | Mod: TC,FY

## 2019-04-11 PROCEDURE — 82043 UR ALBUMIN QUANTITATIVE: CPT | Mod: ZL | Performed by: INTERNAL MEDICINE

## 2019-04-11 PROCEDURE — 84443 ASSAY THYROID STIM HORMONE: CPT | Mod: ZL | Performed by: INTERNAL MEDICINE

## 2019-04-11 PROCEDURE — 40000788 ZZHCL STATISTIC ESTIMATED AVERAGE GLUCOSE: Mod: ZL | Performed by: INTERNAL MEDICINE

## 2019-04-11 PROCEDURE — G0463 HOSPITAL OUTPT CLINIC VISIT: HCPCS | Mod: 25

## 2019-04-11 PROCEDURE — 36415 COLL VENOUS BLD VENIPUNCTURE: CPT | Mod: ZL | Performed by: INTERNAL MEDICINE

## 2019-04-11 ASSESSMENT — ANXIETY QUESTIONNAIRES
5. BEING SO RESTLESS THAT IT IS HARD TO SIT STILL: NOT AT ALL
4. TROUBLE RELAXING: NOT AT ALL
7. FEELING AFRAID AS IF SOMETHING AWFUL MIGHT HAPPEN: NOT AT ALL
6. BECOMING EASILY ANNOYED OR IRRITABLE: NOT AT ALL
2. NOT BEING ABLE TO STOP OR CONTROL WORRYING: NOT AT ALL
3. WORRYING TOO MUCH ABOUT DIFFERENT THINGS: NOT AT ALL
GAD7 TOTAL SCORE: 0
1. FEELING NERVOUS, ANXIOUS, OR ON EDGE: NOT AT ALL

## 2019-04-11 ASSESSMENT — PAIN SCALES - GENERAL: PAINLEVEL: EXTREME PAIN (8)

## 2019-04-11 ASSESSMENT — PATIENT HEALTH QUESTIONNAIRE - PHQ9: SUM OF ALL RESPONSES TO PHQ QUESTIONS 1-9: 0

## 2019-04-11 NOTE — NURSING NOTE
"Chief Complaint   Patient presents with     Pre-Op Exam       Initial /80   Pulse 70   Temp 98.3  F (36.8  C) (Tympanic)   Wt (!) 154.7 kg (341 lb)   SpO2 96%   BMI 44.38 kg/m   Estimated body mass index is 44.38 kg/m  as calculated from the following:    Height as of 1/3/19: 1.867 m (6' 1.5\").    Weight as of this encounter: 154.7 kg (341 lb).  Medication Reconciliation: complete    Ginger Small LPN  "

## 2019-04-11 NOTE — PATIENT INSTRUCTIONS
Only take Metoprolol and Levothyroxine morning of procedure  Increase Torsemide to 3 tablets twice daily x 3 days-let us know how this goes?            Before Your Surgery      Call your surgeon if there is any change in your health. This includes signs of a cold or flu (such as a sore throat, runny nose, cough, rash or fever).    Do not smoke, drink alcohol or take over the counter medicine (unless your surgeon or primary care doctor tells you to) for the 24 hours before and after surgery.    If you take prescribed drugs: Follow your doctor s orders about which medicines to take and which to stop until after surgery.    Eating and drinking prior to surgery: follow the instructions from your surgeon    Take a shower or bath the night before surgery. Use the soap your surgeon gave you to gently clean your skin. If you do not have soap from your surgeon, use your regular soap. Do not shave or scrub the surgery site.  Wear clean pajamas and have clean sheets on your bed.

## 2019-04-11 NOTE — PROGRESS NOTES
[facility]  [address]  [place] [address]  [phone]  Dept: [phone]    PRE-OP EVALUATION:  Today's date: 2019    Gunnar Granados (: 1950) presents for pre-operative evaluation assessment as requested by Dr. Rivas.  He requires evaluation and anesthesia risk assessment prior to undergoing surgery/procedure for treatment of left basilar joint reconstruction .    Proposed Surgery/ Procedure:  left basilar joint reconstruction.  Date of Surgery/ Procedure: 19  Time of Surgery/ Procedure: [facility]  Hospital/Surgical Facility: [facility]  Primary Physician: You Bethea  Type of Anesthesia Anticipated: to be determined    Patient has a Health Care Directive or Living Will:  NO    1. NO - Do you have a history of heart attack, stroke, stent, bypass or surgery on an artery in the head, neck, heart or legs?  2. NO - Do you ever have any pain or discomfort in your chest?  3. NO - Do you have a history of  Heart Failure?  4. YES - ARE YOUR TROUBLED BY SHORTNESS OF BREATH WHEN WALKING ON THE LEVEL, UP A SLIGHT HILL OR AT NIGHT?   5. NO - Do you currently have a cold, bronchitis or other respiratory infection?  6. NO - Do you have a cough, shortness of breath or wheezing?  7. YES - DO YOU SOMETIMES GET PAINS IN THE CALVES OF YOUR LEGS WHEN YOU WALK?   8. NO - Do you or anyone in your family have previous history of blood clots?  9. NO - Do you or does anyone in your family have a serious bleeding problem such as prolonged bleeding following surgeries or cuts?  10. YES - HAVE YOU EVER HAD PROBLEMS WITH ANEMIA OR BEEN TOLD TO TAKE IRON PILLS? Currently taking iron  11. YES - HAVE YOU HAD ANY ABNORMAL BLOOD LOSS SUCH AS BLACK, TARRY OR BLOODY STOOLS, OR ABNORMAL VAGINAL BLEEDING? Due to iron  12. YES - HAVE YOU EVER HAD A BLOOD TRANSFUSION? About 10 years ago with a surgery  13. NO - Have you or any of your relatives ever had problems  with anesthesia?  14. YES - DO YOU HAVE SLEEP APNEA, EXCESSIVE SNORING OR DAYTIME DROWSINESS? Sleep apnea- sleeps with cpap machind  15. NO - Do you have any prosthetic heart valves?  16. NO - Do you have prosthetic joints?  17. NO - Is there any chance that you may be pregnant?      HPI:     HPI related to upcoming procedure: left basilar joint reconstruction.      DIABETES - Patient has a longstanding history of DiabetesType Type II . Patient is being treated with insulin injections and ASA and denies significant side effects. Control has been good. Complicating factors include but are not limited to: hypertension, neuropathy and alcohol abuse.                                                                                                                             HYPERTENSION - Patient has longstanding history of HTN , currently denies any symptoms referable to elevated blood pressure. Specifically denies chest pain, palpitations, dyspnea, orthopnea, PND or peripheral edema. Blood pressure readings have been in normal range. Current medication regimen is as listed below. Patient denies any side effects of medication.                                                                                                                                                                                       HYPOTHYROIDISM - Patient has a longstanding history of chronic Hypothyroidism. Patient has been doing well, noting no tremor, insomnia, hair loss or changes in skin texture. Continues to take medications as directed, without adverse reactions or side effects. Last TSH   Lab Results   Component Value Date    TSH 0.28 (L) 12/18/2018       MEDICAL HISTORY:     Patient Active Problem List    Diagnosis Date Noted     ACP (advance care planning) 04/12/2017     Priority: Medium     Advance Care Planning 4/12/2017: ACP Review of Chart / Resources Provided:  Reviewed chart for advance care plan.  Gunnar Granados has been  provided information and resources to begin or update their advance care plan.  Added by Ginger Small         Type 2 diabetes mellitus with diabetic neuropathy (H) 04/12/2017     Priority: Medium     Morbid obesity due to excess calories (H) 04/12/2017     Priority: Medium     Postoperative hypothyroidism 04/12/2017     Priority: Medium     Benign essential hypertension 04/12/2017     Priority: Medium      Past Medical History:   Diagnosis Date     Alcohol abuse      Diabetes mellitus with neuropathy (H)      Gastric polyps      Gout      HTN (hypertension)      Hx of thyroid cancer      Neuropathy      Obesity      Osteoarthritis      Past Surgical History:   Procedure Laterality Date     basal cell carcinoma  2017     COLONOSCOPY - HIM SCAN  12/01/2012    Colonoscopy due to bleeding, no polyps 12-12     HERNIA REPAIR  2014     Current Outpatient Medications   Medication Sig Dispense Refill     ALLOPURINOL PO Take 300 mg by mouth Take one tablet daily       ascorbic acid (VITAMIN C) 500 MG tablet Take 500 mg by mouth       B-D U/F insulin pen needle by Device route 2 times daily 100 each 0     blood glucose monitoring (ACCU-CHEK FASTCLIX) lancets USE TO TEST BLOOD SUGAR TWICE A DAY OR AS DIRECTED 102 each 2     blood glucose monitoring (NO BRAND SPECIFIED) test strip Use to test blood sugars 2 times daily or as directed (accu check smart view) 100 strip 11     cholecalciferol (VITAMIN D) 200 unit TABS half-tab Take 2,000 Units by mouth daily        FEROSUL 325 (65 Fe) MG tablet TAKE 1 TABLET BY MOUTH ONCE DAILY  BEST IF TAKEN WITH SOME FOOD  3     gabapentin (NEURONTIN) 300 MG capsule TAKE 1 CAPSULE (300 MG) BY MOUTH 3 TIMES DAILY 270 capsule 3     horse chestnut 300 MG CAPS Take 300 mg by mouth       insulin glargine (BASAGLAR KWIKPEN) 100 UNIT/ML pen INJECT 18 UNITS SUBCUTANEOUS AT BEDTIME 18 UNITS DAILY 30 mL 0     levothyroxine (SYNTHROID/LEVOTHROID) 300 MCG tablet Take 1 tablet (300 mcg) by mouth daily 90  tablet 3     metFORMIN (GLUCOPHAGE) 1000 MG tablet Take 1 tablet (1,000 mg) by mouth 2 times daily (with meals) 180 tablet 3     metoprolol succinate (TOPROL-XL) 100 MG 24 hr tablet Take 50 mg by mouth       Multiple Vitamin (MULTI VITAMIN PO) Take 1 Tab by mouth one time a day.       naproxen (NAPROSYN) 500 MG tablet Take 1 tablet (500 mg) by mouth 2 times daily (with meals) 180 tablet 3     NOVOTWIST 32G X 5 MM insulin pen needle FOR USE WITH VICTOZA  each 1     pantoprazole (PROTONIX) 40 MG EC tablet TAKE 1 TABLET BY MOUTH EVERY DAY 30-60 MIN BEFORE A MEAL 90 tablet 1     Potassium Chloride ER 20 MEQ TBCR Take 2 tablets (40 mEq) by mouth 2 times daily 360 tablet 3     QUEtiapine (SEROQUEL) 50 MG tablet Take 1/2- 1 tablet by mouth at bedtime 90 tablet 3     STATIN NOT PRESCRIBED, INTENTIONAL, Please choose reason not prescribed, below       tamsulosin (FLOMAX) 0.4 MG capsule TAKE ONE CAPSULE BY MOUTH EVERY DAY. SWALLOW WHOLE. DO NOT CRUSH OR CHEW       torsemide (DEMADEX) 20 MG tablet Take 2 tablets (40 mg) by mouth 2 times daily 120 tablet 11     UNABLE TO FIND Take 1 tablet by mouth       VICTOZA PEN 18 MG/3ML soln INJECT 1.2 MG SUBCUTANEOUS DAILY 18MG/3ML PREFILLED PEN 18 mL 3     OTC products: None, except as noted above    Allergies   Allergen Reactions     Food      Onions, peppers, olives      Trazodone      Other reaction(s): Dizziness      Latex Allergy: NO    Social History     Tobacco Use     Smoking status: Never Smoker     Smokeless tobacco: Never Used   Substance Use Topics     Alcohol use: Yes     Comment: beer daily      History   Drug Use No       REVIEW OF SYSTEMS:   Constitutional, neuro, ENT, endocrine, pulmonary, cardiac, gastrointestinal, genitourinary, musculoskeletal, integument and psychiatric systems are negative, except as otherwise noted.    EXAM:   There were no vitals taken for this visit.    GENERAL APPEARANCE: Alert and no distress     EYES: EOMI,  PERRL     HENT: ear canals  and TM's normal and nose and mouth without ulcers or lesions     NECK: no adenopathy, no asymmetry, masses, or scars and thyroid normal to palpation     RESP: lungs clear to auscultation - no rales, rhonchi or wheezes     CV: regular rates and rhythm, normal S1 S2, no S3 or S4 and no murmur     ABDOMEN:  Obese-soft, nontender, no HSM or masses and bowel sounds normal     MS: +4 LE edema bilaterally with chronic venous stasis changes     SKIN: no suspicious lesions or rashes     NEURO: Significant peripheral neuropathy demonstrated bilaterally.  Tremor in hands     PSYCH: mentation appears normal. and affect normal/bright      DIAGNOSTICS:     Results for orders placed or performed in visit on 04/11/19   CBC with platelets and differential   Result Value Ref Range    WBC 7.3 4.0 - 11.0 10e9/L    RBC Count 4.33 (L) 4.4 - 5.9 10e12/L    Hemoglobin 14.7 13.3 - 17.7 g/dL    Hematocrit 42.7 40.0 - 53.0 %    MCV 99 78 - 100 fl    MCH 33.9 (H) 26.5 - 33.0 pg    MCHC 34.4 31.5 - 36.5 g/dL    RDW 12.9 10.0 - 15.0 %    Platelet Count 201 150 - 450 10e9/L    % Neutrophils 50.3 %    % Lymphocytes 27.6 %    % Monocytes 12.0 %    % Eosinophils 9.6 %    % Basophils 0.5 %    Absolute Neutrophil 3.7 1.6 - 8.3 10e9/L    Absolute Lymphocytes 2.0 0.8 - 5.3 10e9/L    Absolute Monocytes 0.9 0.0 - 1.3 10e9/L    Absolute Eosinophils 0.7 0.0 - 0.7 10e9/L    Absolute Basophils 0.0 0.0 - 0.2 10e9/L    Diff Method Automated Method    Comprehensive metabolic panel   Result Value Ref Range    Sodium 134 133 - 144 mmol/L    Potassium 4.1 3.4 - 5.3 mmol/L    Chloride 96 94 - 109 mmol/L    Carbon Dioxide 30 20 - 32 mmol/L    Anion Gap 8 3 - 14 mmol/L    Glucose 104 (H) 70 - 99 mg/dL    Urea Nitrogen 13 7 - 30 mg/dL    Creatinine 0.92 0.66 - 1.25 mg/dL    GFR Estimate 85 >60 mL/min/[1.73_m2]    GFR Estimate If Black >90 >60 mL/min/[1.73_m2]    Calcium 9.0 8.5 - 10.1 mg/dL    Bilirubin Total 0.7 0.2 - 1.3 mg/dL    Albumin 3.8 3.4 - 5.0 g/dL    Protein  Total 7.1 6.8 - 8.8 g/dL    Alkaline Phosphatase 56 40 - 150 U/L    ALT 31 0 - 70 U/L    AST 17 0 - 45 U/L   Hemoglobin A1c   Result Value Ref Range    Hemoglobin A1C 5.6 0 - 5.6 %   TSH   Result Value Ref Range    TSH 0.84 0.40 - 4.00 mU/L   T4, free   Result Value Ref Range    T4 Free 1.34 0.76 - 1.46 ng/dL   Estimated Average Glucose   Result Value Ref Range    Estimated Average Glucose 114 mg/dL   Albumin Random Urine Quantitative with Creat Ratio   Result Value Ref Range    Creatinine Urine 88 mg/dL    Albumin Urine mg/L 7 mg/L    Albumin Urine mg/g Cr 8.20 0 - 17 mg/g Cr       Recent Labs   Lab Test 12/18/18  1544 11/06/18  1439 10/04/18  1508 09/19/18  1355  09/14/18  0001  08/14/18  1546  11/07/17  1100  04/25/16   HGB 13.4 10.5*  --  11.1*  --  11.1*  --  10.7*   < >  --   --   --    PLT  --   --   --  311  --  319  --  282   < >  --   --   --    INR  --   --  0.97  --   --   --   --   --   --   --   --  1.0   NA  --  132*  --  133  --  134   < > 132*  --  135   < >  --    POTASSIUM  --  3.8  --  3.9   < > 4.1   < > 3.9  --  4.0   < > 4.1  4.1   CR  --  0.81  --  0.82   < > 0.82   < > 0.82  --  0.84   < > 0.85  0.86   A1C  --   --   --   --   --   --   --  6.1*  --  6.2*   < > 7.2*    < > = values in this interval not displayed.      EKG: NSR, first degree AV block with artifact due to tremor  Patrick Lloyd MD 4/11/2019       Narrative     PROCEDURE:  XR CHEST 2 VW    HISTORY:  Preop general physical exam.     COMPARISON:  None.    FINDINGS:   The cardiac silhouette is normal in size. The pulmonary vasculature is  normal.  The lungs are clear. No pleural effusion or pneumothorax.  There is some mild wedging of several midthoracic vertebral bodies  most likely on the basis of Scheuermann's disease.      Impression     IMPRESSION:  No acute cardiopulmonary disease.      PATRICK LLOYD MD         IMPRESSION:   Diagnosis/reason for consult: Pre op left basilar joint reconstruction.    The proposed  surgical procedure is considered INTERMEDIATE risk.    REVISED CARDIAC RISK INDEX  The patient has the following serious cardiovascular risks for perioperative complications such as (MI, PE, VFib and 3  AV Block):  No serious cardiac risks  INTERPRETATION: 1 risks: Class II (low risk - 0.9% complication rate)    The patient has the following additional risks for perioperative complications:  Alcohol abuse with risk of withdrawal  Morbid obesity    No diagnosis found.    RECOMMENDATIONS:     Only take Metoprolol and Levothyroxine morning of procedure  Increase Torsemide to 3 tablets twice daily x 3 days-let us know how this goes?    APPROVAL GIVEN to proceed with proposed procedure, without further diagnostic evaluation       Signed Electronically by: You Bethea DO    Copy of this evaluation report is provided to requesting physician.    [facility] Preop Guidelines    Revised Cardiac Risk Index

## 2019-04-12 ASSESSMENT — ANXIETY QUESTIONNAIRES: GAD7 TOTAL SCORE: 0

## 2019-04-18 ENCOUNTER — TRANSFERRED RECORDS (OUTPATIENT)
Dept: HEALTH INFORMATION MANAGEMENT | Facility: CLINIC | Age: 69
End: 2019-04-18

## 2019-04-25 ENCOUNTER — OFFICE VISIT (OUTPATIENT)
Dept: OTOLARYNGOLOGY | Facility: OTHER | Age: 69
End: 2019-04-25
Attending: INTERNAL MEDICINE
Payer: MEDICARE

## 2019-04-25 VITALS
DIASTOLIC BLOOD PRESSURE: 72 MMHG | BODY MASS INDEX: 40.43 KG/M2 | SYSTOLIC BLOOD PRESSURE: 130 MMHG | OXYGEN SATURATION: 95 % | HEIGHT: 74 IN | HEART RATE: 82 BPM | WEIGHT: 315 LBS | RESPIRATION RATE: 24 BRPM | TEMPERATURE: 98.1 F

## 2019-04-25 DIAGNOSIS — E66.01 MORBID OBESITY DUE TO EXCESS CALORIES (H): ICD-10-CM

## 2019-04-25 DIAGNOSIS — F10.10 ALCOHOL ABUSE: ICD-10-CM

## 2019-04-25 DIAGNOSIS — J30.0 VASOMOTOR RHINITIS: Primary | ICD-10-CM

## 2019-04-25 DIAGNOSIS — J31.0 CPAP RHINITIS: ICD-10-CM

## 2019-04-25 DIAGNOSIS — J34.829 NASAL VALVE COLLAPSE: ICD-10-CM

## 2019-04-25 PROCEDURE — G0463 HOSPITAL OUTPT CLINIC VISIT: HCPCS | Mod: 25

## 2019-04-25 PROCEDURE — 31231 NASAL ENDOSCOPY DX: CPT | Performed by: OTOLARYNGOLOGY

## 2019-04-25 PROCEDURE — 99213 OFFICE O/P EST LOW 20 MIN: CPT | Mod: 25 | Performed by: OTOLARYNGOLOGY

## 2019-04-25 RX ORDER — FLUTICASONE PROPIONATE 50 MCG
2 SPRAY, SUSPENSION (ML) NASAL DAILY
Qty: 16 G | Refills: 12 | Status: ON HOLD | OUTPATIENT
Start: 2019-04-25 | End: 2019-11-20

## 2019-04-25 ASSESSMENT — MIFFLIN-ST. JEOR: SCORE: 2378.58

## 2019-04-25 ASSESSMENT — PAIN SCALES - GENERAL: PAINLEVEL: EXTREME PAIN (8)

## 2019-04-25 NOTE — PATIENT INSTRUCTIONS
Thank you for allowing Dr. Knight and our ENT team to participate in your care.  If your medications are too expensive, please give the nurse a call.  We can possibly change this medication.  If you have a scheduling or an appointment question please contact our Health Unit Coordinator at their direct line [phone].   ALL nursing questions or concerns can be directed to your ENT nurse at: [phone] - [place]    Use Deana Med Nasal Saline Twice Daily  Start Flonase 2 sprays, once daily  Normal oral cavity exam; no oral lesions  Normal nasal exam  If symptoms worsen contact Dr. Bethea to order a Sinus CT  Follow up with ENT as needed    Use high volume deana med saline irrigation.  Use warm distilled water and 2 packets of the salt solution that comes with the bottle, dissolve in bottle up to 240 ml alondra.  Irrigate each side of your nose leaning over the sink, using 1/3 to 1/2 the volume of the bottle in each nostril every irrigation.  Irrigate 2-3 times daily and as needed.

## 2019-04-25 NOTE — NURSING NOTE
"Chief Complaint   Patient presents with     RECHECK     Follow Up Oral Leukoplakia, Oral Lesion, Fibroma of Intraoral Region (patient is not concerns with oral lesion anymore), CALLIE. Sinus problems.       Initial /72   Pulse 82   Temp 98.1  F (36.7  C) (Tympanic)   Resp 24   Ht 1.867 m (6' 1.5\")   Wt (!) 154.7 kg (341 lb)   SpO2 95%   BMI 44.38 kg/m   Estimated body mass index is 44.38 kg/m  as calculated from the following:    Height as of this encounter: 1.867 m (6' 1.5\").    Weight as of this encounter: 154.7 kg (341 lb).  Medication Reconciliation: complete    Lucy Goldsmith LPN    "

## 2019-04-25 NOTE — PROGRESS NOTES
Otolaryngology Consultation    Patient: Gunnar Granados  : 1950    Patient presents with:  RECHECK: Follow Up Oral Leukoplakia, Oral Lesion, Fibroma of Intraoral Region (patient is not concerns with oral lesion anymore), CALLIE. Sinus problems.      HPI:  Gunnar Granados is a 69 year old adult seen today for chronic left nasal obstruction   Is in several years of difficulty breathing out of his left nose as well as thick mucus in his left nares as well as postnasal drainage.  He has chronic bilateral rhinorrhea       History obstructive sleep apnea he is compliant with CPAP  He uses a full facemask  He does feel that is nasal symptoms have become somewhat worse since he has been using CPAP over the last several years  He denies facial pressure or recurrent use of antibiotics for sinusitis.  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 Rcohe.  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  He states he is here today for his sinuses although he did want to see him back to palate    History of heavy alcohol use with continued alcohol use    Never smoker          Pathology  SPECIMEN(S):   A: Mucosa, buccal, left, biopsy   B: Left soft palate, biopsy     FINAL DIAGNOSIS:   A: Oral cavity, left buccal mucosa, biopsy   - Fibroma     B: Oral cavity, left soft palate, biopsy   - Mild dysplasia       Current Outpatient Rx   Medication Sig Dispense Refill     ALLOPURINOL PO Take 300 mg by mouth Take one tablet daily       ascorbic acid (VITAMIN C) 500 MG tablet Take 500 mg by mouth       B-D U/F insulin pen needle by Device route 2 times daily 100 each 0     blood glucose monitoring (ACCU-CHEK FASTCLIX) lancets USE TO  TEST BLOOD SUGAR TWICE A DAY OR AS DIRECTED 102 each 2     blood glucose monitoring (NO BRAND SPECIFIED) test strip Use to test blood sugars 2 times daily or as directed (accu check smart view) 100 strip 11     cholecalciferol (VITAMIN D) 200 unit TABS half-tab Take 2,000 Units by mouth daily        FEROSUL 325 (65 Fe) MG tablet TAKE 1 TABLET BY MOUTH ONCE DAILY  BEST IF TAKEN WITH SOME FOOD  3     gabapentin (NEURONTIN) 300 MG capsule TAKE 1 CAPSULE (300 MG) BY MOUTH 3 TIMES DAILY 270 capsule 3     horse chestnut 300 MG CAPS Take 300 mg by mouth 2 times daily        insulin glargine (BASAGLAR KWIKPEN) 100 UNIT/ML pen INJECT 18 UNITS SUBCUTANEOUS AT BEDTIME 18 UNITS DAILY 30 mL 0     levothyroxine (SYNTHROID/LEVOTHROID) 300 MCG tablet Take 1 tablet (300 mcg) by mouth daily 90 tablet 3     metFORMIN (GLUCOPHAGE) 1000 MG tablet Take 1 tablet (1,000 mg) by mouth 2 times daily (with meals) 180 tablet 3     metoprolol succinate (TOPROL-XL) 100 MG 24 hr tablet Take 50 mg by mouth       Multiple Vitamin (MULTI VITAMIN PO) Take 1 Tab by mouth one time a day.       naproxen (NAPROSYN) 500 MG tablet Take 1 tablet (500 mg) by mouth 2 times daily (with meals) 180 tablet 3     NOVOTWIST 32G X 5 MM insulin pen needle FOR USE WITH VICTOZA  each 1     pantoprazole (PROTONIX) 40 MG EC tablet TAKE 1 TABLET BY MOUTH EVERY DAY 30-60 MIN BEFORE A MEAL 90 tablet 1     Potassium Chloride ER 20 MEQ TBCR Take 2 tablets (40 mEq) by mouth 2 times daily 360 tablet 3     QUEtiapine (SEROQUEL) 50 MG tablet Take 1/2- 1 tablet by mouth at bedtime 90 tablet 3     STATIN NOT PRESCRIBED, INTENTIONAL, Please choose reason not prescribed, below       tamsulosin (FLOMAX) 0.4 MG capsule TAKE ONE CAPSULE BY MOUTH EVERY DAY. SWALLOW WHOLE. DO NOT CRUSH OR CHEW       torsemide (DEMADEX) 20 MG tablet TAKE 2 TABLETS (40 MG) BY MOUTH 2 TIMES DAILY 120 tablet 0     UNABLE TO FIND Take 1 tablet by mouth       VICTOZA PEN 18 MG/3ML soln INJECT 1.2 MG  "SUBCUTANEOUS DAILY 18MG/3ML PREFILLED PEN 18 mL 3       Allergies: Food and Trazodone     Past Medical History:   Diagnosis Date     Alcohol abuse      Diabetes mellitus with neuropathy (H)      Gastric polyps      Gout      HTN (hypertension)      Hx of thyroid cancer      Neuropathy      Obesity      Osteoarthritis        Past Surgical History:   Procedure Laterality Date     basal cell carcinoma  2017     COLONOSCOPY - HIM SCAN  12/01/2012    Colonoscopy due to bleeding, no polyps 12-12     HERNIA REPAIR  2014       ENT family history reviewed    Social History     Tobacco Use     Smoking status: Never Smoker     Smokeless tobacco: Never Used   Substance Use Topics     Alcohol use: Yes     Comment: beer daily      Drug use: No       Review of Systems  ROS: 10 point ROS neg other than the symptoms noted above in the HPI and hand pain, upcoming hand surgery, systemic joint pain    Physical Exam  /72   Pulse 82   Temp 98.1  F (36.7  C) (Tympanic)   Resp 24   Ht 1.867 m (6' 1.5\")   Wt (!) 154.7 kg (341 lb)   SpO2 95%   BMI 44.38 kg/m    General - The patient is well nourished and well developed, and appears to have good nutritional status.  Alert and oriented to person and place, answers questions and cooperates with examination appropriately.   obese  Head and Face - Normocephalic and atraumatic, with no gross asymmetry noted.  The facial nerve is intact, with strong symmetric movements.  Voice and Breathing - The patient was breathing comfortably without the use of accessory muscles. There was no wheezing, stridor, or stertor.  The patients voice was clear and strong, and had appropriate pitch and quality.  No jose e peripheral digital clubbing or cyanosis   Ears -The external auditory canals are patent, the tympanic membranes are intact without effusion, retraction or mass.  Bony landmarks are intact.  Eyes - Extraocular movements intact, and the pupils were reactive to light.  Sclera were not icteric " or injected, conjunctiva were pink and moist.  Mouth - Examination of the oral cavity showed pink, healthy oral mucosa. No lesions or ulcerations noted.  The tongue was mobile and midline, and the dentition were in good condition.  No oral lesions, no evidence of leukoplakia, prior excision sites well healed  Throat - The walls of the oropharynx were smooth, pink, moist, symmetric, and had no lesions or ulcerations.  The tonsillar pillars and soft palate were symmetric.  The uvula was midline on elevation.    Neck - No palpable enlarged fixed cervical lymph nodes.  No neck cysts or unusual tenderness to palpation.   No palpable fixed thyroid nodules or concerning goiter.  The trachea is grossly midline.   Nose - External contour is asymmetric with flattening of the left ala  There is internal and external nasal valve collapse some improvement with modified caudal maneuver bilaterally   Nasal mucosa is pink and moist with no abnormal mucus.  The septum and turbinates were evaluated:  inferior turbinate hypertrophy .  No polyps, masses, or purulence noted on examination.    To evaluate the nose and sinuses in the post operative state, I performed rigid nasal endoscopy. The LPN had previously sprayed both nares with lidocaine and neosynephrine.    I began with the LEFT side using a 0 degree rigid nasal endoscope, and then similarly examined the RIGHT side    Findings:  Inferior turbinates:  enlarged  Middle turbinate and middle meatus:  No purulence, no polyposis  Mucosa is healthy throughout,  no polyps nor polypoid degeneration  Nasopharynx clear, ET patent bilaterally  The patient tolerated the procedure well        Impression and Plan- Gunnar Granados is a 69 year old adult with:    ICD-10-CM    1. Vasomotor rhinitis J30.0 fluticasone (FLONASE) 50 MCG/ACT nasal spray   2. CPAP rhinitis J31.0 fluticasone (FLONASE) 50 MCG/ACT nasal spray   3. Alcohol abuse F10.10    4. Morbid obesity due to excess calories (H)  E66.01    5. Nasal valve collapse J34.89      Alcohol cessation discussed, increased water intake      Use Valente Med Nasal Saline Twice Daily  Start Flonase 2 sprays, once daily  Normal oral cavity exam; no oral lesions  Normal nasal exam  If symptoms worsen contact Dr. Bethea to order a Sinus CT  Follow up with ENT as needed    If symptoms worsen would consider bilateral turbinate reduction and nasal valve repair.  This was also discussed    Recommend  annual dental follow-up with Dr. Melchor Knight D.O.  Otolaryngology/Head and Neck Surgery  Allergy

## 2019-04-25 NOTE — LETTER
2019         RE: Gunnar Granados  [address] [facility] [address]        Dear Colleague,    Thank you for referring your patient, Gunnar Granados, to the [facility]. Please see a copy of my visit note below.    Otolaryngology Consultation    Patient: Gunnar Granados  : 1950    Patient presents with:  RECHECK: Follow Up Oral Leukoplakia, Oral Lesion, Fibroma of Intraoral Region (patient is not concerns with oral lesion anymore), CALLIE. Sinus problems.      HPI:  Gunnar Granados is a 69 year old adult seen today for chronic left nasal obstruction   Is in several years of difficulty breathing out of his left nose as well as thick mucus in his left nares as well as postnasal drainage.  He has chronic bilateral rhinorrhea       History obstructive sleep apnea he is compliant with CPAP  He uses a full facemask  He does feel that is nasal symptoms have become somewhat worse since he has been using CPAP over the last several years  He denies facial pressure or recurrent use of antibiotics for sinusitis.  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 Roche.  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  He states he is here today for his sinuses although he did want to see him back to palate    History of heavy alcohol use with continued alcohol use    Never smoker          Pathology  SPECIMEN(S):   A: Mucosa, buccal, left, biopsy   B: Left soft palate, biopsy     FINAL DIAGNOSIS:   A: Oral cavity, left buccal mucosa, biopsy   - Fibroma     B: Oral cavity, left soft palate, biopsy   - Mild dysplasia       Current Outpatient Rx   Medication Sig Dispense Refill     ALLOPURINOL PO  Take 300 mg by mouth Take one tablet daily       ascorbic acid (VITAMIN C) 500 MG tablet Take 500 mg by mouth       B-D U/F insulin pen needle by Device route 2 times daily 100 each 0     blood glucose monitoring (ACCU-CHEK FASTCLIX) lancets USE TO TEST BLOOD SUGAR TWICE A DAY OR AS DIRECTED 102 each 2     blood glucose monitoring (NO BRAND SPECIFIED) test strip Use to test blood sugars 2 times daily or as directed (accu check smart view) 100 strip 11     cholecalciferol (VITAMIN D) 200 unit TABS half-tab Take 2,000 Units by mouth daily        FEROSUL 325 (65 Fe) MG tablet TAKE 1 TABLET BY MOUTH ONCE DAILY  BEST IF TAKEN WITH SOME FOOD  3     gabapentin (NEURONTIN) 300 MG capsule TAKE 1 CAPSULE (300 MG) BY MOUTH 3 TIMES DAILY 270 capsule 3     horse chestnut 300 MG CAPS Take 300 mg by mouth 2 times daily        insulin glargine (BASAGLAR KWIKPEN) 100 UNIT/ML pen INJECT 18 UNITS SUBCUTANEOUS AT BEDTIME 18 UNITS DAILY 30 mL 0     levothyroxine (SYNTHROID/LEVOTHROID) 300 MCG tablet Take 1 tablet (300 mcg) by mouth daily 90 tablet 3     metFORMIN (GLUCOPHAGE) 1000 MG tablet Take 1 tablet (1,000 mg) by mouth 2 times daily (with meals) 180 tablet 3     metoprolol succinate (TOPROL-XL) 100 MG 24 hr tablet Take 50 mg by mouth       Multiple Vitamin (MULTI VITAMIN PO) Take 1 Tab by mouth one time a day.       naproxen (NAPROSYN) 500 MG tablet Take 1 tablet (500 mg) by mouth 2 times daily (with meals) 180 tablet 3     NOVOTWIST 32G X 5 MM insulin pen needle FOR USE WITH VICTOZA  each 1     pantoprazole (PROTONIX) 40 MG EC tablet TAKE 1 TABLET BY MOUTH EVERY DAY 30-60 MIN BEFORE A MEAL 90 tablet 1     Potassium Chloride ER 20 MEQ TBCR Take 2 tablets (40 mEq) by mouth 2 times daily 360 tablet 3     QUEtiapine (SEROQUEL) 50 MG tablet Take 1/2- 1 tablet by mouth at bedtime 90 tablet 3     STATIN NOT PRESCRIBED, INTENTIONAL, Please choose reason not prescribed, below       tamsulosin (FLOMAX) 0.4 MG capsule TAKE ONE CAPSULE  "BY MOUTH EVERY DAY. SWALLOW WHOLE. DO NOT CRUSH OR CHEW       torsemide (DEMADEX) 20 MG tablet TAKE 2 TABLETS (40 MG) BY MOUTH 2 TIMES DAILY 120 tablet 0     UNABLE TO FIND Take 1 tablet by mouth       VICTOZA PEN 18 MG/3ML soln INJECT 1.2 MG SUBCUTANEOUS DAILY 18MG/3ML PREFILLED PEN 18 mL 3       Allergies: Food and Trazodone     Past Medical History:   Diagnosis Date     Alcohol abuse      Diabetes mellitus with neuropathy (H)      Gastric polyps      Gout      HTN (hypertension)      Hx of thyroid cancer      Neuropathy      Obesity      Osteoarthritis        Past Surgical History:   Procedure Laterality Date     basal cell carcinoma  2017     COLONOSCOPY - HIM SCAN  12/01/2012    Colonoscopy due to bleeding, no polyps 12-12     HERNIA REPAIR  2014       ENT family history reviewed    Social History     Tobacco Use     Smoking status: Never Smoker     Smokeless tobacco: Never Used   Substance Use Topics     Alcohol use: Yes     Comment: beer daily      Drug use: No       Review of Systems  ROS: 10 point ROS neg other than the symptoms noted above in the HPI and hand pain, upcoming hand surgery, systemic joint pain    Physical Exam  /72   Pulse 82   Temp 98.1  F (36.7  C) (Tympanic)   Resp 24   Ht 1.867 m (6' 1.5\")   Wt (!) 154.7 kg (341 lb)   SpO2 95%   BMI 44.38 kg/m     General - The patient is well nourished and well developed, and appears to have good nutritional status.  Alert and oriented to person and place, answers questions and cooperates with examination appropriately.   obese  Head and Face - Normocephalic and atraumatic, with no gross asymmetry noted.  The facial nerve is intact, with strong symmetric movements.  Voice and Breathing - The patient was breathing comfortably without the use of accessory muscles. There was no wheezing, stridor, or stertor.  The patients voice was clear and strong, and had appropriate pitch and quality.  No jose e peripheral digital clubbing or cyanosis   Ears " -The external auditory canals are patent, the tympanic membranes are intact without effusion, retraction or mass.  Bony landmarks are intact.  Eyes - Extraocular movements intact, and the pupils were reactive to light.  Sclera were not icteric or injected, conjunctiva were pink and moist.  Mouth - Examination of the oral cavity showed pink, healthy oral mucosa. No lesions or ulcerations noted.  The tongue was mobile and midline, and the dentition were in good condition.  No oral lesions, no evidence of leukoplakia, prior excision sites well healed  Throat - The walls of the oropharynx were smooth, pink, moist, symmetric, and had no lesions or ulcerations.  The tonsillar pillars and soft palate were symmetric.  The uvula was midline on elevation.    Neck - No palpable enlarged fixed cervical lymph nodes.  No neck cysts or unusual tenderness to palpation.   No palpable fixed thyroid nodules or concerning goiter.  The trachea is grossly midline.   Nose - External contour is asymmetric with flattening of the left ala  There is internal and external nasal valve collapse some improvement with modified caudal maneuver bilaterally   Nasal mucosa is pink and moist with no abnormal mucus.  The septum and turbinates were evaluated:  inferior turbinate hypertrophy .  No polyps, masses, or purulence noted on examination.    To evaluate the nose and sinuses in the post operative state, I performed rigid nasal endoscopy. The LPN had previously sprayed both nares with lidocaine and neosynephrine.    I began with the LEFT side using a 0 degree rigid nasal endoscope, and then similarly examined the RIGHT side    Findings:  Inferior turbinates:  enlarged  Middle turbinate and middle meatus:  No purulence, no polyposis  Mucosa is healthy throughout,  no polyps nor polypoid degeneration  Nasopharynx clear, ET patent bilaterally  The patient tolerated the procedure well        Impression and Plan- Gunnar Granados is a 69 year old adult  with:    ICD-10-CM    1. Vasomotor rhinitis J30.0 fluticasone (FLONASE) 50 MCG/ACT nasal spray   2. CPAP rhinitis J31.0 fluticasone (FLONASE) 50 MCG/ACT nasal spray   3. Alcohol abuse F10.10    4. Morbid obesity due to excess calories (H) E66.01    5. Nasal valve collapse J34.89      Alcohol cessation discussed, increased water intake      Use Valente Med Nasal Saline Twice Daily  Start Flonase 2 sprays, once daily  Normal oral cavity exam; no oral lesions  Normal nasal exam  If symptoms worsen contact Dr. Bethea to order a Sinus CT  Follow up with ENT as needed    If symptoms worsen would consider bilateral turbinate reduction and nasal valve repair.  This was also discussed    Recommend  annual dental follow-up with ERVIN [email or web address].  Otolaryngology/Head and Neck Surgery  Allergy      Again, thank you for allowing me to participate in the care of your patient.        Sincerely,        Ayana Knight MD

## 2019-05-01 ENCOUNTER — OFFICE VISIT (OUTPATIENT)
Dept: SURGERY | Facility: OTHER | Age: 69
End: 2019-05-01
Attending: SURGERY
Payer: COMMERCIAL

## 2019-05-01 ENCOUNTER — MYC MEDICAL ADVICE (OUTPATIENT)
Dept: SURGERY | Facility: OTHER | Age: 69
End: 2019-05-01

## 2019-05-01 VITALS
HEIGHT: 73 IN | WEIGHT: 315 LBS | TEMPERATURE: 98.2 F | SYSTOLIC BLOOD PRESSURE: 160 MMHG | BODY MASS INDEX: 41.75 KG/M2 | HEART RATE: 83 BPM | DIASTOLIC BLOOD PRESSURE: 72 MMHG | OXYGEN SATURATION: 95 %

## 2019-05-01 DIAGNOSIS — K80.20 CALCULUS OF GALLBLADDER WITHOUT CHOLECYSTITIS WITHOUT OBSTRUCTION: Primary | ICD-10-CM

## 2019-05-01 PROCEDURE — 99214 OFFICE O/P EST MOD 30 MIN: CPT | Performed by: SURGERY

## 2019-05-01 PROCEDURE — G0463 HOSPITAL OUTPT CLINIC VISIT: HCPCS | Performed by: SURGERY

## 2019-05-01 ASSESSMENT — PAIN SCALES - GENERAL: PAINLEVEL: EXTREME PAIN (8)

## 2019-05-01 ASSESSMENT — MIFFLIN-ST. JEOR: SCORE: 2370.65

## 2019-05-01 NOTE — PATIENT INSTRUCTIONS
Thank you for allowing Dr. López and our surgical team to participate in your care.  If you have a scheduling or an appointment question please contact Malathi our Health Unit Coordinator at her direct line [phone].   ALL nursing questions or concerns can be directed to your surgical nurse at: [phone]    Follow up with Dr. López as needed.

## 2019-05-01 NOTE — NURSING NOTE
"Chief Complaint   Patient presents with     Consult     gallbladder        Initial /72 (BP Location: Right arm, Patient Position: Chair, Cuff Size: Adult Large)   Pulse 83   Temp 98.2  F (36.8  C) (Tympanic)   Ht 1.854 m (6' 1\")   Wt (!) 154.7 kg (341 lb)   SpO2 95%   BMI 44.99 kg/m   Estimated body mass index is 44.99 kg/m  as calculated from the following:    Height as of this encounter: 1.854 m (6' 1\").    Weight as of this encounter: 154.7 kg (341 lb).  Medication Reconciliation: complete    Pushpa Andrade LPN    "

## 2019-05-01 NOTE — PROGRESS NOTES
CLINIC NOTE - CONSULT  5/1/2019    Patient : Gunnar Granados  Referring Physician : Jitendra    Reason for Referral : Cholelithiasis and histoty of acute cholecystitis    This is a 69 year old adult with Cholelithiasis.     Date of onset : was admitted 9/2018  No surgery done   Fatty food intolerance : NO   History of jaundice : NO   History of Pancreatitis : NO      Ultrasound shows cholelithiasis : YES    Evidence of choledocholithiasis : NO    This is a 69-year-old male with history of cholelithiasis.  He presented to the emergency room in September 2000 and that time was found to have acute cholecystitis.  Ultrasound showed thickened gallbladder wall and cholelithiasis.  White count was 13.1.  The room was slightly elevated at 1 5.  He was transferred to [facility] in [place] at that time no surgery was performed.  He is here for evaluation.    No real complaints today.  Able to tolerate fatty foods.  No fevers no chills.  No recurrent right upper quadrant abdominal pain.    Past Medical History:  Past Medical History:   Diagnosis Date     Alcohol abuse      Diabetes mellitus with neuropathy (H)      Gastric polyps      Gout      HTN (hypertension)      Hx of thyroid cancer      Neuropathy      Obesity      Osteoarthritis        Past Surgical History:  Past Surgical History:   Procedure Laterality Date     basal cell carcinoma  2017     COLONOSCOPY - HIM SCAN  12/01/2012    Colonoscopy due to bleeding, no polyps 12-12     HERNIA REPAIR  2014       Family History History:  Family History   Problem Relation Age of Onset     Unknown/Adopted Sister        History of Tobacco Use:  History   Smoking Status     Never Smoker   Smokeless Tobacco     Never Used       Current Medications:  Current Outpatient Medications   Medication Sig Dispense Refill     ALLOPURINOL PO Take 300 mg by mouth Take one tablet daily       ascorbic acid (VITAMIN C) 500 MG tablet Take 500 mg by mouth       B-D U/F insulin pen needle by Device  route 2 times daily 100 each 0     blood glucose monitoring (ACCU-CHEK FASTCLIX) lancets USE TO TEST BLOOD SUGAR TWICE A DAY OR AS DIRECTED 102 each 2     blood glucose monitoring (NO BRAND SPECIFIED) test strip Use to test blood sugars 2 times daily or as directed (accu check smart view) 100 strip 11     cholecalciferol (VITAMIN D) 200 unit TABS half-tab Take 2,000 Units by mouth daily        FEROSUL 325 (65 Fe) MG tablet TAKE 1 TABLET BY MOUTH ONCE DAILY  BEST IF TAKEN WITH SOME FOOD  3     fluticasone (FLONASE) 50 MCG/ACT nasal spray Spray 2 sprays into both nostrils daily 16 g 12     gabapentin (NEURONTIN) 300 MG capsule TAKE 1 CAPSULE (300 MG) BY MOUTH 3 TIMES DAILY 270 capsule 3     insulin glargine (BASAGLAR KWIKPEN) 100 UNIT/ML pen INJECT 18 UNITS SUBCUTANEOUS AT BEDTIME 18 UNITS DAILY 30 mL 0     levothyroxine (SYNTHROID/LEVOTHROID) 300 MCG tablet Take 1 tablet (300 mcg) by mouth daily 90 tablet 3     metFORMIN (GLUCOPHAGE) 1000 MG tablet Take 1 tablet (1,000 mg) by mouth 2 times daily (with meals) 180 tablet 3     metoprolol succinate (TOPROL-XL) 100 MG 24 hr tablet Take 50 mg by mouth       Multiple Vitamin (MULTI VITAMIN PO) Take 1 Tab by mouth one time a day.       naproxen (NAPROSYN) 500 MG tablet Take 1 tablet (500 mg) by mouth 2 times daily (with meals) 180 tablet 3     NOVOTWIST 32G X 5 MM insulin pen needle FOR USE WITH VICTOZA  each 1     pantoprazole (PROTONIX) 40 MG EC tablet TAKE 1 TABLET BY MOUTH EVERY DAY 30-60 MIN BEFORE A MEAL 90 tablet 1     Potassium Chloride ER 20 MEQ TBCR Take 2 tablets (40 mEq) by mouth 2 times daily 360 tablet 3     QUEtiapine (SEROQUEL) 50 MG tablet Take 1/2- 1 tablet by mouth at bedtime 90 tablet 3     STATIN NOT PRESCRIBED, INTENTIONAL, Please choose reason not prescribed, below       tamsulosin (FLOMAX) 0.4 MG capsule TAKE ONE CAPSULE BY MOUTH EVERY DAY. SWALLOW WHOLE. DO NOT CRUSH OR CHEW       torsemide (DEMADEX) 20 MG tablet TAKE 2 TABLETS (40 MG) BY  "MOUTH 2 TIMES DAILY 120 tablet 0     UNABLE TO FIND Take 1 tablet by mouth       horse chestnut 300 MG CAPS Take 300 mg by mouth 2 times daily        VICTOZA PEN 18 MG/3ML soln INJECT 1.2 MG SUBCUTANEOUS DAILY 18MG/3ML PREFILLED PEN 18 mL 3       Allergies:  Allergies   Allergen Reactions     Food      Onions, peppers, olives      Trazodone      Other reaction(s): Dizziness       ROS:  Pertinent items are noted in HPI.  All other systems are negative.  A comprehensive review of systems was negative.    PHYSICAL EXAM:     Vital signs: /72 (BP Location: Right arm, Patient Position: Chair, Cuff Size: Adult Large)   Pulse 83   Temp 98.2  F (36.8  C) (Tympanic)   Ht 1.854 m (6' 1\")   Wt (!) 154.7 kg (341 lb)   SpO2 95%   BMI 44.99 kg/m     Weight: [unfilled]   BMI: Body mass index is 44.99 kg/m .   General: Normal, cooperative, in no acute distress, alert, obese, easily winded   Skin: no jaundice   HEENT: PERRLA, EOMI and Trachea midline   Neck: supple, without adenopathy, full range of motion   Lungs: clear to auscultation   CV: Regular rate and rhythm without murmer   Abdominal: abdomen is soft without significant tenderness, masses, organomegaly or guarding   Extremities: No cyanosis, clubbing or edema noted bilaterally in Upper and Lower Extremities   Neurological: without deficit    LABORATORY:  CBC RESULTS:   Recent Labs   Lab Test 04/11/19  1439   WBC 7.3   RBC 4.33*   HGB 14.7   HCT 42.7   MCV 99   MCH 33.9*   MCHC 34.4   RDW 12.9          Last Comprehensive Metabolic Panel:  Sodium   Date Value Ref Range Status   04/11/2019 134 133 - 144 mmol/L Final     Potassium   Date Value Ref Range Status   04/11/2019 4.1 3.4 - 5.3 mmol/L Final     Chloride   Date Value Ref Range Status   04/11/2019 96 94 - 109 mmol/L Final     Carbon Dioxide   Date Value Ref Range Status   04/11/2019 30 20 - 32 mmol/L Final     Anion Gap   Date Value Ref Range Status   04/11/2019 8 3 - 14 mmol/L Final     Glucose   Date " Value Ref Range Status   04/11/2019 104 (H) 70 - 99 mg/dL Final     Comment:     Non Fasting     Urea Nitrogen   Date Value Ref Range Status   04/11/2019 13 7 - 30 mg/dL Final     Creatinine   Date Value Ref Range Status   04/11/2019 0.92 0.66 - 1.25 mg/dL Final     GFR Estimate   Date Value Ref Range Status   04/11/2019 85 >60 mL/min/[1.73_m2] Final     Comment:     Non  GFR Calc  Starting 12/18/2018, serum creatinine based estimated GFR (eGFR) will be   calculated using the Chronic Kidney Disease Epidemiology Collaboration   (CKD-EPI) equation.       Calcium   Date Value Ref Range Status   04/11/2019 9.0 8.5 - 10.1 mg/dL Final     Bilirubin Total   Date Value Ref Range Status   04/11/2019 0.7 0.2 - 1.3 mg/dL Final     Alkaline Phosphatase   Date Value Ref Range Status   04/11/2019 56 40 - 150 U/L Final     ALT   Date Value Ref Range Status   04/11/2019 31 0 - 70 U/L Final     AST   Date Value Ref Range Status   04/11/2019 17 0 - 45 U/L Final       PRIOR PERTINENT RADIOLOGY STUDIES:   As noted above.    ASSESSMENT:    69 year old adult with a history of acute cholecystitis with cholelithiasis.. Of note he is status post umbilical hernia repair and ventral hernia repair at the umbilical site with implantation of mesh.      PLAN:   On initial evaluation the patient is a quite high surgical risk.  And because of the mesh and ventral hernia repair at the umbilicus this would probably be better done as an open cholecystectomy rather than laparoscopic cholecystectomy.  This was all discussed with him.  At this point he does not want to proceed with a cholecystectomy.  Which I think is reasonable.  We did talk about the risks benefits and alternatives understand that he could present any time with acute cholecystitis or worse which could be life-threatening.    I did discuss the case with Dr. Conrado Garcia and he will do a risk stratification.  This will be done if it is necessary for surgery.    Patient is  comfortable with the information and will follow-up with me as needed.

## 2019-05-14 ENCOUNTER — MEDICAL CORRESPONDENCE (OUTPATIENT)
Dept: HEALTH INFORMATION MANAGEMENT | Facility: CLINIC | Age: 69
End: 2019-05-14

## 2019-05-14 ENCOUNTER — TRANSFERRED RECORDS (OUTPATIENT)
Dept: HEALTH INFORMATION MANAGEMENT | Facility: CLINIC | Age: 69
End: 2019-05-14

## 2019-05-21 ENCOUNTER — OFFICE VISIT (OUTPATIENT)
Dept: ORTHOPEDICS | Facility: OTHER | Age: 69
End: 2019-05-21
Attending: ORTHOPAEDIC SURGERY
Payer: MEDICARE

## 2019-05-21 ENCOUNTER — ANCILLARY PROCEDURE (OUTPATIENT)
Dept: GENERAL RADIOLOGY | Facility: OTHER | Age: 69
End: 2019-05-21
Attending: ORTHOPAEDIC SURGERY
Payer: MEDICARE

## 2019-05-21 VITALS
SYSTOLIC BLOOD PRESSURE: 140 MMHG | TEMPERATURE: 98.4 F | OXYGEN SATURATION: 92 % | WEIGHT: 315 LBS | DIASTOLIC BLOOD PRESSURE: 74 MMHG | HEART RATE: 80 BPM | BODY MASS INDEX: 41.75 KG/M2 | HEIGHT: 73 IN

## 2019-05-21 DIAGNOSIS — M17.0 PRIMARY OSTEOARTHRITIS OF BOTH KNEES: Primary | ICD-10-CM

## 2019-05-21 DIAGNOSIS — M25.561 BILATERAL KNEE PAIN: Primary | ICD-10-CM

## 2019-05-21 DIAGNOSIS — M25.562 BILATERAL KNEE PAIN: Primary | ICD-10-CM

## 2019-05-21 PROCEDURE — G0463 HOSPITAL OUTPT CLINIC VISIT: HCPCS

## 2019-05-21 PROCEDURE — 73562 X-RAY EXAM OF KNEE 3: CPT | Mod: TC,50

## 2019-05-21 PROCEDURE — G0463 HOSPITAL OUTPT CLINIC VISIT: HCPCS | Mod: 25

## 2019-05-21 PROCEDURE — 99212 OFFICE O/P EST SF 10 MIN: CPT | Mod: 25 | Performed by: ORTHOPAEDIC SURGERY

## 2019-05-21 PROCEDURE — 20610 DRAIN/INJ JOINT/BURSA W/O US: CPT | Mod: 50 | Performed by: ORTHOPAEDIC SURGERY

## 2019-05-21 RX ADMIN — Medication 48 MG: at 14:45

## 2019-05-21 ASSESSMENT — PAIN SCALES - GENERAL: PAINLEVEL: EXTREME PAIN (8)

## 2019-05-21 ASSESSMENT — MIFFLIN-ST. JEOR: SCORE: 2370.65

## 2019-05-21 NOTE — PROCEDURES
Prior to injection, verified patient identity using patient's name and date of birth.    Joint injection was performed.  Observed Dr Freedman inject patient's BILATERAL KNEES with Synvisc. Patient tolerated well.    Drug Amount Wasted:  None.  Vial/Syringe: Single dose vial  Expiration Date: 2021-07, LOT [id number],    Chrissie Reece LPN

## 2019-05-21 NOTE — NURSING NOTE
"Chief Complaint   Patient presents with     Knee Pain     NPT Bilateral Knee Pain/ Possible Injections  (former Dr Dupree Pt)       Initial /74   Pulse 80   Temp 98.4  F (36.9  C) (Tympanic)   Ht 1.854 m (6' 1\")   Wt (!) 154.7 kg (341 lb)   SpO2 92%   BMI 44.99 kg/m   Estimated body mass index is 44.99 kg/m  as calculated from the following:    Height as of this encounter: 1.854 m (6' 1\").    Weight as of this encounter: 154.7 kg (341 lb).  Medication Reconciliation: complete    Mariama Walker LPN  "

## 2019-05-21 NOTE — PROGRESS NOTES
Patient presents today for consideration for Synvisc injections for his arthritic knees.  He has had a series of 2 in the past with very good results.  It has been over 6 months since his last injection and he is had a recurrence of his aching pain, particularly when standing or attempting to go up or down stairs etc.  His x-rays were updated today and they demonstrate osteoarthritis particularly the patellofemoral joint.  He was counseled regarding the injection, of which she is familiar, as well as the potential risks and expected benefit.  After his consent was obtained, the knees were prepped with alcohol and both knees were injected with with Synvisc, there were no apparent complications.    Procedure note: Bilateral Synvisc injections, knees.  Patient was counseled regarding the risks and benefits of the procedure, his consent was obtained.  The left knee was injected first by creating a local anesthetic wheal between the skin and the capsule of the knee.  Once this is taken its effect, the knee was injected with the full dose of 6 mL of Synvisc 1.  Similarly the right knee was then prepped, wheal of local anesthetic created, and this Synvisc injected through this local anesthetic area.  Gentle pressure was hold on both puncture wounds for approximately 1 minute, sterile Band-Aid applied.  The patient tolerated the procedures well and had no apparent adverse reaction.  Medication utilized on the right knee was 6 mL of Synvisc 1, and on the left knee, 6 mL of Synvisc 1.  He was counseled in urgent and emergent care should it be necessary and will follow-up when his symptoms recur to the point that he would like to consider additional treatment.

## 2019-06-18 ENCOUNTER — TRANSFERRED RECORDS (OUTPATIENT)
Dept: HEALTH INFORMATION MANAGEMENT | Facility: CLINIC | Age: 69
End: 2019-06-18

## 2019-07-05 DIAGNOSIS — E11.65 TYPE 2 DIABETES MELLITUS WITH HYPERGLYCEMIA, WITH LONG-TERM CURRENT USE OF INSULIN (H): ICD-10-CM

## 2019-07-05 DIAGNOSIS — Z79.4 TYPE 2 DIABETES MELLITUS WITH HYPERGLYCEMIA, WITH LONG-TERM CURRENT USE OF INSULIN (H): ICD-10-CM

## 2019-07-05 DIAGNOSIS — E11.40 TYPE 2 DIABETES MELLITUS WITH DIABETIC NEUROPATHY (H): ICD-10-CM

## 2019-07-05 RX ORDER — LANCETS
EACH MISCELLANEOUS
Qty: 102 EACH | Refills: 2 | Status: SHIPPED | OUTPATIENT
Start: 2019-07-05 | End: 2019-12-16

## 2019-07-05 RX ORDER — LIRAGLUTIDE 6 MG/ML
INJECTION SUBCUTANEOUS
Qty: 18 ML | Refills: 0 | Status: SHIPPED | OUTPATIENT
Start: 2019-07-05 | End: 2020-01-30

## 2019-07-09 ENCOUNTER — TELEPHONE (OUTPATIENT)
Dept: INTERNAL MEDICINE | Facility: OTHER | Age: 69
End: 2019-07-09

## 2019-07-09 DIAGNOSIS — I10 BENIGN ESSENTIAL HYPERTENSION: ICD-10-CM

## 2019-07-09 RX ORDER — TORSEMIDE 20 MG/1
40 TABLET ORAL 2 TIMES DAILY
Qty: 360 TABLET | Refills: 3 | Status: SHIPPED | OUTPATIENT
Start: 2019-07-09 | End: 2020-05-05

## 2019-07-26 ENCOUNTER — MYC MEDICAL ADVICE (OUTPATIENT)
Dept: INTERNAL MEDICINE | Facility: OTHER | Age: 69
End: 2019-07-26

## 2019-07-29 DIAGNOSIS — I10 BENIGN ESSENTIAL HYPERTENSION: Primary | ICD-10-CM

## 2019-07-29 NOTE — TELEPHONE ENCOUNTER
Metformin  Last Written Prescription Date: 10/8/18  Last Fill Quantity: 180 # of Refills: 3  Last Office Visit: 4/11/19

## 2019-07-30 RX ORDER — METOPROLOL SUCCINATE 100 MG/1
50 TABLET, EXTENDED RELEASE ORAL DAILY
Qty: 45 TABLET | Refills: 3 | Status: SHIPPED | OUTPATIENT
Start: 2019-07-30 | End: 2020-05-05

## 2019-07-30 NOTE — TELEPHONE ENCOUNTER
Has he contacted Dr Call directly as to other ideas he may have does not appear it is working?  Looks like dry crackled skin-dermatitis.  Gout is possible but more typically gout is in large toe.  Neuropathy tends to be more of a numb/tingling sensation.  Dr Chilel(neurology) would not be able to address this directly as seems more of a podiatry/skin issue to me.   Please also confirm as to whether or not he would like 50 mg Toprol or if he wants to keep breaking 100 mg in half?

## 2019-07-30 NOTE — TELEPHONE ENCOUNTER
Patient notified of recommendations. He will call back if he would like to see Dr. Bay. Patient would like to continue with metoprolol 100mg. Will cut in half.   ANAID BOOGIE

## 2019-07-31 ENCOUNTER — TELEPHONE (OUTPATIENT)
Dept: INTERNAL MEDICINE | Facility: OTHER | Age: 69
End: 2019-07-31

## 2019-07-31 DIAGNOSIS — M79.671 RIGHT FOOT PAIN: Primary | ICD-10-CM

## 2019-08-13 ENCOUNTER — MYC MEDICAL ADVICE (OUTPATIENT)
Dept: INTERNAL MEDICINE | Facility: OTHER | Age: 69
End: 2019-08-13

## 2019-08-13 DIAGNOSIS — G47.00 INSOMNIA, UNSPECIFIED TYPE: ICD-10-CM

## 2019-08-13 DIAGNOSIS — E11.40 TYPE 2 DIABETES MELLITUS WITH DIABETIC NEUROPATHY, UNSPECIFIED WHETHER LONG TERM INSULIN USE (H): ICD-10-CM

## 2019-08-13 RX ORDER — FLURBIPROFEN SODIUM 0.3 MG/ML
SOLUTION/ DROPS OPHTHALMIC 2 TIMES DAILY
Qty: 100 EACH | Refills: 11 | Status: SHIPPED | OUTPATIENT
Start: 2019-08-13 | End: 2020-09-18

## 2019-08-13 RX ORDER — QUETIAPINE FUMARATE 50 MG/1
TABLET, FILM COATED ORAL
Qty: 90 TABLET | Refills: 3 | Status: SHIPPED | OUTPATIENT
Start: 2019-08-13 | End: 2020-05-05

## 2019-08-13 NOTE — TELEPHONE ENCOUNTER
I refilled the Seroquel #90 with three refills to [facility]  I refilled the needles for 100# and 11 refills to [facility]  The refill order for Torsemide 360# plus 3 refills(1 year supply) was given and signed by myself and sent to [facility]ie on 7/9/19.  [facility] should have this. He may want to inquire with them.      I have yet to receive the note(s) from Dr. Chilel on his thumb.    Lastly-yes he can follow up in October 2019 for routine follow up with an appt in the pm, please transfer him to scheduling for this.

## 2019-08-22 ENCOUNTER — TRANSFERRED RECORDS (OUTPATIENT)
Dept: HEALTH INFORMATION MANAGEMENT | Facility: CLINIC | Age: 69
End: 2019-08-22

## 2019-08-29 ENCOUNTER — MYC MEDICAL ADVICE (OUTPATIENT)
Dept: INTERNAL MEDICINE | Facility: OTHER | Age: 69
End: 2019-08-29

## 2019-08-29 DIAGNOSIS — E11.40 TYPE 2 DIABETES MELLITUS WITH DIABETIC NEUROPATHY (H): ICD-10-CM

## 2019-08-29 DIAGNOSIS — Z12.5 SCREENING FOR PROSTATE CANCER: ICD-10-CM

## 2019-08-29 DIAGNOSIS — E87.6 HYPOKALEMIA: ICD-10-CM

## 2019-08-29 DIAGNOSIS — Z01.818 PRE-OP EXAM: ICD-10-CM

## 2019-08-29 DIAGNOSIS — E11.40 TYPE 2 DIABETES MELLITUS WITH DIABETIC NEUROPATHY, UNSPECIFIED WHETHER LONG TERM INSULIN USE (H): Primary | ICD-10-CM

## 2019-08-29 DIAGNOSIS — E03.9 ACQUIRED HYPOTHYROIDISM: ICD-10-CM

## 2019-08-30 RX ORDER — POTASSIUM CHLORIDE 1500 MG/1
TABLET, EXTENDED RELEASE ORAL
Qty: 360 TABLET | Refills: 0 | Status: SHIPPED | OUTPATIENT
Start: 2019-08-30 | End: 2020-01-06

## 2019-09-03 NOTE — TELEPHONE ENCOUNTER
Next 5 appointments (look out 90 days)    Sep 04, 2019  1:45 PM CDT  (Arrive by 1:30 PM)  Office Visit with You Bethea,   [facility] ([facility] ) [address]  [place] [address]  [phone]   Oct 08, 2019  2:00 PM CDT  (Arrive by 1:45 PM)  Office Visit with You Bethea,   [facility] ([facility] ) [address]  [place] [address]  [phone]        Pt called and agreed to appointment time offered by PCP's nurse. Pt scheduled.     Pt verbalized concerns of labs due for pre op and diabetic labs. Dr. Bethea I have pended the labs from [facility], listed in media tab and some diabetic labs. Please review labs and add any missing labs. Pt is hoping all labs needed will be ordered prior to his appointment with you.     Vi Green, RN

## 2019-09-04 ENCOUNTER — OFFICE VISIT (OUTPATIENT)
Dept: INTERNAL MEDICINE | Facility: OTHER | Age: 69
End: 2019-09-04
Attending: INTERNAL MEDICINE
Payer: MEDICARE

## 2019-09-04 ENCOUNTER — OFFICE VISIT (OUTPATIENT)
Dept: PODIATRY | Facility: OTHER | Age: 69
End: 2019-09-04
Attending: INTERNAL MEDICINE
Payer: MEDICARE

## 2019-09-04 VITALS
TEMPERATURE: 98.7 F | HEIGHT: 73 IN | SYSTOLIC BLOOD PRESSURE: 138 MMHG | WEIGHT: 315 LBS | DIASTOLIC BLOOD PRESSURE: 81 MMHG | OXYGEN SATURATION: 97 % | HEART RATE: 71 BPM | BODY MASS INDEX: 41.75 KG/M2

## 2019-09-04 VITALS
BODY MASS INDEX: 41.75 KG/M2 | HEART RATE: 71 BPM | WEIGHT: 315 LBS | DIASTOLIC BLOOD PRESSURE: 81 MMHG | SYSTOLIC BLOOD PRESSURE: 138 MMHG | TEMPERATURE: 98.7 F | HEIGHT: 73 IN

## 2019-09-04 DIAGNOSIS — Z12.5 SCREENING FOR PROSTATE CANCER: ICD-10-CM

## 2019-09-04 DIAGNOSIS — M1A.0711 CHRONIC IDIOPATHIC GOUT INVOLVING TOE OF RIGHT FOOT WITH TOPHUS: ICD-10-CM

## 2019-09-04 DIAGNOSIS — L60.3 ONYCHODYSTROPHY: Primary | ICD-10-CM

## 2019-09-04 DIAGNOSIS — B35.3 TINEA PEDIS OF BOTH FEET: ICD-10-CM

## 2019-09-04 DIAGNOSIS — M21.969 LOSS OF PROTECTIVE SENSATION OF SKIN OF DEFORMED FOOT: ICD-10-CM

## 2019-09-04 DIAGNOSIS — E03.9 ACQUIRED HYPOTHYROIDISM: ICD-10-CM

## 2019-09-04 DIAGNOSIS — E11.9 DIABETES MELLITUS TYPE 2, INSULIN DEPENDENT (H): ICD-10-CM

## 2019-09-04 DIAGNOSIS — Z01.818 PREOP GENERAL PHYSICAL EXAM: Primary | ICD-10-CM

## 2019-09-04 DIAGNOSIS — E11.42 DIABETIC POLYNEUROPATHY ASSOCIATED WITH TYPE 2 DIABETES MELLITUS (H): ICD-10-CM

## 2019-09-04 DIAGNOSIS — L84 CALLUS OF FOOT: ICD-10-CM

## 2019-09-04 DIAGNOSIS — L85.3 XEROSIS OF SKIN: ICD-10-CM

## 2019-09-04 DIAGNOSIS — Z79.4 DIABETES MELLITUS TYPE 2, INSULIN DEPENDENT (H): ICD-10-CM

## 2019-09-04 DIAGNOSIS — R20.8 LOSS OF PROTECTIVE SENSATION OF SKIN OF DEFORMED FOOT: ICD-10-CM

## 2019-09-04 DIAGNOSIS — Z01.818 PRE-OP EXAM: ICD-10-CM

## 2019-09-04 DIAGNOSIS — E11.40 TYPE 2 DIABETES MELLITUS WITH DIABETIC NEUROPATHY, UNSPECIFIED WHETHER LONG TERM INSULIN USE (H): ICD-10-CM

## 2019-09-04 LAB
ALBUMIN SERPL-MCNC: 3.4 G/DL (ref 3.4–5)
ALP SERPL-CCNC: 54 U/L (ref 40–150)
ALT SERPL W P-5'-P-CCNC: 37 U/L (ref 0–70)
ANION GAP SERPL CALCULATED.3IONS-SCNC: 10 MMOL/L (ref 3–14)
AST SERPL W P-5'-P-CCNC: 21 U/L (ref 0–45)
BASOPHILS # BLD AUTO: 0 10E9/L (ref 0–0.2)
BASOPHILS NFR BLD AUTO: 0.6 %
BILIRUB SERPL-MCNC: 1.1 MG/DL (ref 0.2–1.3)
BUN SERPL-MCNC: 13 MG/DL (ref 7–30)
CALCIUM SERPL-MCNC: 8.9 MG/DL (ref 8.5–10.1)
CHLORIDE SERPL-SCNC: 98 MMOL/L (ref 94–109)
CHOLEST SERPL-MCNC: 189 MG/DL
CO2 SERPL-SCNC: 30 MMOL/L (ref 20–32)
CREAT SERPL-MCNC: 0.91 MG/DL (ref 0.66–1.25)
DIFFERENTIAL METHOD BLD: ABNORMAL
EOSINOPHIL # BLD AUTO: 0.8 10E9/L (ref 0–0.7)
EOSINOPHIL NFR BLD AUTO: 13.3 %
ERYTHROCYTE [DISTWIDTH] IN BLOOD BY AUTOMATED COUNT: 12.6 % (ref 10–15)
EST. AVERAGE GLUCOSE BLD GHB EST-MCNC: 117 MG/DL
GFR SERPL CREATININE-BSD FRML MDRD: 86 ML/MIN/{1.73_M2}
GLUCOSE SERPL-MCNC: 136 MG/DL (ref 70–99)
HBA1C MFR BLD: 5.7 % (ref 0–5.6)
HCT VFR BLD AUTO: 44.6 % (ref 40–53)
HDLC SERPL-MCNC: 50 MG/DL
HGB BLD-MCNC: 15.2 G/DL (ref 13.3–17.7)
LDLC SERPL CALC-MCNC: 84 MG/DL
LYMPHOCYTES # BLD AUTO: 1.5 10E9/L (ref 0.8–5.3)
LYMPHOCYTES NFR BLD AUTO: 23.4 %
MCH RBC QN AUTO: 33.5 PG (ref 26.5–33)
MCHC RBC AUTO-ENTMCNC: 34.1 G/DL (ref 31.5–36.5)
MCV RBC AUTO: 98 FL (ref 78–100)
MONOCYTES # BLD AUTO: 0.7 10E9/L (ref 0–1.3)
MONOCYTES NFR BLD AUTO: 11.1 %
NEUTROPHILS # BLD AUTO: 3.2 10E9/L (ref 1.6–8.3)
NEUTROPHILS NFR BLD AUTO: 51.6 %
NONHDLC SERPL-MCNC: 139 MG/DL
PLATELET # BLD AUTO: 193 10E9/L (ref 150–450)
POTASSIUM SERPL-SCNC: 4 MMOL/L (ref 3.4–5.3)
PROT SERPL-MCNC: 6.8 G/DL (ref 6.8–8.8)
PSA SERPL-ACNC: 2.74 UG/L (ref 0–4)
RBC # BLD AUTO: 4.54 10E12/L (ref 4.4–5.9)
SODIUM SERPL-SCNC: 138 MMOL/L (ref 133–144)
T4 FREE SERPL-MCNC: 1.54 NG/DL (ref 0.76–1.46)
TRIGL SERPL-MCNC: 274 MG/DL
TSH SERPL DL<=0.005 MIU/L-ACNC: 0.86 MU/L (ref 0.4–4)
WBC # BLD AUTO: 6.2 10E9/L (ref 4–11)

## 2019-09-04 PROCEDURE — 36415 COLL VENOUS BLD VENIPUNCTURE: CPT | Mod: ZL | Performed by: INTERNAL MEDICINE

## 2019-09-04 PROCEDURE — 84443 ASSAY THYROID STIM HORMONE: CPT | Mod: ZL | Performed by: INTERNAL MEDICINE

## 2019-09-04 PROCEDURE — 11721 DEBRIDE NAIL 6 OR MORE: CPT | Mod: Q8 | Performed by: PODIATRIST

## 2019-09-04 PROCEDURE — G0103 PSA SCREENING: HCPCS | Mod: ZL | Performed by: INTERNAL MEDICINE

## 2019-09-04 PROCEDURE — 80048 BASIC METABOLIC PNL TOTAL CA: CPT | Mod: ZL | Performed by: INTERNAL MEDICINE

## 2019-09-04 PROCEDURE — 99203 OFFICE O/P NEW LOW 30 MIN: CPT | Mod: 25 | Performed by: PODIATRIST

## 2019-09-04 PROCEDURE — 80053 COMPREHEN METABOLIC PANEL: CPT | Mod: ZL | Performed by: INTERNAL MEDICINE

## 2019-09-04 PROCEDURE — 84439 ASSAY OF FREE THYROXINE: CPT | Mod: ZL | Performed by: INTERNAL MEDICINE

## 2019-09-04 PROCEDURE — 85025 COMPLETE CBC W/AUTO DIFF WBC: CPT | Mod: ZL | Performed by: INTERNAL MEDICINE

## 2019-09-04 PROCEDURE — 83036 HEMOGLOBIN GLYCOSYLATED A1C: CPT | Mod: ZL | Performed by: INTERNAL MEDICINE

## 2019-09-04 PROCEDURE — 80061 LIPID PANEL: CPT | Mod: ZL | Performed by: INTERNAL MEDICINE

## 2019-09-04 PROCEDURE — 93005 ELECTROCARDIOGRAM TRACING: CPT

## 2019-09-04 PROCEDURE — 99215 OFFICE O/P EST HI 40 MIN: CPT | Mod: 25 | Performed by: INTERNAL MEDICINE

## 2019-09-04 PROCEDURE — G0463 HOSPITAL OUTPT CLINIC VISIT: HCPCS | Mod: 25,27

## 2019-09-04 PROCEDURE — 93010 ELECTROCARDIOGRAM REPORT: CPT | Performed by: INTERNAL MEDICINE

## 2019-09-04 PROCEDURE — G0463 HOSPITAL OUTPT CLINIC VISIT: HCPCS | Mod: 25

## 2019-09-04 PROCEDURE — 40000788 ZZHCL STATISTIC ESTIMATED AVERAGE GLUCOSE: Mod: ZL | Performed by: INTERNAL MEDICINE

## 2019-09-04 PROCEDURE — G0463 HOSPITAL OUTPT CLINIC VISIT: HCPCS

## 2019-09-04 RX ORDER — PRENATAL VIT 91/IRON/FOLIC/DHA 28-975-200
COMBINATION PACKAGE (EA) ORAL 2 TIMES DAILY
Qty: 42 G | Refills: 3 | Status: SHIPPED | OUTPATIENT
Start: 2019-09-04 | End: 2020-12-01

## 2019-09-04 ASSESSMENT — MIFFLIN-ST. JEOR
SCORE: 2347.97
SCORE: 2347.97

## 2019-09-04 ASSESSMENT — PAIN SCALES - GENERAL
PAINLEVEL: EXTREME PAIN (8)
PAINLEVEL: EXTREME PAIN (8)

## 2019-09-04 NOTE — PATIENT INSTRUCTIONS
Reduce Lantus dose to 16 units day prior to procedure  Hold Demadex potassium and metformin day of surgery            Before Your Surgery      Call your surgeon if there is any change in your health. This includes signs of a cold or flu (such as a sore throat, runny nose, cough, rash or fever).    Do not smoke, drink alcohol or take over the counter medicine (unless your surgeon or primary care doctor tells you to) for the 24 hours before and after surgery.    If you take prescribed drugs: Follow your doctor s orders about which medicines to take and which to stop until after surgery.    Eating and drinking prior to surgery: follow the instructions from your surgeon    Take a shower or bath the night before surgery. Use the soap your surgeon gave you to gently clean your skin. If you do not have soap from your surgeon, use your regular soap. Do not shave or scrub the surgery site.  Wear clean pajamas and have clean sheets on your bed.

## 2019-09-04 NOTE — NURSING NOTE
"Chief Complaint   Patient presents with     Musculoskeletal Problem     Right Foot Pain       Initial /81 (Cuff Size: Adult Large)   Pulse 71   Temp 98.7  F (37.1  C) (Tympanic)   Ht 1.854 m (6' 1\")   Wt (!) 152.4 kg (336 lb)   BMI 44.33 kg/m   Estimated body mass index is 44.33 kg/m  as calculated from the following:    Height as of this encounter: 1.854 m (6' 1\").    Weight as of this encounter: 152.4 kg (336 lb).  Medication Reconciliation: complete   Zuleyma Rojo LPN      "

## 2019-09-04 NOTE — LETTER
"    9/4/2019         RE: Gunnar Granados  [address] [place] [id number]        Dear Colleague,    Thank you for referring your patient, Gunnar Granados, to the [facility]. Please see a copy of my visit note below.    Additionally, the patient was concerned about a red spot on the RIGHT dorsal 2nd digit. This appears to be a gouty tophi. At this time it is not causing pain and it is not causing an open wound. The patient wanted to let you know that he is only taking the Allopurinol as needed for acute gout flares. If the tophi develops into an open wound, he is advised to call you for an appointment to discuss daily medication management for the gout to control the wound. There are no open wounds at this time.    Chief complaint: Patient presents with:  Musculoskeletal Problem: Right Foot Pain; Referred by Dr. Bethea      History of Present Illness: This 69 year old IDDM II is seen with his wife, Yazmin, at the request of [place] for evaluation and suggestions of management of bilateral foot pain.     At the end of July, 2019, his RIGHT heel started to crack and bleed and it was very painful. There were fissures on the heel. He was applying hydrogen peroxide and cortisone cream and Lubriderm every day. The foot has improved. The foot is still painful, but not to the extent that it was before. He gets burning, tingling and numbness on his feet.     Additionally, he has had a red spot on his RIGHT dorsal 2nd digit and he is wondering what it is. He has a h/o gout but he only takes Allopurinol during active gout flares because he is already on a lot of medications and he doesn't want to add another one.    Last HbA1C was 5.6% on 04/11/2019.    /81 (Cuff Size: Adult Large)   Pulse 71   Temp 98.7  F (37.1  C) (Tympanic)   Ht 1.854 m (6' 1\")   Wt (!) 152.4 kg (336 lb)   BMI 44.33 kg/m       Patient Active Problem List   Diagnosis     ACP (advance care planning)     Type 2 " diabetes mellitus with diabetic neuropathy (H)     Morbid obesity due to excess calories (H)     Acquired hypothyroidism     Benign essential hypertension     Calculus of gallbladder without cholecystitis without obstruction       Past Surgical History:   Procedure Laterality Date     basal cell carcinoma  2017     COLONOSCOPY - HIM SCAN  12/01/2012    Colonoscopy due to bleeding, no polyps 12-12     HERNIA REPAIR  2014       Current Outpatient Medications   Medication     ALLOPURINOL PO     ascorbic acid (VITAMIN C) 500 MG tablet     B-D U/F insulin pen needle     blood glucose (ACCU-CHEK SMARTVIEW) test strip     blood glucose monitoring (ACCU-CHEK FASTCLIX) lancets     cholecalciferol (VITAMIN D) 200 unit TABS half-tab     FEROSUL 325 (65 Fe) MG tablet     gabapentin (NEURONTIN) 300 MG capsule     horse chestnut 300 MG CAPS     insulin glargine (BASAGLAR KWIKPEN) 100 UNIT/ML pen     levothyroxine (SYNTHROID/LEVOTHROID) 300 MCG tablet     metFORMIN (GLUCOPHAGE) 1000 MG tablet     metoprolol succinate ER (TOPROL-XL) 100 MG 24 hr tablet     Multiple Vitamin (MULTI VITAMIN PO)     naproxen (NAPROSYN) 500 MG tablet     NOVOTWIST 32G X 5 MM insulin pen needle     pantoprazole (PROTONIX) 40 MG EC tablet     potassium chloride ER (K-DUR/KLOR-CON M) 20 MEQ CR tablet     QUEtiapine (SEROQUEL) 50 MG tablet     STATIN NOT PRESCRIBED, INTENTIONAL,     tamsulosin (FLOMAX) 0.4 MG capsule     torsemide (DEMADEX) 20 MG tablet     UNABLE TO FIND     VICTOZA PEN 18 MG/3ML soln     No current facility-administered medications for this visit.           Allergies   Allergen Reactions     Food      Onions, peppers, olives      Trazodone      Other reaction(s): Dizziness       Family History   Problem Relation Age of Onset     Unknown/Adopted Sister        Social History     Socioeconomic History     Marital status:      Spouse name: Not on file     Number of children: Not on file     Years of education: Not on file     Highest  education level: Not on file   Occupational History     Not on file   Social Needs     Financial resource strain: Not on file     Food insecurity:     Worry: Not on file     Inability: Not on file     Transportation needs:     Medical: Not on file     Non-medical: Not on file   Tobacco Use     Smoking status: Never Smoker     Smokeless tobacco: Never Used   Substance and Sexual Activity     Alcohol use: Yes     Comment: beer daily      Drug use: No     Sexual activity: Not Currently     Partners: Female   Lifestyle     Physical activity:     Days per week: Not on file     Minutes per session: Not on file     Stress: Not on file   Relationships     Social connections:     Talks on phone: Not on file     Gets together: Not on file     Attends [employer] service: Not on file     Active member of club or organization: Not on file     Attends meetings of clubs or organizations: Not on file     Relationship status: Not on file     Intimate partner violence:     Fear of current or ex partner: Not on file     Emotionally abused: Not on file     Physically abused: Not on file     Forced sexual activity: Not on file   Other Topics Concern     Parent/sibling w/ CABG, MI or angioplasty before 65F 55M? Not Asked   Social History Narrative     Not on file       ROS: 10 point ROS neg other than the symptoms noted above in the HPI.  EXAM  Constitutional: healthy, alert and no distress    Psychiatric: mentation appears normal and affect normal/bright    VASCULAR:  -Dorsalis pedis pulse +1/4 b/l  -Posterior tibial pulse +1/4 b/l  -Capillary refill time < 3 seconds to b/l hallux  -Hair growth Absent to b/l anterior legs and ankles  -Varicosities and telangiectasias to bilateral feet  -Mild-to-moderate 3+ pitting edema to bilateral feet and ankles  NEURO:  -Protective sensation diminished with SWM +0/10 RIGHT and +0/10 LEFT on 09/04/2019  -Light touch sensation ABSENT to b/l plantar forefoot  DERM:  -Soft tissue mass to RIGHT dorsal 2nd  digit DIPJ with mild red/yellow discoloration consistent with a gouty tophi  -Skin to bilateral feet and legs is erythematous, dry, flaking, and shiny and atrophic  -Skin temperature warm to bilateral feet and legs  -Diffusely dry skin with flaking of skin and erythema discoloration in a moccasin distribution to bilateral plantar feet  -Toenails elongated, thickened, dystrophic and discolored x 10  MSK:  -No pain on palpation to RIGHT dorsal 2nd digit  -Muscle strength of ankles +5/5 for dorsiflexion, plantarflexion, ABDUction and ADDuction b/l    ============================================================    ASSESSMENT:  (L60.3) Onychodystrophy  (primary encounter diagnosis)    (L84) Callus of foot    (L85.3) Xerosis of skin    (E11.9,  Z79.4) Diabetes mellitus type 2, insulin dependent (H)    (E11.42) Diabetic polyneuropathy associated with type 2 diabetes mellitus (H)    (M21.969,  R20.8) Loss of protective sensation of skin of deformed foot    (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 Treatment options discussed with no educational barriers noted.  -Initial foot exam for patient with LOPS  -Nails debrided x 10 without incident  -Diabetic Foot Education provided. This included checking the feet daily looking for new new blisters or wounds, wearing shoes at all times when walking including around the house, and avoiding lotion application between the toes. Any sign of infection in the foot warrant's the patient presenting to the ED as soon as possible.  -Topical Lamisil ordered for b/l feet. Patient to use every day in the evenings and use the Lubriderm in the mornings.   ---Advised patient and his wife not to use topical hydrocortisone cream more than 14 days in a row without a 14 day break since long term use can thin the skin.  -Patient's RIGHT 2nd digit appears like a gouty tophi. At this time  there are no open wounds, but he should monitor this closely. If he starts to develop a wound over this toe, he is advised to start taking Allopurinol consistently to control the tophi.  -Patient in agreement with the above treatment plan and all of patient's questions were answered.      RTC 5 months (per patient request) for diabetic foot exam and high risk nail care        [place] DPM    Again, thank you for allowing me to participate in the care of your patient.        Sincerely,        Gen[place] DPM

## 2019-09-04 NOTE — NURSING NOTE
"Chief Complaint   Patient presents with     Pre-Op Exam       Initial /81 (BP Location: Left arm, Patient Position: Chair, Cuff Size: Adult Large)   Pulse 71   Temp 98.7  F (37.1  C) (Tympanic)   Ht 1.854 m (6' 1\")   Wt (!) 152.4 kg (336 lb)   SpO2 97%   BMI 44.33 kg/m   Estimated body mass index is 44.33 kg/m  as calculated from the following:    Height as of this encounter: 1.854 m (6' 1\").    Weight as of this encounter: 152.4 kg (336 lb).  Medication Reconciliation: complete  "

## 2019-09-04 NOTE — PROGRESS NOTES
"Chief complaint: Patient presents with:  Musculoskeletal Problem: Right Foot Pain; Referred by Dr. Bethea      History of Present Illness: This 69 year old IDDM II is seen with his wife, Yazmin, at the request of [place] for evaluation and suggestions of management of bilateral foot pain.     At the end of July, 2019, his RIGHT heel started to crack and bleed and it was very painful. There were fissures on the heel. He was applying hydrogen peroxide and cortisone cream and Lubriderm every day. The foot has improved. The foot is still painful, but not to the extent that it was before. He gets burning, tingling and numbness on his feet.     Additionally, he has had a red spot on his RIGHT dorsal 2nd digit and he is wondering what it is. He has a h/o gout but he only takes Allopurinol during active gout flares because he is already on a lot of medications and he doesn't want to add another one.    Last HbA1C was 5.6% on 04/11/2019.    /81 (Cuff Size: Adult Large)   Pulse 71   Temp 98.7  F (37.1  C) (Tympanic)   Ht 1.854 m (6' 1\")   Wt (!) 152.4 kg (336 lb)   BMI 44.33 kg/m      Patient Active Problem List   Diagnosis     ACP (advance care planning)     Type 2 diabetes mellitus with diabetic neuropathy (H)     Morbid obesity due to excess calories (H)     Acquired hypothyroidism     Benign essential hypertension     Calculus of gallbladder without cholecystitis without obstruction       Past Surgical History:   Procedure Laterality Date     basal cell carcinoma  2017     COLONOSCOPY - HIM SCAN  12/01/2012    Colonoscopy due to bleeding, no polyps 12-12     HERNIA REPAIR  2014       Current Outpatient Medications   Medication     ALLOPURINOL PO     ascorbic acid (VITAMIN C) 500 MG tablet     B-D U/F insulin pen needle     blood glucose (ACCU-CHEK SMARTVIEW) test strip     blood glucose monitoring (ACCU-CHEK FASTCLIX) lancets     cholecalciferol (VITAMIN D) 200 unit TABS half-tab     FEROSUL 325 (65 " Fe) MG tablet     gabapentin (NEURONTIN) 300 MG capsule     horse chestnut 300 MG CAPS     insulin glargine (BASAGLAR KWIKPEN) 100 UNIT/ML pen     levothyroxine (SYNTHROID/LEVOTHROID) 300 MCG tablet     metFORMIN (GLUCOPHAGE) 1000 MG tablet     metoprolol succinate ER (TOPROL-XL) 100 MG 24 hr tablet     Multiple Vitamin (MULTI VITAMIN PO)     naproxen (NAPROSYN) 500 MG tablet     NOVOTWIST 32G X 5 MM insulin pen needle     pantoprazole (PROTONIX) 40 MG EC tablet     potassium chloride ER (K-DUR/KLOR-CON M) 20 MEQ CR tablet     QUEtiapine (SEROQUEL) 50 MG tablet     STATIN NOT PRESCRIBED, INTENTIONAL,     tamsulosin (FLOMAX) 0.4 MG capsule     torsemide (DEMADEX) 20 MG tablet     UNABLE TO FIND     VICTOZA PEN 18 MG/3ML soln     No current facility-administered medications for this visit.           Allergies   Allergen Reactions     Food      Onions, peppers, olives      Trazodone      Other reaction(s): Dizziness       Family History   Problem Relation Age of Onset     Unknown/Adopted Sister        Social History     Socioeconomic History     Marital status:      Spouse name: Not on file     Number of children: Not on file     Years of education: Not on file     Highest education level: Not on file   Occupational History     Not on file   Social Needs     Financial resource strain: Not on file     Food insecurity:     Worry: Not on file     Inability: Not on file     Transportation needs:     Medical: Not on file     Non-medical: Not on file   Tobacco Use     Smoking status: Never Smoker     Smokeless tobacco: Never Used   Substance and Sexual Activity     Alcohol use: Yes     Comment: beer daily      Drug use: No     Sexual activity: Not Currently     Partners: Female   Lifestyle     Physical activity:     Days per week: Not on file     Minutes per session: Not on file     Stress: Not on file   Relationships     Social connections:     Talks on phone: Not on file     Gets together: Not on file     Attends  Catholic service: Not on file     Active member of club or organization: Not on file     Attends meetings of clubs or organizations: Not on file     Relationship status: Not on file     Intimate partner violence:     Fear of current or ex partner: Not on file     Emotionally abused: Not on file     Physically abused: Not on file     Forced sexual activity: Not on file   Other Topics Concern     Parent/sibling w/ CABG, MI or angioplasty before 65F 55M? Not Asked   Social History Narrative     Not on file       ROS: 10 point ROS neg other than the symptoms noted above in the HPI.  EXAM  Constitutional: healthy, alert and no distress    Psychiatric: mentation appears normal and affect normal/bright    VASCULAR:  -Dorsalis pedis pulse +1/4 b/l  -Posterior tibial pulse +1/4 b/l  -Capillary refill time < 3 seconds to b/l hallux  -Hair growth Absent to b/l anterior legs and ankles  -Varicosities and telangiectasias to bilateral feet  -Mild-to-moderate 3+ pitting edema to bilateral feet and ankles  NEURO:  -Protective sensation diminished with SWM +0/10 RIGHT and +0/10 LEFT on 09/04/2019  -Light touch sensation ABSENT to b/l plantar forefoot  DERM:  -Soft tissue mass to RIGHT dorsal 2nd digit DIPJ with mild red/yellow discoloration consistent with a gouty tophi  -Skin to bilateral feet and legs is erythematous, dry, flaking, and shiny and atrophic  -Skin temperature warm to bilateral feet and legs  -Diffusely dry skin with flaking of skin and erythema discoloration in a moccasin distribution to bilateral plantar feet  -Toenails elongated, thickened, dystrophic and discolored x 10  MSK:  -No pain on palpation to RIGHT dorsal 2nd digit  -Muscle strength of ankles +5/5 for dorsiflexion, plantarflexion, ABDUction and ADDuction b/l    ============================================================    ASSESSMENT:  (L60.3) Onychodystrophy  (primary encounter diagnosis)    (L84) Callus of foot    (L85.3) Xerosis of skin    (E11.9,   Z79.4) Diabetes mellitus type 2, insulin dependent (H)    (E11.42) Diabetic polyneuropathy associated with type 2 diabetes mellitus (H)    (M21.969,  R20.8) Loss of protective sensation of skin of deformed foot    (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 Treatment options discussed with no educational barriers noted.  -Initial foot exam for patient with LOPS  -Nails debrided x 10 without incident  -Diabetic Foot Education provided. This included checking the feet daily looking for new new blisters or wounds, wearing shoes at all times when walking including around the house, and avoiding lotion application between the toes. Any sign of infection in the foot warrant's the patient presenting to the ED as soon as possible.  -Topical Lamisil ordered for b/l feet. Patient to use every day in the evenings and use the Lubriderm in the mornings.   ---Advised patient and his wife not to use topical hydrocortisone cream more than 14 days in a row without a 14 day break since long term use can thin the skin.  -Patient's RIGHT 2nd digit appears like a gouty tophi. At this time there are no open wounds, but he should monitor this closely. If he starts to develop a wound over this toe, he is advised to start taking Allopurinol consistently to control the tophi.  -Patient in agreement with the above treatment plan and all of patient's questions were answered.      RTC 5 months (per patient request) for diabetic foot exam and high risk nail care        [place] DPM

## 2019-09-04 NOTE — PROGRESS NOTES
[facility]  [address]  [place] [address]  [phone]  Dept: [phone]    PRE-OP EVALUATION:  Today's date: 2019    Gunnar Granados (: 1950) presents for pre-operative evaluation assessment as requested by Dr. Rivas.  He requires evaluation and anesthesia risk assessment prior to undergoing surgery/procedure for treatment of hand surgery .    Proposed Surgery/ Procedure: Carpal Tunnel Surgery  Date of Surgery/ Procedure: 2019  Time of Surgery/ Procedure: unknown  Hospital/Surgical Facility: [facility]   Primary Physician: You Bethea  Type of Anesthesia Anticipated: to be determined    Patient has a Health Care Directive or Living Will:  YES     1. NO - Do you have a history of heart attack, stroke, stent, bypass or surgery on an artery in the head, neck, heart or legs?  2. NO - Do you ever have any pain or discomfort in your chest?  3. NO - Do you have a history of  Heart Failure?  4.YES - Are you troubled by shortness of breath when: walking on the level, up a slight hill or at night?  5. NO - Do you currently have a cold, bronchitis or other respiratory infection?  6. NO - Do you have a cough, shortness of breath or wheezing?  7. YES - Do you sometimes get pains in the calves of your legs when you walk?  8. NO - Do you or anyone in your family have previous history of blood clots?  9. NO - Do you or does anyone in your family have a serious bleeding problem such as prolonged bleeding following surgeries or cuts?  10. YES - Have you ever had problems with anemia or been told to take iron pills?  11. NO - Have you had any abnormal blood loss such as black, tarry or bloody stools, or abnormal vaginal bleeding?  12.YES - Have you ever had a blood transfusion?  13. NO - Have you or any of your relatives ever had problems with anesthesia?  14. YES - Do you have sleep apnea, excessive snoring or daytime drowsiness?  15.  NO - Do you have any prosthetic heart valves?  16. NO - Do you have prosthetic joints?  17. NO - Is there any chance that you may be pregnant?      HPI:     HPI related to upcoming procedure: Carpal tunnel        DIABETES - Patient has a longstanding history of DiabetesType Type II . Patient is being treated with diet, oral agents and insulin injections and denies significant side effects. Control has been good. Complicating factors include but are not limited to: hypertension, hyperlipidemia and morbid obesity .     HYPERLIPIDEMIA - Patient has a long history of significant Hyperlipidemia requiring medication for treatment with recent fair control. Patient reports no problems or side effects with the medication.     HYPERTENSION - Patient has longstanding history of HTN , currently denies any symptoms referable to elevated blood pressure. Specifically denies chest pain, palpitations, dyspnea, orthopnea, PND or peripheral edema. Blood pressure readings have been in normal range. Current medication regimen is as listed below. Patient denies any side effects of medication.     HYPOTHYROIDISM - Patient has a longstanding history of chronic Hypothyroidism. Patient has been doing well, noting no tremor, insomnia, hair loss or changes in skin texture. Continues to take medications as directed, without adverse reactions or side effects. Last TSH   Lab Results   Component Value Date    TSH 0.84 04/11/2019         MEDICAL HISTORY:     Patient Active Problem List    Diagnosis Date Noted     Calculus of gallbladder without cholecystitis without obstruction 05/01/2019     Priority: Medium     ACP (advance care planning) 04/12/2017     Priority: Medium     Advance Care Planning 4/12/2017: ACP Review of Chart / Resources Provided:  Reviewed chart for advance care plan.  Gunnar Granados has been provided information and resources to begin or update their advance care plan.  Added by Ginger Small         Type 2 diabetes mellitus  with diabetic neuropathy (H) 04/12/2017     Priority: Medium     Morbid obesity due to excess calories (H) 04/12/2017     Priority: Medium     Postoperative hypothyroidism 04/12/2017     Priority: Medium     Benign essential hypertension 04/12/2017     Priority: Medium      Past Medical History:   Diagnosis Date     Alcohol abuse      Diabetes mellitus with neuropathy (H)      Gastric polyps      Gout      HTN (hypertension)      Hx of thyroid cancer      Neuropathy      Obesity      Osteoarthritis      Past Surgical History:   Procedure Laterality Date     basal cell carcinoma  2017     COLONOSCOPY - HIM SCAN  12/01/2012    Colonoscopy due to bleeding, no polyps 12-12     HERNIA REPAIR  2014     Current Outpatient Medications   Medication Sig Dispense Refill     ALLOPURINOL PO Take 300 mg by mouth Take one tablet daily       ascorbic acid (VITAMIN C) 500 MG tablet Take 500 mg by mouth       B-D U/F insulin pen needle by Device route 2 times daily 100 each 11     blood glucose (ACCU-CHEK SMARTVIEW) test strip USE TO TEST BLOOD SUGARS 2 TIMES DAILY OR AS DIRECTED (ACCU CHECK SMART VIEW) 100 strip 11     blood glucose monitoring (ACCU-CHEK FASTCLIX) lancets USE TO TEST BLOOD SUGAR TWICE A  each 2     cholecalciferol (VITAMIN D) 200 unit TABS half-tab Take 2,000 Units by mouth daily        FEROSUL 325 (65 Fe) MG tablet TAKE 1 TABLET BY MOUTH ONCE DAILY  BEST IF TAKEN WITH SOME FOOD  3     gabapentin (NEURONTIN) 300 MG capsule TAKE 1 CAPSULE (300 MG) BY MOUTH 3 TIMES DAILY 270 capsule 3     horse chestnut 300 MG CAPS Take 300 mg by mouth 2 times daily        insulin glargine (BASAGLAR KWIKPEN) 100 UNIT/ML pen INJECT 18 UNITS SUBCUTANEOUS AT BEDTIME 18 UNITS DAILY 30 mL 0     levothyroxine (SYNTHROID/LEVOTHROID) 300 MCG tablet Take 1 tablet (300 mcg) by mouth daily 90 tablet 3     metFORMIN (GLUCOPHAGE) 1000 MG tablet TAKE 1 TABLET (1,000 MG) BY MOUTH 2 TIMES DAILY (WITH MEALS) 180 tablet 0     metoprolol  succinate ER (TOPROL-XL) 100 MG 24 hr tablet Take 0.5 tablets (50 mg) by mouth daily 45 tablet 3     Multiple Vitamin (MULTI VITAMIN PO) Take 1 Tab by mouth one time a day.       naproxen (NAPROSYN) 500 MG tablet Take 1 tablet (500 mg) by mouth 2 times daily (with meals) 180 tablet 3     NOVOTWIST 32G X 5 MM insulin pen needle FOR USE WITH VICTOZA  each 1     pantoprazole (PROTONIX) 40 MG EC tablet TAKE 1 TABLET BY MOUTH EVERY DAY 30-60 MIN BEFORE A MEAL 90 tablet 1     potassium chloride ER (K-DUR/KLOR-CON M) 20 MEQ CR tablet TAKE 2 TABLETS (40 MEQ) BY MOUTH 2 TIMES DAILY 360 tablet 0     QUEtiapine (SEROQUEL) 50 MG tablet Take 1/2 to 1 tablet by mouth QHS 90 tablet 3     STATIN NOT PRESCRIBED, INTENTIONAL, Please choose reason not prescribed, below       tamsulosin (FLOMAX) 0.4 MG capsule TAKE ONE CAPSULE BY MOUTH EVERY DAY. SWALLOW WHOLE. DO NOT CRUSH OR CHEW       torsemide (DEMADEX) 20 MG tablet Take 2 tablets (40 mg) by mouth 2 times daily 360 tablet 3     UNABLE TO FIND Take 1 tablet by mouth       VICTOZA PEN 18 MG/3ML soln INJECT 1.2 MG SUBCUTANEOUS DAILY 18MG/3ML PREFILLED PEN 18 mL 0     OTC products: None, except as noted above    Allergies   Allergen Reactions     Food      Onions, peppers, olives      Trazodone      Other reaction(s): Dizziness      Latex Allergy: NO    Social History     Tobacco Use     Smoking status: Never Smoker     Smokeless tobacco: Never Used   Substance Use Topics     Alcohol use: Yes     Comment: beer daily      History   Drug Use No       REVIEW OF SYSTEMS:   Constitutional, neuro, ENT, endocrine, pulmonary, cardiac, gastrointestinal, genitourinary, musculoskeletal, integument and psychiatric systems are negative, except as otherwise noted.    EXAM:   There were no vitals taken for this visit.    GENERAL APPEARANCE: Alert and no distress     EYES: EOMI,  PERRL     HENT: ear canals and TM's normal and nose and mouth without ulcers or lesions     RESP: lungs clear to  auscultation - no rales, rhonchi or wheezes     CV: regular rates and rhythm, normal S1 S2, no S3 or S4 and no murmur, click or rub     ABDOMEN:  Obese-soft, nontender, positive BS     MS: +2 to +3 LE edema    Dupreyens contracture bilaterally       SKIN: no suspicious lesions or rashes     NEURO: No lateralizing deficits.     PSYCH: mentation appears normal. and affect normal/bright    DIAGNOSTICS:   EKG: NSR with 1 st degree AV block and incomplete RBBB unchanged from 4/11/19          Recent Labs   Lab Test 04/11/19  1439 12/18/18  1544 11/06/18  1439 10/04/18  1508 09/19/18  1355  08/14/18  1546  04/25/16   HGB 14.7 13.4 10.5*  --  11.1*   < > 10.7*  --   --      --   --   --  311   < > 282  --   --    INR  --   --   --  0.97  --   --   --   --  1.0     --  132*  --  133   < > 132*   < >  --    POTASSIUM 4.1  --  3.8  --  3.9   < > 3.9   < > 4.1  4.1   CR 0.92  --  0.81  --  0.82   < > 0.82   < > 0.85  0.86   A1C 5.6  --   --   --   --   --  6.1*   < > 7.2*    < > = values in this interval not displayed.      Results for orders placed or performed in visit on 09/04/19   CBC with platelets and differential   Result Value Ref Range    WBC 6.2 4.0 - 11.0 10e9/L    RBC Count 4.54 4.4 - 5.9 10e12/L    Hemoglobin 15.2 13.3 - 17.7 g/dL    Hematocrit 44.6 40.0 - 53.0 %    MCV 98 78 - 100 fl    MCH 33.5 (H) 26.5 - 33.0 pg    MCHC 34.1 31.5 - 36.5 g/dL    RDW 12.6 10.0 - 15.0 %    Platelet Count 193 150 - 450 10e9/L    % Neutrophils 51.6 %    % Lymphocytes 23.4 %    % Monocytes 11.1 %    % Eosinophils 13.3 %    % Basophils 0.6 %    Absolute Neutrophil 3.2 1.6 - 8.3 10e9/L    Absolute Lymphocytes 1.5 0.8 - 5.3 10e9/L    Absolute Monocytes 0.7 0.0 - 1.3 10e9/L    Absolute Eosinophils 0.8 (H) 0.0 - 0.7 10e9/L    Absolute Basophils 0.0 0.0 - 0.2 10e9/L    Diff Method Automated Method    PSA, screen   Result Value Ref Range    PSA 2.74 0 - 4 ug/L   T4, free   Result Value Ref Range    T4 Free 1.54 (H) 0.76 - 1.46  ng/dL   TSH   Result Value Ref Range    TSH 0.86 0.40 - 4.00 mU/L   Comprehensive metabolic panel (BMP + Alb, Alk Phos, ALT, AST, Total. Bili, TP)   Result Value Ref Range    Sodium 138 133 - 144 mmol/L    Potassium 4.0 3.4 - 5.3 mmol/L    Chloride 98 94 - 109 mmol/L    Carbon Dioxide 30 20 - 32 mmol/L    Anion Gap 10 3 - 14 mmol/L    Glucose 136 (H) 70 - 99 mg/dL    Urea Nitrogen 13 7 - 30 mg/dL    Creatinine 0.91 0.66 - 1.25 mg/dL    GFR Estimate 86 >60 mL/min/[1.73_m2]    GFR Estimate If Black >90 >60 mL/min/[1.73_m2]    Calcium 8.9 8.5 - 10.1 mg/dL    Bilirubin Total 1.1 0.2 - 1.3 mg/dL    Albumin 3.4 3.4 - 5.0 g/dL    Protein Total 6.8 6.8 - 8.8 g/dL    Alkaline Phosphatase 54 40 - 150 U/L    ALT 37 0 - 70 U/L    AST 21 0 - 45 U/L   Lipid Profile (Chol, Trig, HDL, LDL calc)   Result Value Ref Range    Cholesterol 189 <200 mg/dL    Triglycerides 274 (H) <150 mg/dL    HDL Cholesterol 50 >39 mg/dL    LDL Cholesterol Calculated 84 <100 mg/dL    Non HDL Cholesterol 139 (H) <130 mg/dL   Hemoglobin A1c   Result Value Ref Range    Hemoglobin A1C 5.7 (H) 0 - 5.6 %   Estimated Average Glucose   Result Value Ref Range    Estimated Average Glucose 117 mg/dL     IMPRESSION:   Reason for surgery/procedure: Carpal tunnel    The proposed surgical procedure is considered LOW risk.    REVISED CARDIAC RISK INDEX  The patient has the following serious cardiovascular risks for perioperative complications such as (MI, PE, VFib and 3  AV Block):  Diabetes Mellitus (on Insulin)  INTERPRETATION: 0 risks: Class I (very low risk - 0.4% complication rate)    The patient has the following additional risks for perioperative complications:  No identified additional risks  The 10-year ASCVD risk score (Tevin APPLE Jr., et al., 2013) is: 34.9%    Values used to calculate the score:      Age: 69 years      Sex: Male      Is Non- : No      Diabetic: Yes      Tobacco smoker: No      Systolic Blood Pressure: 138 mmHg      Is BP  treated: Yes      HDL Cholesterol: 51 mg/dL      Total Cholesterol: 191 mg/dL  Morbid obesity    No diagnosis found.    RECOMMENDATIONS:     Reduce Lantus dose to 16 units day prior to procedure  Hold Demadex potassium and metformin day of surgery      APPROVAL GIVEN to proceed with proposed procedure, without further diagnostic evaluation       Signed Electronically by: You Bethea DO    Copy of this evaluation report is provided to requesting physician.    [facility] Preop Guidelines    Revised Cardiac Risk Index

## 2019-09-19 ENCOUNTER — MYC MEDICAL ADVICE (OUTPATIENT)
Dept: INTERNAL MEDICINE | Facility: OTHER | Age: 69
End: 2019-09-19

## 2019-09-19 NOTE — TELEPHONE ENCOUNTER
Everything looks fine for surgery-EKG unchanged from previous with IBBB and 1st degree AV block which are not concerning as was previous baseline     A1C 5.7   PSA normal   TSH and T4 look fine too-no changes     Cholesterol looks fine

## 2019-09-24 DIAGNOSIS — Z79.4 TYPE 2 DIABETES MELLITUS WITH COMPLICATION, WITH LONG-TERM CURRENT USE OF INSULIN (H): ICD-10-CM

## 2019-09-24 DIAGNOSIS — E11.8 TYPE 2 DIABETES MELLITUS WITH COMPLICATION, WITH LONG-TERM CURRENT USE OF INSULIN (H): ICD-10-CM

## 2019-09-26 DIAGNOSIS — L60.3 ONYCHODYSTROPHY: Primary | ICD-10-CM

## 2019-09-26 DIAGNOSIS — E11.42 DIABETIC POLYNEUROPATHY ASSOCIATED WITH TYPE 2 DIABETES MELLITUS (H): ICD-10-CM

## 2019-09-26 DIAGNOSIS — M21.969 LOSS OF PROTECTIVE SENSATION OF SKIN OF DEFORMED FOOT: ICD-10-CM

## 2019-09-26 DIAGNOSIS — B35.3 TINEA PEDIS OF BOTH FEET: ICD-10-CM

## 2019-09-26 DIAGNOSIS — L84 CALLUS OF FOOT: ICD-10-CM

## 2019-09-26 DIAGNOSIS — L85.3 XEROSIS OF SKIN: ICD-10-CM

## 2019-09-26 DIAGNOSIS — R20.8 LOSS OF PROTECTIVE SENSATION OF SKIN OF DEFORMED FOOT: ICD-10-CM

## 2019-09-26 DIAGNOSIS — M1A.0711 CHRONIC IDIOPATHIC GOUT INVOLVING TOE OF RIGHT FOOT WITH TOPHUS: ICD-10-CM

## 2019-09-26 DIAGNOSIS — E11.9 DIABETES MELLITUS TYPE 2, INSULIN DEPENDENT (H): ICD-10-CM

## 2019-09-26 DIAGNOSIS — Z79.4 DIABETES MELLITUS TYPE 2, INSULIN DEPENDENT (H): ICD-10-CM

## 2019-09-26 RX ORDER — INSULIN GLARGINE 100 [IU]/ML
INJECTION, SOLUTION SUBCUTANEOUS
Qty: 30 ML | Refills: 0 | Status: SHIPPED | OUTPATIENT
Start: 2019-09-26 | End: 2020-04-06

## 2019-09-26 NOTE — PROGRESS NOTES
-Diabetic shoes ordered through the orthotist, Dania Finn. Patient will be called today on 09/26/2019 with his appointment time.

## 2019-10-07 ENCOUNTER — PATIENT OUTREACH (OUTPATIENT)
Dept: CARE COORDINATION | Facility: OTHER | Age: 69
End: 2019-10-07

## 2019-10-07 DIAGNOSIS — K74.60 CIRRHOSIS OF LIVER WITHOUT ASCITES, UNSPECIFIED HEPATIC CIRRHOSIS TYPE (H): Primary | ICD-10-CM

## 2019-10-07 NOTE — PROGRESS NOTES
"Clinic Care Coordination Contact  Care Team Conversations    CC was approached by Zoe Lozoya NP and wife in regards to patient.    Wife states they are just in the \"planning\" phase.  She states she is needing help in the home but needs to check with the  first.  She states she will let him know we talked and see how he feels.  She states he has distrust of 's and I assured her I am an RN.  [place] and are afraid to lose it so hesitant to make decisions.  They live in [place].  We discussed maybe getting some phone numbers and just seeing what is out there and make decisions from there. Wife agreed I could ask for assistance from  but I will call them back.  Routed to [facility] for questions in regards to  and lake home properties.    Wife states that  doesn't get around much.  Maybe from bed to couch to chair.  Hard to leave the home for appointments.  We discussed options such as palliative care and will see what PCP thinks.  Again they are just planning and she states she would need to discuss all with patient.  She states she has difficulty bathing him due to bad back and having to haul water as no main floor shower.  He lays on the bed and she sponge bathes him.  He doesn't drive.  She assists him with dressing and his appetite varies.  He has neuropathy really bad due to diabetes she states.    Wife would like to be called on her cell phone [phone] to discuss and her name is Yazmin.  She would like to talk with  first and again just \"planning.\"  She received a booklet with [facility] phone numbers in there and also [employer].    CC will call wife once hear from [facility].  Clarice Fragoso RN  [facility]ordin[facility]      "

## 2019-10-07 NOTE — PROGRESS NOTES
Clinic Care Coordination Contact  Care Team Conversations    CC was told by Zoe Lozoya,, NP that patient contacted her and doesn't want CC.  No outreach will be performed.  Clarice Fragoso, RN  Care Coordination

## 2019-10-25 ENCOUNTER — MYC MEDICAL ADVICE (OUTPATIENT)
Dept: INTERNAL MEDICINE | Facility: OTHER | Age: 69
End: 2019-10-25

## 2019-10-28 NOTE — TELEPHONE ENCOUNTER
Needs face to face. My chart message sent.     Also needs to be documented in chart on why he needs a new one.

## 2019-10-28 NOTE — TELEPHONE ENCOUNTER
Can we check with [facility] and see if they will need a face to face or not.  If they require it then we have no option.

## 2019-10-30 NOTE — PROGRESS NOTES
Subjective     Gunnar Granados is a 69 year old adult who presents to clinic today for the following health issues:    HPI   Cpap Face to Face      Duration: Follow up     Description (location/character/radiation): patient is here for a face to face. Patient uses his Cpap nightly and it is helping his symptoms     Intensity:  mild    Accompanying signs and symptoms: none    History (similar episodes/previous evaluation): yes    Precipitating or alleviating factors: None    Therapies tried and outcome: Cpap Machine- patient needs to receive a new one due to battery losing life.        Gunnar presents today for follow up regarding his longstanding CALLIE.  He continues to use the CPAP night and benefits from it greatly.  He is now in need of a new CPAP and supplies.  He denies any chest pain or SOB.  He does report his current mask does give him some irritation around his nose and back of his neck.      Patient Active Problem List   Diagnosis     ACP (advance care planning)     Type 2 diabetes mellitus with diabetic neuropathy (H)     Morbid obesity due to excess calories (H)     Acquired hypothyroidism     Benign essential hypertension     Calculus of gallbladder without cholecystitis without obstruction     Past Surgical History:   Procedure Laterality Date     basal cell carcinoma  2017     COLONOSCOPY - HIM SCAN  12/01/2012    Colonoscopy due to bleeding, no polyps 12-12     HERNIA REPAIR  2014       Social History     Tobacco Use     Smoking status: Never Smoker     Smokeless tobacco: Never Used   Substance Use Topics     Alcohol use: Yes     Comment: beer daily      Family History   Problem Relation Age of Onset     Unknown/Adopted Sister          Current Outpatient Medications   Medication Sig Dispense Refill     ALLOPURINOL PO Take 300 mg by mouth Take one tablet daily       ascorbic acid (VITAMIN C) 500 MG tablet Take 500 mg by mouth       B-D U/F insulin pen needle by Device route 2 times daily 100 each 11      blood glucose (ACCU-CHEK SMARTVIEW) test strip USE TO TEST BLOOD SUGARS 2 TIMES DAILY OR AS DIRECTED (ACCU CHECK SMART VIEW) 100 strip 11     blood glucose monitoring (ACCU-CHEK FASTCLIX) lancets USE TO TEST BLOOD SUGAR TWICE A  each 2     cholecalciferol (VITAMIN D) 200 unit TABS half-tab Take 2,000 Units by mouth daily        FEROSUL 325 (65 Fe) MG tablet TAKE 1 TABLET BY MOUTH ONCE DAILY  BEST IF TAKEN WITH SOME FOOD  3     gabapentin (NEURONTIN) 300 MG capsule TAKE 1 CAPSULE (300 MG) BY MOUTH 3 TIMES DAILY 270 capsule 3     horse chestnut 300 MG CAPS Take 300 mg by mouth 2 times daily        insulin glargine (BASAGLAR KWIKPEN) 100 UNIT/ML pen INJECT 18 UNITS SUBCUTANEOUS AT BEDTIME. 30 mL 0     levothyroxine (SYNTHROID/LEVOTHROID) 300 MCG tablet Take 1 tablet (300 mcg) by mouth daily 90 tablet 3     metFORMIN (GLUCOPHAGE) 1000 MG tablet TAKE 1 TABLET (1,000 MG) BY MOUTH 2 TIMES DAILY (WITH MEALS) 180 tablet 0     metoprolol succinate ER (TOPROL-XL) 100 MG 24 hr tablet Take 0.5 tablets (50 mg) by mouth daily 45 tablet 3     Multiple Vitamin (MULTI VITAMIN PO) Take 1 Tab by mouth one time a day.       naproxen (NAPROSYN) 500 MG tablet Take 1 tablet (500 mg) by mouth 2 times daily (with meals) 180 tablet 3     NOVOTWIST 32G X 5 MM insulin pen needle FOR USE WITH VICTOZA  each 1     order for DME Equipment being ordered: CPAP with tubing and filter along with additional needed supplies. 1 Device 0     pantoprazole (PROTONIX) 40 MG EC tablet TAKE 1 TABLET BY MOUTH EVERY DAY 30-60 MIN BEFORE A MEAL 90 tablet 1     potassium chloride ER (K-DUR/KLOR-CON M) 20 MEQ CR tablet TAKE 2 TABLETS (40 MEQ) BY MOUTH 2 TIMES DAILY 360 tablet 0     QUEtiapine (SEROQUEL) 50 MG tablet Take 1/2 to 1 tablet by mouth QHS 90 tablet 3     STATIN NOT PRESCRIBED, INTENTIONAL, Please choose reason not prescribed, below       tamsulosin (FLOMAX) 0.4 MG capsule TAKE ONE CAPSULE BY MOUTH EVERY DAY. SWALLOW WHOLE. DO NOT CRUSH OR  "CHEW       terbinafine (LAMISIL) 1 % external cream Apply topically 2 times daily 42 g 3     torsemide (DEMADEX) 20 MG tablet Take 2 tablets (40 mg) by mouth 2 times daily 360 tablet 3     UNABLE TO FIND Take 1 tablet by mouth       VICTOZA PEN 18 MG/3ML soln INJECT 1.2 MG SUBCUTANEOUS DAILY 18MG/3ML PREFILLED PEN 18 mL 0     Allergies   Allergen Reactions     Food      Onions, peppers, olives      Trazodone      Other reaction(s): Dizziness     Recent Labs   Lab Test 09/04/19  1413 04/11/19  1439  09/19/18  1355  08/14/18  1546  11/07/17  1100   A1C 5.7* 5.6  --   --   --  6.1*  --  6.2*   LDL 84  --   --   --   --   --   --  106*   HDL 50  --   --   --   --   --   --  51   TRIG 274*  --   --   --   --   --   --  171*   ALT 37 31  --  70   < > 29  --  36   CR 0.91 0.92   < > 0.82   < > 0.82  --  0.84   GFRESTIMATED 86 85   < > >90   < > >90  --  >90   GFRESTBLACK >90 >90   < > >90   < > >90  --  >90   POTASSIUM 4.0 4.1   < > 3.9   < > 3.9  --  4.0   TSH 0.86 0.84   < >  --    < > 0.09*   < > 0.16*    < > = values in this interval not displayed.      BP Readings from Last 3 Encounters:   11/05/19 (!) 140/82   09/04/19 138/81   09/04/19 138/81    Wt Readings from Last 3 Encounters:   09/04/19 (!) 152.4 kg (336 lb)   09/04/19 (!) 152.4 kg (336 lb)   05/21/19 (!) 154.7 kg (341 lb)                 Reviewed and updated as needed this visit by Provider         Review of Systems   ROS COMP: Constitutional, HEENT, cardiovascular, pulmonary, gi and gu systems are negative, except as otherwise noted.      Objective    BP (!) 140/82 (BP Location: Left arm, Patient Position: Chair, Cuff Size: Adult Large)   Pulse 68   Temp 98.4  F (36.9  C) (Tympanic)   Ht 1.854 m (6' 1\")   SpO2 97%   BMI 44.33 kg/m    Body mass index is 44.33 kg/m .  Physical Exam   GENERAL: Alert and no distress  EYES:  Left eye scleral edema.    RESP: lungs clear to auscultation - no rales, rhonchi or wheezes  CV: regular rate and rhythm, normal S1 S2, " no S3 or S4, no murmurs  MS: no gross musculoskeletal defects noted, no edema  SKIN: Eczema anterior to the left ear  PSYCH: mentation appears normal, affect normal/bright    Diagnostic Test Results:  No results found for this or any previous visit (from the past 24 hour(s)).  No results found for any visits on 11/05/19.        Assessment & Plan   Problem List Items Addressed This Visit     None      Visit Diagnoses     CALLIE (obstructive sleep apnea)    -  Primary    Relevant Medications    order for DME  CPAP and supplies           You Bethea DO  [facility]

## 2019-11-05 ENCOUNTER — OFFICE VISIT (OUTPATIENT)
Dept: INTERNAL MEDICINE | Facility: OTHER | Age: 69
End: 2019-11-05
Attending: INTERNAL MEDICINE
Payer: COMMERCIAL

## 2019-11-05 VITALS
SYSTOLIC BLOOD PRESSURE: 140 MMHG | TEMPERATURE: 98.4 F | DIASTOLIC BLOOD PRESSURE: 82 MMHG | HEART RATE: 68 BPM | BODY MASS INDEX: 44.33 KG/M2 | HEIGHT: 73 IN | OXYGEN SATURATION: 97 %

## 2019-11-05 DIAGNOSIS — G47.33 OSA (OBSTRUCTIVE SLEEP APNEA): Primary | ICD-10-CM

## 2019-11-05 PROCEDURE — G0463 HOSPITAL OUTPT CLINIC VISIT: HCPCS

## 2019-11-05 PROCEDURE — 99214 OFFICE O/P EST MOD 30 MIN: CPT | Performed by: INTERNAL MEDICINE

## 2019-11-05 ASSESSMENT — PAIN SCALES - GENERAL: PAINLEVEL: EXTREME PAIN (8)

## 2019-11-05 NOTE — NURSING NOTE
"Chief Complaint   Patient presents with     CPAP Follow Up       Initial BP (!) 140/82 (BP Location: Left arm, Patient Position: Chair, Cuff Size: Adult Large)   Pulse 68   Temp 98.4  F (36.9  C) (Tympanic)   Ht 1.854 m (6' 1\")   SpO2 97%   BMI 44.33 kg/m   Estimated body mass index is 44.33 kg/m  as calculated from the following:    Height as of this encounter: 1.854 m (6' 1\").    Weight as of 9/4/19: 152.4 kg (336 lb).  Medication Reconciliation: complete  ANAID BOOGIE LPN  "

## 2019-11-13 ENCOUNTER — PATIENT OUTREACH (OUTPATIENT)
Dept: FAMILY MEDICINE | Facility: OTHER | Age: 69
End: 2019-11-13

## 2019-11-13 NOTE — PROGRESS NOTES
Clinic Care Coordination Contact  Care Team Conversations    CC received a message from patient's wife in regards to our previous conversation and need for shoveling.  Wife states she was wondering if I was going to call her back or if she was supposed to call me back.  She asks for a return call.  CC called patient's wife about shoveling.  She asked to put me on speaker phone and introduced patient.  We discussed putting up an ad at local high school, talking to boy/girl scouts or [employer] about shoveling.  They verbalized understanding.  They state they have called [employer] and other options.  Wife states they attempted to call [employer] but on hold and we discussed open enrollment and calling at a later time.   then asked about planning options in case something happened to wife and if she needed help caring for him.  We discussed [employer] after open enrollment and also talking with county about finances and where they would be at if needing placement.  They verbalize understanding.   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    They state they have a neighbor that works in home care.  We discussed calling [employer] as this is where the neighbor works and just asking a few questions.  They are happy about this and ask for phone number.  Phone number given. I also discussed that we could ask for a referral from provider if they decided to move forward with this to see if they would qualify as CC is unfamiliar with patient.   was a bit skeptical of his PCP knowing until we discussed a referral may be needed.  He verbalized understanding.   wanted to explain himself to CC and that he is a very smart man but unfortunately unable to feel from the knees down so unable to do a lot of things for himself.  Also states not able to use his hands.  He states how hard it is to have his wife take care of him.  CC voiced understanding and  empathy for their situation and they were thankful.  We discussed how this may happen to the best of us.  CC let them know that a referral for CC could be placed if they would like.  They were very thankful for the call and for my time. CC will wait to see if they would like additional help or CC referral as it was left unclear and something I believe they were looking to discuss.  CC will route to PCP.    Clarice Fragoso, CATHY  Care Coordination

## 2019-11-20 ENCOUNTER — APPOINTMENT (OUTPATIENT)
Dept: GENERAL RADIOLOGY | Facility: HOSPITAL | Age: 69
DRG: 446 | End: 2019-11-20
Attending: FAMILY MEDICINE
Payer: MEDICARE

## 2019-11-20 ENCOUNTER — APPOINTMENT (OUTPATIENT)
Dept: CT IMAGING | Facility: HOSPITAL | Age: 69
DRG: 446 | End: 2019-11-20
Attending: FAMILY MEDICINE
Payer: MEDICARE

## 2019-11-20 ENCOUNTER — HOSPITAL ENCOUNTER (INPATIENT)
Facility: HOSPITAL | Age: 69
LOS: 1 days | Discharge: ANOTHER HOSPITAL | DRG: 446 | End: 2019-11-20
Attending: FAMILY MEDICINE | Admitting: INTERNAL MEDICINE
Payer: MEDICARE

## 2019-11-20 ENCOUNTER — TRANSFERRED RECORDS (OUTPATIENT)
Dept: HEALTH INFORMATION MANAGEMENT | Facility: CLINIC | Age: 69
End: 2019-11-20

## 2019-11-20 VITALS
SYSTOLIC BLOOD PRESSURE: 131 MMHG | DIASTOLIC BLOOD PRESSURE: 65 MMHG | RESPIRATION RATE: 20 BRPM | OXYGEN SATURATION: 92 % | HEART RATE: 95 BPM | TEMPERATURE: 99.7 F

## 2019-11-20 DIAGNOSIS — R53.1 GENERALIZED WEAKNESS: Primary | ICD-10-CM

## 2019-11-20 DIAGNOSIS — R74.01 TRANSAMINITIS: ICD-10-CM

## 2019-11-20 DIAGNOSIS — F10.29 ALCOHOL DEPENDENCE WITH UNSPECIFIED ALCOHOL-INDUCED DISORDER (H): ICD-10-CM

## 2019-11-20 PROBLEM — K81.9 CHOLECYSTITIS: Status: ACTIVE | Noted: 2019-11-20

## 2019-11-20 LAB
ALBUMIN SERPL-MCNC: 3 G/DL (ref 3.4–5)
ALBUMIN UR-MCNC: 10 MG/DL
ALP SERPL-CCNC: 83 U/L (ref 40–150)
ALT SERPL W P-5'-P-CCNC: 303 U/L (ref 0–70)
AMMONIA PLAS-SCNC: <10 UMOL/L (ref 10–50)
AMPHETAMINES UR QL: NOT DETECTED NG/ML
ANION GAP SERPL CALCULATED.3IONS-SCNC: 9 MMOL/L (ref 3–14)
APPEARANCE UR: ABNORMAL
AST SERPL W P-5'-P-CCNC: 218 U/L (ref 0–45)
BACTERIA #/AREA URNS HPF: ABNORMAL /HPF
BARBITURATES UR QL SCN: NOT DETECTED NG/ML
BASOPHILS # BLD AUTO: 0.1 10E9/L (ref 0–0.2)
BASOPHILS NFR BLD AUTO: 0.5 %
BENZODIAZ UR QL SCN: NOT DETECTED NG/ML
BILIRUB SERPL-MCNC: 7.7 MG/DL (ref 0.2–1.3)
BILIRUB UR QL STRIP: ABNORMAL
BUN SERPL-MCNC: 36 MG/DL (ref 7–30)
BUPRENORPHINE UR QL: NOT DETECTED NG/ML
CALCIUM SERPL-MCNC: 8.4 MG/DL (ref 8.5–10.1)
CANNABINOIDS UR QL: NOT DETECTED NG/ML
CHLORIDE SERPL-SCNC: 93 MMOL/L (ref 94–109)
CO2 SERPL-SCNC: 29 MMOL/L (ref 20–32)
COCAINE UR QL SCN: NOT DETECTED NG/ML
COLOR UR AUTO: ABNORMAL
CREAT SERPL-MCNC: 1.88 MG/DL (ref 0.66–1.25)
D-METHAMPHET UR QL: NOT DETECTED NG/ML
DIFFERENTIAL METHOD BLD: ABNORMAL
EOSINOPHIL # BLD AUTO: 0.8 10E9/L (ref 0–0.7)
EOSINOPHIL NFR BLD AUTO: 6.3 %
ERYTHROCYTE [DISTWIDTH] IN BLOOD BY AUTOMATED COUNT: 12.5 % (ref 10–15)
ETHANOL SERPL-MCNC: <0.01 G/DL
GFR SERPL CREATININE-BSD FRML MDRD: 36 ML/MIN/{1.73_M2}
GLUCOSE BLDC GLUCOMTR-MCNC: 117 MG/DL (ref 70–99)
GLUCOSE SERPL-MCNC: 114 MG/DL (ref 70–99)
GLUCOSE UR STRIP-MCNC: NEGATIVE MG/DL
HCT VFR BLD AUTO: 39.5 % (ref 40–53)
HGB BLD-MCNC: 14 G/DL (ref 13.3–17.7)
HGB UR QL STRIP: ABNORMAL
HYALINE CASTS #/AREA URNS LPF: 1 /LPF
IMM GRANULOCYTES # BLD: 0.1 10E9/L (ref 0–0.4)
IMM GRANULOCYTES NFR BLD: 0.8 %
KETONES UR STRIP-MCNC: NEGATIVE MG/DL
LACTATE BLD-SCNC: 1.9 MMOL/L (ref 0.7–2)
LACTATE BLD-SCNC: 2.6 MMOL/L (ref 0.7–2)
LEUKOCYTE ESTERASE UR QL STRIP: NEGATIVE
LYMPHOCYTES # BLD AUTO: 0.6 10E9/L (ref 0.8–5.3)
LYMPHOCYTES NFR BLD AUTO: 4.9 %
MCH RBC QN AUTO: 33.7 PG (ref 26.5–33)
MCHC RBC AUTO-ENTMCNC: 35.4 G/DL (ref 31.5–36.5)
MCV RBC AUTO: 95 FL (ref 78–100)
METHADONE UR QL SCN: NOT DETECTED NG/ML
MONOCYTES # BLD AUTO: 0.7 10E9/L (ref 0–1.3)
MONOCYTES NFR BLD AUTO: 5.4 %
MUCOUS THREADS #/AREA URNS LPF: PRESENT /LPF
NEUTROPHILS # BLD AUTO: 9.9 10E9/L (ref 1.6–8.3)
NEUTROPHILS NFR BLD AUTO: 82.1 %
NITRATE UR QL: NEGATIVE
NRBC # BLD AUTO: 0 10*3/UL
NRBC BLD AUTO-RTO: 0 /100
OPIATES UR QL SCN: NOT DETECTED NG/ML
OXYCODONE UR QL SCN: NOT DETECTED NG/ML
PCP UR QL SCN: NOT DETECTED NG/ML
PH UR STRIP: 6 PH (ref 4.7–8)
PLATELET # BLD AUTO: 142 10E9/L (ref 150–450)
POTASSIUM SERPL-SCNC: 4.5 MMOL/L (ref 3.4–5.3)
PROPOXYPH UR QL: NOT DETECTED NG/ML
PROT SERPL-MCNC: 6.3 G/DL (ref 6.8–8.8)
RBC # BLD AUTO: 4.15 10E12/L (ref 4.4–5.9)
RBC #/AREA URNS AUTO: 1 /HPF (ref 0–2)
SODIUM SERPL-SCNC: 131 MMOL/L (ref 133–144)
SOURCE: ABNORMAL
SP GR UR STRIP: 1.01 (ref 1–1.03)
TRICYCLICS UR QL SCN: NOT DETECTED NG/ML
TROPONIN I SERPL-MCNC: <0.015 UG/L (ref 0–0.04)
UROBILINOGEN UR STRIP-MCNC: 4 MG/DL (ref 0–2)
WBC # BLD AUTO: 12 10E9/L (ref 4–11)
WBC #/AREA URNS AUTO: 8 /HPF (ref 0–5)
WBC CLUMPS #/AREA URNS HPF: PRESENT /HPF

## 2019-11-20 PROCEDURE — 87186 SC STD MICRODIL/AGAR DIL: CPT | Performed by: FAMILY MEDICINE

## 2019-11-20 PROCEDURE — 12000000 ZZH R&B MED SURG/OB

## 2019-11-20 PROCEDURE — 99236 HOSP IP/OBS SAME DATE HI 85: CPT | Performed by: INTERNAL MEDICINE

## 2019-11-20 PROCEDURE — 40000786 ZZHCL STATISTIC ACTIVE MRSA SURVEILLANCE CULTURE: Performed by: INTERNAL MEDICINE

## 2019-11-20 PROCEDURE — 93010 ELECTROCARDIOGRAM REPORT: CPT | Performed by: INTERNAL MEDICINE

## 2019-11-20 PROCEDURE — 71045 X-RAY EXAM CHEST 1 VIEW: CPT | Mod: TC

## 2019-11-20 PROCEDURE — 84484 ASSAY OF TROPONIN QUANT: CPT | Performed by: FAMILY MEDICINE

## 2019-11-20 PROCEDURE — 25000128 H RX IP 250 OP 636: Performed by: INTERNAL MEDICINE

## 2019-11-20 PROCEDURE — 99285 EMERGENCY DEPT VISIT HI MDM: CPT | Mod: Z6 | Performed by: FAMILY MEDICINE

## 2019-11-20 PROCEDURE — 80320 DRUG SCREEN QUANTALCOHOLS: CPT | Performed by: FAMILY MEDICINE

## 2019-11-20 PROCEDURE — 87077 CULTURE AEROBIC IDENTIFY: CPT | Performed by: FAMILY MEDICINE

## 2019-11-20 PROCEDURE — 83605 ASSAY OF LACTIC ACID: CPT | Performed by: FAMILY MEDICINE

## 2019-11-20 PROCEDURE — 99285 EMERGENCY DEPT VISIT HI MDM: CPT | Mod: 25

## 2019-11-20 PROCEDURE — 81001 URINALYSIS AUTO W/SCOPE: CPT | Performed by: FAMILY MEDICINE

## 2019-11-20 PROCEDURE — 36415 COLL VENOUS BLD VENIPUNCTURE: CPT | Performed by: FAMILY MEDICINE

## 2019-11-20 PROCEDURE — 25500064 ZZH RX 255 OP 636: Performed by: RADIOLOGY

## 2019-11-20 PROCEDURE — 74177 CT ABD & PELVIS W/CONTRAST: CPT | Mod: TC

## 2019-11-20 PROCEDURE — 00000146 ZZHCL STATISTIC GLUCOSE BY METER IP

## 2019-11-20 PROCEDURE — 25800030 ZZH RX IP 258 OP 636: Performed by: FAMILY MEDICINE

## 2019-11-20 PROCEDURE — 80053 COMPREHEN METABOLIC PANEL: CPT | Performed by: FAMILY MEDICINE

## 2019-11-20 PROCEDURE — 80306 DRUG TEST PRSMV INSTRMNT: CPT | Performed by: FAMILY MEDICINE

## 2019-11-20 PROCEDURE — 25800030 ZZH RX IP 258 OP 636: Performed by: INTERNAL MEDICINE

## 2019-11-20 PROCEDURE — 87040 BLOOD CULTURE FOR BACTERIA: CPT | Performed by: FAMILY MEDICINE

## 2019-11-20 PROCEDURE — 85025 COMPLETE CBC W/AUTO DIFF WBC: CPT | Performed by: FAMILY MEDICINE

## 2019-11-20 PROCEDURE — 82140 ASSAY OF AMMONIA: CPT | Performed by: FAMILY MEDICINE

## 2019-11-20 PROCEDURE — 93005 ELECTROCARDIOGRAM TRACING: CPT

## 2019-11-20 RX ORDER — ONDANSETRON 2 MG/ML
4 INJECTION INTRAMUSCULAR; INTRAVENOUS EVERY 6 HOURS PRN
Status: DISCONTINUED | OUTPATIENT
Start: 2019-11-20 | End: 2019-11-20 | Stop reason: HOSPADM

## 2019-11-20 RX ORDER — SODIUM CHLORIDE 9 MG/ML
1000 INJECTION, SOLUTION INTRAVENOUS CONTINUOUS
Status: DISCONTINUED | OUTPATIENT
Start: 2019-11-20 | End: 2019-11-20 | Stop reason: HOSPADM

## 2019-11-20 RX ORDER — NICOTINE POLACRILEX 4 MG
15-30 LOZENGE BUCCAL
Status: DISCONTINUED | OUTPATIENT
Start: 2019-11-20 | End: 2019-11-20 | Stop reason: HOSPADM

## 2019-11-20 RX ORDER — ACETAMINOPHEN 160 MG
1 TABLET,DISINTEGRATING ORAL DAILY
COMMUNITY

## 2019-11-20 RX ORDER — SODIUM CHLORIDE 9 MG/ML
INJECTION, SOLUTION INTRAVENOUS CONTINUOUS
Status: DISCONTINUED | OUTPATIENT
Start: 2019-11-20 | End: 2019-11-20

## 2019-11-20 RX ORDER — MORPHINE SULFATE 2 MG/ML
1 INJECTION, SOLUTION INTRAMUSCULAR; INTRAVENOUS
Status: DISCONTINUED | OUTPATIENT
Start: 2019-11-20 | End: 2019-11-20 | Stop reason: HOSPADM

## 2019-11-20 RX ORDER — IOPAMIDOL 612 MG/ML
100 INJECTION, SOLUTION INTRAVASCULAR ONCE
Status: COMPLETED | OUTPATIENT
Start: 2019-11-20 | End: 2019-11-20

## 2019-11-20 RX ORDER — NALOXONE HYDROCHLORIDE 0.4 MG/ML
.1-.4 INJECTION, SOLUTION INTRAMUSCULAR; INTRAVENOUS; SUBCUTANEOUS
Status: DISCONTINUED | OUTPATIENT
Start: 2019-11-20 | End: 2019-11-20 | Stop reason: HOSPADM

## 2019-11-20 RX ORDER — LIDOCAINE 40 MG/G
CREAM TOPICAL
Status: DISCONTINUED | OUTPATIENT
Start: 2019-11-20 | End: 2019-11-20 | Stop reason: HOSPADM

## 2019-11-20 RX ORDER — CICLOPIROX OLAMINE 7.7 MG/G
CREAM TOPICAL DAILY
COMMUNITY
End: 2021-01-14

## 2019-11-20 RX ORDER — DEXTROSE MONOHYDRATE 25 G/50ML
25-50 INJECTION, SOLUTION INTRAVENOUS
Status: DISCONTINUED | OUTPATIENT
Start: 2019-11-20 | End: 2019-11-20 | Stop reason: HOSPADM

## 2019-11-20 RX ORDER — ONDANSETRON 4 MG/1
4 TABLET, ORALLY DISINTEGRATING ORAL EVERY 6 HOURS PRN
Status: DISCONTINUED | OUTPATIENT
Start: 2019-11-20 | End: 2019-11-20 | Stop reason: HOSPADM

## 2019-11-20 RX ADMIN — IOPAMIDOL 100 ML: 612 INJECTION, SOLUTION INTRAVENOUS at 09:09

## 2019-11-20 RX ADMIN — TAZOBACTAM SODIUM AND PIPERACILLIN SODIUM 3.38 G: 375; 3 INJECTION, SOLUTION INTRAVENOUS at 17:56

## 2019-11-20 RX ADMIN — SODIUM CHLORIDE 1000 ML: 9 INJECTION, SOLUTION INTRAVENOUS at 07:59

## 2019-11-20 RX ADMIN — DEXTROSE AND SODIUM CHLORIDE: 5; 900 INJECTION, SOLUTION INTRAVENOUS at 18:33

## 2019-11-20 RX ADMIN — SODIUM CHLORIDE 1000 ML: 9 INJECTION, SOLUTION INTRAVENOUS at 09:24

## 2019-11-20 ASSESSMENT — ENCOUNTER SYMPTOMS
EYE REDNESS: 0
HEADACHES: 0
DIFFICULTY URINATING: 0
SHORTNESS OF BREATH: 0
ARTHRALGIAS: 0
APPETITE CHANGE: 0
COLOR CHANGE: 0
NECK STIFFNESS: 0
CONFUSION: 0
ABDOMINAL PAIN: 0

## 2019-11-20 NOTE — DISCHARGE INSTRUCTIONS
What to expect when you have contrast    During your exam, we will inject  contrast  into your vein or artery. (Contrast is a clear liquid with iodine in it. It shows up on X-rays.)    You may feel warm or hot. You may have a metal taste in your mouth and a slight upset stomach. You may also feel pressure near the kidneys and bladder. These effects will last about 1 to 3 minutes.    Please tell us if you have:    Sneezing     Itching    Hives     Swelling in the face    A hoarse voice    Breathing problems    Other new symptoms    Serious problems are rare.  They may include:    Irregular heartbeat     Seizures    Kidney failure              Tissue damage    Shock      Death    If you have any problems during the exam, we  will treat them right away.    When you get home    Call your hospital if you have any new symptoms in the next 2 days, like hives or swelling. (Phone numbers are at the bottom of this page.) Or call your family doctor.     If you have wheezing or trouble breathing, call 911.    Self-care  -Drink at least 4 extra glasses of water today.   This reduces the stress on your kidneys.  -Keep taking your regular medicines.    The contrast will pass out of your body in your  Urine(pee). This will happen in the next 24 hours. You  will not feel this. Your urine will not  change color.    If you have kidney problems or take metformin    Drink 4 to 8 large glasses of water for the next  2 days, if you are not on a fluid restriction.    ?If you take metformin (Glucophage or Glucovance) for diabetes, keep taking it.      ?Your kidney function tests are abnormal.  If you take Metformin, do not take it for 48 hours. Please go to your clinic for a blood test within 3 days after your exam before the restarting this medicine.     (Note to provider:please give patient prescription for lab tests.)    ?Special instructions: -    I have read and understand the above information.    Patient Sign  Here:______________________________________Date:________Time:______    Staff Sign Here:________________________________________Date:_______Time:______      Radiology Departments:     ?[facility]: [phone] ?[place]: [phone]     ?[place]: [phone] ?[place]:[phone]      ?[place]: [phone]  ?[place]: [phone]  ?[place]:[phone]    ?[facility]:[phone]  ?[facility]:[phone]

## 2019-11-20 NOTE — ED NOTES
DATE:  11/20/2019   TIME OF RECEIPT FROM LAB:  0515  LAB TEST:  Lactic acid  LAB VALUE:  2.6  RESULTS GIVEN WITH READ-BACK TO (Miguel Ángel):    TIME LAB VALUE REPORTED TO PROVIDER:   0517

## 2019-11-20 NOTE — ED PROVIDER NOTES
"  History     Chief Complaint   Patient presents with     Extremity Weakness     HPI  Gunnar Granados is a 69 year old man with history of T2DM, HTN and morbid obesity who presents by EMS with multiple concerns including several days of generalized weakness and fatigue that has made it difficult to walk. He also reports chronic swelling of his bilateral lower extremities for which he takes torsemide. (His last echocardiogram 8/15/19 revealed an EF of 65%.) He has an intermittent, non-productive cough without progressive shortness of breath. No fevers/chills. He also reports, \"blueberry-colored urine yesterday\" without burning/itching/frequency/urgency. He drinks 24 beers daily.    Allergies:  Allergies   Allergen Reactions     Food      Onions, peppers, olives      Trazodone      Other reaction(s): Dizziness       Problem List:    Patient Active Problem List    Diagnosis Date Noted     Cholecystitis 11/20/2019     Priority: Medium     Calculus of gallbladder without cholecystitis without obstruction 05/01/2019     Priority: Medium     ACP (advance care planning) 04/12/2017     Priority: Medium     Advance Care Planning 4/12/2017: ACP Review of Chart / Resources Provided:  Reviewed chart for advance care plan.  Gunnar Granados has been provided information and resources to begin or update their advance care plan.  Added by Ginger Small         Type 2 diabetes mellitus with diabetic neuropathy (H) 04/12/2017     Priority: Medium     Morbid obesity due to excess calories (H) 04/12/2017     Priority: Medium     Acquired hypothyroidism 04/12/2017     Priority: Medium     Benign essential hypertension 04/12/2017     Priority: Medium        Past Medical History:    Past Medical History:   Diagnosis Date     Alcohol abuse      Diabetes mellitus with neuropathy (H)      Gastric polyps      Gout      HTN (hypertension)      Hx of thyroid cancer      Neuropathy      Obesity      Osteoarthritis        Past Surgical History: "    Past Surgical History:   Procedure Laterality Date     basal cell carcinoma  2017     COLONOSCOPY - HIM SCAN  12/01/2012    Colonoscopy due to bleeding, no polyps 12-12     HERNIA REPAIR  2014       Family History:    Family History   Problem Relation Age of Onset     Unknown/Adopted Sister        Social History:  Marital Status:   [2]  Social History     Tobacco Use     Smoking status: Never Smoker     Smokeless tobacco: Never Used   Substance Use Topics     Alcohol use: Yes     Comment: beer daily      Drug use: No        Medications:    No current outpatient medications on file.        Review of Systems   Constitutional: Negative for appetite change.   HENT: Negative for congestion.    Eyes: Negative for redness.   Respiratory: Negative for shortness of breath.    Cardiovascular: Negative for chest pain.   Gastrointestinal: Negative for abdominal pain.   Genitourinary: Negative for difficulty urinating.   Musculoskeletal: Negative for arthralgias and neck stiffness.   Skin: Negative for color change.   Neurological: Negative for headaches.   Psychiatric/Behavioral: Negative for confusion.       Physical Exam   BP: 118/88  Pulse: 93  Heart Rate: 83  Temp: 98.3  F (36.8  C)  Resp: 20  SpO2: 95 %      Physical Exam    General Appearance: well-developed, well-nourished, alert & oriented, no apparent distress.    HEENT: atraumatic. Pupils equal, round & reactive to light, extraocular movements intact. Bilateral ear canals clear. Nose without rhinorrhea or epistaxis. Clear oropharynx. Moist mucous membranes.    Neck: normal inspection, non-tender, full & painless ROM, supple, no lymphadenopathy or nuchal rigidity. No jugular venous distension.    Cardiovascular: regular rate, rhythm, normal S1 & S2, no murmurs, rubs or gallops.    Respiratory: clear lung sounds with good air entry, no wheezes rales or rhonchi, no acute respiratory distress.    Gastrointestinal: normal inspection, normal bowel sounds, mild,  diffuse, abdominal pain without rebound or guarding, no masses or organomegaly.    Extremities: normal inspection, 2+/4+ pulses bilaterally, normal & painless ROM, non-tender, joints normal.    Neurologic: alert & oriented x 3, CN II - XII intact, 4/5 strength in proximal & distal musculature in all extremities, sensation intact and equal in all extremities. Normal speech, no aphasia or dysarthria. Coordination intact. DTRs symmetric. Normal steady gait. Negative Romberg test. Heel to shin normal. Negative Babinski test.      Skin: normal color, no skin rash.    ED Course     Patient updated regarding laboratory and imaging evaluation. He required an assist of two to stand and was visibly exhausted ambulating with assistance in the room. He would not be safe to return home. Patient discussed with Dr. Johnston who accepts the patient for admission.       Procedures           Results for orders placed or performed during the hospital encounter of 11/20/19 (from the past 24 hour(s))   XR Chest Port 1 View    Narrative    PROCEDURE: XR CHEST PORT 1 VW 11/20/2019 4:53 AM    HISTORY: cough    COMPARISONS: 4/11/2019.    TECHNIQUE: Single view.    FINDINGS: Heart and pulmonary vasculature are normal. No acute  infiltrate or effusion is.         Impression    IMPRESSION: No acute disease.    KYE ESPINOSA MD   CBC with platelets differential   Result Value Ref [place]    WBC 12.0 (H) 4.0 - 11.0 10e9/L    RBC Count 4.15 (L) 4.4 - 5.9 10e12/L    Hemoglobin 14.0 13.3 - 17.7 g/dL    Hematocrit 39.5 (L) 40.0 - 53.0 %    MCV 95 78 - 100 fl    MCH 33.7 (H) 26.5 - 33.0 pg    MCHC 35.4 31.5 - 36.5 g/dL    RDW 12.5 10.0 - 15.0 %    Platelet Count 142 (L) 150 - 450 10e9/L    Diff Method Automated Method     % Neutrophils 82.1 %    % Lymphocytes 4.9 %    % Monocytes 5.4 %    % Eosinophils 6.3 %    % Basophils 0.5 %    % Immature Granulocytes 0.8 %    Nucleated RBCs 0 0 /100    Absolute Neutrophil 9.9 (H) 1.6 - 8.3 10e9/L    Absolute  Lymphocytes 0.6 (L) 0.8 - 5.3 10e9/L    Absolute Monocytes 0.7 0.0 - 1.3 10e9/L    Absolute Eosinophils 0.8 (H) 0.0 - 0.7 10e9/L    Absolute Basophils 0.1 0.0 - 0.2 10e9/L    Abs Immature Granulocytes 0.1 0 - 0.4 10e9/L    Absolute Nucleated RBC 0.0    Comprehensive metabolic panel   Result Value Ref [place]    Sodium 131 (L) 133 - 144 mmol/L    Potassium 4.5 3.4 - 5.3 mmol/L    Chloride 93 (L) 94 - 109 mmol/L    Carbon Dioxide 29 20 - 32 mmol/L    Anion Gap 9 3 - 14 mmol/L    Glucose 114 (H) 70 - 99 mg/dL    Urea Nitrogen 36 (H) 7 - 30 mg/dL    Creatinine 1.88 (H) 0.66 - 1.25 mg/dL    GFR Estimate 36 (L) >60 mL/min/[1.73_m2]    GFR Estimate If Black 41 (L) >60 mL/min/[1.73_m2]    Calcium 8.4 (L) 8.5 - 10.1 mg/dL    Bilirubin Total 7.7 (H) 0.2 - 1.3 mg/dL    Albumin 3.0 (L) 3.4 - 5.0 g/dL    Protein Total 6.3 (L) 6.8 - 8.8 g/dL    Alkaline Phosphatase 83 40 - 150 U/L     (H) 0 - 70 U/L     (H) 0 - 45 U/L   Lactic acid whole blood   Result Value Ref [place]    Lactic Acid 2.6 (H) 0.7 - 2.0 mmol/L   Troponin I   Result Value Ref [place]    Troponin I ES <0.015 0.000 - 0.045 ug/L   Ammonia   Result Value Ref [place]    Ammonia <10 (L) 10 - 50 umol/L   Alcohol ethyl   Result Value Ref [place]    Ethanol g/dL <0.01 0.01 g/dL   Lactic acid whole blood   Result Value Ref [place]    Lactic Acid 1.9 0.7 - 2.0 mmol/L   UA reflex to Microscopic and Culture   Result Value Ref [place]    Color Urine Dark Yellow     Appearance Urine Slightly Cloudy     Glucose Urine Negative NEG^Negative mg/dL    Bilirubin Urine Small (A) NEG^Negative    Ketones Urine Negative NEG^Negative mg/dL    Specific Gravity Urine 1.012 1.003 - 1.035    Blood Urine Moderate (A) NEG^Negative    pH Urine 6.0 4.7 - 8.0 pH    Protein Albumin Urine 10 (A) NEG^Negative mg/dL    Urobilinogen mg/dL 4.0 (H) 0.0 - 2.0 mg/dL    Nitrite Urine Negative NEG^Negative    Leukocyte Esterase Urine Negative NEG^Negative    Source Midstream Urine     RBC Urine 1 0 - 2 /HPF     WBC Urine 8 (H) 0 - 5 /HPF    WBC Clumps Present (A) NEG^Negative /HPF    Bacteria Urine None (A) NEG^Negative /HPF    Mucous Urine Present (A) NEG^Negative /LPF    Hyaline Casts 1 (A) OTO2^O - 2 /LPF   Urine Drugs of Abuse Screen Panel 13   Result Value Ref [place]    Cannabinoids (76-dcy-5-carboxy-9-THC) Not Detected NDET^Not Detected ng/mL    Phencyclidine (Phencyclidine) Not Detected NDET^Not Detected ng/mL    Cocaine (Benzoylecgonine) Not Detected NDET^Not Detected ng/mL    Methamphetamine (d-Methamphetamine) Not Detected NDET^Not Detected ng/mL    Opiates (Morphine) Not Detected NDET^Not Detected ng/mL    Amphetamine (d-Amphetamine) Not Detected NDET^Not Detected ng/mL    Benzodiazepines (Nordiazepam) Not Detected NDET^Not Detected ng/mL    Tricyclic Antidepressants (Desipramine) Not Detected NDET^Not Detected ng/mL    Methadone (Methadone) Not Detected NDET^Not Detected ng/mL    Barbiturates (Butalbital) Not Detected NDET^Not Detected ng/mL    Oxycodone (Oxycodone) Not Detected NDET^Not Detected ng/mL    Propoxyphene (Norpropoxyphene) Not Detected NDET^Not Detected ng/mL    Buprenorphine (Buprenorphine) Not Detected NDET^Not Detected ng/mL   CT Abdomen Pelvis w Contrast    Narrative    EXAMINATION: CT ABDOMEN PELVIS W CONTRAST, 11/20/2019 9:08 AM    TECHNIQUE:  Helical CT images from the lung bases through the  symphysis pubis were obtained  with IV contrast. Contrast dose: ISOVUE  300  100ML    COMPARISON: none    HISTORY: Acute abdominal pain; transaminitis    FINDINGS:    There is dependent atelectasis at the lung bases. A moderate hiatal  hernia is noted    The liver is free of masses or biliary ductal enlargement. calcified  gallstones are seen. One of the calcified gallstones appears impacted  in the gallbladder neck. No stones are seen in the common bile duct.    The the spleen and pancreas appear normal.    The adrenal glands are normal.    The right and left kidneys are free of masses or  hydronephrosis.    The periaortic lymph nodes are normal in caliber.    No intraperitoneal masses or inflammatory changes are noted.    In the pelvis the bladder and rectum appear normal.    Degenerative changes are present in the thoracic spine and both hips      Impression    IMPRESSION: Cholelithiasis with multiple small calcified gallstones.  One is located in the gallbladder neck. There is no biliary ductal  enlargement.     STEPH LLOYD MD   Glucose by meter   Result Value Ref [place]    Glucose 117 (H) 70 - 99 mg/dL       Medications   0.9% sodium chloride BOLUS (0 mLs Intravenous Stopped 11/20/19 0923)     Followed by   sodium chloride 0.9% infusion (1,000 mLs Intravenous New Bag 11/20/19 0924)   lidocaine 1 % 0.1-1 mL (has no administration in time range)   lidocaine (LMX4) kit (has no administration in time range)   sodium chloride (PF) 0.9% PF flush 3 mL (has no administration in time range)   sodium chloride (PF) 0.9% PF flush 3 mL (has no administration in time range)   naloxone (NARCAN) injection 0.1-0.4 mg (has no administration in time range)   melatonin tablet 1 mg (has no administration in time range)   morphine (PF) injection 1 mg (has no administration in time range)   ondansetron (ZOFRAN-ODT) ODT tab 4 mg (has no administration in time range)     Or   ondansetron (ZOFRAN) injection 4 mg (has no administration in time range)   glucose gel 15-30 g (has no administration in time range)     Or   dextrose 50 % injection 25-50 mL (has no administration in time range)     Or   glucagon injection 1 mg (has no administration in time range)   insulin aspart (NovoLOG) inj (RAPID ACTING) (has no administration in time range)   dextrose 5% and 0.9% NaCl infusion ( Intravenous New Bag 11/20/19 5874)   sodium chloride (PF) 0.9% PF flush 60 mL (60 mLs Intravenous Given 11/20/19 0909)   iopamidol (ISOVUE-300) IV solution 61% 100 mL (100 mLs Intravenous Given 11/20/19 0909)   piperacillin-tazobactam (ZOSYN)  infusion 3.375 g (3.375 g Intravenous New Bag 11/20/19 8671)       Assessments & Plan (with Medical Decision Making)   The patient is a 69 year old man with generalized weakness, transaminitis and alcohol dependence.    1) Generalized weakness - no anemia. Mild leukocytosis without fever. No acute renal abnormality. Mildly elevated lactic acid decreased without treatment. Negative troponin and no acute ST changes on EKG. Normal CXR. No acute intracranial pathology or cervical fracture on CT. Patient will be admitted for further evaluation and treatment.    2) Transaminitis - possibly secondary to alcohol dependence. CT with cholelithiasis without evidence of biliary ductal enlargement or other acute intra-abdominal pathology.      I have reviewed the nursing notes.    I have reviewed the findings, diagnosis, plan and need for follow up with the patient.    Current Discharge Medication List          Final diagnoses:   Transaminitis   Generalized weakness   Alcohol dependence with unspecified alcohol-induced disorder (H)       11/20/2019   [facility]     Yves Del Rosario MD  11/20/19 2729

## 2019-11-20 NOTE — ED NOTES
Reported decreased BP to Dr. Del Rosario. No new orders. Pt alert awake, states feels tired. Placed on cardiac monitor. Blood cx's drawn.

## 2019-11-20 NOTE — ED NOTES
"Pt to the ED via [facility] with report of \"just feeling weak in my legs and arms\".  Pt assisted with 5 staff to transfer to ED cart.  BLEs are edematous and blisters to RLE.  Pt states they are always swollen.  Pt is a diabetic and reports checks twice per day and always in 100s.  Occasional cough per pt.  States he had blueberry colored urine yesterday. Assessment is complete and call light is given.  Monitors on pt.  Call light is given.  Updated MD on pt condition.   "

## 2019-11-20 NOTE — ED NOTES
Walked pt around exam room with walker and followed by w/c. Pt dyspneic with ambulation. States he doesn't think he will be able to climb the steps into the house. MD aware

## 2019-11-20 NOTE — PLAN OF CARE
"PTA med list reviewed with patient. Spouse had a print out that she made of all the meds Juan F takes along with the times taken. I am waiting for another list from [facility] to compare with- but until then, there were only very minor changes made.     Allopurinol he uses only as needed for a gout flare up.     He does not use flonase and it was  so I deleted it.     He stated he takes both insulins in the afternoon.     Added ciclopirox cream. Updated appropriate strengths of vitamins. Deleted \"Statin Not Prescribed\" entry. Deleted unknown drug with no information available.     Radha Meeks on 2019 at 3:11 PM    Received med list from [facility]- all matched except for two and I called and verified them (quetiapine and metformin didn't populate on the list).    Radha Meeks on 2019 at 3:43 PM        "

## 2019-11-20 NOTE — ED NOTES
Dania from CT states patient will go to CT at 0900 after receiving IV bolus due to lab values. Amada, charge RN, informed.

## 2019-11-20 NOTE — ED NOTES
Face to face report given with opportunity to observe patient.    Report given to CATHY Jenkins RN   11/20/2019  7:25 AM

## 2019-11-20 NOTE — PLAN OF CARE
[facility] Inpatient Admission Note:    Patient admitted to 3212/3212-1 at approximately 1340 via cart accompanied by spouse from emergency room . Report received from CATHY Pike  in SBAR format at 1330 via telephone. Patient transferred to bed via slide board.. Patient is alert and oriented X 3, reports pain; rates at 8 on 0-10 scale.  Patient oriented to room, unit, hourly rounding, and plan of care. Explained admission packet and patient handbook with patient bill of rights brochure. Will continue to monitor and document as needed.     Inpatient Nursing criteria listed below was met:    Health care directives status obtained and documented: Yes    Care Everywhere authorization obtained Yes    MRSA swab completed for patient 65 years and older: Yes    Patient identifies a surrogate decision maker: Yes If yes, who:Yazmin - Wife Contact Information:[phone]     If initial lactic acid >2.0, repeat lactic acid drawn within one hour of arrival to unit: NA.   Vaccination assessment and education completed: Yes   Vaccinations received prior to admission: Pneumovax no  Influenza(seasonal)  NO   Vaccination(s) ordered: patient declines    Clergy visit ordered if patient requests: N/A    Skin issues/needs documented: N/A    Isolation Patient: no   Fall Prevention Yes: Care plan updated, education given and documented, sticker and magnet in place: Yes    Care Plan initiated: Yes    Education Documented (including assessment): Yes    Patient has discharge needs : Yes If yes, please explain: Pt has trouble getting around home.

## 2019-11-21 ENCOUNTER — TRANSFERRED RECORDS (OUTPATIENT)
Dept: HEALTH INFORMATION MANAGEMENT | Facility: CLINIC | Age: 69
End: 2019-11-21

## 2019-11-21 NOTE — H&P
[facility]    History and Physical - Hospitalist Service       Date of Admission:  11/20/2019    Assessment & Plan   Gunnar Granados is a 69 year old adult admitted on 11/20/2019.    Cholecystitis: this was discovered after review of the CT report. The patient's case was discussed with the [facility] accepting physician (ER Provider Dr Ortiz)    The case was discussed with [facility], Dr López, who agreed with the transfer of the patient, noting that an ERCP should proceed a cholecystectomy     Diet: NPO for Medical/Clinical Reasons Except for: Meds    DVT Prophylaxis: Pneumatic Compression Devices  Lora Catheter: not present  Code Status: full    Disposition Plan   Expected discharge: Today, recommended to [facility] in [facility]    Entered: Kimo Boswell DO 11/20/2019, 6:00 PM     The patient's care was discussed with the Patient.    Kimo Boswell,   [facility]    ______________________________________________________________________    Chief Complaint      Right abdominal pain and weakness starting 2 days ago    History is obtained from the patient    History of Present Illness   Gunnar Granaods is a 69 year old adult who  Has a history of cholecystitis without intervention    Review of Systems       Constitutional: No fever or chills, some generalized weakness, no unintentional weight change, diminished appetite since abdominal pain onset  Ears, Nose & Throat: no sore throat, no nasal drainage, no congestion. No ear pain, no ear drainage, no particular change in hearing  Eyes: no particular change in vision, no redness, no drainage  Cardiovascular: No chest pain at rest, no chest pain with familiar activities.  Pulmonary: No cough, no particular change in work of breathing, no particular change in shortness of breath with position changes  Gastrointestinal: LUQ abdominal pain, no nausea, no vomiting, no diarrhea. No particular change in bowel movement pattern, no black  stools, no bloody stools  Genitourinary: No particular change in incontinence, no pain with urination, no particular change in stream, no particular change in amount urinated with urge, no discharge  Skin: No particular change in bruising, no rashes  Musculoskeletal: developing weakness, no particular change in muscle development  Neurological: no numbness and tingling, no headache, no particular change in balance, no dizziness  Psychologic: No particular change in depression and/or anxiety  Endocrine: No particular change in heat or cold intolerance       Past Medical History    I have reviewed this patient's medical history and updated it with pertinent information if needed.   Past Medical History:   Diagnosis Date     Alcohol abuse      Diabetes mellitus with neuropathy (H)      Gastric polyps      Gout      HTN (hypertension)      Hx of thyroid cancer      Neuropathy      Obesity      Osteoarthritis        Past Surgical History   I have reviewed this patient's surgical history and updated it with pertinent information if needed.  Past Surgical History:   Procedure Laterality Date     basal cell carcinoma  2017     COLONOSCOPY - HIM SCAN  12/01/2012    Colonoscopy due to bleeding, no polyps 12-12     HERNIA REPAIR  2014       Social History   I have reviewed this patient's social history and updated it with pertinent information if needed.  Social History     Tobacco Use     Smoking status: Never Smoker     Smokeless tobacco: Never Used   Substance Use Topics     Alcohol use: Yes     Comment: beer daily      Drug use: No       Family History   I have reviewed this patient's family history and updated it with pertinent information if needed.   Family History   Problem Relation Age of Onset     Unknown/Adopted Sister         Prior to Admission Medications   Prior to Admission Medications   Prescriptions Last Dose Informant Patient Reported? Taking?   B-D U/F insulin pen needle 11/19/2019 at Unknown time Self No  Yes   Sig: by Device route 2 times daily   Cholecalciferol (VITAMIN D3) 50 MCG (2000 UT) CAPS 11/19/2019 at am  Yes Yes   Sig: Take 1 capsule by mouth daily   FEROSUL 325 (65 Fe) MG tablet 11/19/2019 at am Self Yes Yes   Sig: TAKE 1 TABLET BY MOUTH ONCE DAILY  BEST IF TAKEN WITH SOME FOOD   Multiple Vitamin (MULTI VITAMIN PO) 11/19/2019 at am Self Yes Yes   Sig: Take 1 tablet by mouth daily (has iron in it)   NOVOTWIST 32G X 5 MM insulin pen needle Unknown at Unknown time Self No No   Sig: FOR USE WITH VICTOZA PEN   QUEtiapine (SEROQUEL) 50 MG tablet 11/19/2019 at hs Self No Yes   Sig: Take 1/2 to 1 tablet by mouth QHS   VICTOZA PEN 18 MG/3ML soln 11/19/2019 at Unknown time Self No Yes   Sig: INJECT 1.2 MG SUBCUTANEOUS DAILY 18MG/3ML PREFILLED PEN   allopurinol (ZYLOPRIM) 300 MG tablet 11/19/2019 at Unknown time Self Yes Yes   Sig: Take 300 mg by mouth Take one tablet daily    ascorbic acid (VITAMIN C) 500 MG tablet 11/19/2019 at am Self Yes Yes   Sig: Take 500 mg by mouth (With damaso hips)   blood glucose (ACCU-CHEK SMARTVIEW) test strip 11/19/2019 at Unknown time Self No Yes   Sig: USE TO TEST BLOOD SUGARS 2 TIMES DAILY OR AS DIRECTED (ACCU CHECK SMART VIEW)   blood glucose monitoring (ACCU-CHEK FASTCLIX) lancets 11/19/2019 at Unknown time Self No Yes   Sig: USE TO TEST BLOOD SUGAR TWICE A DAY   ciclopirox (LOPROX) 0.77 % cream Unknown at Unknown time  Yes No   Sig: Apply topically daily to toenails.   gabapentin (NEURONTIN) 300 MG capsule 11/19/2019 at hs Self No Yes   Sig: TAKE 1 CAPSULE (300 MG) BY MOUTH 3 TIMES DAILY   horse chestnut 300 MG CAPS 11/19/2019 at pm Self Yes Yes   Sig: Take 300 mg by mouth 2 times daily    insulin glargine (BASAGLAR KWIKPEN) 100 UNIT/ML pen 11/19/2019 at Unknown time Self No Yes   Sig: INJECT 18 UNITS SUBCUTANEOUS AT BEDTIME.   levothyroxine (SYNTHROID/LEVOTHROID) 300 MCG tablet 11/19/2019 at am Self No Yes   Sig: Take 1 tablet (300 mcg) by mouth daily   metFORMIN (GLUCOPHAGE) 1000  "MG tablet 11/19/2019 at pm Self No Yes   Sig: TAKE 1 TABLET (1,000 MG) BY MOUTH 2 TIMES DAILY (WITH MEALS)   metoprolol succinate ER (TOPROL-XL) 100 MG 24 hr tablet 11/19/2019 at am Self No Yes   Sig: Take 0.5 tablets (50 mg) by mouth daily   naproxen (NAPROSYN) 500 MG tablet 11/19/2019 at hs Self No Yes   Sig: Take 1 tablet (500 mg) by mouth 2 times daily (with meals)   pantoprazole (PROTONIX) 40 MG EC tablet 11/19/2019 at am Self No Yes   Sig: TAKE 1 TABLET BY MOUTH EVERY DAY 30-60 MIN BEFORE A MEAL   potassium chloride ER (K-DUR/KLOR-CON M) 20 MEQ CR tablet 11/19/2019 at pm Self No Yes   Sig: TAKE 2 TABLETS (40 MEQ) BY MOUTH 2 TIMES DAILY   tamsulosin (FLOMAX) 0.4 MG capsule 11/19/2019 at am Self Yes Yes   Sig: TAKE ONE CAPSULE BY MOUTH EVERY DAY. SWALLOW WHOLE. DO NOT CRUSH OR CHEW   terbinafine (LAMISIL) 1 % external cream Past Week at Unknown time Self No Yes   Sig: Apply topically 2 times daily   torsemide (DEMADEX) 20 MG tablet 11/19/2019 at hs Self No Yes   Sig: Take 2 tablets (40 mg) by mouth 2 times daily      Facility-Administered Medications: None     Allergies   Allergies   Allergen Reactions     Food      Onions, peppers, olives      Trazodone      Other reaction(s): Dizziness       Physical Exam   Vital Signs: Temp: 99.7  F (37.6  C) Temp src: Tympanic BP: 131/65 Pulse: 95 Heart Rate: 80 Resp: 20 SpO2: 92 % O2 Device: None (Room air)    Weight: 0 lbs 0 oz     Vital signs:  Temp: 99.7  F (37.6  C) Temp src: Tympanic BP: 131/65 Pulse: 95 Heart Rate: 80 Resp: 20 SpO2: 92 % O2 Device: None (Room air)        Estimated body mass index is 44.33 kg/m  as calculated from the following:    Height as of 11/5/19: 1.854 m (6' 1\").    Weight as of 9/4/19: 152.4 kg (336 lb).      General: No distress, interactive  Head: normocephalic, no obvious trauma  Eyes: Gaze directed normally, sclera clear, no discharge, no abnormal ocular movements  Ears: Normal appearing age-related external ears, no discharge, stable " hearing acuity loss  Nose: Normal age-related appearance  Mouth: Normal appearing oral mucosa, Gums and throat appear age-related normal  Neck: Normal age-related appearance, age-related range of motion, supple, no adenopathy  Pulmonary: Normal work of breathing, no expiratory delay, no coarseness, no wheezing  Cardiovascular: Distant heart sounds, regular rhythm   Abdomen: No obvious distention, soft, bowel sounds present with normal frequency and pitch  Rectal: Deferred  Back: Age-related normal  Skin: Age-related normal, no rashes  Extremities: Not tender, no lower extremity edema. Moving upper and lower extremities  Neurological: Grossly in tact  Psychiatric: Mood is stable, appropriately interactive       Data   Data reviewed today: I reviewed all medications, new labs and imaging results over the last 24 hours. CT report was reviewed which show a stone in the gall bladder neck

## 2019-11-21 NOTE — PLAN OF CARE
Patient was transferred to [facility]  at approximately 7:19 PM via [facility] Ambulance.   Patient status stable. Patient's most recent vitals: Blood pressure 131/65, pulse 95, temperature 99.7  F (37.6  C), temperature source Tympanic, resp. rate 20, SpO2 92 %.  Pt transferred with intact IV.Yes  Intact IV site at Right lower forearm.  Pt transferred with oxygen. .No  Belongings were sent with Patient  AVS and pertinent records sent with patient. .Yes   Report given to Vera EMT in SBAR format.     Report called to [facility] to CATHY Paul @ [phone].

## 2019-11-21 NOTE — DISCHARGE SUMMARY
"[facility]  Hospitalist Discharge Summary       Date of Admission:  11/20/2019  Date of Discharge:  11/20/2019  Discharging Provider: Kimo Boswell DO      Discharge Diagnoses   cholecystitis    Follow-ups Needed After Discharge      The patient is being transferred to [facility] for higher level of care: needs an ERCP, not available at [facility]    Unresulted Labs Ordered in the Past 30 Days of this Admission     Date and Time Order Name Status Description    11/20/2019 1422 Active MRSA Surveillance Culture In process     11/20/2019 0735 Blood culture In process     11/20/2019 0716 Blood culture In process            Discharge Disposition   Transferred to [facility]  Condition at discharge: Stable    Hospital Course      The patient complained of LUQ pain and CT scanning showed a gallstone in the neck of the gallbladder. Arrangements were made to transfer to a higher level of care for ERCP    Consultations This Hospital Stay    the case was discussed with [facility] General Surgeon, Dr López  Code Status   Full Code    Time Spent on this Encounter   I, Kimo Boswell DO, personally saw the patient today and spent greater than 30 minutes discharging this patient.       Kimo Boswell DO  [facility]  ______________________________________________________________________    Physical Exam   Vital Signs: Temp: 99.7  F (37.6  C) Temp src: Tympanic BP: 131/65 Pulse: 95 Heart Rate: 80 Resp: 20 SpO2: 92 % O2 Device: None (Room air)    Weight: 0 lbs 0 oz     Vital signs:  Temp: 99.7  F (37.6  C) Temp src: Tympanic BP: 131/65 Pulse: 95 Heart Rate: 80 Resp: 20 SpO2: 92 % O2 Device: None (Room air)        Estimated body mass index is 44.33 kg/m  as calculated from the following:    Height as of 11/5/19: 1.854 m (6' 1\").    Weight as of 9/4/19: 152.4 kg (336 lb).      General: No distress, interactive  Head: normocephalic, no obvious trauma  Eyes: Gaze directed " normally, sclera clear, no discharge, no abnormal ocular movements  Ears: Normal appearing age-related external ears, no discharge, stable hearing acuity loss  Nose: Normal age-related appearance  Mouth: Normal appearing oral mucosa, Gums and throat appear age-related normal  Neck: Normal age-related appearance, age-related range of motion, supple, no adenopathy  Pulmonary: Normal work of breathing, no expiratory delay, no coarseness, no wheezing  Cardiovascular: Distant heart sounds, regular rhythm   Abdomen: No obvious distention, soft, bowel sounds present with normal frequency and pitch  Rectal: Deferred  Back: Age-related normal  Skin: Age-related normal, no rashes  Extremities: Not tender, no lower extremity edema. Moving upper and lower extremities  Neurological: Grossly in tact  Psychiatric: Mood is stable, appropriately interactive        Primary Care Physician   You Bethea    Discharge Orders      Full Code     Current Facility-Administered Medications   Medication     dextrose 5% and 0.9% NaCl infusion     glucose gel 15-30 g    Or     dextrose 50 % injection 25-50 mL    Or     glucagon injection 1 mg     insulin aspart (NovoLOG) inj (RAPID ACTING)     lidocaine (LMX4) kit     lidocaine 1 % 0.1-1 mL     melatonin tablet 1 mg     morphine (PF) injection 1 mg     naloxone (NARCAN) injection 0.1-0.4 mg     ondansetron (ZOFRAN-ODT) ODT tab 4 mg    Or     ondansetron (ZOFRAN) injection 4 mg     piperacillin-tazobactam (ZOSYN) infusion 3.375 g     sodium chloride (PF) 0.9% PF flush 3 mL     sodium chloride (PF) 0.9% PF flush 3 mL     sodium chloride 0.9% infusion       Significant Results and Procedures   CT scan showing gallstone in the neck of the gallbladder       Medications Prior to Admission   Medication Sig Dispense Refill Last Dose     allopurinol (ZYLOPRIM) 300 MG tablet Take 300 mg by mouth Take one tablet daily    11/19/2019 at Unknown time     ascorbic acid (VITAMIN C) 500 MG tablet Take  500 mg by mouth (With damaso hips)   11/19/2019 at am     B-D U/F insulin pen needle by Device route 2 times daily 100 each 11 11/19/2019 at Unknown time     blood glucose (ACCU-CHEK SMARTVIEW) test strip USE TO TEST BLOOD SUGARS 2 TIMES DAILY OR AS DIRECTED (ACCU CHECK SMART VIEW) 100 strip 11 11/19/2019 at Unknown time     blood glucose monitoring (ACCU-CHEK FASTCLIX) lancets USE TO TEST BLOOD SUGAR TWICE A  each 2 11/19/2019 at Unknown time     Cholecalciferol (VITAMIN D3) 50 MCG (2000 UT) CAPS Take 1 capsule by mouth daily   11/19/2019 at am     FEROSUL 325 (65 Fe) MG tablet TAKE 1 TABLET BY MOUTH ONCE DAILY  BEST IF TAKEN WITH SOME FOOD  3 11/19/2019 at am     gabapentin (NEURONTIN) 300 MG capsule TAKE 1 CAPSULE (300 MG) BY MOUTH 3 TIMES DAILY 270 capsule 3 11/19/2019 at hs     horse chestnut 300 MG CAPS Take 300 mg by mouth 2 times daily    11/19/2019 at pm     insulin glargine (BASAGLAR KWIKPEN) 100 UNIT/ML pen INJECT 18 UNITS SUBCUTANEOUS AT BEDTIME. 30 mL 0 11/19/2019 at Unknown time     levothyroxine (SYNTHROID/LEVOTHROID) 300 MCG tablet Take 1 tablet (300 mcg) by mouth daily 90 tablet 3 11/19/2019 at am     metFORMIN (GLUCOPHAGE) 1000 MG tablet TAKE 1 TABLET (1,000 MG) BY MOUTH 2 TIMES DAILY (WITH MEALS) 180 tablet 0 11/19/2019 at pm     metoprolol succinate ER (TOPROL-XL) 100 MG 24 hr tablet Take 0.5 tablets (50 mg) by mouth daily 45 tablet 3 11/19/2019 at am     Multiple Vitamin (MULTI VITAMIN PO) Take 1 tablet by mouth daily (has iron in it)   11/19/2019 at am     naproxen (NAPROSYN) 500 MG tablet Take 1 tablet (500 mg) by mouth 2 times daily (with meals) 180 tablet 3 11/19/2019 at hs     pantoprazole (PROTONIX) 40 MG EC tablet TAKE 1 TABLET BY MOUTH EVERY DAY 30-60 MIN BEFORE A MEAL 90 tablet 1 11/19/2019 at am     potassium chloride ER (K-DUR/KLOR-CON M) 20 MEQ CR tablet TAKE 2 TABLETS (40 MEQ) BY MOUTH 2 TIMES DAILY 360 tablet 0 11/19/2019 at pm     QUEtiapine (SEROQUEL) 50 MG tablet Take 1/2  to 1 tablet by mouth QHS 90 tablet 3 11/19/2019 at hs     tamsulosin (FLOMAX) 0.4 MG capsule TAKE ONE CAPSULE BY MOUTH EVERY DAY. SWALLOW WHOLE. DO NOT CRUSH OR CHEW   11/19/2019 at am     terbinafine (LAMISIL) 1 % external cream Apply topically 2 times daily 42 g 3 Past Week at Unknown time     torsemide (DEMADEX) 20 MG tablet Take 2 tablets (40 mg) by mouth 2 times daily 360 tablet 3 11/19/2019 at hs     VICTOZA PEN 18 MG/3ML soln INJECT 1.2 MG SUBCUTANEOUS DAILY 18MG/3ML PREFILLED PEN 18 mL 0 11/19/2019 at Unknown time     ciclopirox (LOPROX) 0.77 % cream Apply topically daily to toenails.   Unknown at Unknown time     NOVOTWIST 32G X 5 MM insulin pen needle FOR USE WITH VICTOZA  each 1 Unknown at Unknown time         Allergies   Allergies   Allergen Reactions     Food      Onions, peppers, olives      Trazodone      Other reaction(s): Dizziness

## 2019-11-21 NOTE — PLAN OF CARE
Critical lab values called to Beverly Steiner RN.   1 bottle of 2 - First Set   Gram Negative Rods  Notified @ 4437    1 bottle of 2 - Second Set    Gram Negative Rods Notified @ 7189

## 2019-11-22 LAB
BACTERIA SPEC CULT: ABNORMAL
BACTERIA SPEC CULT: NORMAL
SPECIMEN SOURCE: ABNORMAL
SPECIMEN SOURCE: NORMAL

## 2019-11-22 NOTE — UTILIZATION REVIEW
Admission Status; Secondary Review Determination       As part of the [facility] Utilization review plan, a self-audit is done on Medicare inpatient admission with less than 2 midnights stay. The 2014 IPPS Final Rule allows outpatient billing in the event that a hospital determines that an inpatient admission was not medically necessary under utilization review process.          (x) Outpatient status would be Appropriate- Short Stay- Post discharge review.     RATIONALE FOR DETERMINATION   Gunnar Granados is a 69 year old adult admitted on 11/20/2019. Cholecystitis: this was discovered after review of the CT report. Plan for transfer to [facility] for ERCP then cholecystectomy. WBC 12. IV antibiotics x1.  No overnight stay. Was inpatient for <4hrs after the inpatient order.  This account and medical record number for a Medicare beneficiary was an inpatient short stay (<2 midnights) and didn't meet medical necessity for inpatient admission. We recommend billing outpatient services based on the severity of illness, intensity of service provided and the inpatient length of stay.  Please contact me within one week of receiving this letter only if you disagree with this determination. If you concur, no further action is needed.          The information on this document is developed by the utilization review team in order for the business office to ensure compliance.  This only denotes the appropriateness of proper admission status and does not reflect the quality of care rendered.         The definitions of Inpatient Status and Observation Status used in making the determination above are those provided in the CMS Coverage Manual, Chapter 1 and Chapter 6, section 70.4.      Sincerely,       KISHAN ZUNIGA MD    System Medical Director  Utilization  Management  [facility].

## 2019-11-23 ENCOUNTER — TRANSFERRED RECORDS (OUTPATIENT)
Dept: HEALTH INFORMATION MANAGEMENT | Facility: CLINIC | Age: 69
End: 2019-11-23

## 2019-11-23 LAB
BACTERIA SPEC CULT: ABNORMAL
SPECIMEN SOURCE: ABNORMAL

## 2019-11-25 ENCOUNTER — TELEPHONE (OUTPATIENT)
Dept: INTERNAL MEDICINE | Facility: OTHER | Age: 69
End: 2019-11-25

## 2019-11-25 NOTE — TELEPHONE ENCOUNTER
You Bethea, DO  You 3 hours ago (11:19 AM)      I have no privileges at [facility].  It is not my role to tell another provider whether or not a patient can be discharged as it is their license that is operating under the [facility] umbrella. If I demanded he be discharged without having taking care of him during this hospitalization it may also lead to me being reprimanded.    Routing comment

## 2019-11-25 NOTE — TELEPHONE ENCOUNTER
Pt was admitted to [facility] on 11/20/19 and would like to be discharged but provider on call at [facility] does not feel pt is ready to be discharged. Pt would like a call back in regards to this. He feels he is ready to go home and is wondering if PCP can call hospital for him. Please return pt call at [phone].

## 2019-11-27 ENCOUNTER — TELEPHONE (OUTPATIENT)
Dept: INTERNAL MEDICINE | Facility: OTHER | Age: 69
End: 2019-11-27

## 2019-11-27 NOTE — TELEPHONE ENCOUNTER
8:46 AM    Reason for Call: Phone Call    Description: Aniyah from [facility] called and states that Gunnar was discharged yesterday and looking for skilled nursing and therapy, needs verbal orders    Was an appointment offered for this call? No  If yes : Appointment type              Date    Preferred method for responding to this message: Telephone Call  What is your phone number ?[phone]    If we cannot reach you directly, may we leave a detailed response at the number you provided? Yes    Can this message wait until your PCP/provider returns, if available today? Not applicable, pcp is in    [facility]

## 2019-11-29 ENCOUNTER — MEDICAL CORRESPONDENCE (OUTPATIENT)
Dept: HEALTH INFORMATION MANAGEMENT | Facility: CLINIC | Age: 69
End: 2019-11-29

## 2019-11-29 ENCOUNTER — TRANSFERRED RECORDS (OUTPATIENT)
Dept: HEALTH INFORMATION MANAGEMENT | Facility: CLINIC | Age: 69
End: 2019-11-29

## 2019-12-02 ENCOUNTER — PATIENT OUTREACH (OUTPATIENT)
Dept: FAMILY MEDICINE | Facility: OTHER | Age: 69
End: 2019-12-02

## 2019-12-02 NOTE — PROGRESS NOTES
Clinic Care Coordination Contact  Care Team Conversations    CC called and left message for patient and wife.  CC just left message that CC is here if they are needing anything to just give me a call and left my phone number.  Looks as if [facility] called about verbal orders for Home Care after hospital admission.  Clarice Fragoso, RN  Care Coordination

## 2020-01-06 ENCOUNTER — TELEPHONE (OUTPATIENT)
Dept: INTERNAL MEDICINE | Facility: OTHER | Age: 70
End: 2020-01-06

## 2020-01-06 DIAGNOSIS — E87.6 HYPOKALEMIA: ICD-10-CM

## 2020-01-06 RX ORDER — POTASSIUM CHLORIDE 1500 MG/1
TABLET, EXTENDED RELEASE ORAL
Qty: 360 TABLET | Refills: 3 | Status: SHIPPED | OUTPATIENT
Start: 2020-01-06 | End: 2021-03-04

## 2020-01-28 DIAGNOSIS — Z79.4 TYPE 2 DIABETES MELLITUS WITH HYPERGLYCEMIA, WITH LONG-TERM CURRENT USE OF INSULIN (H): ICD-10-CM

## 2020-01-28 DIAGNOSIS — E11.65 TYPE 2 DIABETES MELLITUS WITH HYPERGLYCEMIA, WITH LONG-TERM CURRENT USE OF INSULIN (H): ICD-10-CM

## 2020-01-28 NOTE — TELEPHONE ENCOUNTER
VICTOZA PEN 18 MG/3ML soln  Last visit date with prescribing provider: 9-4-2019  Last refill date: 7-5-2019  Quantity: 18 ml, Refills: 0    Susanna Foley LPN      Next 5 appointments (look out 90 days)    Feb 05, 2020  1:30 PM CST  (Arrive by 1:15 PM)  Return Visit with [place] DPM  [facility] ([facility] ) [address] [facility] [id number] [phone]

## 2020-01-30 ENCOUNTER — MYC MEDICAL ADVICE (OUTPATIENT)
Dept: INTERNAL MEDICINE | Facility: OTHER | Age: 70
End: 2020-01-30

## 2020-01-30 DIAGNOSIS — Z79.4 TYPE 2 DIABETES MELLITUS WITH HYPERGLYCEMIA, WITH LONG-TERM CURRENT USE OF INSULIN (H): ICD-10-CM

## 2020-01-30 DIAGNOSIS — E11.65 TYPE 2 DIABETES MELLITUS WITH HYPERGLYCEMIA, WITH LONG-TERM CURRENT USE OF INSULIN (H): ICD-10-CM

## 2020-01-30 RX ORDER — LIRAGLUTIDE 6 MG/ML
INJECTION SUBCUTANEOUS
Qty: 18 ML | Refills: 3 | Status: SHIPPED | OUTPATIENT
Start: 2020-01-30 | End: 2020-11-05

## 2020-01-31 RX ORDER — LIRAGLUTIDE 6 MG/ML
INJECTION SUBCUTANEOUS
Qty: 18 ML | Refills: 0 | OUTPATIENT
Start: 2020-01-31

## 2020-02-05 ENCOUNTER — OFFICE VISIT (OUTPATIENT)
Dept: PODIATRY | Facility: OTHER | Age: 70
End: 2020-02-05
Attending: PODIATRIST
Payer: MEDICARE

## 2020-02-05 VITALS
DIASTOLIC BLOOD PRESSURE: 76 MMHG | SYSTOLIC BLOOD PRESSURE: 148 MMHG | OXYGEN SATURATION: 93 % | TEMPERATURE: 98.1 F | HEART RATE: 93 BPM | RESPIRATION RATE: 24 BRPM

## 2020-02-05 DIAGNOSIS — L84 CALLUS OF FOOT: ICD-10-CM

## 2020-02-05 DIAGNOSIS — M21.969 LOSS OF PROTECTIVE SENSATION OF SKIN OF DEFORMED FOOT: ICD-10-CM

## 2020-02-05 DIAGNOSIS — E11.42 DIABETIC POLYNEUROPATHY ASSOCIATED WITH TYPE 2 DIABETES MELLITUS (H): ICD-10-CM

## 2020-02-05 DIAGNOSIS — Z79.4 DIABETES MELLITUS TYPE 2, INSULIN DEPENDENT (H): ICD-10-CM

## 2020-02-05 DIAGNOSIS — R20.8 LOSS OF PROTECTIVE SENSATION OF SKIN OF DEFORMED FOOT: ICD-10-CM

## 2020-02-05 DIAGNOSIS — L60.3 ONYCHODYSTROPHY: Primary | ICD-10-CM

## 2020-02-05 DIAGNOSIS — B35.3 TINEA PEDIS OF BOTH FEET: ICD-10-CM

## 2020-02-05 DIAGNOSIS — L85.3 XEROSIS OF SKIN: ICD-10-CM

## 2020-02-05 DIAGNOSIS — M1A.0711 CHRONIC IDIOPATHIC GOUT INVOLVING TOE OF RIGHT FOOT WITH TOPHUS: ICD-10-CM

## 2020-02-05 DIAGNOSIS — E11.9 DIABETES MELLITUS TYPE 2, INSULIN DEPENDENT (H): ICD-10-CM

## 2020-02-05 PROCEDURE — 99213 OFFICE O/P EST LOW 20 MIN: CPT | Mod: 25 | Performed by: PODIATRIST

## 2020-02-05 PROCEDURE — G0463 HOSPITAL OUTPT CLINIC VISIT: HCPCS | Mod: 25

## 2020-02-05 PROCEDURE — 11721 DEBRIDE NAIL 6 OR MORE: CPT | Performed by: PODIATRIST

## 2020-02-05 ASSESSMENT — PAIN SCALES - GENERAL: PAINLEVEL: EXTREME PAIN (8)

## 2020-02-05 NOTE — NURSING NOTE
"Chief Complaint   Patient presents with     Foot Problems     Nail trim and foot check       Initial BP (!) 148/76   Pulse 93   Temp 98.1  F (36.7  C) (Tympanic)   Resp 24   SpO2 93%  Estimated body mass index is 44.33 kg/m  as calculated from the following:    Height as of 11/5/19: 1.854 m (6' 1\").    Weight as of 9/4/19: 152.4 kg (336 lb).  Medication Reconciliation: complete  Lucy Goldsmith LPN    "

## 2020-02-05 NOTE — PROGRESS NOTES
Chief complaint: Patient presents with:  Foot Problems: Nail trim and foot check      History of Present Illness: This 69 year old IDDM II is seen with his wife, Yazmin, for follow-up management of bilateral foot pain, a diabetic foot exam and high risk nail debridement.    At the end of July, 2019, his RIGHT heel started to crack and bleed and it was very painful. He cannot see his heels. He has pain sometimes around the heels (R>L). He has been applying Terbinifine cream to the heels daily in the morning and it seems to be helping. He has a pink colored ring around the RIGHT plantar foot per his wife.    He gets burning, tingling and numbness on his feet and he says the numbness is especially prominent. He thinks he has been getting bouts of gout, but he cannot feel the pain due to the numbness. He will get flares of his toes every once in a while but he is not sure how often (possibly once to twice a month). He has Allopurinol for the gout, but he has not been taking it because it has not caused him pain and he doesn't want to add another medication. No further pedal complaints today.     Last HbA1C was 5.6% on 04/11/2019.    BP (!) 148/76   Pulse 93   Temp 98.1  F (36.7  C) (Tympanic)   Resp 24   SpO2 93%     Patient Active Problem List   Diagnosis     ACP (advance care planning)     Type 2 diabetes mellitus with diabetic neuropathy (H)     Morbid obesity due to excess calories (H)     Acquired hypothyroidism     Benign essential hypertension     Calculus of gallbladder without cholecystitis without obstruction     Cholecystitis       Past Surgical History:   Procedure Laterality Date     basal cell carcinoma  2017     CHOLECYSTECTOMY  11/23/2019     COLONOSCOPY - HIM SCAN  12/01/2012    Colonoscopy due to bleeding, no polyps 12-12     HERNIA REPAIR  2014       Current Outpatient Medications   Medication     allopurinol (ZYLOPRIM) 300 MG tablet     ascorbic acid (VITAMIN C) 500 MG tablet     B-D U/F insulin  pen needle     blood glucose (ACCU-CHEK SMARTVIEW) test strip     blood glucose monitoring (ACCU-CHEK FASTCLIX) lancets     Cholecalciferol (VITAMIN D3) 50 MCG (2000 UT) CAPS     ciclopirox (LOPROX) 0.77 % cream     FEROSUL 325 (65 Fe) MG tablet     gabapentin (NEURONTIN) 300 MG capsule     horse chestnut 300 MG CAPS     insulin glargine (BASAGLAR KWIKPEN) 100 UNIT/ML pen     levothyroxine (SYNTHROID/LEVOTHROID) 300 MCG tablet     liraglutide (VICTOZA PEN) 18 MG/3ML solution     metFORMIN (GLUCOPHAGE) 1000 MG tablet     metoprolol succinate ER (TOPROL-XL) 100 MG 24 hr tablet     Multiple Vitamin (MULTI VITAMIN PO)     naproxen (NAPROSYN) 500 MG tablet     NOVOTWIST 32G X 5 MM insulin pen needle     pantoprazole (PROTONIX) 40 MG EC tablet     potassium chloride ER (K-DUR/KLOR-CON M) 20 MEQ CR tablet     QUEtiapine (SEROQUEL) 50 MG tablet     tamsulosin (FLOMAX) 0.4 MG capsule     terbinafine (LAMISIL) 1 % external cream     torsemide (DEMADEX) 20 MG tablet     No current facility-administered medications for this visit.           Allergies   Allergen Reactions     Food      Onions, peppers, olives      Trazodone      Other reaction(s): Dizziness       Family History   Problem Relation Age of Onset     Unknown/Adopted Sister        Social History     Socioeconomic History     Marital status:      Spouse name: Not on file     Number of children: Not on file     Years of education: Not on file     Highest education level: Not on file   Occupational History     Not on file   Social Needs     Financial resource strain: Not on file     Food insecurity:     Worry: Not on file     Inability: Not on file     Transportation needs:     Medical: Not on file     Non-medical: Not on file   Tobacco Use     Smoking status: Never Smoker     Smokeless tobacco: Never Used   Substance and Sexual Activity     Alcohol use: Yes     Comment: beer daily      Drug use: No     Sexual activity: Not Currently     Partners: Female    Lifestyle     Physical activity:     Days per week: Not on file     Minutes per session: Not on file     Stress: Not on file   Relationships     Social connections:     Talks on phone: Not on file     Gets together: Not on file     Attends Baptism service: Not on file     Active member of club or organization: Not on file     Attends meetings of clubs or organizations: Not on file     Relationship status: Not on file     Intimate partner violence:     Fear of current or ex partner: Not on file     Emotionally abused: Not on file     Physically abused: Not on file     Forced sexual activity: Not on file   Other Topics Concern     Parent/sibling w/ CABG, MI or angioplasty before 65F 55M? Not Asked   Social History Narrative     Not on file       ROS: 10 point ROS neg other than the symptoms noted above in the HPI.  EXAM  Constitutional: healthy, alert and no distress    Psychiatric: mentation appears normal and affect normal/bright    VASCULAR:  -Dorsalis pedis pulse +1/4 b/l  -Posterior tibial pulse +1/4 b/l  -Capillary refill time < 3 seconds to b/l hallux  -Hair growth Absent to b/l anterior legs and ankles  -Varicosities and telangiectasias to bilateral feet  -Mild-to-moderate 3+ pitting edema to bilateral feet and ankles  NEURO:  -Protective sensation diminished with SWM +0/10 RIGHT and +0/10 LEFT on 02/05/2020  -Protective sensation diminished with SWM +0/10 RIGHT and +0/10 LEFT on 09/04/2019  -Light touch sensation ABSENT to b/l plantar forefoot  DERM:  -Soft tissue mass to RIGHT dorsal 2nd digit DIPJ with mild red/yellow discoloration consistent with a gouty tophi with no open wound or drainage  ---No severe erythema, no ascending erythema, no calor, no purulence, no malodor, no other SOI.   -Skin to bilateral feet and legs is erythematous, dry, flaking, and shiny and atrophic  -Skin temperature warm to bilateral feet and legs  -Diffusely dry skin with flaking of skin and erythema discoloration in a  moccasin distribution to bilateral plantar feet  -Toenails elongated, thickened, dystrophic and discolored x 10  MSK:  -No pain on palpation to RIGHT dorsal 2nd digit  -Muscle strength of ankles +5/5 for dorsiflexion, plantarflexion, ABDUction and ADDuction b/l    ============================================================    ASSESSMENT:  (L60.3) Onychodystrophy  (primary encounter diagnosis)    (L84) Callus of foot    (L85.3) Xerosis of skin    (E11.9,  Z79.4) Diabetes mellitus type 2, insulin dependent (H)    (E11.42) Diabetic polyneuropathy associated with type 2 diabetes mellitus (H)    (M21.969,  R20.8) Loss of protective sensation of skin of deformed foot    (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 Treatment options discussed with no educational barriers noted.  -Follow-up foot exam for patient with LOPS  -Nails debrided x 10 without incident  -Diabetic Foot Education provided. This included checking the feet daily looking for new new blisters or wounds, wearing shoes at all times when walking including around the house, and avoiding lotion application between the toes. Any sign of infection in the foot warrant's the patient presenting to the ED as soon as possible.    -Continue topical Lamisil, but alternate every other day with Lubriderm followed by socks in the morning (patient does not want to do one one lotion in the morning and one in the evening because applying lotion in the evening may cause him to fall due to slippery feet in the middle of the night).    -Patient's RIGHT 2nd digit appears like a gouty tophi. At this time there are no open wounds, but he should monitor this closely. If he starts to develop a wound over this toe, he is advised to start taking Allopurinol consistently to control the tophi.  -Patient in agreement with the above treatment plan and all of patient's questions  were answered.      RTC 5 months (per patient request) for diabetic foot exam and high risk nail care        Genesis Bay DPM

## 2020-02-25 ENCOUNTER — OFFICE VISIT (OUTPATIENT)
Dept: CARE COORDINATION | Facility: OTHER | Age: 70
End: 2020-02-25
Attending: INTERNAL MEDICINE
Payer: MEDICARE

## 2020-02-25 VITALS
WEIGHT: 315 LBS | BODY MASS INDEX: 40.43 KG/M2 | HEART RATE: 76 BPM | OXYGEN SATURATION: 96 % | HEIGHT: 74 IN | SYSTOLIC BLOOD PRESSURE: 158 MMHG | DIASTOLIC BLOOD PRESSURE: 76 MMHG | TEMPERATURE: 98 F | RESPIRATION RATE: 22 BRPM

## 2020-02-25 DIAGNOSIS — K90.9 DIARRHEA DUE TO MALABSORPTION: Primary | ICD-10-CM

## 2020-02-25 DIAGNOSIS — R19.7 DIARRHEA DUE TO MALABSORPTION: Primary | ICD-10-CM

## 2020-02-25 PROCEDURE — G0463 HOSPITAL OUTPT CLINIC VISIT: HCPCS

## 2020-02-25 ASSESSMENT — MIFFLIN-ST. JEOR: SCORE: 2336.16

## 2020-02-25 ASSESSMENT — PAIN SCALES - GENERAL: PAINLEVEL: EXTREME PAIN (8)

## 2020-02-25 NOTE — PROGRESS NOTES
Gastroenterlogy Consult      CC/REFERRING MD: You Bethea MD    Chief Complaint: Diarrhea and other concerns following cholecystectomy in November 2019      Past Medical History:  Past Medical History:   Diagnosis Date     Alcohol abuse      Diabetes mellitus with neuropathy (H)      Gastric polyps      Gout      HTN (hypertension)      Hx of thyroid cancer      Neuropathy      Obesity      Osteoarthritis          HPI:  Gunnar is a longstanding patient of mine who underwent a cholecystectomy in November 2019.  He underwent a MRCP which was normal and then oddly Dr. Avtar Hollis performed an ERCP anyway which was of course normal.  Dr. Oppenheim then performed the cholecystectomy.  Since then there has been some change in the patient's bowel habits he had prior to surgery 1 formed bowel movement every other day now he has a soft bowel movement every day.  I explained to him the mechanism of bile salt induced diarrhea.  He asked questions about how diet should be altered following cholecystectomy we have we discussed avoiding large fatty meals.  He really is been doing well otherwise though since surgery and is had no trouble with abdominal pain jaundice icterus dark urine or light stools    PREVIOUS SURGERIES:  Past Surgical History:   Procedure Laterality Date     basal cell carcinoma  2017     CHOLECYSTECTOMY  11/23/2019     COLONOSCOPY - HIM SCAN  12/01/2012    Colonoscopy due to bleeding, no polyps 12-12     HERNIA REPAIR  2014       MEDICATIONS:    Current Outpatient Medications:      allopurinol (ZYLOPRIM) 300 MG tablet, Take 300 mg by mouth Take one tablet daily , Disp: , Rfl:      ascorbic acid (VITAMIN C) 500 MG tablet, Take 500 mg by mouth (With damaso hips), Disp: , Rfl:      B-D U/F insulin pen needle, by Device route 2 times daily, Disp: 100 each, Rfl: 11     blood glucose (ACCU-CHEK SMARTVIEW) test strip, USE TO TEST BLOOD SUGARS 2 TIMES DAILY OR AS DIRECTED (ACCU CHECK SMART VIEW), Disp: 100  strip, Rfl: 11     blood glucose monitoring (ACCU-CHEK FASTCLIX) lancets, USE TO TEST BLOOD SUGAR TWICE A DAY, Disp: 102 each, Rfl: 2     Cholecalciferol (VITAMIN D3) 50 MCG (2000 UT) CAPS, Take 1 capsule by mouth daily, Disp: , Rfl:      ciclopirox (LOPROX) 0.77 % cream, Apply topically daily to toenails., Disp: , Rfl:      FEROSUL 325 (65 Fe) MG tablet, TAKE 1 TABLET BY MOUTH ONCE DAILY  BEST IF TAKEN WITH SOME FOOD, Disp: , Rfl: 3     gabapentin (NEURONTIN) 300 MG capsule, TAKE 1 CAPSULE (300 MG) BY MOUTH 3 TIMES DAILY, Disp: 270 capsule, Rfl: 3     horse chestnut 300 MG CAPS, Take 300 mg by mouth 2 times daily , Disp: , Rfl:      insulin glargine (BASAGLAR KWIKPEN) 100 UNIT/ML pen, INJECT 18 UNITS SUBCUTANEOUS AT BEDTIME., Disp: 30 mL, Rfl: 0     levothyroxine (SYNTHROID/LEVOTHROID) 300 MCG tablet, TAKE 1 TABLET (300 MCG) BY MOUTH DAILY, Disp: 90 tablet, Rfl: 3     liraglutide (VICTOZA PEN) 18 MG/3ML solution, INJECT 1.2 MG SUBCUTANEOUS DAILY 18MG/3ML PREFILLED PEN, Disp: 18 mL, Rfl: 3     metFORMIN (GLUCOPHAGE) 1000 MG tablet, TAKE 1 TABLET (1,000 MG) BY MOUTH 2 TIMES DAILY (WITH MEALS), Disp: 180 tablet, Rfl: 0     metoprolol succinate ER (TOPROL-XL) 100 MG 24 hr tablet, Take 0.5 tablets (50 mg) by mouth daily, Disp: 45 tablet, Rfl: 3     Multiple Vitamin (MULTI VITAMIN PO), Take 1 tablet by mouth daily (has iron in it), Disp: , Rfl:      naproxen (NAPROSYN) 500 MG tablet, Take 1 tablet (500 mg) by mouth 2 times daily (with meals), Disp: 180 tablet, Rfl: 3     NOVOTWIST 32G X 5 MM insulin pen needle, FOR USE WITH VICTOZA PEN, Disp: 100 each, Rfl: 1     pantoprazole (PROTONIX) 40 MG EC tablet, TAKE 1 TABLET BY MOUTH EVERY DAY 30-60 MIN BEFORE A MEAL, Disp: 90 tablet, Rfl: 1     potassium chloride ER (K-DUR/KLOR-CON M) 20 MEQ CR tablet, TAKE 2 TABLETS (40 MEQ) BY MOUTH 2 TIMES DAILY, Disp: 360 tablet, Rfl: 3     QUEtiapine (SEROQUEL) 50 MG tablet, Take 1/2 to 1 tablet by mouth QHS, Disp: 90 tablet, Rfl: 3      tamsulosin (FLOMAX) 0.4 MG capsule, TAKE ONE CAPSULE BY MOUTH EVERY DAY. SWALLOW WHOLE. DO NOT CRUSH OR CHEW, Disp: , Rfl:      terbinafine (LAMISIL) 1 % external cream, Apply topically 2 times daily, Disp: 42 g, Rfl: 3     torsemide (DEMADEX) 20 MG tablet, Take 2 tablets (40 mg) by mouth 2 times daily, Disp: 360 tablet, Rfl: 3    ALLERGIES:     Allergies   Allergen Reactions     Food      Onions, peppers, olives      Trazodone      Other reaction(s): Dizziness       FAMILY HISTORY:  Family History   Problem Relation Age of Onset     Unknown/Adopted Sister        SOCIAL HISTORY:  Social History     Socioeconomic History     Marital status:      Spouse name: Not on file     Number of children: Not on file     Years of education: Not on file     Highest education level: Not on file   Occupational History     Not on file   Social Needs     Financial resource strain: Not on file     Food insecurity:     Worry: Not on file     Inability: Not on file     Transportation needs:     Medical: Not on file     Non-medical: Not on file   Tobacco Use     Smoking status: Never Smoker     Smokeless tobacco: Never Used   Substance and Sexual Activity     Alcohol use: Yes     Comment: beer daily      Drug use: No     Sexual activity: Not Currently     Partners: Female   Lifestyle     Physical activity:     Days per week: Not on file     Minutes per session: Not on file     Stress: Not on file   Relationships     Social connections:     Talks on phone: Not on file     Gets together: Not on file     Attends Zoroastrianism service: Not on file     Active member of club or organization: Not on file     Attends meetings of clubs or organizations: Not on file     Relationship status: Not on file     Intimate partner violence:     Fear of current or ex partner: Not on file     Emotionally abused: Not on file     Physically abused: Not on file     Forced sexual activity: Not on file   Other Topics Concern     Parent/sibling w/ CABG, MI or  "angioplasty before 65F 55M? Not Asked   Social History Narrative     Not on file         PHYSICAL EXAM:  Constitutional: aaox3, cooperative, pleasant, not dyspneic/diaphoretic, no acute distress  Vitals reviewed: BP (!) 158/76 (BP Location: Right arm, Cuff Size: Adult Large)   Pulse 76   Temp 98  F (36.7  C) (Tympanic)   Resp 22   Ht 1.88 m (6' 2\")   Wt (!) 150.1 kg (331 lb)   SpO2 96%   BMI 42.50 kg/m    Wt:   Wt Readings from Last 2 Encounters:   02/25/20 (!) 150.1 kg (331 lb)   09/04/19 (!) 152.4 kg (336 lb)      Eyes: Sclera anicteric/injected  Ears/nose/mouth/throat: Normal oropharynx without ulcers or exudate, mucus membranes moist, hearing intact  Neck: supple, thyroid normal size  CV: No edema  Respiratory: Unlabored breathing  Lymph: No axillary, submandibular, supraclavicular or inguinal lymphadenopathy  Abd: Soft nondistended, +bs, no hepatosplenomegaly, nontender, no peritoneal signs  Skin: warm, perfused, no jaundice  Psych: Normal affect  MSK: Normal gait      ASSESSMENT/PLAN:  Gunnar is doing well postoperatively there is been some modest change in bowel habits likely due to bile salt induced irritation its not significant enough to recommend cholestyramine and he is in agreement with this.  His questions were answered follow-up will be on an as-needed basis.      Tru Hahn MD  "

## 2020-02-25 NOTE — NURSING NOTE
"Chief Complaint   Patient presents with     Consult     referred by Dr Bethea for consult of gallbladder surgery Nov 2019, having more liquidy BM, 20 minutes after meal getting drowsy for 30 minutes, Large intestine pain and cramping at time       Initial BP (!) 158/76 (BP Location: Right arm, Cuff Size: Adult Large)   Pulse 76   Temp 98  F (36.7  C) (Tympanic)   Resp 22   Ht 1.88 m (6' 2\")   Wt (!) 150.1 kg (331 lb)   SpO2 96%   BMI 42.50 kg/m   Estimated body mass index is 42.5 kg/m  as calculated from the following:    Height as of this encounter: 1.88 m (6' 2\").    Weight as of this encounter: 150.1 kg (331 lb).  Medication Reconciliation: complete  Namita Quevedo LPN    "

## 2020-02-28 ENCOUNTER — MYC MEDICAL ADVICE (OUTPATIENT)
Dept: INTERNAL MEDICINE | Facility: OTHER | Age: 70
End: 2020-02-28

## 2020-02-28 PROBLEM — K21.9 GASTROESOPHAGEAL REFLUX DISEASE WITHOUT ESOPHAGITIS: Status: ACTIVE | Noted: 2017-05-18

## 2020-02-28 NOTE — LETTER
February 28, 2020      Gunnar Granados  [address] [place] [id number]        To Whom It May Concern:    Gunnar Granados is under my care and I would ask that you please excuse him from jury duty for medical reasons.        Sincerely,        You Bethea, DO

## 2020-03-10 ENCOUNTER — HEALTH MAINTENANCE LETTER (OUTPATIENT)
Age: 70
End: 2020-03-10

## 2020-03-12 DIAGNOSIS — G62.1 ALCOHOL-INDUCED POLYNEUROPATHY (H): ICD-10-CM

## 2020-03-12 DIAGNOSIS — K21.9 GASTROESOPHAGEAL REFLUX DISEASE WITHOUT ESOPHAGITIS: ICD-10-CM

## 2020-03-12 NOTE — TELEPHONE ENCOUNTER
Gabapentin 300 MG      Last Written Prescription Date:  3-11-19  Last Fill Quantity: 270,   # refills: 3  Last Office Visit: 11-5-19    Pantoprazole 40 MG EC      Last Written Prescription Date:  9-24-19  Last Fill Quantity: 90,   # refills: 1  Last Office Visit: 11-5-19  Future Office visit:       Routing refill request to provider for review/approval because:

## 2020-03-13 RX ORDER — PANTOPRAZOLE SODIUM 40 MG/1
TABLET, DELAYED RELEASE ORAL
Qty: 90 TABLET | Refills: 1 | Status: SHIPPED | OUTPATIENT
Start: 2020-03-13 | End: 2020-09-18

## 2020-03-13 RX ORDER — GABAPENTIN 300 MG/1
300 CAPSULE ORAL 3 TIMES DAILY
Qty: 270 CAPSULE | Refills: 3 | Status: SHIPPED | OUTPATIENT
Start: 2020-03-13 | End: 2020-12-03

## 2020-03-20 ENCOUNTER — TRANSFERRED RECORDS (OUTPATIENT)
Dept: HEALTH INFORMATION MANAGEMENT | Facility: HOSPITAL | Age: 70
End: 2020-03-20

## 2020-03-20 LAB — HBA1C MFR BLD: 6 % (ref 4–5.6)

## 2020-04-06 DIAGNOSIS — Z79.4 TYPE 2 DIABETES MELLITUS WITH COMPLICATION, WITH LONG-TERM CURRENT USE OF INSULIN (H): ICD-10-CM

## 2020-04-06 DIAGNOSIS — E11.8 TYPE 2 DIABETES MELLITUS WITH COMPLICATION, WITH LONG-TERM CURRENT USE OF INSULIN (H): ICD-10-CM

## 2020-04-06 RX ORDER — INSULIN GLARGINE 100 [IU]/ML
INJECTION, SOLUTION SUBCUTANEOUS
Qty: 30 ML | Refills: 11 | Status: SHIPPED | OUTPATIENT
Start: 2020-04-06 | End: 2021-04-15

## 2020-04-06 NOTE — TELEPHONE ENCOUNTER
He wants a years worth of Basaglar. Last A1c on 9.4.2019.      Please advise.Did not pend at this time.    Carly Manzo RN

## 2020-04-16 DIAGNOSIS — E11.40 TYPE 2 DIABETES MELLITUS WITH DIABETIC NEUROPATHY, UNSPECIFIED WHETHER LONG TERM INSULIN USE (H): ICD-10-CM

## 2020-04-16 NOTE — TELEPHONE ENCOUNTER
Metformin       Last Written Prescription Date:  8/13/19  Last Fill Quantity: 180,   # refills: 0  Last Office Visit: 11/15/19  Future Office visit:    Next 5 appointments (look out 90 days)    Jul 01, 2020  1:30 PM CDT  (Arrive by 1:15 PM)  Return Visit with Genesis Bay DPM  [facility] ([facility] ) [address] [facility] [address] [phone]           Routing refill request to provider for review/approval because:  Lab Results   Component Value Date    A1C 5.7 09/04/2019    A1C 5.6 04/11/2019    A1C 6.1 08/14/2018    A1C 6.2 11/07/2017    A1C 6.4 05/03/2017     Creatinine   Date Value Ref Range Status   11/20/2019 1.88 (H) 0.66 - 1.25 mg/dL Final

## 2020-04-21 DIAGNOSIS — E11.9 DM2 (DIABETES MELLITUS, TYPE 2) (H): ICD-10-CM

## 2020-05-05 DIAGNOSIS — I10 BENIGN ESSENTIAL HYPERTENSION: ICD-10-CM

## 2020-05-05 DIAGNOSIS — G47.00 INSOMNIA, UNSPECIFIED TYPE: ICD-10-CM

## 2020-05-05 RX ORDER — QUETIAPINE FUMARATE 50 MG/1
TABLET, FILM COATED ORAL
Qty: 90 TABLET | Refills: 1 | Status: SHIPPED | OUTPATIENT
Start: 2020-05-05 | End: 2020-11-05

## 2020-05-05 RX ORDER — METOPROLOL SUCCINATE 100 MG/1
50 TABLET, EXTENDED RELEASE ORAL DAILY
Qty: 45 TABLET | Refills: 3 | Status: SHIPPED | OUTPATIENT
Start: 2020-05-05 | End: 2021-05-18

## 2020-05-05 RX ORDER — TORSEMIDE 20 MG/1
40 TABLET ORAL 2 TIMES DAILY
Qty: 360 TABLET | Refills: 3 | Status: SHIPPED | OUTPATIENT
Start: 2020-05-05 | End: 2021-04-15

## 2020-05-05 NOTE — TELEPHONE ENCOUNTER
Demadex      Last Written Prescription Date: 7/9/19  Last Fill Quantity: 360,   # refills: 3  Last Office Visit: 11/5/19  Future Office visit:    Next 5 appointments (look out 90 days)    Jul 01, 2020  1:30 PM CDT  (Arrive by 1:15 PM)  Return Visit with Genesis Bay DPM  [facility] ([facility] ) [address] [address]  [phone]           Routing refill request to provider for review/approval because:    Diuretics (Including Combos) Protocol [id number]/05/2020 11:31 AM   Blood pressure under 140/90 in past 12 months Protocol Details    Normal serum creatinine on file in past 12 months     Normal serum sodium on file in past 12 months          Metoprolol  Succinate ER  Last Written Prescription Date:  7/30/19  Last Fill Quantity: 45,   # refills: 3  Last Office Visit: 11/5/19  Future Office visit:    Next 5 appointments (look out 90 days)    Jul 01, 2020  1:30 PM CDT  (Arrive by 1:15 PM)  Return Visit with Genesis Bay DPM  [facility] ([facility] ) [address] [place] [address]  [phone]           Routing refill request to provider for review/approval because:    Beta-Blockers Protocol [id number]/05/2020 11:31 AM   Blood pressure under 140/90 in past 12 months       Seroquel      Last Written Prescription Date:  8/13/19  Last Fill Quantity: 90,   # refills: 3  Last Office Visit: 11/5/19  Future Office visit:    Next 5 appointments (look out 90 days)    Jul 01, 2020  1:30 PM CDT  (Arrive by 1:15 PM)  Return Visit with Genesis Bay DPM  [facility] ([facility] ) [address] [place] [address]  [phone]           Routing refill request to provider for review/approval because:    Antipsychotic Medications [id number]/05/2020 11:31 AM   Blood pressure under 140/90 in past 12  months Protocol Details    Recent (6 mo) or future (30 days) visit within the authorizing provider's specialty

## 2020-07-02 DIAGNOSIS — M25.562 CHRONIC PAIN OF BOTH KNEES: ICD-10-CM

## 2020-07-02 DIAGNOSIS — G89.29 CHRONIC PAIN OF BOTH KNEES: ICD-10-CM

## 2020-07-02 DIAGNOSIS — M25.561 CHRONIC PAIN OF BOTH KNEES: ICD-10-CM

## 2020-07-02 NOTE — TELEPHONE ENCOUNTER
Naproxen      Last Written Prescription Date:  03/11/19  Last Fill Quantity: 180,   # refills: 3  Last Office Visit: 11/05/19  Future Office visit:         Routing refill request to provider for review/approval because:

## 2020-07-06 ENCOUNTER — TRANSFERRED RECORDS (OUTPATIENT)
Dept: HEALTH INFORMATION MANAGEMENT | Facility: CLINIC | Age: 70
End: 2020-07-06

## 2020-07-07 RX ORDER — NAPROXEN 500 MG/1
500 TABLET ORAL 2 TIMES DAILY WITH MEALS
Qty: 180 TABLET | Refills: 3 | Status: SHIPPED | OUTPATIENT
Start: 2020-07-07 | End: 2021-10-22

## 2020-07-07 NOTE — TELEPHONE ENCOUNTER
Protocol failed due to    Blood pressure under 140/90 in past 12 months Protocol Details    Normal ALT on file in past 12 months     Normal AST on file in past 12 months     Patient is age 6-64 years     Normal CBC on file in past 12 months     Normal serum creatinine on file in past 12 months      Last CMP and CBC 11/20/19. Please advise. Thank you!

## 2020-07-30 ENCOUNTER — TRANSFERRED RECORDS (OUTPATIENT)
Dept: HEALTH INFORMATION MANAGEMENT | Facility: CLINIC | Age: 70
End: 2020-07-30

## 2020-09-17 DIAGNOSIS — E11.40 TYPE 2 DIABETES MELLITUS WITH DIABETIC NEUROPATHY, UNSPECIFIED WHETHER LONG TERM INSULIN USE (H): ICD-10-CM

## 2020-09-17 DIAGNOSIS — K21.9 GASTROESOPHAGEAL REFLUX DISEASE WITHOUT ESOPHAGITIS: ICD-10-CM

## 2020-09-17 NOTE — TELEPHONE ENCOUNTER
pantoprazole      Last Written Prescription Date:  03/13/2020  Last Fill Quantity: 90,   # refills: 1  Last Office Visit: 11/05/2019  Future Office visit:       Insulin pen needles      Last Written Prescription Date:  08/13/2019  Last Fill Quantity: 100,   # refills: 11

## 2020-09-18 RX ORDER — PANTOPRAZOLE SODIUM 40 MG/1
TABLET, DELAYED RELEASE ORAL
Qty: 90 TABLET | Refills: 1 | Status: SHIPPED | OUTPATIENT
Start: 2020-09-18 | End: 2021-03-03

## 2020-09-18 RX ORDER — FLURBIPROFEN SODIUM 0.3 MG/ML
SOLUTION/ DROPS OPHTHALMIC 2 TIMES DAILY
Qty: 100 EACH | Refills: 11 | Status: SHIPPED | OUTPATIENT
Start: 2020-09-18 | End: 2021-10-22

## 2020-10-12 ENCOUNTER — TELEPHONE (OUTPATIENT)
Dept: INTERNAL MEDICINE | Facility: OTHER | Age: 70
End: 2020-10-12

## 2020-10-12 NOTE — TELEPHONE ENCOUNTER
To: RX nurse  Patient is almost OUT of TEST STRIPS.  Could you please call with status of order.  Phone is [phone].  Thank you

## 2020-10-29 ENCOUNTER — NURSE TRIAGE (OUTPATIENT)
Dept: NURSING | Facility: CLINIC | Age: 70
End: 2020-10-29

## 2020-10-29 NOTE — TELEPHONE ENCOUNTER
A message was left stating that the patient will need to be seen before anything is recommended. May need a biopsy depending on the in office findings. He was provided with our schedulers number and our number to call with questions.   
Clinic Action Needed: Can you route this to the care team working with Dr Knight(ENT)?    Patient requesting Dr Knight's nurse to call him    FNA Triage Call  Presenting Problem:  Patient states he has been trying to send a message to Ayana Knight MD with ENT via [facility]. Patient states he is having trouble sending the message.     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 He has had a sore on his lower lip for 3-4 months and the sore is not healing.   Discussed with patient he should make an appointment with Dr Knight.     Patient wants to know if Dr Knight can treat his condition before he makes an appointment and drives a long way to the clinic.     Routed to: [facility] RN Brandon Collazo RN/[facility] Nurse Advisors      Reason for Disposition    [1] Caller requesting NON-URGENT health information AND [2] PCP's office is the best resource    Additional Information    Negative: [1] Caller is not with the adult (patient) AND [2] reporting urgent symptoms    Negative: Lab result questions    Negative: Medication questions    Negative: Caller can't be reached by phone    Negative: Caller has already spoken to PCP or another triager    Negative: RN needs further essential information from caller in order to complete triage    Negative: Requesting regular office appointment    Protocols used: INFORMATION ONLY CALL-A-      
Normal rate, regular rhythm.  Heart sounds S1, S2.  No murmurs, rubs or gallops.

## 2020-11-03 ENCOUNTER — OFFICE VISIT (OUTPATIENT)
Dept: OTOLARYNGOLOGY | Facility: OTHER | Age: 70
End: 2020-11-03
Attending: OTOLARYNGOLOGY
Payer: MEDICARE

## 2020-11-03 VITALS
BODY MASS INDEX: 42.5 KG/M2 | TEMPERATURE: 98.3 F | WEIGHT: 315 LBS | OXYGEN SATURATION: 94 % | DIASTOLIC BLOOD PRESSURE: 80 MMHG | SYSTOLIC BLOOD PRESSURE: 130 MMHG | HEART RATE: 72 BPM

## 2020-11-03 DIAGNOSIS — K13.79 HYPERTROPHIC SOFT PALATE: ICD-10-CM

## 2020-11-03 DIAGNOSIS — G47.33 OSA ON CPAP: ICD-10-CM

## 2020-11-03 DIAGNOSIS — K13.0 LIP LESION: Primary | ICD-10-CM

## 2020-11-03 PROCEDURE — 99203 OFFICE O/P NEW LOW 30 MIN: CPT | Mod: 25 | Performed by: OTOLARYNGOLOGY

## 2020-11-03 PROCEDURE — 40812 EXCISE/REPAIR MOUTH LESION: CPT | Performed by: OTOLARYNGOLOGY

## 2020-11-03 PROCEDURE — 88304 TISSUE EXAM BY PATHOLOGIST: CPT | Mod: TC | Performed by: OTOLARYNGOLOGY

## 2020-11-03 PROCEDURE — G0463 HOSPITAL OUTPT CLINIC VISIT: HCPCS

## 2020-11-03 ASSESSMENT — PAIN SCALES - GENERAL: PAINLEVEL: NO PAIN (0)

## 2020-11-03 NOTE — LETTER
11/3/2020         RE: Gunnar Granados  [address] [facility] [address]        Dear Colleague,    Thank you for referring your patient, Gunnar Granados, to the [facility]. Please see a copy of my visit note below.    Otolaryngology Consultation    Patient: Gunnar Granados  : 1950    Consult:  Lip lesion  Referring physician:  Dr. Watters    HPI:  Gunnar Granados is a 70 year old adult seen today for who presents out of concern for a lower lip lesion.    This has been present over 4 months  No preceding trauma  He notes some lumps along his right lower lip  No pain or ulceration    Never smoker no tobacco use    No weight loss    Hx of CALLIE wearing CPAP every night        Current Outpatient Rx   Medication Sig Dispense Refill     ascorbic acid (VITAMIN C) 500 MG tablet Take 500 mg by mouth (With damaso hips)       B-D U/F insulin pen needle BY DEVICE ROUTE 2 TIMES DAILY 100 each 11     blood glucose (ACCU-CHEK SMARTVIEW) test strip USE TO TEST BLOOD SUGARS 2 TIMES DAILY OR AS DIRECTED (ACCU CHECK SMART VIEW) 100 strip 11     blood glucose monitoring (ACCU-CHEK FASTCLIX) lancets USE TO TEST BLOOD SUGAR TWICE A  each 2     Cholecalciferol (VITAMIN D3) 50 MCG (2000 UT) CAPS Take 1 capsule by mouth daily       ciclopirox (LOPROX) 0.77 % cream Apply topically daily to toenails.       FEROSUL 325 (65 Fe) MG tablet TAKE 1 TABLET BY MOUTH ONCE DAILY  BEST IF TAKEN WITH SOME FOOD  3     gabapentin (NEURONTIN) 300 MG capsule TAKE 1 CAPSULE (300 MG) BY MOUTH 3 TIMES DAILY 270 capsule 3     horse chestnut 300 MG CAPS Take 300 mg by mouth 2 times daily        insulin glargine (BASAGLAR KWIKPEN) 100 UNIT/ML pen INJECT 18 UNITS SUBCUTANEOUS AT BEDTIME. 30 mL 11     levothyroxine (SYNTHROID/LEVOTHROID) 300 MCG tablet TAKE 1 TABLET (300 MCG) BY MOUTH DAILY 90 tablet 3     liraglutide (VICTOZA PEN) 18 MG/3ML solution INJECT 1.2 MG SUBCUTANEOUS DAILY 18MG/3ML PREFILLED PEN 18 mL 3      metFORMIN (GLUCOPHAGE) 1000 MG tablet TAKE 1 TABLET (1,000 MG) BY MOUTH 2 TIMES DAILY (WITH MEALS) 180 tablet 1     metoprolol succinate ER (TOPROL-XL) 100 MG 24 hr tablet TAKE 0.5 TABLETS (50 MG) BY MOUTH DAILY 45 tablet 3     Multiple Vitamin (MULTI VITAMIN PO) Take 1 tablet by mouth daily (has iron in it)       naproxen (NAPROSYN) 500 MG tablet TAKE 1 TABLET (500 MG) BY MOUTH 2 TIMES DAILY (WITH MEALS) 180 tablet 3     NOVOTWIST 32G X 5 MM insulin pen needle FOR USE WITH VICTOZA  each 1     pantoprazole (PROTONIX) 40 MG EC tablet TAKE 1 TABLET BY MOUTH EVERY DAY 30-60 MIN BEFORE A MEAL 90 tablet 1     potassium chloride ER (K-DUR/KLOR-CON M) 20 MEQ CR tablet TAKE 2 TABLETS (40 MEQ) BY MOUTH 2 TIMES DAILY 360 tablet 3     QUEtiapine (SEROQUEL) 50 MG tablet TAKE 1/2- 1 TABLET BY MOUTH AT BEDTIME 90 tablet 1     tamsulosin (FLOMAX) 0.4 MG capsule TAKE ONE CAPSULE BY MOUTH EVERY DAY. SWALLOW WHOLE. DO NOT CRUSH OR CHEW       terbinafine (LAMISIL) 1 % external cream Apply topically 2 times daily 42 g 3     torsemide (DEMADEX) 20 MG tablet TAKE 2 TABLETS (40 MG) BY MOUTH 2 TIMES DAILY 360 tablet 3     allopurinol (ZYLOPRIM) 300 MG tablet Take 300 mg by mouth Take one tablet daily          Allergies: Food and Trazodone     Past Medical History:   Diagnosis Date     Alcohol abuse      Diabetes mellitus with neuropathy (H)      Gastric polyps      Gout      HTN (hypertension)      Hx of thyroid cancer      Neuropathy      Obesity      Osteoarthritis        Past Surgical History:   Procedure Laterality Date     basal cell carcinoma  2017     CHOLECYSTECTOMY  11/23/2019     COLONOSCOPY - HIM SCAN  12/01/2012    Colonoscopy due to bleeding, no polyps 12-12     HERNIA REPAIR  2014       ENT family history reviewed    Social History     Tobacco Use     Smoking status: Never Smoker     Smokeless tobacco: Never Used   Substance Use Topics     Alcohol use: Yes     Comment: beer daily      Drug use: No       Review of  Systems  ROS: 10 point ROS neg other than the symptoms noted above in the HPI and leg edema, severe neuropathy, shortness of breath on exertion, nasal congestion, joint pain, extremity weakness, numbness, weight gain, night sweats, dry skin    Physical Exam  /80   Pulse 72   Temp 98.3  F (36.8  C) (Tympanic)   Wt (!) 150.1 kg (331 lb)   SpO2 94%   BMI 42.50 kg/m    General - The patient is obese and appears chronically ill, sitting in a wheel chair.  Very bright and conversational.  Alert and oriented to person and place, answers questions and cooperates with examination appropriately.   Head and Face - Normocephalic and atraumatic, with no gross asymmetry noted. The facial nerve is intact, with strong symmetric movements.  Voice and Breathing - The patient was breathing comfortably without the use of accessory muscles. There was no wheezing, stridor, or stertor.  The patients voice was clear and strong, and had appropriate pitch and quality.  No jose e peripheral digital clubbing or cyanosis   Ears -The external auditory canals are patent, the tympanic membranes are intact without effusion, retraction or mass.  Bony landmarks are intact.  Eyes - Extraocular movements intact, and the pupils were reactive to light.  Sclera were not icteric or injected, conjunctiva were pink and moist.  Mouth - Examination of the oral cavity showed pink, healthy oral mucosa. No  ulcerations noted.  The tongue was mobile and midline, and the dentition were in good condition.    Pea sized palpable mucosal lower lip lesion c/w mucocele  No overlying ulceration  Throat - The walls of the oropharynx were smooth, pink, moist, symmetric, and had no lesions or ulcerations.  The tonsillar pillars and soft palate were symmetric.  The uvula was midline on elevation.  Bills Tongue Position IV  Neck - No palpable enlarged fixed cervical lymph nodes.  No neck cysts or unusual tenderness to palpation.   No palpable fixed thyroid nodules  or concerning goiter.  The trachea is grossly midline.   Nose - External contour is symmetric, no gross deflection or scars.  Nasal mucosa is pink and moist with no abnormal mucus.  The septum and turbinates were evaluated: bilateral  inferior turbinate hypertrophy.  No polyps, masses, or purulence noted on examination.      Procedure - Oral cavity lesion Removal    Informed consent was discussed and signed, and risks of the procedure were discussed, including bleeding, anesthesia, infection, scar formation, numbness, need for additional surgery, injury to salivary tissue.  I then infiltrated the mucosal tissues of the lower mucosal lip with 1% lidocaine with 1:200,000 epinephrine.  I then used a 15-blade to create linear incision over the lesion.   I then elevated the  ellipse and a thin cuff of underlying normal appearing mucosa with the scalpel.  I proceeded to use a fine iris scissor to undermine the lesion and excise it.   This appears to be a small minor salivary gland vs thickened mucocele.  Hemostasis was achieved with direct pressure and silver nitrate cautery.  The total length of the incision was 1.0 cm.  The specimen(s) was placed in formalin and sent to pathology.  The mucosal edges were then reapproximated using 4-0 chromic, simple interrupted sutures .  The patient tolerated the procedure without any difficulty.      A/P - This patient has had removal of an oral cavity lesion today.  I have instructed the patient on wound care with 1/2 strength peroxide rinses for 1 week, and  Diet.  Ibuprofen alternated with tylenol prn pain.  The patient will be called with pathology results.        ICD-10-CM    1. Lip lesion  K13.0 EXCISION LESION MOUTH W SIMPLE REPAIR   2. CALLIE on CPAP  G47.33     Z99.89    3. Hypertrophic soft palate  K13.79      congratulated on CPAP use      Ayana Knight D.O.  Otolaryngology/Head and Neck Surgery  Allergy        Again, thank you for allowing me to participate in the  care of your patient.        Sincerely,        Ayana Knight MD

## 2020-11-03 NOTE — PATIENT INSTRUCTIONS
Thank you for allowing Dr. Knight and our ENT team to participate in your care.  If your medications are too expensive, please give the nurse a call.  We can possibly change this medication.  If you have a scheduling or an appointment question please contact our Health Unit Coordinator at their direct line [phone].   ALL nursing questions or concerns can be directed to your ENT nurse at: [phone] - [place]        POST PROCEDURE INSTRUCTIONS      For oral lesions, rinse your mouth with a mixture of half water and half hydrogen peroxide 3 times daily, after meals and before bed.       For lip lesions, apply Aquaphor Healing Ointment to the wound 3 times daily after cleaning with above mixture(Unless specified Bacitracin).      You can apply ice to the surgical area to help reduce swelling. (no longer than 20 minutes at a time)       You can use acetaminophen(Tylenol) or the prescription you received for pain.       If you have sutures in place these are dissolvable. They will fall out on their own. DO NOT play with them as this will cause more irritation to the surgical area.       If cautery was used, that is the blackened area you see. This will fade away and can become a white patchy area. This is normal! Continue to clean wound as described above.     SIGNS OF INFECTION ARE:    Redness, swelling, red streaks, pus, drainage, warmth, fever, increased pain, foul smell.     Contact your primary health care provider if you notice any of the warning signs.     FOLLOW - UP    Follow-up as needed in clinic.     Pathology results will be called to you when they are back. This can take approx. 7-10 days.

## 2020-11-03 NOTE — NURSING NOTE
"Chief Complaint   Patient presents with     Consult     Lip Lesion; Self Referral       Initial /80   Pulse 72   Temp 98.3  F (36.8  C) (Tympanic)   Wt (!) 150.1 kg (331 lb)   SpO2 94%   BMI 42.50 kg/m   Estimated body mass index is 42.5 kg/m  as calculated from the following:    Height as of 2/25/20: 1.88 m (6' 2\").    Weight as of this encounter: 150.1 kg (331 lb).  Medication Reconciliation: complete  Mariama Walker LPN  "

## 2020-11-03 NOTE — PROGRESS NOTES
Otolaryngology Consultation    Patient: Gunnar Granados  : 1950    Consult:  Lip lesion  Referring physician:  Dr. Watters    HPI:  Gunnar Granados is a 70 year old adult seen today for who presents out of concern for a lower lip lesion.    This has been present over 4 months  No preceding trauma  He notes some lumps along his right lower lip  No pain or ulceration    Never smoker no tobacco use    No weight loss    Hx of CALLIE wearing CPAP every night        Current Outpatient Rx   Medication Sig Dispense Refill     ascorbic acid (VITAMIN C) 500 MG tablet Take 500 mg by mouth (With damaso hips)       B-D U/F insulin pen needle BY DEVICE ROUTE 2 TIMES DAILY 100 each 11     blood glucose (ACCU-CHEK SMARTVIEW) test strip USE TO TEST BLOOD SUGARS 2 TIMES DAILY OR AS DIRECTED (ACCU CHECK SMART VIEW) 100 strip 11     blood glucose monitoring (ACCU-CHEK FASTCLIX) lancets USE TO TEST BLOOD SUGAR TWICE A  each 2     Cholecalciferol (VITAMIN D3) 50 MCG (2000 UT) CAPS Take 1 capsule by mouth daily       ciclopirox (LOPROX) 0.77 % cream Apply topically daily to toenails.       FEROSUL 325 (65 Fe) MG tablet TAKE 1 TABLET BY MOUTH ONCE DAILY  BEST IF TAKEN WITH SOME FOOD  3     gabapentin (NEURONTIN) 300 MG capsule TAKE 1 CAPSULE (300 MG) BY MOUTH 3 TIMES DAILY 270 capsule 3     horse chestnut 300 MG CAPS Take 300 mg by mouth 2 times daily        insulin glargine (BASAGLAR KWIKPEN) 100 UNIT/ML pen INJECT 18 UNITS SUBCUTANEOUS AT BEDTIME. 30 mL 11     levothyroxine (SYNTHROID/LEVOTHROID) 300 MCG tablet TAKE 1 TABLET (300 MCG) BY MOUTH DAILY 90 tablet 3     liraglutide (VICTOZA PEN) 18 MG/3ML solution INJECT 1.2 MG SUBCUTANEOUS DAILY 18MG/3ML PREFILLED PEN 18 mL 3     metFORMIN (GLUCOPHAGE) 1000 MG tablet TAKE 1 TABLET (1,000 MG) BY MOUTH 2 TIMES DAILY (WITH MEALS) 180 tablet 1     metoprolol succinate ER (TOPROL-XL) 100 MG 24 hr tablet TAKE 0.5 TABLETS (50 MG) BY MOUTH DAILY 45 tablet 3     Multiple Vitamin  (MULTI VITAMIN PO) Take 1 tablet by mouth daily (has iron in it)       naproxen (NAPROSYN) 500 MG tablet TAKE 1 TABLET (500 MG) BY MOUTH 2 TIMES DAILY (WITH MEALS) 180 tablet 3     NOVOTWIST 32G X 5 MM insulin pen needle FOR USE WITH VICTOZA  each 1     pantoprazole (PROTONIX) 40 MG EC tablet TAKE 1 TABLET BY MOUTH EVERY DAY 30-60 MIN BEFORE A MEAL 90 tablet 1     potassium chloride ER (K-DUR/KLOR-CON M) 20 MEQ CR tablet TAKE 2 TABLETS (40 MEQ) BY MOUTH 2 TIMES DAILY 360 tablet 3     QUEtiapine (SEROQUEL) 50 MG tablet TAKE 1/2- 1 TABLET BY MOUTH AT BEDTIME 90 tablet 1     tamsulosin (FLOMAX) 0.4 MG capsule TAKE ONE CAPSULE BY MOUTH EVERY DAY. SWALLOW WHOLE. DO NOT CRUSH OR CHEW       terbinafine (LAMISIL) 1 % external cream Apply topically 2 times daily 42 g 3     torsemide (DEMADEX) 20 MG tablet TAKE 2 TABLETS (40 MG) BY MOUTH 2 TIMES DAILY 360 tablet 3     allopurinol (ZYLOPRIM) 300 MG tablet Take 300 mg by mouth Take one tablet daily          Allergies: Food and Trazodone     Past Medical History:   Diagnosis Date     Alcohol abuse      Diabetes mellitus with neuropathy (H)      Gastric polyps      Gout      HTN (hypertension)      Hx of thyroid cancer      Neuropathy      Obesity      Osteoarthritis        Past Surgical History:   Procedure Laterality Date     basal cell carcinoma  2017     CHOLECYSTECTOMY  11/23/2019     COLONOSCOPY - HIM SCAN  12/01/2012    Colonoscopy due to bleeding, no polyps 12-12     HERNIA REPAIR  2014       ENT family history reviewed    Social History     Tobacco Use     Smoking status: Never Smoker     Smokeless tobacco: Never Used   Substance Use Topics     Alcohol use: Yes     Comment: beer daily      Drug use: No       Review of Systems  ROS: 10 point ROS neg other than the symptoms noted above in the HPI and leg edema, severe neuropathy, shortness of breath on exertion, nasal congestion, joint pain, extremity weakness, numbness, weight gain, night sweats, dry  skin    Physical Exam  /80   Pulse 72   Temp 98.3  F (36.8  C) (Tympanic)   Wt (!) 150.1 kg (331 lb)   SpO2 94%   BMI 42.50 kg/m    General - The patient is obese and appears chronically ill, sitting in a wheel chair.  Very bright and conversational.  Alert and oriented to person and place, answers questions and cooperates with examination appropriately.   Head and Face - Normocephalic and atraumatic, with no gross asymmetry noted. The facial nerve is intact, with strong symmetric movements.  Voice and Breathing - The patient was breathing comfortably without the use of accessory muscles. There was no wheezing, stridor, or stertor.  The patients voice was clear and strong, and had appropriate pitch and quality.  No jose e peripheral digital clubbing or cyanosis   Ears -The external auditory canals are patent, the tympanic membranes are intact without effusion, retraction or mass.  Bony landmarks are intact.  Eyes - Extraocular movements intact, and the pupils were reactive to light.  Sclera were not icteric or injected, conjunctiva were pink and moist.  Mouth - Examination of the oral cavity showed pink, healthy oral mucosa. No  ulcerations noted.  The tongue was mobile and midline, and the dentition were in good condition.    Pea sized palpable mucosal lower lip lesion c/w mucocele  No overlying ulceration  Throat - The walls of the oropharynx were smooth, pink, moist, symmetric, and had no lesions or ulcerations.  The tonsillar pillars and soft palate were symmetric.  The uvula was midline on elevation.  Bills Tongue Position IV  Neck - No palpable enlarged fixed cervical lymph nodes.  No neck cysts or unusual tenderness to palpation.   No palpable fixed thyroid nodules or concerning goiter.  The trachea is grossly midline.   Nose - External contour is symmetric, no gross deflection or scars.  Nasal mucosa is pink and moist with no abnormal mucus.  The septum and turbinates were evaluated: bilateral   inferior turbinate hypertrophy.  No polyps, masses, or purulence noted on examination.      Procedure - Oral cavity lesion Removal    Informed consent was discussed and signed, and risks of the procedure were discussed, including bleeding, anesthesia, infection, scar formation, numbness, need for additional surgery, injury to salivary tissue.  I then infiltrated the mucosal tissues of the lower mucosal lip with 1% lidocaine with 1:200,000 epinephrine.  I then used a 15-blade to create linear incision over the lesion.   I then elevated the  ellipse and a thin cuff of underlying normal appearing mucosa with the scalpel.  I proceeded to use a fine iris scissor to undermine the lesion and excise it.   This appears to be a small minor salivary gland vs thickened mucocele.  Hemostasis was achieved with direct pressure and silver nitrate cautery.  The total length of the incision was 1.0 cm.  The specimen(s) was placed in formalin and sent to pathology.  The mucosal edges were then reapproximated using 4-0 chromic, simple interrupted sutures .  The patient tolerated the procedure without any difficulty.      A/P - This patient has had removal of an oral cavity lesion today.  I have instructed the patient on wound care with 1/2 strength peroxide rinses for 1 week, and  Diet.  Ibuprofen alternated with tylenol prn pain.  The patient will be called with pathology results.        ICD-10-CM    1. Lip lesion  K13.0 EXCISION LESION MOUTH W SIMPLE REPAIR   2. CALLIE on CPAP  G47.33     Z99.89    3. Hypertrophic soft palate  K13.79      congratulated on CPAP use      Ayana Knight D.O.  Otolaryngology/Head and Neck Surgery  Allergy

## 2020-11-05 DIAGNOSIS — B35.3 TINEA PEDIS OF BOTH FEET: ICD-10-CM

## 2020-11-05 LAB — COPATH REPORT: NORMAL

## 2020-11-05 NOTE — TELEPHONE ENCOUNTER
TERBINAFINE OINTMENT      Last Written Prescription Date:  9-4-2019  Last Fill Quantity: 42GRAM,   # refills: 3  Last Office Visit: 2-5-2020  Future Office visit:       Routing refill request to provider for review/approval because:

## 2020-12-01 RX ORDER — TERBINAFINE HYDROCHLORIDE 1 G/100G
CREAM TOPICAL
Qty: 28.4 G | Refills: 3 | Status: SHIPPED | OUTPATIENT
Start: 2020-12-01 | End: 2022-01-11

## 2020-12-03 DIAGNOSIS — E03.4 HYPOTHYROIDISM DUE TO ACQUIRED ATROPHY OF THYROID: ICD-10-CM

## 2020-12-03 DIAGNOSIS — G62.1 ALCOHOL-INDUCED POLYNEUROPATHY (H): ICD-10-CM

## 2020-12-03 RX ORDER — GABAPENTIN 300 MG/1
300 CAPSULE ORAL 3 TIMES DAILY
Qty: 270 CAPSULE | Refills: 3 | Status: SHIPPED | OUTPATIENT
Start: 2020-12-03 | End: 2021-12-09

## 2020-12-03 RX ORDER — LEVOTHYROXINE SODIUM 300 UG/1
300 TABLET ORAL DAILY
Qty: 90 TABLET | Refills: 3 | Status: SHIPPED | OUTPATIENT
Start: 2020-12-03 | End: 2021-09-09

## 2020-12-03 NOTE — TELEPHONE ENCOUNTER
Neurontin 300mg      Last Written Prescription Date:  3/13/20  Last Fill Quantity: 270,   # refills: 3  Last Office Visit: 11/5/19  Future Office visit:       Routing refill request to provider for review/approval because:

## 2020-12-03 NOTE — TELEPHONE ENCOUNTER
Synthroid 300mcg      Last Written Prescription Date:  11/21/19  Last Fill Quantity: 90,   # refills: 3  Last Office Visit: 11/5/19  Future Office visit:       Routing refill request to provider for review/approval because:

## 2020-12-27 ENCOUNTER — HEALTH MAINTENANCE LETTER (OUTPATIENT)
Age: 70
End: 2020-12-27

## 2020-12-29 ENCOUNTER — MEDICAL CORRESPONDENCE (OUTPATIENT)
Dept: HEALTH INFORMATION MANAGEMENT | Facility: CLINIC | Age: 70
End: 2020-12-29

## 2021-01-14 DIAGNOSIS — L60.3 ONYCHODYSTROPHY: Primary | ICD-10-CM

## 2021-01-14 RX ORDER — CICLOPIROX OLAMINE 7.7 MG/G
CREAM TOPICAL DAILY
Qty: 30 G | Refills: 3 | Status: SHIPPED | OUTPATIENT
Start: 2021-01-14 | End: 2022-02-10

## 2021-01-14 NOTE — TELEPHONE ENCOUNTER
ciclopirox      Last Written Prescription Date:  8/20/20  Last Fill Quantity: 30,   # refills: 0  Last Office Visit: 2/5/20  Future Office visit:       Routing refill request to provider for review/approval because:

## 2021-01-20 ENCOUNTER — TRANSFERRED RECORDS (OUTPATIENT)
Dept: HEALTH INFORMATION MANAGEMENT | Facility: CLINIC | Age: 71
End: 2021-01-20

## 2021-01-20 LAB
CHOLEST SERPL-MCNC: 179 MG/DL (ref 114–200)
CREAT SERPL-MCNC: 1.05 MG/DL (ref 0.7–1.2)
GFR SERPL CREATININE-BSD FRML MDRD: >60 ML/MIN/1.73M2
GLUCOSE SERPL-MCNC: 153 MG/DL (ref 70–99)
HBA1C MFR BLD: 6 % (ref 4–5.6)
HDLC SERPL-MCNC: 40 MG/DL (ref 40–60)
LDLC SERPL CALC-MCNC: 65 MG/DL
POTASSIUM SERPL-SCNC: 4.5 MEQ/L (ref 3.4–5.1)
TRIGL SERPL-MCNC: 371 MG/DL (ref 10–200)

## 2021-02-22 ENCOUNTER — TRANSFERRED RECORDS (OUTPATIENT)
Dept: HEALTH INFORMATION MANAGEMENT | Facility: CLINIC | Age: 71
End: 2021-02-22

## 2021-03-03 DIAGNOSIS — E87.6 HYPOKALEMIA: ICD-10-CM

## 2021-03-03 DIAGNOSIS — K21.9 GASTROESOPHAGEAL REFLUX DISEASE WITHOUT ESOPHAGITIS: ICD-10-CM

## 2021-03-03 RX ORDER — PANTOPRAZOLE SODIUM 40 MG/1
TABLET, DELAYED RELEASE ORAL
Qty: 90 TABLET | Refills: 1 | Status: SHIPPED | OUTPATIENT
Start: 2021-03-03 | End: 2021-09-09

## 2021-03-03 NOTE — TELEPHONE ENCOUNTER
Potassium chloride ER 20 meq  Last Visit 11/03/20  Last Fill 1/06/20  Next Visit not scheduled    Thank you

## 2021-03-03 NOTE — TELEPHONE ENCOUNTER
Pantoprazole    40mg  Last Written Prescription Date:  9/18/2020  Last Fill Quantity: 90,   # refills: 1  Last Office Visit: 11/5/2019  Future Office visit:    Next 5 appointments (look out 90 days)    Mar 17, 2021  1:30 PM  (Arrive by 1:15 PM)  Return Visit with [facility] DPM  [facility] ([facility] ) [address] [place] [id number] [phone]           Routing refill request to provider for review/approval because:

## 2021-03-04 RX ORDER — POTASSIUM CHLORIDE 1500 MG/1
TABLET, EXTENDED RELEASE ORAL
Qty: 360 TABLET | Refills: 3 | Status: SHIPPED | OUTPATIENT
Start: 2021-03-04 | End: 2022-01-10

## 2021-03-08 ENCOUNTER — MYC MEDICAL ADVICE (OUTPATIENT)
Dept: PODIATRY | Facility: OTHER | Age: 71
End: 2021-03-08

## 2021-03-09 NOTE — TELEPHONE ENCOUNTER
Called patient to answer questions. They would do daily dressing changes of betadine and dry gauze to the tophi area of the toe. Continue to monitor daily for increased redness, pain, swelling or open areas, and if so call podiarty or go to ED. Any infection of toe can lead to amputation.

## 2021-03-11 ENCOUNTER — MYC MEDICAL ADVICE (OUTPATIENT)
Dept: PODIATRY | Facility: OTHER | Age: 71
End: 2021-03-11

## 2021-03-11 NOTE — TELEPHONE ENCOUNTER
Called patient and spoke with him and his wife Yazmin. Addressed their concerns on the dressing to the toe. Instructed to apply Betadine with dry gauze daily. To continue monitoring for SOI. Go directly to ED if sign of infection. Clarified appointment date and time in [facility] on 3/17/2021 @ 1:30. With patient and wife understanding.

## 2021-03-17 ENCOUNTER — ANCILLARY PROCEDURE (OUTPATIENT)
Dept: GENERAL RADIOLOGY | Facility: OTHER | Age: 71
End: 2021-03-17
Attending: PODIATRIST
Payer: MEDICARE

## 2021-03-17 ENCOUNTER — OFFICE VISIT (OUTPATIENT)
Dept: PODIATRY | Facility: OTHER | Age: 71
End: 2021-03-17
Attending: PODIATRIST
Payer: MEDICARE

## 2021-03-17 VITALS
SYSTOLIC BLOOD PRESSURE: 126 MMHG | HEART RATE: 87 BPM | OXYGEN SATURATION: 96 % | TEMPERATURE: 97.6 F | DIASTOLIC BLOOD PRESSURE: 70 MMHG

## 2021-03-17 DIAGNOSIS — L60.3 ONYCHODYSTROPHY: ICD-10-CM

## 2021-03-17 DIAGNOSIS — Z79.4 DIABETES MELLITUS TYPE 2, INSULIN DEPENDENT (H): ICD-10-CM

## 2021-03-17 DIAGNOSIS — E11.9 DIABETES MELLITUS TYPE 2, INSULIN DEPENDENT (H): ICD-10-CM

## 2021-03-17 DIAGNOSIS — L97.514 DIABETIC ULCER OF TOE OF RIGHT FOOT ASSOCIATED WITH TYPE 2 DIABETES MELLITUS, WITH NECROSIS OF BONE (H): Primary | ICD-10-CM

## 2021-03-17 DIAGNOSIS — E11.42 DIABETIC POLYNEUROPATHY ASSOCIATED WITH TYPE 2 DIABETES MELLITUS (H): ICD-10-CM

## 2021-03-17 DIAGNOSIS — R20.8 LOSS OF PROTECTIVE SENSATION OF SKIN OF DEFORMED FOOT: ICD-10-CM

## 2021-03-17 DIAGNOSIS — L97.516 DIABETIC ULCER OF TOE OF RIGHT FOOT ASSOCIATED WITH TYPE 2 DIABETES MELLITUS, WITH BONE INVOLVEMENT WITHOUT EVIDENCE OF NECROSIS (H): ICD-10-CM

## 2021-03-17 DIAGNOSIS — M1A.0711 CHRONIC IDIOPATHIC GOUT INVOLVING TOE OF RIGHT FOOT WITH TOPHUS: ICD-10-CM

## 2021-03-17 DIAGNOSIS — E11.621 DIABETIC ULCER OF TOE OF RIGHT FOOT ASSOCIATED WITH TYPE 2 DIABETES MELLITUS, WITH NECROSIS OF BONE (H): Primary | ICD-10-CM

## 2021-03-17 DIAGNOSIS — E11.621 DIABETIC ULCER OF TOE OF RIGHT FOOT ASSOCIATED WITH TYPE 2 DIABETES MELLITUS, WITH BONE INVOLVEMENT WITHOUT EVIDENCE OF NECROSIS (H): ICD-10-CM

## 2021-03-17 DIAGNOSIS — B35.3 TINEA PEDIS OF BOTH FEET: ICD-10-CM

## 2021-03-17 DIAGNOSIS — M21.969 LOSS OF PROTECTIVE SENSATION OF SKIN OF DEFORMED FOOT: ICD-10-CM

## 2021-03-17 PROCEDURE — 73630 X-RAY EXAM OF FOOT: CPT | Mod: TC,RT,FY

## 2021-03-17 PROCEDURE — 11721 DEBRIDE NAIL 6 OR MORE: CPT | Mod: 59 | Performed by: PODIATRIST

## 2021-03-17 PROCEDURE — G0463 HOSPITAL OUTPT CLINIC VISIT: HCPCS | Mod: 25

## 2021-03-17 PROCEDURE — 99213 OFFICE O/P EST LOW 20 MIN: CPT | Mod: 25 | Performed by: PODIATRIST

## 2021-03-17 PROCEDURE — 11043 DBRDMT MUSC&/FSCA 1ST 20/<: CPT | Performed by: PODIATRIST

## 2021-03-17 ASSESSMENT — PAIN SCALES - GENERAL: PAINLEVEL: NO PAIN (0)

## 2021-03-17 NOTE — NURSING NOTE
"Chief Complaint   Patient presents with     RECHECK     DFE And toenail trimming         Initial /70 (BP Location: Right arm, Patient Position: Sitting, Cuff Size: Adult Large)   Pulse 87   Temp 97.6  F (36.4  C) (Tympanic)   SpO2 96%  Estimated body mass index is 42.5 kg/m  as calculated from the following:    Height as of 2/25/20: 1.88 m (6' 2\").    Weight as of 11/3/20: 150.1 kg (331 lb).  Medication Reconciliation: complete  Mariama Walker LPN  "

## 2021-03-17 NOTE — PROGRESS NOTES
Chief complaint: Patient presents with:  RECHECK: DFE And toenail trimming        History of Present Illness: This 71 year old IDDM II is seen with his wife, Yazmin, for follow-up management of bilateral foot pain, a diabetic foot exam, high risk nail debridement, and concerns regarding gout in his RIGHT second digit. Patient had a lot of pain in the two about two to three weeks ago when he started noticing a white mass develop around the joint space. The pain fully subsided, but then the white mass opened to an open wound on the toe. The toe was initially red when it was painful, but the redness has also subsided. His wife has been treating the toe with betadine, gauze and a bandage daily. Patient was originally taking Allopurinol 300mg daily, but he stopped taking this over a year ago per his own decision because he didn't like the amount of medications that he was taking on his own.    No further pedal complaints today.     Last HbA1C was 6.0% on 01/20/2021    Previously 5.6% on 04/11/2019    /70 (BP Location: Right arm, Patient Position: Sitting, Cuff Size: Adult Large)   Pulse 87   Temp 97.6  F (36.4  C) (Tympanic)   SpO2 96%     Patient Active Problem List   Diagnosis     ACP (advance care planning)     Type 2 diabetes mellitus with diabetic neuropathy (H)     Morbid obesity due to excess calories (H)     Acquired hypothyroidism     Benign essential hypertension     Calculus of gallbladder without cholecystitis without obstruction     Cholecystitis     Gastroesophageal reflux disease without esophagitis       Past Surgical History:   Procedure Laterality Date     basal cell carcinoma  2017     CHOLECYSTECTOMY  11/23/2019     COLONOSCOPY - HIM SCAN  12/01/2012    Colonoscopy due to bleeding, no polyps 12-12     HERNIA REPAIR  2014       Current Outpatient Medications   Medication     allopurinol (ZYLOPRIM) 300 MG tablet     ascorbic acid (VITAMIN C) 500 MG tablet     B-D U/F insulin pen needle      blood glucose (ACCU-CHEK SMARTVIEW) test strip     blood glucose monitoring (ACCU-CHEK FASTCLIX) lancets     Cholecalciferol (VITAMIN D3) 50 MCG (2000 UT) CAPS     ciclopirox (LOPROX) 0.77 % cream     FEROSUL 325 (65 Fe) MG tablet     gabapentin (NEURONTIN) 300 MG capsule     GNP TERBINAFINE HYDROCHLORIDE 1 % external cream     horse chestnut 300 MG CAPS     insulin glargine (BASAGLAR KWIKPEN) 100 UNIT/ML pen     levothyroxine (SYNTHROID/LEVOTHROID) 300 MCG tablet     liraglutide (VICTOZA PEN) 18 MG/3ML solution     metFORMIN (GLUCOPHAGE) 1000 MG tablet     metoprolol succinate ER (TOPROL-XL) 100 MG 24 hr tablet     Multiple Vitamin (MULTI VITAMIN PO)     naproxen (NAPROSYN) 500 MG tablet     NOVOTWIST 32G X 5 MM insulin pen needle     pantoprazole (PROTONIX) 40 MG EC tablet     potassium chloride ER (KLOR-CON M) 20 MEQ CR tablet     QUEtiapine (SEROQUEL) 50 MG tablet     tamsulosin (FLOMAX) 0.4 MG capsule     torsemide (DEMADEX) 20 MG tablet     No current facility-administered medications for this visit.           Allergies   Allergen Reactions     Food      Onions, peppers, olives      Trazodone      Other reaction(s): Dizziness       Family History   Problem Relation Age of Onset     Unknown/Adopted Sister        Social History     Socioeconomic History     Marital status:      Spouse name: Not on file     Number of children: Not on file     Years of education: Not on file     Highest education level: Not on file   Occupational History     Not on file   Social Needs     Financial resource strain: Not on file     Food insecurity:     Worry: Not on file     Inability: Not on file     Transportation needs:     Medical: Not on file     Non-medical: Not on file   Tobacco Use     Smoking status: Never Smoker     Smokeless tobacco: Never Used   Substance and Sexual Activity     Alcohol use: Yes     Comment: beer daily      Drug use: No     Sexual activity: Not Currently     Partners: Female   Lifestyle      Physical activity:     Days per week: Not on file     Minutes per session: Not on file     Stress: Not on file   Relationships     Social connections:     Talks on phone: Not on file     Gets together: Not on file     Attends Mandaeism service: Not on file     Active member of club or organization: Not on file     Attends meetings of clubs or organizations: Not on file     Relationship status: Not on file     Intimate partner violence:     Fear of current or ex partner: Not on file     Emotionally abused: Not on file     Physically abused: Not on file     Forced sexual activity: Not on file   Other Topics Concern     Parent/sibling w/ CABG, MI or angioplasty before 65F 55M? Not Asked   Social History Narrative     Not on file       ROS: 10 point ROS neg other than the symptoms noted above in the HPI.  EXAM  Constitutional: healthy, alert and no distress    Psychiatric: mentation appears normal and affect normal/bright    VASCULAR:  -Dorsalis pedis pulse +1/4 b/l  -Posterior tibial pulse +1/4 b/l  -Capillary refill time < 3 seconds to b/l hallux  -Hair growth Absent to b/l anterior legs and ankles  -Varicosities and telangiectasias to bilateral feet  -Mild-to-moderate 1+ to 2+ pitting edema to bilateral foot and ankle  NEURO:  -Light touch sensation ABSENT to b/l plantar forefoot  -Protective sensation diminished with SWM +0/10 RIGHT and +0/10 LEFT on 02/05/2020  -Protective sensation diminished with SWM +0/10 RIGHT and +0/10 LEFT on 09/04/2019    DERM:  -Soft tissue mass to RIGHT distal third digit and LEFT distal medial hallux, superficial, red/yellow/white discoloration consistent with a gouty tophi with no open wound or drainage  ---No severe erythema, no ascending erythema, no calor, no purulence, no malodor, no other SOI.   -Skin to bilateral foot and leg has a persistent rubor with dry, flaking, and shiny and atrophic skin  -Skin temperature warm without calor to bilateral foot and leg  -Diffusely dry skin  with flaking of skin and erythema discoloration in a moccasin distribution to bilateral plantar feet  -Toenails elongated, thickened, dystrophic and discolored x 10  Wound Location:  RIGHT dorsal second digit DIPJ  03/17/2021  Measurement:  1.2cm x 1.4cm x 0.8cm to the bone  Drainage:  Moderate serous with tophi  Odor:  None  Undermining:  Moderate in all directions (0.2cm+)  Edges:  Fragile  Base:  100% tophi and to bone  Surrounding Skin: Intact  No severe erythema, no ascending erythema, no calor, no purulence, no malodor, no other SOI.   MSK:  -No pain on palpation to RIGHT dorsal 2nd digit  -Muscle strength of ankles +5/5 for dorsiflexion, plantarflexion, ABDUction and ADDuction b/l    RIGHT FOOT RADIOGRAPH 03/17/2021  IMPRESSION: Findings suspicious for osteomyelitis of the middle and distal phalanxes of the right second toe.    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 Treatment options discussed with no educational barriers noted.  Gout:  -The nature of gout and tophi within wounds was explained. The patient has a wound that is overflowing with tophi and the wound probes to the level of the bone. The patient needs to resume his gout medication to prevent worsening of the tophi and of this wound or to prevent other wounds from developing. Discussed the patient's Allopurinol with his PCP today,  Dr. Bethea, who said it is okay to proceed with resuming the patient's Allopurinol at 300mg starting today -- he does not need to slowly increase the dose per his PCP. The patient was given this information. It is important for the patient to resume this medication because even if he has a toe amputation, it is possible an amputation site may not heal as tophi may also overload the incision site. He and his wife understand these instructions.  -The patient's wound probes to bone  -Xrays from 03/17/2021 show destruction of bone which may be due to the gout, chronic bone infection, or both. Patient will be called with the results. The toe was stable today without acute SOI, so surgical intervention isn't needed emergently. Discussed with patient in the clinic that it is possible he may require at least a partial digital amputation in the near future. He would like to see how the toe progresses in the next week or two.    -Excisional debridement of wound on RIGHT dorsal second digit with a sharp ring curette and tissue nipper to the level of the muscle/tendon/fascia layer (<20 cm squared) (did not debride the bone)  ---Dressed wound with betadine soaked gauze, dry gauze and tape  ---Patient to continue with the above dressing every day to monitor for SOI    --------------------------------------------    -Follow-up foot exam for patient with LOPS  -Nails debrided x 10 without incident on 03/17/2021  -Diabetic Foot Education provided. This included checking the feet daily looking for new new blisters or wounds, wearing shoes at all times when walking including around the house, and avoiding lotion application between the toes. Any sign of infection in the foot warrant's the patient presenting to the ED as soon as possible.    -Patient in agreement with the above treatment plan and all of patient's questions were answered.      RTC one week and two weeks to monitor toe wound (already scheduled for [place] on  03/29/2021 at noon at in [facility] on 04/07/2021 at noon)      Genesis Bay DPM

## 2021-03-29 ENCOUNTER — MYC MEDICAL ADVICE (OUTPATIENT)
Dept: INTERNAL MEDICINE | Facility: OTHER | Age: 71
End: 2021-03-29

## 2021-03-29 ENCOUNTER — TELEPHONE (OUTPATIENT)
Dept: PODIATRY | Facility: OTHER | Age: 71
End: 2021-03-29

## 2021-03-29 DIAGNOSIS — M10.00 IDIOPATHIC GOUT, UNSPECIFIED CHRONICITY, UNSPECIFIED SITE: Primary | ICD-10-CM

## 2021-03-29 NOTE — TELEPHONE ENCOUNTER
Called patient and spoke with his wife Yazmin. Informed her that  reviewed his note and pictures and instructed pt to see his PCP for his gout flare up. Yazmin stated she sent a message to . Pt has appointment on 4/07/2021 with

## 2021-03-30 RX ORDER — PREDNISONE 20 MG/1
20 TABLET ORAL DAILY
Qty: 5 TABLET | Refills: 1 | Status: SHIPPED | OUTPATIENT
Start: 2021-03-30 | End: 2021-04-22

## 2021-04-07 ENCOUNTER — OFFICE VISIT (OUTPATIENT)
Dept: PODIATRY | Facility: OTHER | Age: 71
End: 2021-04-07
Attending: PODIATRIST
Payer: MEDICARE

## 2021-04-07 VITALS
SYSTOLIC BLOOD PRESSURE: 120 MMHG | TEMPERATURE: 96.8 F | OXYGEN SATURATION: 94 % | DIASTOLIC BLOOD PRESSURE: 76 MMHG | HEART RATE: 74 BPM

## 2021-04-07 DIAGNOSIS — E11.42 DIABETIC POLYNEUROPATHY ASSOCIATED WITH TYPE 2 DIABETES MELLITUS (H): ICD-10-CM

## 2021-04-07 DIAGNOSIS — E11.621 DIABETIC ULCER OF TOE OF RIGHT FOOT ASSOCIATED WITH TYPE 2 DIABETES MELLITUS, WITH NECROSIS OF BONE (H): Primary | ICD-10-CM

## 2021-04-07 DIAGNOSIS — B35.3 TINEA PEDIS OF BOTH FEET: ICD-10-CM

## 2021-04-07 DIAGNOSIS — E11.9 DIABETES MELLITUS TYPE 2, INSULIN DEPENDENT (H): ICD-10-CM

## 2021-04-07 DIAGNOSIS — M21.969 LOSS OF PROTECTIVE SENSATION OF SKIN OF DEFORMED FOOT: ICD-10-CM

## 2021-04-07 DIAGNOSIS — R20.8 LOSS OF PROTECTIVE SENSATION OF SKIN OF DEFORMED FOOT: ICD-10-CM

## 2021-04-07 DIAGNOSIS — L60.3 ONYCHODYSTROPHY: ICD-10-CM

## 2021-04-07 DIAGNOSIS — M1A.0711 CHRONIC IDIOPATHIC GOUT INVOLVING TOE OF RIGHT FOOT WITH TOPHUS: ICD-10-CM

## 2021-04-07 DIAGNOSIS — L97.514 DIABETIC ULCER OF TOE OF RIGHT FOOT ASSOCIATED WITH TYPE 2 DIABETES MELLITUS, WITH NECROSIS OF BONE (H): Primary | ICD-10-CM

## 2021-04-07 DIAGNOSIS — Z79.4 DIABETES MELLITUS TYPE 2, INSULIN DEPENDENT (H): ICD-10-CM

## 2021-04-07 PROCEDURE — 11042 DBRDMT SUBQ TIS 1ST 20SQCM/<: CPT | Performed by: PODIATRIST

## 2021-04-07 PROCEDURE — 99213 OFFICE O/P EST LOW 20 MIN: CPT | Mod: 25 | Performed by: PODIATRIST

## 2021-04-07 PROCEDURE — G0463 HOSPITAL OUTPT CLINIC VISIT: HCPCS

## 2021-04-07 RX ORDER — SODIUM HYPOCHLORITE 2.5 MG/ML
SOLUTION TOPICAL ONCE
Qty: 473 ML | Refills: 0 | Status: SHIPPED | OUTPATIENT
Start: 2021-04-07 | End: 2021-04-07

## 2021-04-07 ASSESSMENT — PAIN SCALES - GENERAL: PAINLEVEL: EXTREME PAIN (8)

## 2021-04-07 NOTE — NURSING NOTE
"Chief Complaint   Patient presents with     Wound Check     Right Foot       RECHECK     Gout         Initial /76 (BP Location: Right arm, Patient Position: Sitting, Cuff Size: Adult Large)   Pulse 74   Temp 96.8  F (36  C) (Tympanic)   SpO2 94%  Estimated body mass index is 42.5 kg/m  as calculated from the following:    Height as of 2/25/20: 1.88 m (6' 2\").    Weight as of 11/3/20: 150.1 kg (331 lb).  Medication Reconciliation: complete  Mariama Walker LPN  "

## 2021-04-15 DIAGNOSIS — Z79.4 TYPE 2 DIABETES MELLITUS WITH COMPLICATION, WITH LONG-TERM CURRENT USE OF INSULIN (H): ICD-10-CM

## 2021-04-15 DIAGNOSIS — I10 BENIGN ESSENTIAL HYPERTENSION: ICD-10-CM

## 2021-04-15 DIAGNOSIS — E11.8 TYPE 2 DIABETES MELLITUS WITH COMPLICATION, WITH LONG-TERM CURRENT USE OF INSULIN (H): ICD-10-CM

## 2021-04-15 RX ORDER — TORSEMIDE 20 MG/1
40 TABLET ORAL 2 TIMES DAILY
Qty: 360 TABLET | Refills: 3 | Status: SHIPPED | OUTPATIENT
Start: 2021-04-15 | End: 2022-04-20

## 2021-04-15 RX ORDER — INSULIN GLARGINE 100 [IU]/ML
INJECTION, SOLUTION SUBCUTANEOUS
Qty: 15 ML | Refills: 11 | Status: SHIPPED | OUTPATIENT
Start: 2021-04-15 | End: 2022-05-13

## 2021-04-15 NOTE — TELEPHONE ENCOUNTER
Demadex       Last Written Prescription Date:  5/5/2020  Last Fill Quantity: 360,   # refills: 3    Basaglar       Last Written Prescription Date:  4/6/2020  Last Fill Quantity: 30mL,   # refills: 11  Last Office Visit: 11/5/2019  Future Office visit:    Next 5 appointments (look out 90 days)    Apr 21, 2021  1:15 PM  (Arrive by 1:00 PM)  Return Visit with Genesis Bay DPM  [facility] ([facility] ) [address] [place] [id number] [phone]

## 2021-04-21 ENCOUNTER — OFFICE VISIT (OUTPATIENT)
Dept: PODIATRY | Facility: OTHER | Age: 71
End: 2021-04-21
Attending: PODIATRIST
Payer: MEDICARE

## 2021-04-21 VITALS
TEMPERATURE: 97.7 F | HEART RATE: 76 BPM | OXYGEN SATURATION: 93 % | SYSTOLIC BLOOD PRESSURE: 136 MMHG | BODY MASS INDEX: 42.5 KG/M2 | WEIGHT: 315 LBS | DIASTOLIC BLOOD PRESSURE: 78 MMHG

## 2021-04-21 DIAGNOSIS — L60.3 ONYCHODYSTROPHY: ICD-10-CM

## 2021-04-21 DIAGNOSIS — M21.969 LOSS OF PROTECTIVE SENSATION OF SKIN OF DEFORMED FOOT: ICD-10-CM

## 2021-04-21 DIAGNOSIS — R20.8 LOSS OF PROTECTIVE SENSATION OF SKIN OF DEFORMED FOOT: ICD-10-CM

## 2021-04-21 DIAGNOSIS — Z79.4 DIABETES MELLITUS TYPE 2, INSULIN DEPENDENT (H): ICD-10-CM

## 2021-04-21 DIAGNOSIS — B35.3 TINEA PEDIS OF BOTH FEET: ICD-10-CM

## 2021-04-21 DIAGNOSIS — E11.9 DIABETES MELLITUS TYPE 2, INSULIN DEPENDENT (H): ICD-10-CM

## 2021-04-21 DIAGNOSIS — L97.514 DIABETIC ULCER OF TOE OF RIGHT FOOT ASSOCIATED WITH TYPE 2 DIABETES MELLITUS, WITH NECROSIS OF BONE (H): Primary | ICD-10-CM

## 2021-04-21 DIAGNOSIS — E11.621 DIABETIC ULCER OF TOE OF RIGHT FOOT ASSOCIATED WITH TYPE 2 DIABETES MELLITUS, WITH NECROSIS OF BONE (H): Primary | ICD-10-CM

## 2021-04-21 DIAGNOSIS — M1A.0711 CHRONIC IDIOPATHIC GOUT INVOLVING TOE OF RIGHT FOOT WITH TOPHUS: ICD-10-CM

## 2021-04-21 DIAGNOSIS — E11.42 DIABETIC POLYNEUROPATHY ASSOCIATED WITH TYPE 2 DIABETES MELLITUS (H): ICD-10-CM

## 2021-04-21 PROCEDURE — 11042 DBRDMT SUBQ TIS 1ST 20SQCM/<: CPT | Performed by: PODIATRIST

## 2021-04-21 PROCEDURE — 99213 OFFICE O/P EST LOW 20 MIN: CPT | Mod: 25 | Performed by: PODIATRIST

## 2021-04-21 PROCEDURE — G0463 HOSPITAL OUTPT CLINIC VISIT: HCPCS | Mod: 25

## 2021-04-21 ASSESSMENT — PAIN SCALES - GENERAL: PAINLEVEL: NO PAIN (0)

## 2021-04-21 NOTE — PROGRESS NOTES
Chief complaint: Patient presents with:  Arthritis      History of Present Illness: This 71 year old IDDM II is seen with his wife, Yazmin, for follow-up management of a RIGHT foot 2nd digit ulceration that was previously probing to the bone.    The patient's wife has been changing the dressing every day with pouring some Dakin's solution on the toe, letting it air dry, then applying dry gauze and tape to the toe. He says the toe has not been painful.    He has continued to take Allopurinol 300mg daily since his last appointment.    No further pedal complaints today.     Last HbA1C was 6.0% on 01/20/2021    Previously 5.6% on 04/11/2019    /78   Pulse 76   Temp 97.7  F (36.5  C) (Tympanic)   Wt (!) 150.1 kg (331 lb)   SpO2 93%   BMI 42.50 kg/m      Patient Active Problem List   Diagnosis     ACP (advance care planning)     Type 2 diabetes mellitus with diabetic neuropathy (H)     Morbid obesity due to excess calories (H)     Acquired hypothyroidism     Benign essential hypertension     Calculus of gallbladder without cholecystitis without obstruction     Cholecystitis     Gastroesophageal reflux disease without esophagitis       Past Surgical History:   Procedure Laterality Date     basal cell carcinoma  2017     CHOLECYSTECTOMY  11/23/2019     COLONOSCOPY - HIM SCAN  12/01/2012    Colonoscopy due to bleeding, no polyps 12-12     HERNIA REPAIR  2014       Current Outpatient Medications   Medication     allopurinol (ZYLOPRIM) 300 MG tablet     ascorbic acid (VITAMIN C) 500 MG tablet     B-D U/F insulin pen needle     blood glucose (ACCU-CHEK SMARTVIEW) test strip     blood glucose monitoring (ACCU-CHEK FASTCLIX) lancets     Cholecalciferol (VITAMIN D3) 50 MCG (2000 UT) CAPS     ciclopirox (LOPROX) 0.77 % cream     FEROSUL 325 (65 Fe) MG tablet     gabapentin (NEURONTIN) 300 MG capsule     GNP TERBINAFINE HYDROCHLORIDE 1 % external cream     horse chestnut 300 MG CAPS     insulin glargine (BASAGLAR  KWIKPEN) 100 UNIT/ML pen     levothyroxine (SYNTHROID/LEVOTHROID) 300 MCG tablet     liraglutide (VICTOZA PEN) 18 MG/3ML solution     metFORMIN (GLUCOPHAGE) 1000 MG tablet     metoprolol succinate ER (TOPROL-XL) 100 MG 24 hr tablet     Multiple Vitamin (MULTI VITAMIN PO)     naproxen (NAPROSYN) 500 MG tablet     NOVOTWIST 32G X 5 MM insulin pen needle     pantoprazole (PROTONIX) 40 MG EC tablet     potassium chloride ER (KLOR-CON M) 20 MEQ CR tablet     predniSONE (DELTASONE) 20 MG tablet     QUEtiapine (SEROQUEL) 50 MG tablet     tamsulosin (FLOMAX) 0.4 MG capsule     torsemide (DEMADEX) 20 MG tablet     No current facility-administered medications for this visit.           Allergies   Allergen Reactions     Food      Onions, peppers, olives      Trazodone      Other reaction(s): Dizziness       Family History   Problem Relation Age of Onset     Unknown/Adopted Sister        Social History     Socioeconomic History     Marital status:      Spouse name: Not on file     Number of children: Not on file     Years of education: Not on file     Highest education level: Not on file   Occupational History     Not on file   Social Needs     Financial resource strain: Not on file     Food insecurity:     Worry: Not on file     Inability: Not on file     Transportation needs:     Medical: Not on file     Non-medical: Not on file   Tobacco Use     Smoking status: Never Smoker     Smokeless tobacco: Never Used   Substance and Sexual Activity     Alcohol use: Yes     Comment: beer daily      Drug use: No     Sexual activity: Not Currently     Partners: Female   Lifestyle     Physical activity:     Days per week: Not on file     Minutes per session: Not on file     Stress: Not on file   Relationships     Social connections:     Talks on phone: Not on file     Gets together: Not on file     Attends Jain service: Not on file     Active member of club or organization: Not on file     Attends meetings of clubs or  organizations: Not on file     Relationship status: Not on file     Intimate partner violence:     Fear of current or ex partner: Not on file     Emotionally abused: Not on file     Physically abused: Not on file     Forced sexual activity: Not on file   Other Topics Concern     Parent/sibling w/ CABG, MI or angioplasty before 65F 55M? Not Asked   Social History Narrative     Not on file       ROS: 10 point ROS neg other than the symptoms noted above in the HPI.  EXAM  Constitutional: healthy, alert and no distress    Psychiatric: mentation appears normal and affect normal/bright    RIGHT FOOT FOCUSED    VASCULAR:  -Dorsalis pedis pulse +1/4   -Posterior tibial pulse +1/4   -Capillary refill time < 3 seconds to hallux  -Hair growth Absent to b/l anterior leg and ankle  -Varicosities and telangiectasias to bilateral foot  -Mild-to-moderate 1+ to 2+ pitting edema to bilateral foot and ankle  NEURO:  -Light touch sensation ABSENT to b/l plantar forefoot  -Protective sensation diminished with SWM +0/10 RIGHT and +0/10 LEFT on 02/05/2020  -Protective sensation diminished with SWM +0/10 RIGHT and +0/10 LEFT on 09/04/2019    DERM:  -Soft tissue mass to RIGHT distal third digit and LEFT distal medial hallux, superficial, red/yellow/white discoloration consistent with a gouty tophi with no open wound or drainage  ---No severe erythema, no ascending erythema, no calor, no purulence, no malodor, no other SOI.   -Skin to bilateral foot and leg has a persistent rubor with dry, flaking, and shiny and atrophic skin  -Skin temperature warm without calor to bilateral foot and leg  -Diffusely dry skin with flaking of skin and erythema discoloration in a moccasin distribution to bilateral plantar feet  -Toenails elongated, thickened, dystrophic and discolored x 10  Wound Location:  RIGHT dorsal second digit DIPJ  04/21/2021  Measurement:  0.8cm x 1cm x 0.2cm to the subcutaneous tissue layer   Drainage:  Serous with mild tophi  Odor:   None  Undermining:  None  Edges:  Fragile  Base:  80% viable mixed with 20% tophi -- bone is covered and skin has a mild, boggy consistency that is fragile, but there is viable tissue that has covered the bone (same as 04/07/2021)  Surrounding Skin: Intact  No severe erythema, no ascending erythema, no calor, no purulence, no malodor, no other SOI.     04/07/2021  Measurement:  0.8cm x 1.1cm x 0.2cm to the subcutaneous tissue layer   Drainage:  Serous with mild tophi  Odor:  None  Undermining:  None  Edges:  Fragile  Base:  80% viable mixed with 20% tophi -- bone is covered and skin has a mild, boggy consistency that is fragile, but there is viable tissue that has covered the bone  Surrounding Skin: Intact  No severe erythema, no ascending erythema, no calor, no purulence, no malodor, no other SOI.     03/17/2021:  1.2cm x 1.4cm x 0.8cm to the bone    MSK:  -No pain on palpation to RIGHT dorsal 2nd digit  -Muscle strength of ankles +5/5 for dorsiflexion, plantarflexion, ABDUction and ADDuction b/l    RIGHT FOOT RADIOGRAPH 03/17/2021  IMPRESSION: Findings suspicious for osteomyelitis of the middle and distal phalanxes of the right second toe.    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 Treatment options discussed with no educational barriers noted.    -Most recent x-rays from 03/17/2021 show  destruction of bone which may be due to the gout, chronic bone infection, or both. The toe, however, is still not infected clinically and the wound has remained stable  ---There is still the possibility that the patient may require a partial or full toe amputation in the future if the wound does not heal, worsens, or an acute infection develops. Today, however, the toe has remained stable.  ---Continue Allopurinol 300mg daily. Explained the importance of continuing this daily to help the wound heal.    -Excisional debridement of wound on RIGHT dorsal second digit with a sharp 15-blade to the level of the muscle/tendon/fascia layer (<20 cm squared) (did not debride the bone)  ---Dressed wound with betadine soaked gauze, dry gauze and tape  ---Dakin's 0.25% strength ordered for patient on 04/07/2021 -- patient's wife may continue with the Dakin's dressinb ut she should apply Dakin's to the gauze and lightly pack this onto the wound bed vs. Just pouring the Dakin's and applying wet gauze to the wound after it has dried.  ---Patient to continue with the above dressing every day to monitor for SOI  ---Patient was instructed to look for SOI (redness, swelling, pain, purulence, fever, chills, nausea, vomiting) and to return to podiatry or the emergency department immediately if there are any SOI.   --------------------------------------------    -Nails last debrided on 03/17/2021    -Patient in agreement with the above treatment plan and all of patient's questions were answered.      RTC two weeks in [facility] to evaluate RIGHT second toe ulceration      Genesis Bay DPM

## 2021-04-21 NOTE — NURSING NOTE
"Chief Complaint   Patient presents with     Arthritis       Initial /78   Pulse 76   Temp 97.7  F (36.5  C) (Tympanic)   Wt (!) 150.1 kg (331 lb)   SpO2 93%   BMI 42.50 kg/m   Estimated body mass index is 42.5 kg/m  as calculated from the following:    Height as of 2/25/20: 1.88 m (6' 2\").    Weight as of this encounter: 150.1 kg (331 lb).  Medication Reconciliation: complete  Adrienne Wilson LPN    "

## 2021-04-22 DIAGNOSIS — N40.0 BENIGN PROSTATIC HYPERPLASIA WITHOUT LOWER URINARY TRACT SYMPTOMS: Primary | ICD-10-CM

## 2021-04-22 DIAGNOSIS — M10.00 IDIOPATHIC GOUT, UNSPECIFIED CHRONICITY, UNSPECIFIED SITE: ICD-10-CM

## 2021-04-22 RX ORDER — PREDNISONE 20 MG/1
20 TABLET ORAL DAILY
Qty: 5 TABLET | Refills: 1 | Status: SHIPPED | OUTPATIENT
Start: 2021-04-22 | End: 2021-09-09

## 2021-04-22 RX ORDER — TAMSULOSIN HYDROCHLORIDE 0.4 MG/1
CAPSULE ORAL
Qty: 90 CAPSULE | Refills: 2 | Status: SHIPPED | OUTPATIENT
Start: 2021-04-22 | End: 2021-09-30

## 2021-04-22 NOTE — TELEPHONE ENCOUNTER
Flomax 0.4 mg      Last Written Prescription Date:  4/30/18  Last Fill Quantity: 0,   # refills: 0  Last Office Visit: 11/5/19  Future Office visit:    Next 5 appointments (look out 90 days)    May 05, 2021  1:00 PM  (Arrive by 12:45 PM)  Return Visit with Genesis Bay DPM  [facility] ([facility] ) [address] [address]  [phone]           Routing refill request to provider for review/approval because:  Med reported as historical      Prednisone 20 mg      Last Written Prescription Date:  3/30/21  Last Fill Quantity: 5,   # refills: 1  Last Office Visit: 11/5/19  Future Office visit:    Next 5 appointments (look out 90 days)    May 05, 2021  1:00 PM  (Arrive by 12:45 PM)  Return Visit with Genesis Bay DPM  [facility] ([facility] ) [address] [address] [facility] [address]  [phone]           Routing refill request to provider for review/approval because:

## 2021-04-24 ENCOUNTER — HEALTH MAINTENANCE LETTER (OUTPATIENT)
Age: 71
End: 2021-04-24

## 2021-05-05 ENCOUNTER — OFFICE VISIT (OUTPATIENT)
Dept: PODIATRY | Facility: OTHER | Age: 71
End: 2021-05-05
Attending: INTERNAL MEDICINE
Payer: MEDICARE

## 2021-05-05 VITALS
TEMPERATURE: 98 F | DIASTOLIC BLOOD PRESSURE: 68 MMHG | HEART RATE: 68 BPM | WEIGHT: 315 LBS | OXYGEN SATURATION: 98 % | BODY MASS INDEX: 42.5 KG/M2 | SYSTOLIC BLOOD PRESSURE: 126 MMHG

## 2021-05-05 DIAGNOSIS — E11.621 DIABETIC ULCER OF TOE OF RIGHT FOOT ASSOCIATED WITH TYPE 2 DIABETES MELLITUS, WITH NECROSIS OF BONE (H): Primary | ICD-10-CM

## 2021-05-05 DIAGNOSIS — E11.9 DIABETES MELLITUS TYPE 2, INSULIN DEPENDENT (H): ICD-10-CM

## 2021-05-05 DIAGNOSIS — L60.3 ONYCHODYSTROPHY: ICD-10-CM

## 2021-05-05 DIAGNOSIS — M1A.0711 CHRONIC IDIOPATHIC GOUT INVOLVING TOE OF RIGHT FOOT WITH TOPHUS: ICD-10-CM

## 2021-05-05 DIAGNOSIS — L97.514 DIABETIC ULCER OF TOE OF RIGHT FOOT ASSOCIATED WITH TYPE 2 DIABETES MELLITUS, WITH NECROSIS OF BONE (H): Primary | ICD-10-CM

## 2021-05-05 DIAGNOSIS — M21.969 LOSS OF PROTECTIVE SENSATION OF SKIN OF DEFORMED FOOT: ICD-10-CM

## 2021-05-05 DIAGNOSIS — Z79.4 DIABETES MELLITUS TYPE 2, INSULIN DEPENDENT (H): ICD-10-CM

## 2021-05-05 DIAGNOSIS — E11.42 DIABETIC POLYNEUROPATHY ASSOCIATED WITH TYPE 2 DIABETES MELLITUS (H): ICD-10-CM

## 2021-05-05 DIAGNOSIS — R20.8 LOSS OF PROTECTIVE SENSATION OF SKIN OF DEFORMED FOOT: ICD-10-CM

## 2021-05-05 DIAGNOSIS — B35.3 TINEA PEDIS OF BOTH FEET: ICD-10-CM

## 2021-05-05 PROCEDURE — 11042 DBRDMT SUBQ TIS 1ST 20SQCM/<: CPT | Performed by: PODIATRIST

## 2021-05-05 PROCEDURE — G0463 HOSPITAL OUTPT CLINIC VISIT: HCPCS

## 2021-05-05 ASSESSMENT — PAIN SCALES - GENERAL: PAINLEVEL: WORST PAIN (10)

## 2021-05-05 NOTE — NURSING NOTE
"Chief Complaint   Patient presents with     GOUT TOE       Initial /68   Pulse 68   Temp 98  F (36.7  C) (Tympanic)   Wt (!) 150.1 kg (331 lb)   SpO2 98%   BMI 42.50 kg/m   Estimated body mass index is 42.5 kg/m  as calculated from the following:    Height as of 2/25/20: 1.88 m (6' 2\").    Weight as of this encounter: 150.1 kg (331 lb).  Medication Reconciliation: complete  Adrienne Wilson LPN    "

## 2021-05-05 NOTE — PROGRESS NOTES
Chief complaint: Patient presents with:  GOUT TOE      History of Present Illness: This 71 year old IDDM II is seen with his wife, Yazmin, for follow-up management of a RIGHT foot 2nd digit ulceration that was previously probing to the bone.    The patient's wife has been changing the dressing every day with Dakin's soaked gauze, dry gauze and tape. He says the toe has not been painful.    He has continued to take Allopurinol 300mg daily since his last appointment.    No further pedal complaints today.     Last HbA1C was 6.0% on 01/20/2021    Previously 5.6% on 04/11/2019    /68   Pulse 68   Temp 98  F (36.7  C) (Tympanic)   Wt (!) 150.1 kg (331 lb)   SpO2 98%   BMI 42.50 kg/m      Patient Active Problem List   Diagnosis     ACP (advance care planning)     Type 2 diabetes mellitus with diabetic neuropathy (H)     Morbid obesity due to excess calories (H)     Acquired hypothyroidism     Benign essential hypertension     Calculus of gallbladder without cholecystitis without obstruction     Cholecystitis     Gastroesophageal reflux disease without esophagitis       Past Surgical History:   Procedure Laterality Date     basal cell carcinoma  2017     CHOLECYSTECTOMY  11/23/2019     COLONOSCOPY - HIM SCAN  12/01/2012    Colonoscopy due to bleeding, no polyps 12-12     HERNIA REPAIR  2014       Current Outpatient Medications   Medication     allopurinol (ZYLOPRIM) 300 MG tablet     ascorbic acid (VITAMIN C) 500 MG tablet     B-D U/F insulin pen needle     blood glucose (ACCU-CHEK SMARTVIEW) test strip     blood glucose monitoring (ACCU-CHEK FASTCLIX) lancets     Cholecalciferol (VITAMIN D3) 50 MCG (2000 UT) CAPS     ciclopirox (LOPROX) 0.77 % cream     FEROSUL 325 (65 Fe) MG tablet     gabapentin (NEURONTIN) 300 MG capsule     GNP TERBINAFINE HYDROCHLORIDE 1 % external cream     horse chestnut 300 MG CAPS     insulin glargine (BASAGLAR KWIKPEN) 100 UNIT/ML pen     levothyroxine (SYNTHROID/LEVOTHROID) 300 MCG  tablet     liraglutide (VICTOZA PEN) 18 MG/3ML solution     metFORMIN (GLUCOPHAGE) 1000 MG tablet     metoprolol succinate ER (TOPROL-XL) 100 MG 24 hr tablet     Multiple Vitamin (MULTI VITAMIN PO)     naproxen (NAPROSYN) 500 MG tablet     NOVOTWIST 32G X 5 MM insulin pen needle     pantoprazole (PROTONIX) 40 MG EC tablet     potassium chloride ER (KLOR-CON M) 20 MEQ CR tablet     predniSONE (DELTASONE) 20 MG tablet     QUEtiapine (SEROQUEL) 50 MG tablet     tamsulosin (FLOMAX) 0.4 MG capsule     torsemide (DEMADEX) 20 MG tablet     No current facility-administered medications for this visit.           Allergies   Allergen Reactions     Food      Onions, peppers, olives      Trazodone      Other reaction(s): Dizziness       Family History   Problem Relation Age of Onset     Unknown/Adopted Sister        Social History     Socioeconomic History     Marital status:      Spouse name: Not on file     Number of children: Not on file     Years of education: Not on file     Highest education level: Not on file   Occupational History     Not on file   Social Needs     Financial resource strain: Not on file     Food insecurity:     Worry: Not on file     Inability: Not on file     Transportation needs:     Medical: Not on file     Non-medical: Not on file   Tobacco Use     Smoking status: Never Smoker     Smokeless tobacco: Never Used   Substance and Sexual Activity     Alcohol use: Yes     Comment: beer daily      Drug use: No     Sexual activity: Not Currently     Partners: Female   Lifestyle     Physical activity:     Days per week: Not on file     Minutes per session: Not on file     Stress: Not on file   Relationships     Social connections:     Talks on phone: Not on file     Gets together: Not on file     Attends Jain service: Not on file     Active member of club or organization: Not on file     Attends meetings of clubs or organizations: Not on file     Relationship status: Not on file     Intimate  partner violence:     Fear of current or ex partner: Not on file     Emotionally abused: Not on file     Physically abused: Not on file     Forced sexual activity: Not on file   Other Topics Concern     Parent/sibling w/ CABG, MI or angioplasty before 65F 55M? Not Asked   Social History Narrative     Not on file       ROS: 10 point ROS neg other than the symptoms noted above in the HPI.  EXAM  Constitutional: healthy, alert and no distress    Psychiatric: mentation appears normal and affect normal/bright    RIGHT FOOT FOCUSED    VASCULAR:  -Dorsalis pedis pulse +1/4   -Posterior tibial pulse +1/4   -Capillary refill time < 3 seconds to hallux  -Hair growth Absent to b/l anterior leg and ankle  -Varicosities and telangiectasias to bilateral foot  -Mild 1+ pitting edema to bilateral foot and ankle  NEURO:  -Light touch sensation ABSENT to plantar forefoot  -Protective sensation diminished with SWM +0/10 RIGHT and +0/10 LEFT on 02/05/2020  -Protective sensation diminished with SWM +0/10 RIGHT and +0/10 LEFT on 09/04/2019    DERM:  -Soft tissue mass to RIGHT distal third digit and LEFT distal medial hallux, superficial, red/yellow/white discoloration consistent with a gouty tophi with no open wound or drainage (LEFT foot not evaluated today)  ---No severe erythema, no ascending erythema, no calor, no purulence, no malodor, no other SOI.   -Skin to foot and leg has a persistent rubor with dry, flaking, and shiny and atrophic skin  -Skin temperature warm without calor to foot and leg  -Diffusely dry skin with flaking of skin and erythema discoloration in a moccasin distribution to plantar foot  -Toenails elongated, thickened, dystrophic and discolored x 5  Wound Location:  RIGHT dorsal second digit DIPJ  05/05/2021  Measurement:  0.8cm x 1cm x 0.2cm to the subcutaneous tissue layer   Drainage:  Serous with mild tophi  Odor:  None  Undermining:  None  Edges:  Fragile  Base:  70% viable, 20% fibrotic, 10% tophi  Surrounding  Skin: Intact  No severe erythema, no ascending erythema, no calor, no purulence, no malodor, no other SOI.     04/21/2021  Measurement:  0.8cm x 1cm x 0.2cm to the subcutaneous tissue layer   Drainage:  Serous with mild tophi  Odor:  None  Undermining:  None  Edges:  Fragile  Base:  80% viable mixed with 20% tophi -- bone is covered and skin has a mild, boggy consistency that is fragile, but there is viable tissue that has covered the bone (same as 04/07/2021)  Surrounding Skin: Intact  No severe erythema, no ascending erythema, no calor, no purulence, no malodor, no other SOI.     04/07/2021:  0.8cm x 1.1cm x 0.2cm to the subcutaneous tissue layer   03/17/2021:  1.2cm x 1.4cm x 0.8cm to the bone    MSK:  -No pain on palpation to RIGHT dorsal 2nd digit  -Muscle strength of ankles +5/5 for dorsiflexion, plantarflexion, ABDUction and ADDuction b/l    RIGHT FOOT RADIOGRAPH 03/17/2021  IMPRESSION: Findings suspicious for osteomyelitis of the middle and distal phalanxes of the right second toe.    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 Treatment options discussed with no educational barriers noted.    -Most recent x-rays from 03/17/2021 showed destruction of bone which may be due to the gout, chronic bone infection, or both. The toe, however, is still not infected clinically and the wound has remained  stable  ---There is still the possibility that the patient may require a partial or full toe amputation in the future if the wound does not heal, worsens, or an acute infection develops. Today, however, the toe has remained stable.  ---Continue Allopurinol 300mg daily. Explained the importance of continuing this daily to help the wound heal. He said he only has one bottle of medication remaining -- he is to contact his PCP if he needs a refill. He and his wife are in agreement with this plan.    -Excisional debridement of wound on RIGHT dorsal second digit with a sharp 15-blade to the level of the subcutaneous tissue layer (<20 cm squared) (did not debride the bone)  ---Dressed wound with betadine soaked gauze, dry gauze and tape  ---Dakin's 0.25% strength ordered for patient on 04/07/2021 -- patient's wife may continue with the Dakin's dressinb ut she should apply Dakin's to the gauze and lightly pack this onto the wound bed vs. Just pouring the Dakin's and applying wet gauze to the wound after it has dried.  ---Patient to continue with the above dressing every day to monitor for SOI  ---Patient was instructed to look for SOI (redness, swelling, pain, purulence, fever, chills, nausea, vomiting) and to return to podiatry or the emergency department immediately if there are any SOI.   --------------------------------------------    -Nails last debrided on 03/17/2021    -Patient in agreement with the above treatment plan and all of patient's questions were answered.      RTC four weeks in [facility] to evaluate RIGHT second toe ulceration and for diabetic foot exam and high risk nail debridement       [place] DPM

## 2021-05-17 DIAGNOSIS — I10 BENIGN ESSENTIAL HYPERTENSION: ICD-10-CM

## 2021-05-17 DIAGNOSIS — G47.00 INSOMNIA, UNSPECIFIED TYPE: ICD-10-CM

## 2021-05-17 DIAGNOSIS — E11.40 TYPE 2 DIABETES MELLITUS WITH DIABETIC NEUROPATHY, UNSPECIFIED WHETHER LONG TERM INSULIN USE (H): ICD-10-CM

## 2021-05-17 DIAGNOSIS — E11.9 TYPE 2 DIABETES, HBA1C GOAL < 8% (H): ICD-10-CM

## 2021-05-18 RX ORDER — QUETIAPINE FUMARATE 50 MG/1
TABLET, FILM COATED ORAL
Qty: 90 TABLET | Refills: 1 | Status: SHIPPED | OUTPATIENT
Start: 2021-05-18 | End: 2022-01-10

## 2021-05-18 RX ORDER — METOPROLOL SUCCINATE 100 MG/1
50 TABLET, EXTENDED RELEASE ORAL DAILY
Qty: 45 TABLET | Refills: 3 | Status: SHIPPED | OUTPATIENT
Start: 2021-05-18 | End: 2022-02-09

## 2021-05-19 ENCOUNTER — MYC MEDICAL ADVICE (OUTPATIENT)
Dept: INTERNAL MEDICINE | Facility: OTHER | Age: 71
End: 2021-05-19

## 2021-05-20 ENCOUNTER — MYC MEDICAL ADVICE (OUTPATIENT)
Dept: INTERNAL MEDICINE | Facility: OTHER | Age: 71
End: 2021-05-20

## 2021-05-24 ENCOUNTER — TRANSFERRED RECORDS (OUTPATIENT)
Dept: HEALTH INFORMATION MANAGEMENT | Facility: CLINIC | Age: 71
End: 2021-05-24

## 2021-06-02 ENCOUNTER — OFFICE VISIT (OUTPATIENT)
Dept: PODIATRY | Facility: OTHER | Age: 71
End: 2021-06-02
Attending: PODIATRIST
Payer: MEDICARE

## 2021-06-02 VITALS
WEIGHT: 315 LBS | DIASTOLIC BLOOD PRESSURE: 74 MMHG | BODY MASS INDEX: 42.5 KG/M2 | TEMPERATURE: 98.2 F | HEART RATE: 79 BPM | OXYGEN SATURATION: 93 % | SYSTOLIC BLOOD PRESSURE: 130 MMHG

## 2021-06-02 DIAGNOSIS — E11.9 DIABETES MELLITUS TYPE 2, INSULIN DEPENDENT (H): ICD-10-CM

## 2021-06-02 DIAGNOSIS — L84 CALLUS OF FOOT: Primary | ICD-10-CM

## 2021-06-02 DIAGNOSIS — M1A.0711 CHRONIC IDIOPATHIC GOUT INVOLVING TOE OF RIGHT FOOT WITH TOPHUS: ICD-10-CM

## 2021-06-02 DIAGNOSIS — Z79.4 DIABETES MELLITUS TYPE 2, INSULIN DEPENDENT (H): ICD-10-CM

## 2021-06-02 DIAGNOSIS — R20.8 LOSS OF PROTECTIVE SENSATION OF SKIN OF DEFORMED FOOT: ICD-10-CM

## 2021-06-02 DIAGNOSIS — M21.969 LOSS OF PROTECTIVE SENSATION OF SKIN OF DEFORMED FOOT: ICD-10-CM

## 2021-06-02 DIAGNOSIS — B35.3 TINEA PEDIS OF BOTH FEET: ICD-10-CM

## 2021-06-02 DIAGNOSIS — L60.3 ONYCHODYSTROPHY: ICD-10-CM

## 2021-06-02 DIAGNOSIS — E11.42 DIABETIC POLYNEUROPATHY ASSOCIATED WITH TYPE 2 DIABETES MELLITUS (H): ICD-10-CM

## 2021-06-02 PROCEDURE — G0463 HOSPITAL OUTPT CLINIC VISIT: HCPCS

## 2021-06-02 PROCEDURE — 99212 OFFICE O/P EST SF 10 MIN: CPT | Performed by: PODIATRIST

## 2021-06-02 ASSESSMENT — PAIN SCALES - GENERAL: PAINLEVEL: EXTREME PAIN (8)

## 2021-06-02 NOTE — NURSING NOTE
"Chief Complaint   Patient presents with     WOUND CARE       Initial /74   Pulse 79   Temp 98.2  F (36.8  C) (Tympanic)   Wt (!) 150.1 kg (331 lb)   SpO2 93%   BMI 42.50 kg/m   Estimated body mass index is 42.5 kg/m  as calculated from the following:    Height as of 2/25/20: 1.88 m (6' 2\").    Weight as of this encounter: 150.1 kg (331 lb).  Medication Reconciliation: complete  Adrienne Wilson LPN    "

## 2021-06-02 NOTE — PROGRESS NOTES
Chief complaint: Patient presents with:  WOUND CARE      History of Present Illness: This 71 year old IDDM II is seen with his wife, Yazmin, for follow-up management of a RIGHT foot 2nd digit ulceration that was previously probing to the bone.    The patient's wife has been changing the dressing every day with Dakin's soaked gauze, dry gauze and tape. He says the toe has not been painful.    He has continued to take Allopurinol 300mg daily since his last appointment.    No further pedal complaints today.     Last HbA1C was 6.0% on 01/20/2021    Previously 5.6% on 04/11/2019      /74   Pulse 79   Temp 98.2  F (36.8  C) (Tympanic)   Wt (!) 150.1 kg (331 lb)   SpO2 93%   BMI 42.50 kg/m      Patient Active Problem List   Diagnosis     ACP (advance care planning)     Type 2 diabetes mellitus with diabetic neuropathy (H)     Morbid obesity due to excess calories (H)     Acquired hypothyroidism     Benign essential hypertension     Calculus of gallbladder without cholecystitis without obstruction     Cholecystitis     Gastroesophageal reflux disease without esophagitis       Past Surgical History:   Procedure Laterality Date     basal cell carcinoma  2017     CHOLECYSTECTOMY  11/23/2019     COLONOSCOPY - HIM SCAN  12/01/2012    Colonoscopy due to bleeding, no polyps 12-12     HERNIA REPAIR  2014       Current Outpatient Medications   Medication     allopurinol (ZYLOPRIM) 300 MG tablet     ascorbic acid (VITAMIN C) 500 MG tablet     B-D U/F insulin pen needle     blood glucose (ACCU-CHEK SMARTVIEW) test strip     blood glucose monitoring (ACCU-CHEK FASTCLIX) lancets     Cholecalciferol (VITAMIN D3) 50 MCG (2000 UT) CAPS     ciclopirox (LOPROX) 0.77 % cream     FEROSUL 325 (65 Fe) MG tablet     gabapentin (NEURONTIN) 300 MG capsule     GNP TERBINAFINE HYDROCHLORIDE 1 % external cream     horse chestnut 300 MG CAPS     insulin glargine (BASAGLAR KWIKPEN) 100 UNIT/ML pen     levothyroxine (SYNTHROID/LEVOTHROID) 300  MCG tablet     liraglutide (VICTOZA PEN) 18 MG/3ML solution     metFORMIN (GLUCOPHAGE) 1000 MG tablet     metoprolol succinate ER (TOPROL-XL) 100 MG 24 hr tablet     Multiple Vitamin (MULTI VITAMIN PO)     naproxen (NAPROSYN) 500 MG tablet     NOVOTWIST 32G X 5 MM insulin pen needle     pantoprazole (PROTONIX) 40 MG EC tablet     potassium chloride ER (KLOR-CON M) 20 MEQ CR tablet     predniSONE (DELTASONE) 20 MG tablet     QUEtiapine (SEROQUEL) 50 MG tablet     tamsulosin (FLOMAX) 0.4 MG capsule     torsemide (DEMADEX) 20 MG tablet     No current facility-administered medications for this visit.           Allergies   Allergen Reactions     Food      Onions, peppers, olives      Trazodone      Other reaction(s): Dizziness       Family History   Problem Relation Age of Onset     Unknown/Adopted Sister        Social History     Socioeconomic History     Marital status:      Spouse name: Not on file     Number of children: Not on file     Years of education: Not on file     Highest education level: Not on file   Occupational History     Not on file   Social Needs     Financial resource strain: Not on file     Food insecurity:     Worry: Not on file     Inability: Not on file     Transportation needs:     Medical: Not on file     Non-medical: Not on file   Tobacco Use     Smoking status: Never Smoker     Smokeless tobacco: Never Used   Substance and Sexual Activity     Alcohol use: Yes     Comment: beer daily      Drug use: No     Sexual activity: Not Currently     Partners: Female   Lifestyle     Physical activity:     Days per week: Not on file     Minutes per session: Not on file     Stress: Not on file   Relationships     Social connections:     Talks on phone: Not on file     Gets together: Not on file     Attends Jain service: Not on file     Active member of club or organization: Not on file     Attends meetings of clubs or organizations: Not on file     Relationship status: Not on file     Intimate  partner violence:     Fear of current or ex partner: Not on file     Emotionally abused: Not on file     Physically abused: Not on file     Forced sexual activity: Not on file   Other Topics Concern     Parent/sibling w/ CABG, MI or angioplasty before 65F 55M? Not Asked   Social History Narrative     Not on file       ROS: 10 point ROS neg other than the symptoms noted above in the HPI.  EXAM  Constitutional: healthy, alert and no distress    Psychiatric: mentation appears normal and affect normal/bright    RIGHT FOOT FOCUSED    VASCULAR:  -Dorsalis pedis pulse +1/4   -Posterior tibial pulse +1/4   -Capillary refill time < 3 seconds to hallux  -Hair growth Absent to b/l anterior leg and ankle  -Varicosities and telangiectasias to bilateral foot  -Mild 1+ pitting edema to bilateral foot and ankle  NEURO:  -Light touch sensation ABSENT to plantar forefoot  -Protective sensation diminished with SWM +0/10 RIGHT and +0/10 LEFT on 02/05/2020  -Protective sensation diminished with SWM +0/10 RIGHT and +0/10 LEFT on 09/04/2019    DERM:  -Soft tissue mass to RIGHT distal third digit and LEFT distal medial hallux, superficial, red/yellow/white discoloration consistent with a gouty tophi with no open wound or drainage (LEFT foot not evaluated today)  ---No severe erythema, no ascending erythema, no calor, no purulence, no malodor, no other SOI.   -Skin to foot and leg has a persistent rubor with dry, flaking, and shiny and atrophic skin  -Skin temperature warm without calor to foot and leg  -Diffusely dry skin with flaking of skin and erythema discoloration in a moccasin distribution to plantar foot  -Toenails elongated, thickened, dystrophic and discolored x 5  Wound Location:  RIGHT dorsal second digit DIPJ  06/02/2021  Measurement:  0.5cm x 0.5cm x well-adhered scab without manjit-wound drainage  Drainage:  None  Odor:  None  Undermining:  None  Edges:  Intact  Base:  100% well adhered scab  Surrounding Skin:  Intact  Non-pitting edema to the digit compared to the surrounding digits  No severe erythema, no ascending erythema, no calor, no purulence, no malodor, no other SOI.     05/05/2021  Measurement:  0.8cm x 1cm x 0.2cm to the subcutaneous tissue layer   Drainage:  Serous with mild tophi  Odor:  None  Undermining:  None  Edges:  Fragile  Base:  70% viable, 20% fibrotic, 10% tophi  Surrounding Skin: Intact  No severe erythema, no ascending erythema, no calor, no purulence, no malodor, no other SOI.     04/21/2021:  0.8cm x 1cm x 0.2cm to the subcutaneous tissue layer   04/07/2021:  0.8cm x 1.1cm x 0.2cm to the subcutaneous tissue layer   03/17/2021:  1.2cm x 1.4cm x 0.8cm to the bone    MSK:  -No pain on palpation to RIGHT dorsal 2nd digit  -Muscle strength of ankles +5/5 for dorsiflexion, plantarflexion, ABDUction and ADDuction b/l    RIGHT FOOT RADIOGRAPH 03/17/2021  IMPRESSION: Findings suspicious for osteomyelitis of the middle and distal phalanxes of the right second toe.    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 Treatment options discussed with no educational barriers noted.    -Most recent x-rays from 03/17/2021 showed destruction of bone which may be due to the gout, chronic bone infection, or both. The toe, however, is still not infected clinically and the wound has remained stable  ---There is still the possibility that the patient may require a partial or full toe amputation in the future if  the wound does not heal, worsens, or an acute infection develops. Today, however, the toe has continued to appear stable.   ---Continue Allopurinol 300mg daily. Explained the importance of continuing this daily to help the wound heal. He said he only has one bottle of medication remaining -- he is to contact his PCP if he needs a refill. He and his wife are in agreement with this plan.     -No debridement required today. The dorsal digit has a well adhered scab without any open wounds and without any drainage.  ---No severe erythema, no ascending erythema, no calor, no purulence, no malodor, no other SOI.   -Applied a dry gauze dressing  -Patient's wife may apply a dry dressing daily to prevent the scab from becoming pulled by a sock or sheet. If there is a clear drainage noted, then the patient's wife should resume the Dakin's dressing.   ---Dakin's 0.25% strength ordered for patient on 04/07/2021     -Patient was instructed to look for SOI (redness, swelling, pain, purulence, fever, chills, nausea, vomiting) and to return to podiatry or the emergency department immediately if there are any SOI.   --------------------------------------------    -Nails last debrided on 03/17/2021    -Patient in agreement with the above treatment plan and all of patient's questions were answered.      RTC four weeks in [facility] to evaluate RIGHT second toe ulceration and for diabetic foot exam and high risk nail debridement       [place] DPM

## 2021-06-16 ENCOUNTER — TRANSFERRED RECORDS (OUTPATIENT)
Dept: HEALTH INFORMATION MANAGEMENT | Facility: HOSPITAL | Age: 71
End: 2021-06-16

## 2021-06-16 LAB — RETINOPATHY: NORMAL

## 2021-07-07 ENCOUNTER — OFFICE VISIT (OUTPATIENT)
Dept: PODIATRY | Facility: OTHER | Age: 71
End: 2021-07-07
Attending: PODIATRIST
Payer: MEDICARE

## 2021-07-07 VITALS
HEART RATE: 74 BPM | TEMPERATURE: 97.8 F | SYSTOLIC BLOOD PRESSURE: 138 MMHG | WEIGHT: 315 LBS | DIASTOLIC BLOOD PRESSURE: 76 MMHG | BODY MASS INDEX: 42.5 KG/M2 | OXYGEN SATURATION: 93 %

## 2021-07-07 DIAGNOSIS — L84 CALLUS OF FOOT: Primary | ICD-10-CM

## 2021-07-07 DIAGNOSIS — Z79.4 DIABETES MELLITUS TYPE 2, INSULIN DEPENDENT (H): ICD-10-CM

## 2021-07-07 DIAGNOSIS — B35.3 TINEA PEDIS OF BOTH FEET: ICD-10-CM

## 2021-07-07 DIAGNOSIS — M1A.0711 CHRONIC IDIOPATHIC GOUT INVOLVING TOE OF RIGHT FOOT WITH TOPHUS: ICD-10-CM

## 2021-07-07 DIAGNOSIS — E11.9 DIABETES MELLITUS TYPE 2, INSULIN DEPENDENT (H): ICD-10-CM

## 2021-07-07 DIAGNOSIS — R20.8 LOSS OF PROTECTIVE SENSATION OF SKIN OF DEFORMED FOOT: ICD-10-CM

## 2021-07-07 DIAGNOSIS — M21.969 LOSS OF PROTECTIVE SENSATION OF SKIN OF DEFORMED FOOT: ICD-10-CM

## 2021-07-07 DIAGNOSIS — L60.3 ONYCHODYSTROPHY: ICD-10-CM

## 2021-07-07 DIAGNOSIS — E11.42 DIABETIC POLYNEUROPATHY ASSOCIATED WITH TYPE 2 DIABETES MELLITUS (H): ICD-10-CM

## 2021-07-07 PROCEDURE — 99212 OFFICE O/P EST SF 10 MIN: CPT | Performed by: PODIATRIST

## 2021-07-07 PROCEDURE — G0463 HOSPITAL OUTPT CLINIC VISIT: HCPCS

## 2021-07-07 ASSESSMENT — PAIN SCALES - GENERAL: PAINLEVEL: EXTREME PAIN (8)

## 2021-07-07 NOTE — NURSING NOTE
"Chief Complaint   Patient presents with     Wound Check       Initial /76   Pulse 74   Temp 97.8  F (36.6  C) (Tympanic)   Wt (!) 150.1 kg (331 lb)   SpO2 93%   BMI 42.50 kg/m   Estimated body mass index is 42.5 kg/m  as calculated from the following:    Height as of 2/25/20: 1.88 m (6' 2\").    Weight as of this encounter: 150.1 kg (331 lb).  Medication Reconciliation: complete  Adrienne Wilson LPN    "

## 2021-07-07 NOTE — PROGRESS NOTES
Chief complaint: Patient presents with:  Wound Check      History of Present Illness: This 71 year old IDDM II is seen with his wife, Yazmin, for follow-up management of a RIGHT foot 2nd digit ulceration that was previously probing to the bone.    The patient's wife has been changing the dressing every day with dry gauze and tape. She has not seen any drainage on the dressings. The patient has no pain in the toe.    He has continued to take Allopurinol 300mg daily since his last appointment.    The patient says he does not think his toenails are long enough today to require debridement.    No further pedal complaints today.     Last HbA1C was 6.0% on 01/20/2021    Previously 5.6% on 04/11/2019      /76   Pulse 74   Temp 97.8  F (36.6  C) (Tympanic)   Wt (!) 150.1 kg (331 lb)   SpO2 93%   BMI 42.50 kg/m      Patient Active Problem List   Diagnosis     ACP (advance care planning)     Type 2 diabetes mellitus with diabetic neuropathy (H)     Morbid obesity due to excess calories (H)     Acquired hypothyroidism     Benign essential hypertension     Calculus of gallbladder without cholecystitis without obstruction     Cholecystitis     Gastroesophageal reflux disease without esophagitis     Depression     Congenital anomaly of spleen     Disorder of lipoid metabolism     Other lipoprotein metabolism disorders     Malignant neoplasm of thyroid gland (H)     Tremor     Hypothyroidism, postsurgical       Past Surgical History:   Procedure Laterality Date     basal cell carcinoma  2017     CHOLECYSTECTOMY  11/23/2019     COLONOSCOPY - HIM SCAN  12/01/2012    Colonoscopy due to bleeding, no polyps 12-12     HERNIA REPAIR  2014       Current Outpatient Medications   Medication     allopurinol (ZYLOPRIM) 300 MG tablet     ascorbic acid (VITAMIN C) 500 MG tablet     B-D U/F insulin pen needle     blood glucose (ACCU-CHEK SMARTVIEW) test strip     blood glucose monitoring (ACCU-CHEK FASTCLIX) lancets      Cholecalciferol (VITAMIN D3) 50 MCG (2000 UT) CAPS     ciclopirox (LOPROX) 0.77 % cream     FEROSUL 325 (65 Fe) MG tablet     gabapentin (NEURONTIN) 300 MG capsule     GNP TERBINAFINE HYDROCHLORIDE 1 % external cream     horse chestnut 300 MG CAPS     insulin glargine (BASAGLAR KWIKPEN) 100 UNIT/ML pen     levothyroxine (SYNTHROID/LEVOTHROID) 300 MCG tablet     liraglutide (VICTOZA PEN) 18 MG/3ML solution     metFORMIN (GLUCOPHAGE) 1000 MG tablet     metoprolol succinate ER (TOPROL-XL) 100 MG 24 hr tablet     Multiple Vitamin (MULTI VITAMIN PO)     naproxen (NAPROSYN) 500 MG tablet     NOVOTWIST 32G X 5 MM insulin pen needle     pantoprazole (PROTONIX) 40 MG EC tablet     potassium chloride ER (KLOR-CON M) 20 MEQ CR tablet     predniSONE (DELTASONE) 20 MG tablet     QUEtiapine (SEROQUEL) 50 MG tablet     tamsulosin (FLOMAX) 0.4 MG capsule     torsemide (DEMADEX) 20 MG tablet     No current facility-administered medications for this visit.           Allergies   Allergen Reactions     Food      Onions, peppers, olives      Trazodone      Other reaction(s): Dizziness       Family History   Problem Relation Age of Onset     Unknown/Adopted Sister        Social History     Socioeconomic History     Marital status:      Spouse name: Not on file     Number of children: Not on file     Years of education: Not on file     Highest education level: Not on file   Occupational History     Not on file   Social Needs     Financial resource strain: Not on file     Food insecurity:     Worry: Not on file     Inability: Not on file     Transportation needs:     Medical: Not on file     Non-medical: Not on file   Tobacco Use     Smoking status: Never Smoker     Smokeless tobacco: Never Used   Substance and Sexual Activity     Alcohol use: Yes     Comment: beer daily      Drug use: No     Sexual activity: Not Currently     Partners: Female   Lifestyle     Physical activity:     Days per week: Not on file     Minutes per session:  Not on file     Stress: Not on file   Relationships     Social connections:     Talks on phone: Not on file     Gets together: Not on file     Attends Scientologist service: Not on file     Active member of club or organization: Not on file     Attends meetings of clubs or organizations: Not on file     Relationship status: Not on file     Intimate partner violence:     Fear of current or ex partner: Not on file     Emotionally abused: Not on file     Physically abused: Not on file     Forced sexual activity: Not on file   Other Topics Concern     Parent/sibling w/ CABG, MI or angioplasty before 65F 55M? Not Asked   Social History Narrative     Not on file       ROS: 10 point ROS neg other than the symptoms noted above in the HPI.  EXAM  Constitutional: healthy, alert and no distress    Psychiatric: mentation appears normal and affect normal/bright    RIGHT FOOT FOCUSED    VASCULAR:  -Dorsalis pedis pulse +1/4   -Posterior tibial pulse +1/4   -Capillary refill time < 3 seconds to hallux  -Hair growth Absent to b/l anterior leg and ankle  -Varicosities and telangiectasias to bilateral foot  -Mild 1+ pitting edema to bilateral foot and ankle  NEURO:  -Light touch sensation ABSENT to plantar forefoot  -Protective sensation diminished with SWM +0/10 RIGHT and +0/10 LEFT on 02/05/2020  -Protective sensation diminished with SWM +0/10 RIGHT and +0/10 LEFT on 09/04/2019    DERM:  -Soft tissue mass to RIGHT distal third digit and LEFT distal medial hallux, superficial, red/yellow/white discoloration consistent with a gouty tophi with no open wound or drainage (LEFT foot not evaluated today)  ---No severe erythema, no ascending erythema, no calor, no purulence, no malodor, no other SOI.   -Skin to foot and leg has a persistent rubor with dry, flaking, and shiny and atrophic skin  -Skin temperature warm without calor to foot and leg  -Diffusely dry skin with flaking of skin and erythema discoloration in a moccasin distribution to  plantar foot  -Toenails elongated, thickened, dystrophic and discolored x 5  Wound Location:  RIGHT dorsal second digit DIPJ  07/07/2021: Minimal scab -- flat and even with level of skin without any flaking of skin  ---No open wounds or drainage  ---No severe erythema, no ascending erythema, no calor, no purulence, no malodor, no other SOI.     06/02/2021  Measurement:  0.5cm x 0.5cm x well-adhered scab without manjit-wound drainage  Drainage:  None  Odor:  None  Undermining:  None  Edges:  Intact  Base:  100% well adhered scab  Surrounding Skin: Intact  Non-pitting edema to the digit compared to the surrounding digits  No severe erythema, no ascending erythema, no calor, no purulence, no malodor, no other SOI.     05/05/2021:  0.8cm x 1cm x 0.2cm to the subcutaneous tissue layer   04/21/2021:  0.8cm x 1cm x 0.2cm to the subcutaneous tissue layer   04/07/2021:  0.8cm x 1.1cm x 0.2cm to the subcutaneous tissue layer   03/17/2021:  1.2cm x 1.4cm x 0.8cm to the bone    MSK:  -No pain on palpation to RIGHT dorsal 2nd digit  -Muscle strength of ankles +5/5 for dorsiflexion, plantarflexion, ABDUction and ADDuction b/l    RIGHT FOOT RADIOGRAPH 03/17/2021  IMPRESSION: Findings suspicious for osteomyelitis of the middle and distal phalanxes of the right second toe.    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 Treatment options discussed with no educational barriers noted.  -Patient's toe has no open wounds and no  drainage. The scab is smooth and level with the skin. He may discontinue dressings. However, if any loose skin develops around the scab, he may consider resuming a dry gauze dressing to protect the scab so it does not rip off with a sock.  -Advised patient to continue with Allopurinol and discuss medication changes with his PCP if this is not helping and/or if a new flare occurs or if a new wound develops with tophi  -Patient also advised to call podiatry with any new open wounds on the toes  -Patient's toenails have minimal length. He would like to wait a couple more months before having them debrided again.  ---Nails last debrided on 03/17/2021    -Patient in agreement with the above treatment plan and all of patient's questions were answered.      RTC two months in [facility] to evaluate RIGHT second toe (recently healed) ulceration and for diabetic foot exam and high risk nail debridement   ---Will consider resuming six month follow-up appointments for diabetic foot exams and high risk nail debridements if patient is doing well at follow-up appointment      Genesis Bay DPM

## 2021-08-12 DIAGNOSIS — M1A.00X0 IDIOPATHIC CHRONIC GOUT WITHOUT TOPHUS, UNSPECIFIED SITE: Primary | ICD-10-CM

## 2021-08-12 RX ORDER — ALLOPURINOL 300 MG/1
300 TABLET ORAL
OUTPATIENT
Start: 2021-08-12

## 2021-08-12 NOTE — TELEPHONE ENCOUNTER
Allopurinol 300 mg      Last Written Prescription Date:  4/12/17  Last Fill Quantity: unknown,   # refills: unknown  Last Office Visit: 7/07/21  Future Office visit:    Next 5 appointments (look out 90 days)    Aug 19, 2021  1:45 PM  (Arrive by 1:30 PM)  Return Visit with Ayana Knight MD  [facility] [address]  [phone]   Sep 01, 2021  2:00 PM  (Arrive by 1:45 PM)  Return Visit with Genesis Bay DPM  [facility] ([facility] ) [address] [place] [id number] [phone]           Routing refill request to provider for review/approval because:  Drug not on the [facility], [facility] or [facility] refill protocol or controlled substance

## 2021-08-12 NOTE — TELEPHONE ENCOUNTER
Allopurinol historic med 4 years ago. Unclear if wanting to restart or continue. May need appointment.

## 2021-08-14 ENCOUNTER — HEALTH MAINTENANCE LETTER (OUTPATIENT)
Age: 71
End: 2021-08-14

## 2021-08-15 ENCOUNTER — MYC MEDICAL ADVICE (OUTPATIENT)
Dept: INTERNAL MEDICINE | Facility: OTHER | Age: 71
End: 2021-08-15

## 2021-08-15 DIAGNOSIS — E11.40 TYPE 2 DIABETES MELLITUS WITH DIABETIC NEUROPATHY, WITH LONG-TERM CURRENT USE OF INSULIN (H): Primary | ICD-10-CM

## 2021-08-15 DIAGNOSIS — Z79.4 TYPE 2 DIABETES MELLITUS WITH DIABETIC NEUROPATHY, WITH LONG-TERM CURRENT USE OF INSULIN (H): Primary | ICD-10-CM

## 2021-08-16 RX ORDER — ALLOPURINOL 300 MG/1
300 TABLET ORAL DAILY
Qty: 90 TABLET | Refills: 1 | Status: SHIPPED | OUTPATIENT
Start: 2021-08-16 | End: 2021-10-25

## 2021-08-16 NOTE — TELEPHONE ENCOUNTER
Never filled. Confirmed by pharmacy. Note from Dr. Bay states he has been taking it. Order pending

## 2021-09-01 ENCOUNTER — OFFICE VISIT (OUTPATIENT)
Dept: PODIATRY | Facility: OTHER | Age: 71
End: 2021-09-01
Attending: PODIATRIST
Payer: MEDICARE

## 2021-09-01 ENCOUNTER — LAB (OUTPATIENT)
Dept: LAB | Facility: OTHER | Age: 71
End: 2021-09-01
Attending: PODIATRIST
Payer: MEDICARE

## 2021-09-01 VITALS
BODY MASS INDEX: 43.27 KG/M2 | DIASTOLIC BLOOD PRESSURE: 66 MMHG | TEMPERATURE: 98.4 F | OXYGEN SATURATION: 97 % | WEIGHT: 315 LBS | HEART RATE: 69 BPM | SYSTOLIC BLOOD PRESSURE: 140 MMHG

## 2021-09-01 DIAGNOSIS — E11.40 TYPE 2 DIABETES MELLITUS WITH DIABETIC NEUROPATHY, WITH LONG-TERM CURRENT USE OF INSULIN (H): ICD-10-CM

## 2021-09-01 DIAGNOSIS — M21.969 LOSS OF PROTECTIVE SENSATION OF SKIN OF DEFORMED FOOT: ICD-10-CM

## 2021-09-01 DIAGNOSIS — L60.3 ONYCHODYSTROPHY: ICD-10-CM

## 2021-09-01 DIAGNOSIS — L84 CALLUS OF FOOT: Primary | ICD-10-CM

## 2021-09-01 DIAGNOSIS — E11.42 DIABETIC POLYNEUROPATHY ASSOCIATED WITH TYPE 2 DIABETES MELLITUS (H): ICD-10-CM

## 2021-09-01 DIAGNOSIS — R20.8 LOSS OF PROTECTIVE SENSATION OF SKIN OF DEFORMED FOOT: ICD-10-CM

## 2021-09-01 DIAGNOSIS — M1A.0711 CHRONIC IDIOPATHIC GOUT INVOLVING TOE OF RIGHT FOOT WITH TOPHUS: ICD-10-CM

## 2021-09-01 DIAGNOSIS — E11.9 DIABETES MELLITUS TYPE 2, INSULIN DEPENDENT (H): ICD-10-CM

## 2021-09-01 DIAGNOSIS — Z79.4 DIABETES MELLITUS TYPE 2, INSULIN DEPENDENT (H): ICD-10-CM

## 2021-09-01 DIAGNOSIS — Z79.4 TYPE 2 DIABETES MELLITUS WITH DIABETIC NEUROPATHY, WITH LONG-TERM CURRENT USE OF INSULIN (H): ICD-10-CM

## 2021-09-01 DIAGNOSIS — B35.3 TINEA PEDIS OF BOTH FEET: ICD-10-CM

## 2021-09-01 LAB
ANION GAP SERPL CALCULATED.3IONS-SCNC: 8 MMOL/L (ref 3–14)
BUN SERPL-MCNC: 17 MG/DL (ref 7–30)
CALCIUM SERPL-MCNC: 8.6 MG/DL (ref 8.5–10.1)
CHLORIDE BLD-SCNC: 102 MMOL/L (ref 94–109)
CO2 SERPL-SCNC: 30 MMOL/L (ref 20–32)
CREAT SERPL-MCNC: 1.04 MG/DL (ref 0.52–1.25)
EST. AVERAGE GLUCOSE BLD GHB EST-MCNC: 128 MG/DL
GFR SERPL CREATININE-BSD FRML MDRD: 72 ML/MIN/1.73M2
GLUCOSE BLD-MCNC: 72 MG/DL (ref 70–99)
HBA1C MFR BLD: 6.1 % (ref 0–5.6)
POTASSIUM BLD-SCNC: 4.2 MMOL/L (ref 3.4–5.3)
SODIUM SERPL-SCNC: 140 MMOL/L (ref 133–144)

## 2021-09-01 PROCEDURE — 36415 COLL VENOUS BLD VENIPUNCTURE: CPT | Mod: ZL

## 2021-09-01 PROCEDURE — 83036 HEMOGLOBIN GLYCOSYLATED A1C: CPT | Mod: ZL

## 2021-09-01 PROCEDURE — 80048 BASIC METABOLIC PNL TOTAL CA: CPT | Mod: ZL

## 2021-09-01 PROCEDURE — 99213 OFFICE O/P EST LOW 20 MIN: CPT | Mod: 25 | Performed by: PODIATRIST

## 2021-09-01 PROCEDURE — 11721 DEBRIDE NAIL 6 OR MORE: CPT | Performed by: PODIATRIST

## 2021-09-01 PROCEDURE — G0463 HOSPITAL OUTPT CLINIC VISIT: HCPCS | Mod: 25

## 2021-09-01 ASSESSMENT — PAIN SCALES - GENERAL: PAINLEVEL: MILD PAIN (3)

## 2021-09-01 NOTE — PROGRESS NOTES
Chief complaint: Patient presents with:  RECHECK: Bilateral Feet      History of Present Illness: This 71 year old IDDM II is seen with his wife, Yazmin, for follow-up management of a RIGHT foot 2nd digit ulceration that was previously probing to the bone.    The patient's wife has been changing the dressing every day with dry gauze and tape. She has not seen any drainage on the dressings. The patient has no pain in the toe.    He has continued to take Allopurinol 300mg daily since his last appointment.    The patient is requesting high risk nail debridement today, but he doesn't think he needs to have them trimmed more than every six months. He does not want to follow-up in the winter so he would like his follow-up visit to be in April, 2021.    No further pedal complaints today.     Last HbA1C was 6.1% on 09/01/2021.    Previously 6.0% on 01/20/2021    Previously 5.6% on 04/11/2019      BP (!) 140/66 (BP Location: Left arm, Patient Position: Sitting, Cuff Size: Adult Large)   Pulse 69   Temp 98.4  F (36.9  C) (Tympanic)   Wt (!) 152.9 kg (337 lb)   SpO2 97%   BMI 43.27 kg/m      Patient Active Problem List   Diagnosis     ACP (advance care planning)     Type 2 diabetes mellitus with diabetic neuropathy (H)     Morbid obesity due to excess calories (H)     Acquired hypothyroidism     Benign essential hypertension     Calculus of gallbladder without cholecystitis without obstruction     Cholecystitis     Gastroesophageal reflux disease without esophagitis     Depression     Congenital anomaly of spleen     Disorder of lipoid metabolism     Other lipoprotein metabolism disorders     Malignant neoplasm of thyroid gland (H)     Tremor     Hypothyroidism, postsurgical       Past Surgical History:   Procedure Laterality Date     basal cell carcinoma  2017     CHOLECYSTECTOMY  11/23/2019     COLONOSCOPY - HIM SCAN  12/01/2012    Colonoscopy due to bleeding, no polyps 12-12     HERNIA REPAIR  2014       Current  Outpatient Medications   Medication     allopurinol (ZYLOPRIM) 300 MG tablet     ascorbic acid (VITAMIN C) 500 MG tablet     B-D U/F insulin pen needle     blood glucose (ACCU-CHEK SMARTVIEW) test strip     blood glucose monitoring (ACCU-CHEK FASTCLIX) lancets     Cholecalciferol (VITAMIN D3) 50 MCG (2000 UT) CAPS     ciclopirox (LOPROX) 0.77 % cream     FEROSUL 325 (65 Fe) MG tablet     gabapentin (NEURONTIN) 300 MG capsule     GNP TERBINAFINE HYDROCHLORIDE 1 % external cream     horse chestnut 300 MG CAPS     insulin glargine (BASAGLAR KWIKPEN) 100 UNIT/ML pen     levothyroxine (SYNTHROID/LEVOTHROID) 300 MCG tablet     liraglutide (VICTOZA PEN) 18 MG/3ML solution     metFORMIN (GLUCOPHAGE) 1000 MG tablet     metoprolol succinate ER (TOPROL-XL) 100 MG 24 hr tablet     Multiple Vitamin (MULTI VITAMIN PO)     naproxen (NAPROSYN) 500 MG tablet     NOVOTWIST 32G X 5 MM insulin pen needle     pantoprazole (PROTONIX) 40 MG EC tablet     potassium chloride ER (KLOR-CON M) 20 MEQ CR tablet     predniSONE (DELTASONE) 20 MG tablet     QUEtiapine (SEROQUEL) 50 MG tablet     tamsulosin (FLOMAX) 0.4 MG capsule     torsemide (DEMADEX) 20 MG tablet     No current facility-administered medications for this visit.          Allergies   Allergen Reactions     Food      Onions, peppers, olives      Trazodone      Other reaction(s): Dizziness       Family History   Problem Relation Age of Onset     Unknown/Adopted Sister        Social History     Socioeconomic History     Marital status:      Spouse name: Not on file     Number of children: Not on file     Years of education: Not on file     Highest education level: Not on file   Occupational History     Not on file   Social Needs     Financial resource strain: Not on file     Food insecurity:     Worry: Not on file     Inability: Not on file     Transportation needs:     Medical: Not on file     Non-medical: Not on file   Tobacco Use     Smoking status: Never Smoker      Smokeless tobacco: Never Used   Substance and Sexual Activity     Alcohol use: Yes     Comment: beer daily      Drug use: No     Sexual activity: Not Currently     Partners: Female   Lifestyle     Physical activity:     Days per week: Not on file     Minutes per session: Not on file     Stress: Not on file   Relationships     Social connections:     Talks on phone: Not on file     Gets together: Not on file     Attends Buddhism service: Not on file     Active member of club or organization: Not on file     Attends meetings of clubs or organizations: Not on file     Relationship status: Not on file     Intimate partner violence:     Fear of current or ex partner: Not on file     Emotionally abused: Not on file     Physically abused: Not on file     Forced sexual activity: Not on file   Other Topics Concern     Parent/sibling w/ CABG, MI or angioplasty before 65F 55M? Not Asked   Social History Narrative     Not on file       ROS: 10 point ROS neg other than the symptoms noted above in the HPI.  EXAM  Constitutional: healthy, alert and no distress    Psychiatric: mentation appears normal and affect normal/bright    VASCULAR:  -Dorsalis pedis pulse +1/4 bilaterally   -Posterior tibial pulse +1/4 bilaterally   -Capillary refill time < 3 seconds to hallux bilaterally   -Hair growth Absent to b/l anterior leg and ankle bilaterally   -Varicosities and telangiectasias to bilateral foot bilaterally   -Mild 1+ pitting edema to bilateral foot and ankle bilaterally   NEURO:  -Light touch sensation ABSENT to plantar forefoot on 09/01/2021  -Protective sensation diminished with SWM +0/10 RIGHT and +0/10 LEFT on 02/05/2020  -Protective sensation diminished with SWM +0/10 RIGHT and +0/10 LEFT on 09/04/2019    DERM:  -Soft tissue mass to RIGHT distal third digit and LEFT distal medial hallux, superficial, red/yellow/white discoloration consistent with a gouty tophi with no open wound or drainage (LEFT foot not evaluated  today)  ---No severe erythema, no ascending erythema, no calor, no purulence, no malodor, no other SOI.   -Skin to foot and leg has a persistent rubor with dry, flaking, and shiny and atrophic skin, bilaterally   -Skin temperature warm without calor to foot and leg, bilaterally   -Diffusely dry skin with flaking of skin and erythema discoloration in a moccasin distribution to plantar foot bilaterally   -Toenails elongated, thickened, dystrophic and discolored x 10  Wound Location:  RIGHT dorsal second digit DIPJ  09/01/2021: HEALED with no open wounds and no drainage    07/07/2021: Minimal scab -- flat and even with level of skin without any flaking of skin  ---No open wounds or drainage  ---No severe erythema, no ascending erythema, no calor, no purulence, no malodor, no other SOI.     06/02/2021  :  0.5cm x 0.5cm x well-adhered scab without manjit-wound drainage  05/05/2021:  0.8cm x 1cm x 0.2cm to the subcutaneous tissue layer   04/21/2021:  0.8cm x 1cm x 0.2cm to the subcutaneous tissue layer   04/07/2021:  0.8cm x 1.1cm x 0.2cm to the subcutaneous tissue layer   03/17/2021:  1.2cm x 1.4cm x 0.8cm to the bone    MSK:  -No pain on palpation to RIGHT dorsal 2nd digit  -Muscle strength of ankles +5/5 for dorsiflexion, plantarflexion, ABDUction and ADDuction b/l    RIGHT FOOT RADIOGRAPH 03/17/2021  IMPRESSION: Findings suspicious for osteomyelitis of the middle and distal phalanxes of the right second toe.    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 Treatment options discussed with no educational barriers noted.  Gout:  -Patient's toe has no open wounds and no drainage. The wound is healed. Patient may start by discontinuing the dressing at night. If there are no open wounds for a couple weeks, he may also discontinue the dressing during the day.   -Patient's gout has been well controlled since he increased Allopurinol to 300mg daily    -High risk toenail debridement x 10 toenails without incident     -Diabetic Foot Education provided. This included checking the feet daily looking for new new blisters or wounds, wearing shoes at all times when walking including around the house, and avoiding lotion application between the toes. If there are any signs of infection, the patient should present to the ED as soon as possible. Infections of the foot can be life threatening or lead to amputations of the foot or leg.  ---Patient's wife checks the patient's feet daily.    -Patient in agreement with the above treatment plan and all of patient's questions were answered.      RTC seven months in [facility] for diabetic foot exam and high risk nail debridement per patient request        Genesis Bay DPM

## 2021-09-01 NOTE — NURSING NOTE
"Chief Complaint   Patient presents with     RECHECK     Bilateral Feet       Initial BP (!) 140/66 (BP Location: Left arm, Patient Position: Sitting, Cuff Size: Adult Large)   Pulse 69   Temp 98.4  F (36.9  C) (Tympanic)   Wt (!) 152.9 kg (337 lb)   SpO2 97%   BMI 43.27 kg/m   Estimated body mass index is 43.27 kg/m  as calculated from the following:    Height as of 2/25/20: 1.88 m (6' 2\").    Weight as of this encounter: 152.9 kg (337 lb).  Medication Reconciliation: complete  Mariama Walker LPN  "

## 2021-09-07 DIAGNOSIS — K21.9 GASTROESOPHAGEAL REFLUX DISEASE WITHOUT ESOPHAGITIS: ICD-10-CM

## 2021-09-07 DIAGNOSIS — M10.00 IDIOPATHIC GOUT, UNSPECIFIED CHRONICITY, UNSPECIFIED SITE: ICD-10-CM

## 2021-09-07 DIAGNOSIS — E03.4 HYPOTHYROIDISM DUE TO ACQUIRED ATROPHY OF THYROID: ICD-10-CM

## 2021-09-09 RX ORDER — PANTOPRAZOLE SODIUM 40 MG/1
TABLET, DELAYED RELEASE ORAL
Qty: 90 TABLET | Refills: 1 | Status: SHIPPED | OUTPATIENT
Start: 2021-09-09 | End: 2022-03-03

## 2021-09-09 RX ORDER — LEVOTHYROXINE SODIUM 300 UG/1
300 TABLET ORAL DAILY
Qty: 90 TABLET | Refills: 3 | Status: SHIPPED | OUTPATIENT
Start: 2021-09-09 | End: 2022-08-10

## 2021-09-09 RX ORDER — PREDNISONE 20 MG/1
20 TABLET ORAL DAILY
Qty: 5 TABLET | Refills: 1 | Status: SHIPPED | OUTPATIENT
Start: 2021-09-09 | End: 2022-01-10

## 2021-09-09 NOTE — TELEPHONE ENCOUNTER
Due for appointment    TSH   Date Value Ref Range Status   09/04/2019 0.86 0.40 - 4.00 mU/L Final       predniSONE (DELTASONE) 20 MG tablet      Last Written Prescription Date:  4/22/21  Last Fill Quantity: 5,   # refills: 1  Last Office Visit: 11/5/19  Future Office visit:       Routing refill request to provider for review/approval because:  Drug not on the [facility], [facility] or [facility] refill protocol or controlled substance      pantoprazole (PROTONIX) 40 MG EC tablet      Last Written Prescription Date:  3/3/21  Last Fill Quantity: 90,   # refills: 1  Last Office Visit: 11/5/19  Future Office visit:       Routing refill request to provider for review/approval because:  Drug not on the [facility], [facility] or [facility] refill protocol or controlled substance      levothyroxine (SYNTHROID/LEVOTHROID) 300 MCG tablet      Last Written Prescription Date:  12/3/20  Last Fill Quantity: 90,   # refills: 3  Last Office Visit: 11/5/19  Future Office visit:       Routing refill request to provider for review/approval because:  Drug not on the [facility], [facility] or [facility] refill protocol or controlled substance

## 2021-09-16 NOTE — PROGRESS NOTES
"Otolaryngology Progress Note          Gunnar Granados is a 71 year old adult    Presents for follow-up of the lower lip lesion.  He initially saw me on 11/3/2024     Pea-sized palpable mucosal lip lesions along the lower lip consistent with a mucocele I performed an office excision which showed a mucocele on final pathology.  He also has concerns regarding his ears.     He has a significant history of obstructive sleep apnea and is CPAP compliant.     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  His dentist is Dr. Roche        Physical Exam  /80 (BP Location: Right arm, Cuff Size: Adult Large)   Pulse 75   Temp 98.5  F (36.9  C) (Tympanic)   Ht 1.88 m (6' 2\")   Wt (!) 152.9 kg (337 lb)   SpO2 94%   BMI 43.27 kg/m    General - The patient is well nourished and well developed, and appears to have good nutritional status.  Alert and oriented to person and place, interactive.  Head and Face - Normocephalic and atraumatic, with no gross asymmetry noted of the contour of the facial features.  The facial nerve is intact, with strong symmetric movements.  Neck-enlarged, thick no palpable lymphadenopathy or fixed mass.  Trachea is midline.  Eyes - Extraocular movements intact.   Nose - Nasal mucosa is pink and moist with no abnormal mucus.    Mouth - Examination of the oral cavity shows pink, healthy, moist mucosa.  No lesions or ulceration noted.  The dentition are in fair repair.    Left lower mucosal lip small 0.3 cm mobile mucocele overlying mucosa is normal no ulceration or mass  The tongue is mobile and midline.    Throat - The walls of the oropharynx were smooth, pink, moist, symmetric, and had no lesions or ulcerations.  The tonsillar pillars and soft palate were symmetric.  The uvula was midline on elevation.  Bills Palate Position IV    Impression/Plan  Gunnar " SABINE Granados is a 71 year old adult    ICD-10-CM    1. Mucocele of lip  K13.0          I offered to remove this today but he states it is not overly bothersome he really just wanted reassurance     diagnosis: Benign Mucocele, no evidence of oral cancer  Anatomy d/w Juan F and Amarilis, reassured    Congratulated on CPAP compliance    Follow up with ENT as needed    Wants a second opinion on DDS, very difficult for him to make q6 month cleanings due to multiple medical issues, given Dr. Hanson's name      Ayana Knight D.O.  Otolaryngology/Head and Neck Surgery  Allergy

## 2021-09-20 ENCOUNTER — LAB (OUTPATIENT)
Dept: LAB | Facility: OTHER | Age: 71
End: 2021-09-20
Payer: MEDICARE

## 2021-09-20 ENCOUNTER — OFFICE VISIT (OUTPATIENT)
Dept: OTOLARYNGOLOGY | Facility: OTHER | Age: 71
End: 2021-09-20
Attending: OTOLARYNGOLOGY
Payer: COMMERCIAL

## 2021-09-20 VITALS
HEART RATE: 75 BPM | BODY MASS INDEX: 40.43 KG/M2 | DIASTOLIC BLOOD PRESSURE: 80 MMHG | SYSTOLIC BLOOD PRESSURE: 130 MMHG | OXYGEN SATURATION: 94 % | WEIGHT: 315 LBS | HEIGHT: 74 IN | TEMPERATURE: 98.5 F

## 2021-09-20 DIAGNOSIS — Z79.4 TYPE 2 DIABETES MELLITUS WITH DIABETIC NEUROPATHY, WITH LONG-TERM CURRENT USE OF INSULIN (H): ICD-10-CM

## 2021-09-20 DIAGNOSIS — K13.0 MUCOCELE OF LIP: Primary | ICD-10-CM

## 2021-09-20 DIAGNOSIS — E11.40 TYPE 2 DIABETES MELLITUS WITH DIABETIC NEUROPATHY, WITH LONG-TERM CURRENT USE OF INSULIN (H): ICD-10-CM

## 2021-09-20 PROBLEM — H25.13 NUCLEAR SCLEROTIC CATARACT OF BOTH EYES: Status: ACTIVE | Noted: 2021-06-16

## 2021-09-20 PROBLEM — H02.9 LESION OF LEFT UPPER EYELID: Status: ACTIVE | Noted: 2021-06-16

## 2021-09-20 PROBLEM — H47.232: Status: ACTIVE | Noted: 2021-06-16

## 2021-09-20 PROBLEM — H52.7 AMETROPIA: Status: ACTIVE | Noted: 2021-06-16

## 2021-09-20 PROBLEM — R45.89 FLAT AFFECT: Status: ACTIVE | Noted: 2021-06-16

## 2021-09-20 PROBLEM — H10.89 OTHER CONJUNCTIVITIS OF BOTH EYES: Status: ACTIVE | Noted: 2021-06-16

## 2021-09-20 PROBLEM — H52.4 PRESBYOPIA: Status: ACTIVE | Noted: 2021-06-16

## 2021-09-20 LAB
CREAT UR-MCNC: 63 MG/DL
MICROALBUMIN UR-MCNC: <5 MG/L
MICROALBUMIN/CREAT UR: NORMAL MG/G{CREAT}

## 2021-09-20 PROCEDURE — 82043 UR ALBUMIN QUANTITATIVE: CPT | Mod: ZL

## 2021-09-20 PROCEDURE — G0463 HOSPITAL OUTPT CLINIC VISIT: HCPCS | Mod: 25

## 2021-09-20 PROCEDURE — 99213 OFFICE O/P EST LOW 20 MIN: CPT | Performed by: OTOLARYNGOLOGY

## 2021-09-20 ASSESSMENT — MIFFLIN-ST. JEOR: SCORE: 2358.37

## 2021-09-20 ASSESSMENT — PAIN SCALES - GENERAL: PAINLEVEL: EXTREME PAIN (8)

## 2021-09-20 NOTE — PATIENT INSTRUCTIONS
Thank you for allowing Dr. Knight and our ENT team to participate in your care.  If your medications are too expensive, please give the nurse a call.  We can possibly change this medication.  If you have a scheduling or an appointment question please contact our Health Unit Coordinator at their direct line [phone].   ALL nursing questions or concerns can be directed to your ENT nurse at: [phone] - laura    Follow up with Dr. Torey SCHULTE  Diagnosis: Benign Mucocele  Follow up with ENT as needed

## 2021-09-20 NOTE — LETTER
"    9/20/2021         RE: Gunnar Granados  [address] [place] [id number]        Dear Colleague,    Thank you for referring your patient, Gunnar Granados, to the [facility]. Please see a copy of my visit note below.    Otolaryngology Progress Note          Gunnar Granados is a 71 year old adult    Presents for follow-up of the lower lip lesion.  He initially saw me on 11/3/2024     Pea-sized palpable mucosal lip lesions along the lower lip consistent with a mucocele I performed an office excision which showed a mucocele on final pathology.  He also has concerns regarding his ears.     He has a significant history of obstructive sleep apnea and is CPAP compliant.     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  His dentist is Dr. Roche        Physical Exam  /80 (BP Location: Right arm, Cuff Size: Adult Large)   Pulse 75   Temp 98.5  F (36.9  C) (Tympanic)   Ht 1.88 m (6' 2\")   Wt (!) 152.9 kg (337 lb)   SpO2 94%   BMI 43.27 kg/m    General - The patient is well nourished and well developed, and appears to have good nutritional status.  Alert and oriented to person and place, interactive.  Head and Face - Normocephalic and atraumatic, with no gross asymmetry noted of the contour of the facial features.  The facial nerve is intact, with strong symmetric movements.  Neck-enlarged, thick no palpable lymphadenopathy or fixed mass.  Trachea is midline.  Eyes - Extraocular movements intact.   Nose - Nasal mucosa is pink and moist with no abnormal mucus.    Mouth - Examination of the oral cavity shows pink, healthy, moist mucosa.  No lesions or ulceration noted.  The dentition are in fair repair.    Left lower mucosal lip small 0.3 cm mobile mucocele overlying mucosa is normal no ulceration or mass  The tongue is mobile and midline.    Throat - " The walls of the oropharynx were smooth, pink, moist, symmetric, and had no lesions or ulcerations.  The tonsillar pillars and soft palate were symmetric.  The uvula was midline on elevation.  Bills Palate Position IV    Impression/Plan  Gunnar Granados is a 71 year old adult    ICD-10-CM    1. Mucocele of lip  K13.0          I offered to remove this today but he states it is not overly bothersome he really just wanted reassurance     diagnosis: Benign Mucocele, no evidence of oral cancer  Anatomy d/w Juan F and Amarilis, reassured    Congratulated on CPAP compliance    Follow up with ENT as needed    Wants a second opinion on DDS, very difficult for him to make q6 month cleanings due to multiple medical issues, given Dr. Hanson's name      Ayana Knight D.O.  Otolaryngology/Head and Neck Surgery  Allergy                 Again, thank you for allowing me to participate in the care of your patient.        Sincerely,        Ayana Knight MD

## 2021-09-20 NOTE — NURSING NOTE
"Chief Complaint   Patient presents with     Consult     Facial Lesion; Self Referral  This is on the inside of his mouth.       Initial /80 (BP Location: Right arm, Cuff Size: Adult Large)   Pulse 75   Temp 98.5  F (36.9  C) (Tympanic)   Ht 1.88 m (6' 2\")   Wt (!) 152.9 kg (337 lb)   SpO2 94%   BMI 43.27 kg/m   Estimated body mass index is 43.27 kg/m  as calculated from the following:    Height as of this encounter: 1.88 m (6' 2\").    Weight as of this encounter: 152.9 kg (337 lb).  Medication Reconciliation: complete  Disha Fuentes LPN    "

## 2021-09-29 DIAGNOSIS — N40.0 BENIGN PROSTATIC HYPERPLASIA WITHOUT LOWER URINARY TRACT SYMPTOMS: ICD-10-CM

## 2021-09-30 RX ORDER — TAMSULOSIN HYDROCHLORIDE 0.4 MG/1
CAPSULE ORAL
Qty: 90 CAPSULE | Refills: 2 | Status: SHIPPED | OUTPATIENT
Start: 2021-09-30 | End: 2022-07-22

## 2021-09-30 NOTE — TELEPHONE ENCOUNTER
flomax      Last Written Prescription Date:  4/22/21  Last Fill Quantity: 90,   # refills: 2  Last Office Visit: 9/4/19  Future Office visit:

## 2021-10-09 ENCOUNTER — HEALTH MAINTENANCE LETTER (OUTPATIENT)
Age: 71
End: 2021-10-09

## 2021-10-21 DIAGNOSIS — E11.40 TYPE 2 DIABETES MELLITUS WITH DIABETIC NEUROPATHY, UNSPECIFIED WHETHER LONG TERM INSULIN USE (H): ICD-10-CM

## 2021-10-21 DIAGNOSIS — G89.29 CHRONIC PAIN OF BOTH KNEES: ICD-10-CM

## 2021-10-21 DIAGNOSIS — M25.562 CHRONIC PAIN OF BOTH KNEES: ICD-10-CM

## 2021-10-21 DIAGNOSIS — M25.561 CHRONIC PAIN OF BOTH KNEES: ICD-10-CM

## 2021-10-22 RX ORDER — FLURBIPROFEN SODIUM 0.3 MG/ML
SOLUTION/ DROPS OPHTHALMIC 2 TIMES DAILY
Qty: 100 EACH | Refills: 11 | Status: SHIPPED | OUTPATIENT
Start: 2021-10-22 | End: 2023-01-18

## 2021-10-22 RX ORDER — NAPROXEN 500 MG/1
500 TABLET ORAL 2 TIMES DAILY WITH MEALS
Qty: 180 TABLET | Refills: 3 | Status: SHIPPED | OUTPATIENT
Start: 2021-10-22 | End: 2022-12-08

## 2021-10-22 NOTE — TELEPHONE ENCOUNTER
Insulin Pen Needle       Last Written Prescription Date:  5/18/2021  Last Fill Quantity: 100,   # refills: 1    Naprosyn       Last Written Prescription Date:  7/7/2020  Last Fill Quantity: 180,   # refills: 3  Last Office Visit: 11/5/2019  Future Office visit:

## 2021-10-23 DIAGNOSIS — M1A.00X0 IDIOPATHIC CHRONIC GOUT WITHOUT TOPHUS, UNSPECIFIED SITE: ICD-10-CM

## 2021-10-25 RX ORDER — ALLOPURINOL 300 MG/1
300 TABLET ORAL DAILY
Qty: 90 TABLET | Refills: 1 | Status: SHIPPED | OUTPATIENT
Start: 2021-10-25 | End: 2022-04-20

## 2021-10-25 NOTE — TELEPHONE ENCOUNTER
lov 11/05/2019    Future Office visit:       Routing refill request to provider for review/approval because:

## 2021-11-17 DIAGNOSIS — E11.40 TYPE 2 DIABETES MELLITUS WITH DIABETIC NEUROPATHY, UNSPECIFIED WHETHER LONG TERM INSULIN USE (H): ICD-10-CM

## 2021-11-17 DIAGNOSIS — M10.00 IDIOPATHIC GOUT, UNSPECIFIED CHRONICITY, UNSPECIFIED SITE: ICD-10-CM

## 2021-11-17 DIAGNOSIS — E11.9 TYPE 2 DIABETES, HBA1C GOAL < 8% (H): ICD-10-CM

## 2021-11-19 RX ORDER — PREDNISONE 20 MG/1
20 TABLET ORAL DAILY
Qty: 5 TABLET | Refills: 1 | OUTPATIENT
Start: 2021-11-19

## 2021-11-19 NOTE — TELEPHONE ENCOUNTER
Test Strips, Victoza      Last Written Prescription Date:  9.29.21, 8.9.21  Last Fill Quantity: #100, #18mL,   # refills: 0  Last Office Visit: 11.5.19  Future Office visit:       Routing refill request to provider for review/approval because:

## 2021-11-23 ENCOUNTER — MYC MEDICAL ADVICE (OUTPATIENT)
Dept: INTERNAL MEDICINE | Facility: OTHER | Age: 71
End: 2021-11-23
Payer: COMMERCIAL

## 2021-11-24 RX ORDER — LIRAGLUTIDE 6 MG/ML
INJECTION SUBCUTANEOUS
Qty: 18 ML | Refills: 1 | Status: SHIPPED | OUTPATIENT
Start: 2021-11-24 | End: 2022-02-09

## 2021-12-08 DIAGNOSIS — E11.9 TYPE 2 DIABETES, HBA1C GOAL < 8% (H): ICD-10-CM

## 2021-12-08 DIAGNOSIS — G62.1 ALCOHOL-INDUCED POLYNEUROPATHY (H): ICD-10-CM

## 2021-12-09 RX ORDER — GABAPENTIN 300 MG/1
300 CAPSULE ORAL 3 TIMES DAILY
Qty: 270 CAPSULE | Refills: 3 | Status: SHIPPED | OUTPATIENT
Start: 2021-12-09 | End: 2022-08-10

## 2021-12-09 RX ORDER — PEN NEEDLE, DIABETIC 30 GX 1/3"
NEEDLE, DISPOSABLE MISCELLANEOUS
Qty: 100 EACH | Refills: 1 | Status: SHIPPED | OUTPATIENT
Start: 2021-12-09 | End: 2022-06-09

## 2021-12-09 NOTE — TELEPHONE ENCOUNTER
gabapentin      Last Written Prescription Date:  12/3/20  Last Fill Quantity: 270,   # refills: 3  Last Office Visit: 11/5/19  Future Office visit:       Routing refill request to provider for review/approval because:  Drug not on the [facility] or [facility] refill protocol or controlled substance

## 2022-01-07 DIAGNOSIS — G47.00 INSOMNIA, UNSPECIFIED TYPE: ICD-10-CM

## 2022-01-07 DIAGNOSIS — M10.00 IDIOPATHIC GOUT, UNSPECIFIED CHRONICITY, UNSPECIFIED SITE: ICD-10-CM

## 2022-01-07 DIAGNOSIS — E87.6 HYPOKALEMIA: ICD-10-CM

## 2022-01-07 DIAGNOSIS — B35.3 TINEA PEDIS OF BOTH FEET: ICD-10-CM

## 2022-01-10 RX ORDER — PREDNISONE 20 MG/1
20 TABLET ORAL DAILY
Qty: 5 TABLET | Refills: 1 | Status: SHIPPED | OUTPATIENT
Start: 2022-01-10 | End: 2022-04-20

## 2022-01-10 RX ORDER — POTASSIUM CHLORIDE 1500 MG/1
TABLET, EXTENDED RELEASE ORAL
Qty: 360 TABLET | Refills: 3 | Status: SHIPPED | OUTPATIENT
Start: 2022-01-10 | End: 2023-01-18

## 2022-01-10 RX ORDER — QUETIAPINE FUMARATE 50 MG/1
TABLET, FILM COATED ORAL
Qty: 90 TABLET | Refills: 1 | Status: SHIPPED | OUTPATIENT
Start: 2022-01-10 | End: 2022-04-20

## 2022-01-10 NOTE — TELEPHONE ENCOUNTER
KLOR-CON      Last Written Prescription Date:  3/4/2021  Last Fill Quantity: 360,   # refills: 3  Last Office Visit: 9/4/2019  Future Office visit:    Next 5 appointments (look out 90 days)    Apr 06, 2022  2:00 PM  (Arrive by 1:45 PM)  Return Visit with Genesis Bay DPM  [facility] ([facility] ) [address] [place] [id number] [phone]           Routing refill request to provider for review/approval because:    DELTASONE      Last Written Prescription Date:  9/9/2021  Last Fill Quantity: 5,   # refills: 1    SEROQUEL      Last Written Prescription Date:  5/18/2021  Last Fill Quantity: 90,   # refills: 1

## 2022-01-10 NOTE — TELEPHONE ENCOUNTER
Tervinafine       Last Written Prescription Date:  12/1/2020  Last Fill Quantity: 28.4g,   # refills: 3  Last Office Visit: 11/5/2019  Future Office visit:    Next 5 appointments (look out 90 days)    Apr 06, 2022  2:00 PM  (Arrive by 1:45 PM)  Return Visit with Genesis Bay DPM  [facility] ([facility] ) [address] [place] [address] [phone]

## 2022-01-11 RX ORDER — TERBINAFINE HYDROCHLORIDE 1 G/100G
CREAM TOPICAL
Qty: 28.4 G | Refills: 3 | Status: SHIPPED | OUTPATIENT
Start: 2022-01-11 | End: 2023-06-12

## 2022-01-19 ENCOUNTER — MEDICAL CORRESPONDENCE (OUTPATIENT)
Dept: HEALTH INFORMATION MANAGEMENT | Facility: CLINIC | Age: 72
End: 2022-01-19

## 2022-02-08 DIAGNOSIS — I10 BENIGN ESSENTIAL HYPERTENSION: ICD-10-CM

## 2022-02-08 DIAGNOSIS — L60.3 ONYCHODYSTROPHY: ICD-10-CM

## 2022-02-08 DIAGNOSIS — E11.9 TYPE 2 DIABETES, HBA1C GOAL < 8% (H): ICD-10-CM

## 2022-02-09 RX ORDER — LIRAGLUTIDE 6 MG/ML
INJECTION SUBCUTANEOUS
Qty: 18 ML | Refills: 1 | Status: SHIPPED | OUTPATIENT
Start: 2022-02-09 | End: 2022-09-08

## 2022-02-09 RX ORDER — METOPROLOL SUCCINATE 100 MG/1
50 TABLET, EXTENDED RELEASE ORAL DAILY
Qty: 45 TABLET | Refills: 3 | Status: SHIPPED | OUTPATIENT
Start: 2022-02-09 | End: 2023-02-10

## 2022-02-09 NOTE — TELEPHONE ENCOUNTER
metoprolol      Last Written Prescription Date:  5/18/2021  Last Fill Quantity: 45,   # refills: 3  Last Office Visit: 9/20/2021  Future Office visit:    Next 5 appointments (look out 90 days)    Apr 06, 2022  2:00 PM  (Arrive by 1:45 PM)  Return Visit with Genesis Bay DPM  [facility] ([facility] ) [address] [facility] [id number] [phone]        VICTOZA      Last Written Prescription Date:  11/24/2021  Last Fill Quantity: 18,   # refills: 1

## 2022-02-09 NOTE — TELEPHONE ENCOUNTER
ciclopirox (LOPROX) 0.77 % cream      Last Written Prescription Date:  1/14/2021  Last Fill Quantity: 30g,   # refills: 3  Last Office Visit: 11/5/2019  Future Office visit:    Next 5 appointments (look out 90 days)    Apr 06, 2022  2:00 PM  (Arrive by 1:45 PM)  Return Visit with Genesis Bay DPM  [facility] ([facility] ) [address] [place] [id number] [phone]

## 2022-02-10 RX ORDER — CICLOPIROX OLAMINE 7.7 MG/G
CREAM TOPICAL
Qty: 30 G | Refills: 4 | Status: SHIPPED | OUTPATIENT
Start: 2022-02-10 | End: 2023-03-30

## 2022-02-28 DIAGNOSIS — E11.40 TYPE 2 DIABETES MELLITUS WITH DIABETIC NEUROPATHY, UNSPECIFIED WHETHER LONG TERM INSULIN USE (H): ICD-10-CM

## 2022-02-28 DIAGNOSIS — K21.9 GASTROESOPHAGEAL REFLUX DISEASE WITHOUT ESOPHAGITIS: ICD-10-CM

## 2022-03-02 NOTE — TELEPHONE ENCOUNTER
Protonix      Last Written Prescription Date:  9/9/21  Last Fill Quantity: 90,   # refills: 1  Last Office Visit: 11/5/19  Future Office visit:    Next 5 appointments (look out 90 days)    Apr 06, 2022  2:00 PM  (Arrive by 1:45 PM)  Return Visit with Genesis Bay DPM  [facility] ([facility] ) [address] [facility] [address]  [phone]           Routing refill request to provider for review/approval because:      Metformin      Last Written Prescription Date:  5/18/21  Last Fill Quantity: 180,   # refills: 0  Last Office Visit: 11/5/19  Future Office visit:    Next 5 appointments (look out 90 days)    Apr 06, 2022  2:00 PM  (Arrive by 1:45 PM)  Return Visit with Genesis Bay DPM  [facility] ([facility] ) [address] [address]  [phone]           Routing refill request to provider for review/approval because:

## 2022-03-03 RX ORDER — PANTOPRAZOLE SODIUM 40 MG/1
TABLET, DELAYED RELEASE ORAL
Qty: 90 TABLET | Refills: 1 | Status: SHIPPED | OUTPATIENT
Start: 2022-03-03 | End: 2022-08-10

## 2022-03-22 ENCOUNTER — MYC MEDICAL ADVICE (OUTPATIENT)
Dept: INTERNAL MEDICINE | Facility: OTHER | Age: 72
End: 2022-03-22
Payer: COMMERCIAL

## 2022-03-22 DIAGNOSIS — E03.4 HYPOTHYROIDISM DUE TO ACQUIRED ATROPHY OF THYROID: ICD-10-CM

## 2022-03-22 DIAGNOSIS — E11.40 TYPE 2 DIABETES MELLITUS WITH DIABETIC NEUROPATHY, UNSPECIFIED WHETHER LONG TERM INSULIN USE (H): Primary | ICD-10-CM

## 2022-03-22 DIAGNOSIS — I10 BENIGN ESSENTIAL HYPERTENSION: ICD-10-CM

## 2022-03-26 ENCOUNTER — HEALTH MAINTENANCE LETTER (OUTPATIENT)
Age: 72
End: 2022-03-26

## 2022-04-06 ENCOUNTER — LAB (OUTPATIENT)
Dept: LAB | Facility: OTHER | Age: 72
End: 2022-04-06
Attending: PODIATRIST
Payer: MEDICARE

## 2022-04-06 ENCOUNTER — OFFICE VISIT (OUTPATIENT)
Dept: PODIATRY | Facility: OTHER | Age: 72
End: 2022-04-06
Attending: PODIATRIST
Payer: MEDICARE

## 2022-04-06 VITALS
SYSTOLIC BLOOD PRESSURE: 134 MMHG | DIASTOLIC BLOOD PRESSURE: 76 MMHG | OXYGEN SATURATION: 94 % | HEART RATE: 72 BPM | TEMPERATURE: 98.5 F | RESPIRATION RATE: 20 BRPM

## 2022-04-06 DIAGNOSIS — M1A.0711 CHRONIC IDIOPATHIC GOUT INVOLVING TOE OF RIGHT FOOT WITH TOPHUS: ICD-10-CM

## 2022-04-06 DIAGNOSIS — E03.4 HYPOTHYROIDISM DUE TO ACQUIRED ATROPHY OF THYROID: ICD-10-CM

## 2022-04-06 DIAGNOSIS — L60.3 ONYCHODYSTROPHY: Primary | ICD-10-CM

## 2022-04-06 DIAGNOSIS — E11.42 DIABETIC POLYNEUROPATHY ASSOCIATED WITH TYPE 2 DIABETES MELLITUS (H): ICD-10-CM

## 2022-04-06 DIAGNOSIS — E11.9 DIABETES MELLITUS TYPE 2, INSULIN DEPENDENT (H): ICD-10-CM

## 2022-04-06 DIAGNOSIS — I10 BENIGN ESSENTIAL HYPERTENSION: ICD-10-CM

## 2022-04-06 DIAGNOSIS — R20.8 LOSS OF PROTECTIVE SENSATION OF SKIN OF DEFORMED FOOT: ICD-10-CM

## 2022-04-06 DIAGNOSIS — L84 CALLUS OF FOOT: ICD-10-CM

## 2022-04-06 DIAGNOSIS — E11.40 TYPE 2 DIABETES MELLITUS WITH DIABETIC NEUROPATHY, UNSPECIFIED WHETHER LONG TERM INSULIN USE (H): ICD-10-CM

## 2022-04-06 DIAGNOSIS — Z79.4 DIABETES MELLITUS TYPE 2, INSULIN DEPENDENT (H): ICD-10-CM

## 2022-04-06 DIAGNOSIS — B35.3 TINEA PEDIS OF BOTH FEET: ICD-10-CM

## 2022-04-06 DIAGNOSIS — M21.969 LOSS OF PROTECTIVE SENSATION OF SKIN OF DEFORMED FOOT: ICD-10-CM

## 2022-04-06 LAB
ALBUMIN SERPL-MCNC: 3.9 G/DL (ref 3.4–5)
ALP SERPL-CCNC: 65 U/L (ref 40–150)
ALT SERPL W P-5'-P-CCNC: 26 U/L (ref 0–70)
ANION GAP SERPL CALCULATED.3IONS-SCNC: 9 MMOL/L (ref 3–14)
AST SERPL W P-5'-P-CCNC: 11 U/L (ref 0–45)
BASOPHILS # BLD AUTO: 0.1 10E3/UL (ref 0–0.2)
BASOPHILS NFR BLD AUTO: 1 %
BILIRUB SERPL-MCNC: 0.8 MG/DL (ref 0.2–1.3)
BUN SERPL-MCNC: 20 MG/DL (ref 7–30)
CALCIUM SERPL-MCNC: 8.7 MG/DL (ref 8.5–10.1)
CHLORIDE BLD-SCNC: 99 MMOL/L (ref 94–109)
CHOLEST SERPL-MCNC: 193 MG/DL
CO2 SERPL-SCNC: 29 MMOL/L (ref 20–32)
CREAT SERPL-MCNC: 1.05 MG/DL (ref 0.52–1.25)
EOSINOPHIL # BLD AUTO: 0.4 10E3/UL (ref 0–0.7)
EOSINOPHIL NFR BLD AUTO: 5 %
ERYTHROCYTE [DISTWIDTH] IN BLOOD BY AUTOMATED COUNT: 13 % (ref 10–15)
EST. AVERAGE GLUCOSE BLD GHB EST-MCNC: 114 MG/DL
FASTING STATUS PATIENT QL REPORTED: YES
GFR SERPL CREATININE-BSD FRML MDRD: 76 ML/MIN/1.73M2
GLUCOSE BLD-MCNC: 119 MG/DL (ref 70–99)
HBA1C MFR BLD: 5.6 % (ref 0–5.6)
HCT VFR BLD AUTO: 43 % (ref 35–53)
HDLC SERPL-MCNC: 49 MG/DL
HGB BLD-MCNC: 14.8 G/DL (ref 11.7–17.7)
LDLC SERPL CALC-MCNC: 90 MG/DL
LYMPHOCYTES # BLD AUTO: 2.9 10E3/UL (ref 0.8–5.3)
LYMPHOCYTES NFR BLD AUTO: 37 %
MCH RBC QN AUTO: 33.2 PG (ref 26.5–33)
MCHC RBC AUTO-ENTMCNC: 34.4 G/DL (ref 31.5–36.5)
MCV RBC AUTO: 96 FL (ref 78–100)
MONOCYTES # BLD AUTO: 0.9 10E3/UL (ref 0–1.3)
MONOCYTES NFR BLD AUTO: 12 %
NEUTROPHILS # BLD AUTO: 3.6 10E3/UL (ref 1.6–8.3)
NEUTROPHILS NFR BLD AUTO: 46 %
NONHDLC SERPL-MCNC: 144 MG/DL
PLATELET # BLD AUTO: 210 10E3/UL (ref 150–450)
POTASSIUM BLD-SCNC: 4.1 MMOL/L (ref 3.4–5.3)
PROT SERPL-MCNC: 7.3 G/DL (ref 6.8–8.8)
RBC # BLD AUTO: 4.46 10E6/UL (ref 3.8–5.9)
SODIUM SERPL-SCNC: 137 MMOL/L (ref 133–144)
T4 FREE SERPL-MCNC: 1.66 NG/DL
TRIGL SERPL-MCNC: 268 MG/DL
TSH SERPL DL<=0.005 MIU/L-ACNC: 0.28 MU/L (ref 0.4–4)
WBC # BLD AUTO: 7.8 10E3/UL (ref 4–11)

## 2022-04-06 PROCEDURE — 85025 COMPLETE CBC W/AUTO DIFF WBC: CPT | Mod: ZL

## 2022-04-06 PROCEDURE — 80061 LIPID PANEL: CPT | Mod: ZL

## 2022-04-06 PROCEDURE — 99212 OFFICE O/P EST SF 10 MIN: CPT | Mod: 25 | Performed by: PODIATRIST

## 2022-04-06 PROCEDURE — 84480 ASSAY TRIIODOTHYRONINE (T3): CPT | Mod: ZL

## 2022-04-06 PROCEDURE — 36415 COLL VENOUS BLD VENIPUNCTURE: CPT | Mod: ZL

## 2022-04-06 PROCEDURE — 83036 HEMOGLOBIN GLYCOSYLATED A1C: CPT | Mod: ZL

## 2022-04-06 PROCEDURE — 80053 COMPREHEN METABOLIC PANEL: CPT | Mod: ZL

## 2022-04-06 PROCEDURE — 11721 DEBRIDE NAIL 6 OR MORE: CPT | Performed by: PODIATRIST

## 2022-04-06 PROCEDURE — 84439 ASSAY OF FREE THYROXINE: CPT | Mod: ZL

## 2022-04-06 PROCEDURE — 84443 ASSAY THYROID STIM HORMONE: CPT | Mod: ZL

## 2022-04-06 PROCEDURE — G0463 HOSPITAL OUTPT CLINIC VISIT: HCPCS | Mod: 25

## 2022-04-06 ASSESSMENT — PAIN SCALES - GENERAL: PAINLEVEL: EXTREME PAIN (8)

## 2022-04-06 NOTE — PROGRESS NOTES
Chief complaint: Patient presents with:  Toenail: trimming      History of Present Illness: This 72 year old IDDM II is seen with his wife, Yazmin, for follow-up management for a diabetic foot exam and high risk nail debridement.     His wife is applying Terbinifine to the feet and she says the redness and flaking has greatly reduced. He has increased patchy skin on his RIGHT lateral ankle and it doesn't cause pain, but they are wondering what it is.    He has continued to take Allopurinol 300mg daily since his last appointment.    The patient is requesting high risk nail debridement every six months.    No further pedal complaints today.     Last HbA1C was 6.1% on 09/01/2021.    Previously 6.0% on 01/20/2021    Previously 5.6% on 04/11/2019      /76 (BP Location: Left arm, Patient Position: Sitting, Cuff Size: Adult Large)   Pulse 72   Temp 98.5  F (36.9  C) (Tympanic)   Resp 20   SpO2 94%     Patient Active Problem List   Diagnosis     ACP (advance care planning)     Type 2 diabetes mellitus with diabetic neuropathy (H)     Morbid obesity due to excess calories (H)     Acquired hypothyroidism     Benign essential hypertension     Calculus of gallbladder without cholecystitis without obstruction     Cholecystitis     Gastroesophageal reflux disease without esophagitis     Depression     Congenital anomaly of spleen     Disorder of lipoid metabolism     Other lipoprotein metabolism disorders     Malignant neoplasm of thyroid gland (H)     Tremor     Hypothyroidism, postsurgical     Ametropia     Cupping of optic disc, left     Flat affect     Lesion of left upper eyelid     Nuclear sclerotic cataract of both eyes     Other conjunctivitis of both eyes     Presbyopia       Past Surgical History:   Procedure Laterality Date     basal cell carcinoma  2017     CHOLECYSTECTOMY  11/23/2019     COLONOSCOPY - HIM SCAN  12/01/2012    Colonoscopy due to bleeding, no polyps 12-12     HERNIA REPAIR  2014       Current  Outpatient Medications   Medication     allopurinol (ZYLOPRIM) 300 MG tablet     ascorbic acid (VITAMIN C) 500 MG tablet     B-D U/F insulin pen needle     blood glucose monitoring (ACCU-CHEK FASTCLIX) lancets     Cholecalciferol (VITAMIN D3) 50 MCG (2000 UT) CAPS     ciclopirox (LOPROX) 0.77 % cream     CONTOUR NEXT TEST test strip     FEROSUL 325 (65 Fe) MG tablet     gabapentin (NEURONTIN) 300 MG capsule     GNP TERBINAFINE HYDROCHLORIDE 1 % external cream     horse chestnut 300 MG CAPS     insulin glargine (BASAGLAR KWIKPEN) 100 UNIT/ML pen     levothyroxine (SYNTHROID/LEVOTHROID) 300 MCG tablet     metFORMIN (GLUCOPHAGE) 1000 MG tablet     metoprolol succinate ER (TOPROL-XL) 100 MG 24 hr tablet     Multiple Vitamin (MULTI VITAMIN PO)     naproxen (NAPROSYN) 500 MG tablet     NOVOTWIST PEN NEEDLE 32G X 5 MM     pantoprazole (PROTONIX) 40 MG EC tablet     potassium chloride ER (KLOR-CON M) 20 MEQ CR tablet     predniSONE (DELTASONE) 20 MG tablet     QUEtiapine (SEROQUEL) 50 MG tablet     tamsulosin (FLOMAX) 0.4 MG capsule     torsemide (DEMADEX) 20 MG tablet     VICTOZA PEN 18 MG/3ML soln     No current facility-administered medications for this visit.          Allergies   Allergen Reactions     Food      Onions, peppers, olives      Trazodone      Other reaction(s): Dizziness       Family History   Problem Relation Age of Onset     Unknown/Adopted Sister        Social History     Socioeconomic History     Marital status:      Spouse name: Not on file     Number of children: Not on file     Years of education: Not on file     Highest education level: Not on file   Occupational History     Not on file   Social Needs     Financial resource strain: Not on file     Food insecurity:     Worry: Not on file     Inability: Not on file     Transportation needs:     Medical: Not on file     Non-medical: Not on file   Tobacco Use     Smoking status: Never Smoker     Smokeless tobacco: Never Used   Substance and  Sexual Activity     Alcohol use: Yes     Comment: beer daily      Drug use: No     Sexual activity: Not Currently     Partners: Female   Lifestyle     Physical activity:     Days per week: Not on file     Minutes per session: Not on file     Stress: Not on file   Relationships     Social connections:     Talks on phone: Not on file     Gets together: Not on file     Attends Zoroastrianism service: Not on file     Active member of club or organization: Not on file     Attends meetings of clubs or organizations: Not on file     Relationship status: Not on file     Intimate partner violence:     Fear of current or ex partner: Not on file     Emotionally abused: Not on file     Physically abused: Not on file     Forced sexual activity: Not on file   Other Topics Concern     Parent/sibling w/ CABG, MI or angioplasty before 65F 55M? Not Asked   Social History Narrative     Not on file       ROS: 10 point ROS neg other than the symptoms noted above in the HPI.  EXAM  Constitutional: healthy, alert and no distress    Psychiatric: mentation appears normal and affect normal/bright    VASCULAR:  -Dorsalis pedis pulse +1/4 bilaterally   -Posterior tibial pulse +1/4 bilaterally   -Capillary refill time < 3 seconds to hallux bilaterally   -Hair growth Absent to b/l anterior leg and ankle bilaterally   -Varicosities and telangiectasias to bilateral foot bilaterally   -Mild 1+ pitting edema to bilateral foot and ankle bilaterally   NEURO:  -Light touch sensation ABSENT to plantar forefoot on 04/06/2022  -Protective sensation diminished with SWM +0/10 RIGHT and +0/10 LEFT on 02/05/2020  -Protective sensation diminished with SWM +0/10 RIGHT and +0/10 LEFT on 09/04/2019    DERM:  -Diffusely dry skin with flaking of skin to the RIGHT lateral ankle  -Toenails elongated, thickened, dystrophic and discolored x 10  -Dry, scaling skin on the RIGHT lateral ankle  MSK:  -Muscle strength of ankles +5/5 for dorsiflexion, plantarflexion, ABDUction  and ADDuction b/l    RIGHT FOOT RADIOGRAPH 03/17/2021  IMPRESSION: Findings suspicious for osteomyelitis of the middle and distal phalanxes of the right second toe.    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 Treatment options discussed with no educational barriers noted.  Gout:  -Patient's toe has no open wounds and no drainage. His gout is well controlled.  -Patient's gout has been well controlled since he increased Allopurinol to 300mg daily    -High risk toenail debridement x 10 toenails without incident     -Diabetic Foot Education provided. This included checking the feet daily looking for new new blisters or wounds, wearing shoes at all times when walking including around the house, and avoiding lotion application between the toes. If there are any signs of infection, the patient should present to the ED as soon as possible. Infections of the foot can be life threatening or lead to amputations of the foot or leg.  ---Patient's wife checks the patient's feet daily.    -The dry skin on the RIGHT lateral ankle is just dry, cracking skin. He is advised to apply Eucerin cream to the ankle daily.    -Patient in agreement with the above treatment plan and all of patient's questions were answered.      RTC six months in [facility] for diabetic foot exam and high risk nail debridement per patient request        Genesis Bay DPM

## 2022-04-06 NOTE — PATIENT INSTRUCTIONS
Thank you for allowing  and our Podiatry team to participate in your care. Please call our office at [phone] with scheduling questions or with any other questions or concerns.

## 2022-04-06 NOTE — NURSING NOTE
"Chief Complaint   Patient presents with     Toenail     trimming       Initial /76 (BP Location: Left arm, Patient Position: Sitting, Cuff Size: Adult Large)   Pulse 72   Temp 98.5  F (36.9  C) (Tympanic)   Resp 20   SpO2 94%  Estimated body mass index is 43.27 kg/m  as calculated from the following:    Height as of 9/20/21: 1.88 m (6' 2\").    Weight as of 9/20/21: 152.9 kg (337 lb).  Medication Reconciliation: complete  Harini Ralph LPN  "

## 2022-04-07 LAB — T3 SERPL-MCNC: 60 NG/DL (ref 60–181)

## 2022-04-11 ENCOUNTER — MYC MEDICAL ADVICE (OUTPATIENT)
Dept: INTERNAL MEDICINE | Facility: OTHER | Age: 72
End: 2022-04-11
Payer: COMMERCIAL

## 2022-04-18 DIAGNOSIS — M1A.00X0 IDIOPATHIC CHRONIC GOUT WITHOUT TOPHUS, UNSPECIFIED SITE: ICD-10-CM

## 2022-04-18 DIAGNOSIS — I10 BENIGN ESSENTIAL HYPERTENSION: ICD-10-CM

## 2022-04-18 DIAGNOSIS — M10.00 IDIOPATHIC GOUT, UNSPECIFIED CHRONICITY, UNSPECIFIED SITE: ICD-10-CM

## 2022-04-18 DIAGNOSIS — G47.00 INSOMNIA, UNSPECIFIED TYPE: ICD-10-CM

## 2022-04-20 RX ORDER — TORSEMIDE 20 MG/1
40 TABLET ORAL 2 TIMES DAILY
Qty: 360 TABLET | Refills: 3 | Status: SHIPPED | OUTPATIENT
Start: 2022-04-20 | End: 2023-01-18

## 2022-04-20 RX ORDER — PREDNISONE 20 MG/1
20 TABLET ORAL DAILY
Qty: 5 TABLET | Refills: 1 | Status: SHIPPED | OUTPATIENT
Start: 2022-04-20 | End: 2022-06-21

## 2022-04-20 RX ORDER — ALLOPURINOL 300 MG/1
300 TABLET ORAL DAILY
Qty: 90 TABLET | Refills: 1 | Status: SHIPPED | OUTPATIENT
Start: 2022-04-20 | End: 2022-10-19

## 2022-04-20 RX ORDER — QUETIAPINE FUMARATE 50 MG/1
TABLET, FILM COATED ORAL
Qty: 90 TABLET | Refills: 1 | Status: SHIPPED | OUTPATIENT
Start: 2022-04-20 | End: 2022-10-19

## 2022-04-20 NOTE — TELEPHONE ENCOUNTER
Demadex      Last Written Prescription Date:  4/15/21  Last Fill Quantity: 360,   # refills: 3  Last Office Visit: 11/5/19  Future Office visit:    Next 5 appointments (look out 90 days)    Jun 21, 2022  1:30 PM  (Arrive by 1:15 PM)  SHORT with You Bethea DO  [facility] ([facility] ) [address]  [place] [address]  [phone]           Routing refill request to provider for review/approval because:      Allopurinol      Last Written Prescription Date:  10/25/21  Last Fill Quantity: 90,   # refills: 1  Last Office Visit: 11/5/19  Future Office visit:    Next 5 appointments (look out 90 days)    Jun 21, 2022  1:30 PM  (Arrive by 1:15 PM)  SHORT with You Bethea DO  [facility] ([facility] ) [address]  [place] [address]  [phone]           Routing refill request to provider for review/approval because:      Prednisone      Last Written Prescription Date:  1/10/22  Last Fill Quantity: 5,   # refills: 1  Last Office Visit: 11/5/19  Future Office visit:    Next 5 appointments (look out 90 days)    Jun 21, 2022  1:30 PM  (Arrive by 1:15 PM)  SHORT with You Bethea DO  [facility] ([facility] ) [address]  [place] [address]  [phone]           Routing refill request to provider for review/approval because:      Seroquel      Last Written Prescription Date:  1/10/22  Last Fill Quantity: 90,   # refills: 1  Last Office Visit: 11/5/19  Future Office visit:    Next 5 appointments (look out 90 days)    Jun 21, 2022  1:30 PM  (Arrive by 1:15 PM)  SHORT with You Bethea DO  [facility] ([facility] ) [address]  [place] [address]  [phone]           Routing refill request to provider for  review/approval because:

## 2022-05-09 ENCOUNTER — TRANSFERRED RECORDS (OUTPATIENT)
Dept: HEALTH INFORMATION MANAGEMENT | Facility: CLINIC | Age: 72
End: 2022-05-09

## 2022-05-12 DIAGNOSIS — E11.8 TYPE 2 DIABETES MELLITUS WITH COMPLICATION, WITH LONG-TERM CURRENT USE OF INSULIN (H): ICD-10-CM

## 2022-05-12 DIAGNOSIS — Z79.4 TYPE 2 DIABETES MELLITUS WITH COMPLICATION, WITH LONG-TERM CURRENT USE OF INSULIN (H): ICD-10-CM

## 2022-05-13 RX ORDER — INSULIN GLARGINE 100 [IU]/ML
INJECTION, SOLUTION SUBCUTANEOUS
Qty: 15 ML | Refills: 11 | Status: SHIPPED | OUTPATIENT
Start: 2022-05-13 | End: 2023-04-17

## 2022-05-13 NOTE — TELEPHONE ENCOUNTER
Insulin Glargine      Last Written Prescription Date:  4/15/21  Last Fill Quantity: 15ml,   # refills: 11  Last Office Visit: 11/5/19  Future Office visit:    Next 5 appointments (look out 90 days)    Jun 21, 2022  1:30 PM  (Arrive by 1:15 PM)  SHORT with You Bethea DO  [facility] ([facility] ) [address]  [place] [phone]

## 2022-05-21 ENCOUNTER — HEALTH MAINTENANCE LETTER (OUTPATIENT)
Age: 72
End: 2022-05-21

## 2022-06-08 DIAGNOSIS — E11.40 TYPE 2 DIABETES MELLITUS WITH DIABETIC NEUROPATHY, UNSPECIFIED WHETHER LONG TERM INSULIN USE (H): ICD-10-CM

## 2022-06-08 DIAGNOSIS — E11.9 TYPE 2 DIABETES, HBA1C GOAL < 8% (H): ICD-10-CM

## 2022-06-09 RX ORDER — PEN NEEDLE, DIABETIC 30 GX 1/3"
NEEDLE, DISPOSABLE MISCELLANEOUS
Qty: 100 EACH | Refills: 1 | Status: SHIPPED | OUTPATIENT
Start: 2022-06-09 | End: 2022-12-14

## 2022-06-09 NOTE — TELEPHONE ENCOUNTER
Contour next test strip      Last Written Prescription Date:  11/24/21  Last Fill Quantity: 100,   # refills: 3  Last Office Visit: 4/6/22  Future Office visit:    Next 5 appointments (look out 90 days)    Jun 21, 2022  1:30 PM  (Arrive by 1:15 PM)  SHORT with You Bethea DO  [facility] ([facility] ) [address]  [place] [address]  [phone]        Metformin      Last Written Prescription Date:  3/3/22  Last Fill Quantity: 180,   # refills: 1  Last Office Visit: 4/6/22  Future Office visit:      Pen Needle      Last Written Prescription Date:  12/9/21  Last Fill Quantity: 100,   # refills: 1  Last Office Visit: 4/6/22  Future Office visit:

## 2022-06-16 NOTE — PROGRESS NOTES
"  Assessment & Plan   Problem List Items Addressed This Visit        Nervous and Auditory    Type 2 diabetes mellitus with diabetic neuropathy (H)    Relevant Medications    predniSONE (DELTASONE) 20 MG tablet    Other Relevant Orders    Miscellaneous Order for DME - ONLY FOR DME       Endocrine    Acquired hypothyroidism      Other Visit Diagnoses     Type 2 diabetes, HbA1c goal < 7% (H)    -  Primary    Relevant Medications    predniSONE (DELTASONE) 20 MG tablet    Other Relevant Orders    Miscellaneous Order for DME - ONLY FOR DME-Diabetic shoes    Screening for prostate cancer        Relevant Orders    PSA, screen    Idiopathic gout, unspecified chronicity, unspecified site        Relevant Medications    predniSONE (DELTASONE) 20 MG tablet             30 minutes spent on the date of the encounter doing chart review, review of test results, interpretation of tests, patient visit, documentation and discussion with family        BMI:   Estimated body mass index is 42.24 kg/m  as calculated from the following:    Height as of 9/20/21: 1.88 m (6' 2\").    Weight as of this encounter: 149.2 kg (329 lb).           No follow-ups on file.    You Bethea, [facility]   Juan F is a 72 year old, presenting for the following health issues:  Hypertension, Thyroid Problem, and Diabetes      HPI     Juan F presents today for follow up.  He has a history of hypothyroidism, Type 2 diabetes, and HTN.  He needs new diabetic shoes also.  His wife brought up the idea of home care.  However after a lengthy conversation as to the fact the home care would not be present for hours each day they declined, however Juan F is homebound and does not drive.    Juan F recently had labs that were reviewed today.      Diabetes Follow-up    How often are you checking your blood sugar? Two times daily  What time of day are you checking your blood sugars (select all that apply)?  Before and after meals  Have you " had any blood sugars above 200?  No  Have you had any blood sugars below 70?  No    What symptoms do you notice when your blood sugar is low?  None    What concerns do you have today about your diabetes? None     Do you have any of these symptoms? (Select all that apply)  Numbness in feet and Burning in feet      BP Readings from Last 2 Encounters:   06/21/22 136/74   04/06/22 134/76     Hemoglobin A1C POCT (%)   Date Value   01/20/2021 6.0 (H)   03/20/2020 6.0 (H)     Hemoglobin A1C (%)   Date Value   04/06/2022 5.6   09/01/2021 6.1 (H)     LDL Cholesterol Calculated (mg/dL)   Date Value   04/06/2022 90   01/20/2021 65   09/04/2019 84         Hypertension Follow-up      Do you check your blood pressure regularly outside of the clinic? Yes     Are you following a low salt diet? Yes    Are your blood pressures ever more than 140 on the top number (systolic) OR more No    Hypothyroidism Follow-up      Since last visit, patient describes the following symptoms: Weight stable, no hair loss, no skin changes, no constipation, no loose stools        Review of Systems   Constitutional, HEENT, cardiovascular, pulmonary, gi and gu systems are negative, except as otherwise noted.      Objective    /74 (BP Location: Left arm, Patient Position: Chair, Cuff Size: Adult Large)   Pulse 63   Temp 98.3  F (36.8  C) (Tympanic)   Wt 149.2 kg (329 lb)   SpO2 96%   BMI 42.24 kg/m    Body mass index is 42.24 kg/m .  Physical Exam   GENERAL: alert and no distress  RESP: lungs clear to auscultation - no rales, rhonchi or wheezes  CV: regular rate and rhythm, normal S1 S2, no S3 or S4, no murmurs  ABDOMEN: Obese , soft, positive BS.    MS: +3 LE edema in both   SKIN: no suspicious lesions or rashes  NEURO: Neuropathy of both feet noted with monofilament testing.    PSYCH: mentation appears normal, affect normal/bright    Lab on 04/06/2022   Component Date Value Ref Range Status     Estimated Average Glucose 04/06/2022 114  mg/dL  Final     Hemoglobin A1C 04/06/2022 5.6  0.0 - 5.6 % Final    Normal <5.7%   Prediabetes 5.7-6.4%    Diabetes 6.5% or higher     Note: Adopted from ADA consensus guidelines.     TSH 04/06/2022 0.28 (A) 0.40 - 4.00 mU/L Final     T3 Total 04/06/2022 60  60 - 181 ng/dL Final     Free T4 04/06/2022 1.66  ng/dL Final     Cholesterol 04/06/2022 193  <200 mg/dL Final     Triglycerides 04/06/2022 268 (A) <150 mg/dL Final     Direct Measure HDL 04/06/2022 49  >=40 mg/dL Final     LDL Cholesterol Calculated 04/06/2022 90  <=100 mg/dL Final     Non HDL Cholesterol 04/06/2022 144 (A) <130 mg/dL Final     Patient Fasting > 8hrs? 04/06/2022 Yes   Final     Sodium 04/06/2022 137  133 - 144 mmol/L Final     Potassium 04/06/2022 4.1  3.4 - 5.3 mmol/L Final     Chloride 04/06/2022 99  94 - 109 mmol/L Final    0-20 years:       Female:  mmol/L  Male:    mmol/L       20 years and older:   Female:  mmol/L   Male:    mmol/L       Carbon Dioxide (CO2) 04/06/2022 29  20 - 32 mmol/L Final     Anion Gap 04/06/2022 9  3 - 14 mmol/L Final     Urea Nitrogen 04/06/2022 20  7 - 30 mg/dL Final    Female  0 to 15 days           3-23  15 days to 1 year      3-17  1 to 10 years          9-22  10 to 19 years         7-19  19 years and older     7-30    Male  0 to 15 days           3-23  15 days to 1 year      3-17  1 to 10 years          9-22  10 to 19 years         7-21  19 years and older     7-30     Creatinine 04/06/2022 1.05  0.52 - 1.25 mg/dL Final    20 y and older Female 0.52-1.04 mg/dL  20 y and older Male 0.66-1.25 mg/dL    Varies with the amount of muscle mass present.    GICH  Female: 0.6-1.2 mg/dL  Male: 0.7-1.3 mg/dL       Calcium 04/06/2022 8.7  8.5 - 10.1 mg/dL Final     Glucose 04/06/2022 119 (A) 70 - 99 mg/dL Final     Alkaline Phosphatase 04/06/2022 65  40 - 150 U/L Final    Female:    0-3 years  110-320 U/L  4-9 years  150-420 U/L  10-11 years  130-560 U/L  12-13 years  105-420 U/L  14-15 years    U/L  16 years and older   U/L      Male:  0-3 years  110-320 U/L  4-9 years  150-420 U/L  10-15 years  130-530 U/L  16-19 years   U/L  20 years and older   U/L       AST 04/06/2022 11  0 - 45 U/L Final     ALT 04/06/2022 26  0 - 70 U/L Final    Female    All ages 0-50 U/L    Male    0 - 19 years       0-50 U/L  20 years and older 0-70 U/L            Protein Total 04/06/2022 7.3  6.8 - 8.8 g/dL Final     Albumin 04/06/2022 3.9  3.4 - 5.0 g/dL Final     Bilirubin Total 04/06/2022 0.8  0.2 - 1.3 mg/dL Final     GFR Estimate 04/06/2022 76  >60 mL/min/1.73m2 Final    GFR not calculated when sex unspecified or nonbinary.  Effective December 21, 2021 eGFRcr in adults is calculated using the 2021 CKD-EPI creatinine equation which includes age and gender (Lakshmi et al., NEJ, DOI: 10.1056/FIUXwg8595198)     WBC Count 04/06/2022 7.8  4.0 - 11.0 10e3/uL Final     RBC Count 04/06/2022 4.46  3.80 - 5.90 10e6/uL Final    Reference Range:                                                     Female 3.80-5.20 10e6/uL                                      Male 4.40-5.90 10e6u/L     Hemoglobin 04/06/2022 14.8  11.7 - 17.7 g/dL Final    Reference Range:                                                     Female 11.7-15.7 g/dL                                      Male 13.3-17.7 g/dL     Hematocrit 04/06/2022 43.0  35.0 - 53.0 % Final    Reference Range:                                                     Female 35.0-47.0 %                                            Male 40.0-54.0 %     MCV 04/06/2022 96  78 - 100 fL Final     MCH 04/06/2022 33.2 (A) 26.5 - 33.0 pg Final     MCHC 04/06/2022 34.4  31.5 - 36.5 g/dL Final     RDW 04/06/2022 13.0  10.0 - 15.0 % Final     Platelet Count 04/06/2022 210  150 - 450 10e3/uL Final     % Neutrophils 04/06/2022 46  % Final     % Lymphocytes 04/06/2022 37  % Final     % Monocytes 04/06/2022 12  % Final     % Eosinophils 04/06/2022 5  % Final     % Basophils 04/06/2022 1  % Final      Absolute Neutrophils 04/06/2022 3.6  1.6 - 8.3 10e3/uL Final     Absolute Lymphocytes 04/06/2022 2.9  0.8 - 5.3 10e3/uL Final     Absolute Monocytes 04/06/2022 0.9  0.0 - 1.3 10e3/uL Final     Absolute Eosinophils 04/06/2022 0.4  0.0 - 0.7 10e3/uL Final     Absolute Basophils 04/06/2022 0.1  0.0 - 0.2 10e3/uL Final     No results found for any visits on 06/21/22.  No results found for this or any previous visit (from the past 24 hour(s)).                .  ..

## 2022-06-21 ENCOUNTER — TRANSFERRED RECORDS (OUTPATIENT)
Dept: HEALTH INFORMATION MANAGEMENT | Facility: CLINIC | Age: 72
End: 2022-06-21

## 2022-06-21 ENCOUNTER — OFFICE VISIT (OUTPATIENT)
Dept: INTERNAL MEDICINE | Facility: OTHER | Age: 72
End: 2022-06-21
Attending: INTERNAL MEDICINE
Payer: COMMERCIAL

## 2022-06-21 VITALS
DIASTOLIC BLOOD PRESSURE: 74 MMHG | TEMPERATURE: 98.3 F | SYSTOLIC BLOOD PRESSURE: 136 MMHG | BODY MASS INDEX: 42.24 KG/M2 | HEART RATE: 63 BPM | OXYGEN SATURATION: 96 % | WEIGHT: 315 LBS

## 2022-06-21 DIAGNOSIS — E11.40 TYPE 2 DIABETES MELLITUS WITH DIABETIC NEUROPATHY, WITHOUT LONG-TERM CURRENT USE OF INSULIN (H): ICD-10-CM

## 2022-06-21 DIAGNOSIS — E11.9 TYPE 2 DIABETES, HBA1C GOAL < 7% (H): Primary | ICD-10-CM

## 2022-06-21 DIAGNOSIS — E03.9 ACQUIRED HYPOTHYROIDISM: ICD-10-CM

## 2022-06-21 DIAGNOSIS — Z12.5 SCREENING FOR PROSTATE CANCER: ICD-10-CM

## 2022-06-21 DIAGNOSIS — M10.00 IDIOPATHIC GOUT, UNSPECIFIED CHRONICITY, UNSPECIFIED SITE: ICD-10-CM

## 2022-06-21 LAB
PSA SERPL-MCNC: 2.82 UG/L (ref 0–4)
RETINOPATHY: NORMAL

## 2022-06-21 PROCEDURE — 99214 OFFICE O/P EST MOD 30 MIN: CPT | Performed by: INTERNAL MEDICINE

## 2022-06-21 PROCEDURE — G0103 PSA SCREENING: HCPCS | Mod: ZL | Performed by: INTERNAL MEDICINE

## 2022-06-21 PROCEDURE — G0463 HOSPITAL OUTPT CLINIC VISIT: HCPCS

## 2022-06-21 PROCEDURE — 36415 COLL VENOUS BLD VENIPUNCTURE: CPT | Mod: ZL | Performed by: INTERNAL MEDICINE

## 2022-06-21 RX ORDER — PREDNISONE 20 MG/1
20 TABLET ORAL DAILY
Qty: 10 TABLET | Refills: 4 | Status: SHIPPED | OUTPATIENT
Start: 2022-06-21 | End: 2023-12-19

## 2022-06-21 ASSESSMENT — PAIN SCALES - GENERAL: PAINLEVEL: EXTREME PAIN (8)

## 2022-06-21 NOTE — NURSING NOTE
"Chief Complaint   Patient presents with     Hypertension     Thyroid Problem     Diabetes       Initial /74 (BP Location: Left arm, Patient Position: Chair, Cuff Size: Adult Large)   Pulse 63   Temp 98.3  F (36.8  C) (Tympanic)   Wt 149.2 kg (329 lb)   SpO2 96%   BMI 42.24 kg/m   Estimated body mass index is 42.24 kg/m  as calculated from the following:    Height as of 9/20/21: 1.88 m (6' 2\").    Weight as of this encounter: 149.2 kg (329 lb).  Medication Reconciliation: complete  ANAID BOOGIE LPN    "

## 2022-07-20 DIAGNOSIS — N40.0 BENIGN PROSTATIC HYPERPLASIA WITHOUT LOWER URINARY TRACT SYMPTOMS: ICD-10-CM

## 2022-07-22 RX ORDER — TAMSULOSIN HYDROCHLORIDE 0.4 MG/1
CAPSULE ORAL
Qty: 90 CAPSULE | Refills: 2 | Status: SHIPPED | OUTPATIENT
Start: 2022-07-22 | End: 2023-04-19

## 2022-07-27 ENCOUNTER — MEDICAL CORRESPONDENCE (OUTPATIENT)
Dept: HEALTH INFORMATION MANAGEMENT | Facility: HOSPITAL | Age: 72
End: 2022-07-27

## 2022-09-08 DIAGNOSIS — E11.9 TYPE 2 DIABETES, HBA1C GOAL < 8% (H): ICD-10-CM

## 2022-09-08 RX ORDER — LIRAGLUTIDE 6 MG/ML
INJECTION SUBCUTANEOUS
Qty: 18 ML | Refills: 1 | Status: SHIPPED | OUTPATIENT
Start: 2022-09-08 | End: 2023-03-30

## 2022-09-11 ENCOUNTER — HEALTH MAINTENANCE LETTER (OUTPATIENT)
Age: 72
End: 2022-09-11

## 2022-10-05 ENCOUNTER — OFFICE VISIT (OUTPATIENT)
Dept: PODIATRY | Facility: OTHER | Age: 72
End: 2022-10-05
Attending: PODIATRIST
Payer: MEDICARE

## 2022-10-05 DIAGNOSIS — M1A.0711 CHRONIC IDIOPATHIC GOUT INVOLVING TOE OF RIGHT FOOT WITH TOPHUS: ICD-10-CM

## 2022-10-05 DIAGNOSIS — E11.42 DIABETIC POLYNEUROPATHY ASSOCIATED WITH TYPE 2 DIABETES MELLITUS (H): ICD-10-CM

## 2022-10-05 DIAGNOSIS — E11.9 DIABETES MELLITUS TYPE 2, INSULIN DEPENDENT (H): ICD-10-CM

## 2022-10-05 DIAGNOSIS — B35.3 TINEA PEDIS OF BOTH FEET: ICD-10-CM

## 2022-10-05 DIAGNOSIS — R20.8 LOSS OF PROTECTIVE SENSATION OF SKIN OF DEFORMED FOOT: ICD-10-CM

## 2022-10-05 DIAGNOSIS — Z79.4 DIABETES MELLITUS TYPE 2, INSULIN DEPENDENT (H): ICD-10-CM

## 2022-10-05 DIAGNOSIS — L60.3 ONYCHODYSTROPHY: Primary | ICD-10-CM

## 2022-10-05 DIAGNOSIS — M21.969 LOSS OF PROTECTIVE SENSATION OF SKIN OF DEFORMED FOOT: ICD-10-CM

## 2022-10-05 DIAGNOSIS — L84 CALLUS OF FOOT: ICD-10-CM

## 2022-10-05 PROCEDURE — 11721 DEBRIDE NAIL 6 OR MORE: CPT | Performed by: PODIATRIST

## 2022-10-05 PROCEDURE — G0463 HOSPITAL OUTPT CLINIC VISIT: HCPCS | Mod: 25

## 2022-10-05 ASSESSMENT — PAIN SCALES - GENERAL: PAINLEVEL: EXTREME PAIN (8)

## 2022-10-05 NOTE — PROGRESS NOTES
Chief complaint: Patient presents with:  Toenail      History of Present Illness: This 72 year old IDDM II is seen with his wife, Yazmin, for follow-up management for a diabetic foot exam and high risk nail debridement.     His wife is applying Terbinifine to the feet and she says the redness and flaking has been well controlled. She has not applied any lotion to the tops of his feet but she thinks she will start this winter.    He has continued to take Allopurinol 300mg daily since his last appointment.    The patient is requesting high risk nail debridement every six months.    No further pedal complaints today.       Last HbA1C was 5.6% on 04/06/2022.    Previously 6.1% on 09/01/2021.    Previously 6.0% on 01/20/2021    Previously 5.6% on 04/11/2019      /70 (BP Location: Left arm, Patient Position: Sitting, Cuff Size: Adult Regular)   Pulse 70   Temp 97  F (36.1  C) (Tympanic)   SpO2 97%     Patient Active Problem List   Diagnosis     ACP (advance care planning)     Type 2 diabetes mellitus with diabetic neuropathy (H)     Morbid obesity due to excess calories (H)     Acquired hypothyroidism     Benign essential hypertension     Calculus of gallbladder without cholecystitis without obstruction     Cholecystitis     Gastroesophageal reflux disease without esophagitis     Depression     Congenital anomaly of spleen     Disorder of lipoid metabolism     Other lipoprotein metabolism disorders     Malignant neoplasm of thyroid gland (H)     Tremor     Hypothyroidism, postsurgical     Ametropia     Cupping of optic disc, left     Flat affect     Lesion of left upper eyelid     Nuclear sclerotic cataract of both eyes     Other conjunctivitis of both eyes     Presbyopia       Past Surgical History:   Procedure Laterality Date     basal cell carcinoma  2017     CHOLECYSTECTOMY  11/23/2019     COLONOSCOPY - HIM SCAN  12/01/2012    Colonoscopy due to bleeding, no polyps 12-12     HERNIA REPAIR  2014       Current  Outpatient Medications   Medication     allopurinol (ZYLOPRIM) 300 MG tablet     ascorbic acid (VITAMIN C) 500 MG tablet     B-D U/F insulin pen needle     blood glucose monitoring (ACCU-CHEK FASTCLIX) lancets     Cholecalciferol (VITAMIN D3) 50 MCG (2000 UT) CAPS     ciclopirox (LOPROX) 0.77 % cream     CONTOUR NEXT TEST test strip     FEROSUL 325 (65 Fe) MG tablet     gabapentin (NEURONTIN) 300 MG capsule     GNP TERBINAFINE HYDROCHLORIDE 1 % external cream     horse chestnut 300 MG CAPS     insulin glargine (BASAGLAR KWIKPEN) 100 UNIT/ML pen     levothyroxine (SYNTHROID/LEVOTHROID) 300 MCG tablet     metFORMIN (GLUCOPHAGE) 1000 MG tablet     metoprolol succinate ER (TOPROL-XL) 100 MG 24 hr tablet     Multiple Vitamin (MULTI VITAMIN PO)     naproxen (NAPROSYN) 500 MG tablet     NOVOTWIST PEN NEEDLE 32G X 5 MM     pantoprazole (PROTONIX) 40 MG EC tablet     potassium chloride ER (KLOR-CON M) 20 MEQ CR tablet     predniSONE (DELTASONE) 20 MG tablet     QUEtiapine (SEROQUEL) 50 MG tablet     tamsulosin (FLOMAX) 0.4 MG capsule     torsemide (DEMADEX) 20 MG tablet     VICTOZA PEN 18 MG/3ML soln     No current facility-administered medications for this visit.          Allergies   Allergen Reactions     Food      Onions, peppers, olives        Family History   Problem Relation Age of Onset     Unknown/Adopted Sister        Social History     Socioeconomic History     Marital status:      Spouse name: Not on file     Number of children: Not on file     Years of education: Not on file     Highest education level: Not on file   Occupational History     Not on file   Social Needs     Financial resource strain: Not on file     Food insecurity:     Worry: Not on file     Inability: Not on file     Transportation needs:     Medical: Not on file     Non-medical: Not on file   Tobacco Use     Smoking status: Never Smoker     Smokeless tobacco: Never Used   Substance and Sexual Activity     Alcohol use: Yes     Comment:  beer daily      Drug use: No     Sexual activity: Not Currently     Partners: Female   Lifestyle     Physical activity:     Days per week: Not on file     Minutes per session: Not on file     Stress: Not on file   Relationships     Social connections:     Talks on phone: Not on file     Gets together: Not on file     Attends [employer] service: Not on file     Active member of club or organization: Not on file     Attends meetings of clubs or organizations: Not on file     Relationship status: Not on file     Intimate partner violence:     Fear of current or ex partner: Not on file     Emotionally abused: Not on file     Physically abused: Not on file     Forced sexual activity: Not on file   Other Topics Concern     Parent/sibling w/ CABG, MI or angioplasty before 65F 55M? Not Asked   Social History Narrative     Not on file       ROS: 10 point ROS neg other than the symptoms noted above in the HPI.  EXAM  Constitutional: healthy, alert and no distress    Psychiatric: mentation appears normal and affect normal/bright    VASCULAR:  -Dorsalis pedis pulse +1/4 bilaterally   -Posterior tibial pulse +1/4 bilaterally   -Capillary refill time < 3 seconds to hallux bilaterally   -Hair growth Absent to b/l anterior leg and ankle bilaterally   -Varicosities and telangiectasias to bilateral foot bilaterally   -Mild 1+ pitting edema to bilateral foot and ankle bilaterally   NEURO:  -Epicritic and protective sensation ABSENT to the bilateral foot.  DERM:  -Diffusely dry skin with flaking of skin to the RIGHT lateral ankle  -Toenails elongated, thickened, dystrophic and discolored x 10  -Dry, scaling skin on the RIGHT lateral ankle  MSK:  -Muscle strength of ankles +5/5 for dorsiflexion, plantarflexion, ABDUction and ADDuction b/l    RIGHT FOOT RADIOGRAPH 03/17/2021  IMPRESSION: Findings suspicious for osteomyelitis of the middle and distal phalanxes of the right second toe.    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 Treatment options discussed with no educational barriers noted.  Gout:  -Patient's toe has no open wounds and no drainage. His gout is well controlled.  -Patient's gout has been well controlled since he increased Allopurinol to 300mg daily    -High risk toenail debridement x 10 toenails without incident     -Diabetic Foot Education provided. This included checking the feet daily looking for new new blisters or wounds, wearing shoes at all times when walking including around the house, and avoiding lotion application between the toes. If there are any signs of infection, the patient should present to the ED as soon as possible. Infections of the foot can be life threatening or lead to amputations of the foot or leg.    -Patient in agreement with the above treatment plan and all of patient's questions were answered.      Return to clinic six months in [facility] for diabetic foot exam and high risk nail debridement per patient request        Genesis Bay, AQUILES, AQUILES

## 2022-10-05 NOTE — NURSING NOTE
"Chief Complaint   Patient presents with     Toenail       Initial /70 (BP Location: Left arm, Patient Position: Sitting, Cuff Size: Adult Regular)   Pulse 70   Temp 97  F (36.1  C) (Tympanic)   SpO2 97%  Estimated body mass index is 42.24 kg/m  as calculated from the following:    Height as of 9/20/21: 1.88 m (6' 2\").    Weight as of 6/21/22: 149.2 kg (329 lb).  Medication Reconciliation: samy Lopez  "

## 2022-10-12 VITALS
BODY MASS INDEX: 43.14 KG/M2 | OXYGEN SATURATION: 97 % | DIASTOLIC BLOOD PRESSURE: 70 MMHG | TEMPERATURE: 97 F | SYSTOLIC BLOOD PRESSURE: 135 MMHG | HEART RATE: 70 BPM | WEIGHT: 315 LBS

## 2022-10-18 DIAGNOSIS — G47.00 INSOMNIA, UNSPECIFIED TYPE: ICD-10-CM

## 2022-10-18 DIAGNOSIS — M1A.00X0 IDIOPATHIC CHRONIC GOUT WITHOUT TOPHUS, UNSPECIFIED SITE: ICD-10-CM

## 2022-10-19 RX ORDER — ALLOPURINOL 300 MG/1
300 TABLET ORAL DAILY
Qty: 90 TABLET | Refills: 1 | Status: SHIPPED | OUTPATIENT
Start: 2022-10-19 | End: 2023-04-19

## 2022-10-19 RX ORDER — QUETIAPINE FUMARATE 50 MG/1
TABLET, FILM COATED ORAL
Qty: 90 TABLET | Refills: 1 | Status: SHIPPED | OUTPATIENT
Start: 2022-10-19 | End: 2023-04-17

## 2022-10-19 NOTE — TELEPHONE ENCOUNTER
allopurinol (ZYLOPRIM) 300 MG tablet        Last Written Prescription Date:  4/20/22  Last Fill Quantity: 90,   # refills: 1  Last Office Visit: 6/21/22  Future Office visit:       Routing refill request to provider for review/approval because:    Gout Agents Protocol Failed 10/18/2022 03:40 PM   Protocol Details  Has Uric Acid on file in past 12 months and value is less than 6     No results found for: URIC

## 2022-11-14 ENCOUNTER — TRANSFERRED RECORDS (OUTPATIENT)
Dept: HEALTH INFORMATION MANAGEMENT | Facility: CLINIC | Age: 72
End: 2022-11-14

## 2022-11-28 DIAGNOSIS — E11.9 TYPE 2 DIABETES, HBA1C GOAL < 8% (H): Primary | ICD-10-CM

## 2022-11-29 ENCOUNTER — TRANSFERRED RECORDS (OUTPATIENT)
Dept: HEALTH INFORMATION MANAGEMENT | Facility: CLINIC | Age: 72
End: 2022-11-29

## 2022-12-05 ENCOUNTER — DOCUMENTATION ONLY (OUTPATIENT)
Dept: INTERNAL MEDICINE | Facility: OTHER | Age: 72
End: 2022-12-05

## 2022-12-05 DIAGNOSIS — G47.33 OBSTRUCTIVE SLEEP APNEA (ADULT) (PEDIATRIC): Primary | ICD-10-CM

## 2022-12-08 DIAGNOSIS — G89.29 CHRONIC PAIN OF BOTH KNEES: ICD-10-CM

## 2022-12-08 DIAGNOSIS — M25.561 CHRONIC PAIN OF BOTH KNEES: ICD-10-CM

## 2022-12-08 DIAGNOSIS — M25.562 CHRONIC PAIN OF BOTH KNEES: ICD-10-CM

## 2022-12-08 DIAGNOSIS — E11.40 TYPE 2 DIABETES MELLITUS WITH DIABETIC NEUROPATHY, UNSPECIFIED WHETHER LONG TERM INSULIN USE (H): ICD-10-CM

## 2022-12-08 RX ORDER — NAPROXEN 500 MG/1
500 TABLET ORAL 2 TIMES DAILY WITH MEALS
Qty: 180 TABLET | Refills: 3 | Status: ON HOLD | OUTPATIENT
Start: 2022-12-08 | End: 2023-10-17

## 2022-12-08 NOTE — TELEPHONE ENCOUNTER
metFORMIN      Last Written Prescription Date:  6/9/22  Last Fill Quantity: 180,   # refills: 1  Last Office Visit: 10/5/22  Future Office visit:       Routing refill request to provider for review/approval because:      naproxen      Last Written Prescription Date:  10/22/21  Last Fill Quantity: 180,   # refills: 3  Last Office Visit: 10/5/22  Future Office visit:       Routing refill request to provider for review/approval because:

## 2022-12-12 DIAGNOSIS — E11.40 TYPE 2 DIABETES MELLITUS WITH DIABETIC NEUROPATHY, UNSPECIFIED WHETHER LONG TERM INSULIN USE (H): ICD-10-CM

## 2022-12-13 DIAGNOSIS — E11.9 TYPE 2 DIABETES, HBA1C GOAL < 8% (H): ICD-10-CM

## 2022-12-14 RX ORDER — PEN NEEDLE, DIABETIC 30 GX 1/3"
NEEDLE, DISPOSABLE MISCELLANEOUS
Qty: 100 EACH | Refills: 1 | Status: SHIPPED | OUTPATIENT
Start: 2022-12-14 | End: 2022-12-23

## 2022-12-22 ENCOUNTER — LAB (OUTPATIENT)
Dept: LAB | Facility: OTHER | Age: 72
End: 2022-12-22
Payer: MEDICARE

## 2022-12-22 DIAGNOSIS — E11.9 TYPE 2 DIABETES, HBA1C GOAL < 8% (H): ICD-10-CM

## 2022-12-22 PROCEDURE — 82043 UR ALBUMIN QUANTITATIVE: CPT | Mod: ZL

## 2022-12-22 PROCEDURE — 82570 ASSAY OF URINE CREATININE: CPT | Mod: ZL

## 2022-12-23 LAB
CREAT UR-MCNC: 72.4 MG/DL
MICROALBUMIN UR-MCNC: 12.6 MG/L
MICROALBUMIN/CREAT UR: 17.4 MG/G CR (ref 0–25)

## 2023-01-17 DIAGNOSIS — E11.40 TYPE 2 DIABETES MELLITUS WITH DIABETIC NEUROPATHY, UNSPECIFIED WHETHER LONG TERM INSULIN USE (H): ICD-10-CM

## 2023-01-17 DIAGNOSIS — E87.6 HYPOKALEMIA: ICD-10-CM

## 2023-01-17 DIAGNOSIS — I10 BENIGN ESSENTIAL HYPERTENSION: ICD-10-CM

## 2023-01-18 RX ORDER — FLURBIPROFEN SODIUM 0.3 MG/ML
SOLUTION/ DROPS OPHTHALMIC 2 TIMES DAILY
Qty: 100 EACH | Refills: 11 | Status: SHIPPED | OUTPATIENT
Start: 2023-01-18 | End: 2024-02-01

## 2023-01-18 RX ORDER — POTASSIUM CHLORIDE 1500 MG/1
TABLET, EXTENDED RELEASE ORAL
Qty: 360 TABLET | Refills: 1 | Status: SHIPPED | OUTPATIENT
Start: 2023-01-18 | End: 2023-04-19

## 2023-01-18 RX ORDER — TORSEMIDE 20 MG/1
40 TABLET ORAL 2 TIMES DAILY
Qty: 360 TABLET | Refills: 1 | Status: SHIPPED | OUTPATIENT
Start: 2023-01-18 | End: 2023-10-18

## 2023-01-18 NOTE — TELEPHONE ENCOUNTER
BD UF MINI PEN NDL 31GX3/16 31G X 5 MM Miscellaneous        Last Written Prescription Date:  10/22/21  Last Fill Quantity: 100,   # refills: 11  Last Office Visit: 6/21/22  Future Office visit:    Next 5 appointments (look out 90 days)    Apr 05, 2023  2:00 PM  (Arrive by 1:45 PM)  Return Visit with Genesis Bay DPM  [facility] ([facility] ) [address] [place] [id number] [phone]           Routing refill request to provider for review/approval because:    Diabetic Supplies Protocol Failed 01/17/2023 03:47 PM   Protocol Details  Recent (6 mo) or future (30 days) visit within the authorizing provider's specialty

## 2023-01-23 ENCOUNTER — HEALTH MAINTENANCE LETTER (OUTPATIENT)
Age: 73
End: 2023-01-23

## 2023-01-30 ENCOUNTER — TELEPHONE (OUTPATIENT)
Dept: INTERNAL MEDICINE | Facility: OTHER | Age: 73
End: 2023-01-30

## 2023-01-30 DIAGNOSIS — Z12.5 SCREENING FOR PROSTATE CANCER: ICD-10-CM

## 2023-01-30 DIAGNOSIS — E11.9 TYPE 2 DIABETES, HBA1C GOAL < 7% (H): Primary | ICD-10-CM

## 2023-01-30 NOTE — TELEPHONE ENCOUNTER
Call placed to patient notified Dr. Bethea has ordered labs for 4/5/23 when patient comes in to see Dr. Bay.     Appt scheduled for lab only to follow appt with Dr. Bay on 4/5/23 at 230 pm.

## 2023-01-30 NOTE — TELEPHONE ENCOUNTER
Call returned to patient. Patient notified a follow up appt with Dr. Bethea is due.     Patient inquiring if appt can be scheduled with Dr. Bethea on 4/5/23 as patient has an appt with Dr. Bay at 145 pm in [facility]. Patient states he has extreme difficulty getting out of the house to appt's requiring full assistance of spouse.     Notified patient will discuss with Dr. Bethea and return call to patient with an update.        Upon review Dr. Bethea will be working in the hospital on 4/5/23, will inquire on lab order for A1C - forwarding to Dr. Bethea.

## 2023-01-30 NOTE — TELEPHONE ENCOUNTER
9:12 AM    Reason for Call: Phone Call    Description:     Patient got a message by [employer] that he is due for his A1C there is no lab orders placed and this appointment can't be scheduled until there is lab orders in Epic. Patient stated he is in the clinic on April 5 at 2pm and he would like to come in on this day if possible.     Was an appointment offered for this call? No  If yes : Appointment type              Date    Preferred method for responding to this message: Telephone Call  What is your phone number ? [phone]    If we cannot reach you directly, may we leave a detailed response at the number you provided? Yes    Can this message wait until your PCP/provider returns, if available today? No

## 2023-02-09 DIAGNOSIS — I10 BENIGN ESSENTIAL HYPERTENSION: ICD-10-CM

## 2023-02-10 RX ORDER — METOPROLOL SUCCINATE 100 MG/1
50 TABLET, EXTENDED RELEASE ORAL DAILY
Qty: 45 TABLET | Refills: 1 | Status: SHIPPED | OUTPATIENT
Start: 2023-02-10 | End: 2023-08-09

## 2023-02-14 ENCOUNTER — MYC MEDICAL ADVICE (OUTPATIENT)
Dept: INTERNAL MEDICINE | Facility: OTHER | Age: 73
End: 2023-02-14

## 2023-02-14 DIAGNOSIS — E03.9 ACQUIRED HYPOTHYROIDISM: Primary | ICD-10-CM

## 2023-02-16 NOTE — TELEPHONE ENCOUNTER
My Chart Message from patient reporting Insurance has notified patient a PA is required for the following Medications.     1. Basaglar Kwik Pen  2. Quetiapine Fumarate  3. Victoza    Thank you.

## 2023-02-17 ENCOUNTER — TELEPHONE (OUTPATIENT)
Dept: FAMILY MEDICINE | Facility: OTHER | Age: 73
End: 2023-02-17

## 2023-02-17 ENCOUNTER — TELEPHONE (OUTPATIENT)
Dept: INTERNAL MEDICINE | Facility: OTHER | Age: 73
End: 2023-02-17

## 2023-02-17 NOTE — TELEPHONE ENCOUNTER
PA's have been submitted on all three medications on CMM. Waiting for a response. Started separate telephone encounters for each medication.

## 2023-02-17 NOTE — TELEPHONE ENCOUNTER
Received message to start PA on Basaglar KwikPen 100UNIT/ML pen-injectors. Submitted on CMM. Waiting for a response.

## 2023-02-17 NOTE — TELEPHONE ENCOUNTER
Received message to start PA on Victoza 18MG/3ML pen-injectors. Submitted on CMM. Waiting for a response.

## 2023-02-17 NOTE — TELEPHONE ENCOUNTER
Received message to start PA on QUEtiapine Fumarate 50MG tablets. Submitted on CMM. Waiting for a response.

## 2023-02-20 DIAGNOSIS — E03.9 ACQUIRED HYPOTHYROIDISM: Primary | ICD-10-CM

## 2023-02-20 NOTE — TELEPHONE ENCOUNTER
Received DENIAL from [employer] for Basaglar KwikPen 100UNIT/ML pen-injectors. 02/17/2023    Forms scanned to Epic

## 2023-02-20 NOTE — TELEPHONE ENCOUNTER
Received DENIAL from [employer] for QUEtiapine Fumarate 50MG tablets. 02/17/2023    Forms scanned to [facility].

## 2023-02-20 NOTE — TELEPHONE ENCOUNTER
Received APPROVAL from [employer] for Victoza 18MG/3ML pen-injectors. Effective 01/01/2023 - 02/17/2024. Forms scanned to Epic.

## 2023-02-23 NOTE — TELEPHONE ENCOUNTER
Call placed to patient to discuss formulary issue, spouse requesting to return call after 2 pm.

## 2023-02-24 NOTE — TELEPHONE ENCOUNTER
Patient requesting to switch to lantus. Start date for lantus 4/10/23. See note below for additional information.     Patient has filed appeal for seroquel, awaiting a response.

## 2023-02-24 NOTE — TELEPHONE ENCOUNTER
Patient reports using Lantus in the past, therefore he is requesting to switch to Lantus since basaglar is no longer covered. Patient reports having 45 days worth or more of basaglar at home, requesting fill date of 45 days from today (4/10/23) to be sent to [facility].      Discussed Seroquel being denied due to dx of insomnia. Patient has submitted an appeal with insurance, is awaiting a response.     Patient states that seroquel is helping with insomnia. [employer] was giving other options, ex: trazadone, patient is not able to take trazadone due to significant side effects, which patient has provided in the appeal.     Notified order request will be sent to Dr. Bethea for Lantus. Patient will provide update on the appeal when more information is available.

## 2023-02-27 DIAGNOSIS — E11.9 TYPE 2 DIABETES, HBA1C GOAL < 7% (H): Primary | ICD-10-CM

## 2023-03-28 DIAGNOSIS — L60.3 ONYCHODYSTROPHY: ICD-10-CM

## 2023-03-28 DIAGNOSIS — E11.9 TYPE 2 DIABETES, HBA1C GOAL < 8% (H): ICD-10-CM

## 2023-03-29 NOTE — TELEPHONE ENCOUNTER
loprox      Last Written Prescription Date:  2/10/22  Last Fill Quantity: 30 g,   # refills: 4  Last Office Visit: 6/21/22  Future Office visit:    Next 5 appointments (look out 90 days)    Apr 05, 2023  2:00 PM  (Arrive by 1:45 PM)  Return Visit with Genesis Bay DPM  [facility] ([facility] ) [address] [facility] [id number] [phone]         victoza      Last Written Prescription Date:  9/8/22  Last Fill Quantity: 18 ml,   # refills: 1  Last Office Visit: 6/21/22  Future Office visit:

## 2023-03-30 RX ORDER — LIRAGLUTIDE 6 MG/ML
INJECTION SUBCUTANEOUS
Qty: 18 ML | Refills: 1 | Status: SHIPPED | OUTPATIENT
Start: 2023-03-30 | End: 2023-06-12

## 2023-03-30 RX ORDER — CICLOPIROX OLAMINE 7.7 MG/G
CREAM TOPICAL
Qty: 30 G | Refills: 4 | Status: SHIPPED | OUTPATIENT
Start: 2023-03-30 | End: 2024-09-10

## 2023-04-05 ENCOUNTER — LAB (OUTPATIENT)
Dept: LAB | Facility: OTHER | Age: 73
End: 2023-04-05
Attending: INTERNAL MEDICINE
Payer: MEDICARE

## 2023-04-05 ENCOUNTER — OFFICE VISIT (OUTPATIENT)
Dept: PODIATRY | Facility: OTHER | Age: 73
End: 2023-04-05
Attending: INTERNAL MEDICINE
Payer: MEDICARE

## 2023-04-05 VITALS
HEART RATE: 67 BPM | OXYGEN SATURATION: 96 % | DIASTOLIC BLOOD PRESSURE: 75 MMHG | SYSTOLIC BLOOD PRESSURE: 125 MMHG | TEMPERATURE: 97.2 F

## 2023-04-05 DIAGNOSIS — E03.9 ACQUIRED HYPOTHYROIDISM: ICD-10-CM

## 2023-04-05 DIAGNOSIS — E11.42 DIABETIC POLYNEUROPATHY ASSOCIATED WITH TYPE 2 DIABETES MELLITUS (H): ICD-10-CM

## 2023-04-05 DIAGNOSIS — Z12.5 SCREENING FOR PROSTATE CANCER: ICD-10-CM

## 2023-04-05 DIAGNOSIS — R20.8 LOSS OF PROTECTIVE SENSATION OF SKIN OF DEFORMED FOOT: ICD-10-CM

## 2023-04-05 DIAGNOSIS — M21.969 LOSS OF PROTECTIVE SENSATION OF SKIN OF DEFORMED FOOT: ICD-10-CM

## 2023-04-05 DIAGNOSIS — E11.9 TYPE 2 DIABETES, HBA1C GOAL < 7% (H): ICD-10-CM

## 2023-04-05 DIAGNOSIS — Z79.4 DIABETES MELLITUS TYPE 2, INSULIN DEPENDENT (H): ICD-10-CM

## 2023-04-05 DIAGNOSIS — E11.9 DIABETES MELLITUS TYPE 2, INSULIN DEPENDENT (H): ICD-10-CM

## 2023-04-05 DIAGNOSIS — L60.3 ONYCHODYSTROPHY: Primary | ICD-10-CM

## 2023-04-05 LAB
ALBUMIN SERPL BCG-MCNC: 3.8 G/DL (ref 3.5–5.2)
ALP SERPL-CCNC: 56 U/L (ref 35–129)
ALT SERPL W P-5'-P-CCNC: 21 U/L (ref 10–50)
ANION GAP SERPL CALCULATED.3IONS-SCNC: 13 MMOL/L (ref 7–15)
AST SERPL W P-5'-P-CCNC: 16 U/L (ref 10–50)
BASOPHILS # BLD AUTO: 0 10E3/UL (ref 0–0.2)
BASOPHILS NFR BLD AUTO: 0 %
BILIRUB SERPL-MCNC: 0.7 MG/DL
BUN SERPL-MCNC: 21 MG/DL (ref 8–23)
CALCIUM SERPL-MCNC: 8.9 MG/DL (ref 8.8–10.2)
CHLORIDE SERPL-SCNC: 98 MMOL/L (ref 98–107)
CHOLEST SERPL-MCNC: 191 MG/DL
CREAT SERPL-MCNC: 1.16 MG/DL (ref 0.51–1.17)
DEPRECATED HCO3 PLAS-SCNC: 29 MMOL/L (ref 22–29)
EOSINOPHIL # BLD AUTO: 0.1 10E3/UL (ref 0–0.7)
EOSINOPHIL NFR BLD AUTO: 1 %
ERYTHROCYTE [DISTWIDTH] IN BLOOD BY AUTOMATED COUNT: 13.7 % (ref 10–15)
EST. AVERAGE GLUCOSE BLD GHB EST-MCNC: 126 MG/DL
GFR SERPL CREATININE-BSD FRML MDRD: 67 ML/MIN/1.73M2
GLUCOSE SERPL-MCNC: 121 MG/DL (ref 70–99)
HBA1C MFR BLD: 6 %
HCT VFR BLD AUTO: 42.8 % (ref 35–53)
HDLC SERPL-MCNC: 47 MG/DL
HGB BLD-MCNC: 14.3 G/DL (ref 11.7–17.7)
LDLC SERPL CALC-MCNC: 110 MG/DL
LYMPHOCYTES # BLD AUTO: 2.3 10E3/UL (ref 0.8–5.3)
LYMPHOCYTES NFR BLD AUTO: 33 %
MCH RBC QN AUTO: 31.7 PG (ref 26.5–33)
MCHC RBC AUTO-ENTMCNC: 33.4 G/DL (ref 31.5–36.5)
MCV RBC AUTO: 95 FL (ref 78–100)
MONOCYTES # BLD AUTO: 0.7 10E3/UL (ref 0–1.3)
MONOCYTES NFR BLD AUTO: 10 %
NEUTROPHILS # BLD AUTO: 4 10E3/UL (ref 1.6–8.3)
NEUTROPHILS NFR BLD AUTO: 56 %
NONHDLC SERPL-MCNC: 144 MG/DL
PLATELET # BLD AUTO: 204 10E3/UL (ref 150–450)
POTASSIUM SERPL-SCNC: 4.4 MMOL/L (ref 3.4–5.3)
PROT SERPL-MCNC: 6.6 G/DL (ref 6.4–8.3)
PSA SERPL DL<=0.01 NG/ML-MCNC: 4.09 NG/ML (ref 0–6.5)
RBC # BLD AUTO: 4.51 10E6/UL (ref 3.8–5.9)
SODIUM SERPL-SCNC: 140 MMOL/L (ref 136–145)
T4 FREE SERPL-MCNC: 1.85 NG/DL (ref 0.9–1.7)
TRIGL SERPL-MCNC: 170 MG/DL
TSH SERPL DL<=0.005 MIU/L-ACNC: 1.24 UIU/ML (ref 0.3–4.2)
WBC # BLD AUTO: 7.2 10E3/UL (ref 4–11)

## 2023-04-05 PROCEDURE — G0463 HOSPITAL OUTPT CLINIC VISIT: HCPCS | Mod: 25

## 2023-04-05 PROCEDURE — 80061 LIPID PANEL: CPT | Mod: ZL

## 2023-04-05 PROCEDURE — 80053 COMPREHEN METABOLIC PANEL: CPT | Mod: ZL

## 2023-04-05 PROCEDURE — 85004 AUTOMATED DIFF WBC COUNT: CPT | Mod: ZL

## 2023-04-05 PROCEDURE — 84439 ASSAY OF FREE THYROXINE: CPT | Mod: ZL

## 2023-04-05 PROCEDURE — 36415 COLL VENOUS BLD VENIPUNCTURE: CPT | Mod: ZL

## 2023-04-05 PROCEDURE — G0103 PSA SCREENING: HCPCS | Mod: ZL,GZ

## 2023-04-05 PROCEDURE — 83036 HEMOGLOBIN GLYCOSYLATED A1C: CPT | Mod: ZL

## 2023-04-05 PROCEDURE — 84443 ASSAY THYROID STIM HORMONE: CPT | Mod: ZL

## 2023-04-05 PROCEDURE — 11721 DEBRIDE NAIL 6 OR MORE: CPT | Performed by: PODIATRIST

## 2023-04-05 ASSESSMENT — PAIN SCALES - GENERAL: PAINLEVEL: EXTREME PAIN (8)

## 2023-04-05 NOTE — PROGRESS NOTES
Chief complaint: Patient presents with:  Toenail: Trimming      History of Present Illness: This 72 year old IDDM II is seen with his wife, Yazmin, for follow-up management for a diabetic foot exam and high risk nail debridement.     His wife is applying an anti-fungal solution on the patient's toenails and lotion on his feet.     He has continued to take Allopurinol 300mg daily since his last appointment. He has not complained of any recent gout flares.    The patient is requesting high risk nail debridement every six months.    No further pedal complaints today.       Lab Results   Component Value Date    A1C 6.0 04/05/2023    A1C 5.6 04/06/2022    A1C 6.1 09/01/2021    A1C 6.0 01/20/2021    A1C 6.0 03/20/2020    A1C 5.7 09/04/2019    A1C 5.6 04/11/2019    A1C 6.1 08/14/2018       Diabetes Mellitus: Patient's DM is managed by their PCP. The DM appears to be stable. Patient's last HbA1C was 6.0% on 04/05//2023.        /75 (BP Location: Left arm, Patient Position: Sitting, Cuff Size: Adult Regular)   Pulse 67   Temp 97.2  F (36.2  C) (Tympanic)   SpO2 96%     Patient Active Problem List   Diagnosis     ACP (advance care planning)     Type 2 diabetes mellitus with diabetic neuropathy (H)     Morbid obesity due to excess calories (H)     Acquired hypothyroidism     Benign essential hypertension     Calculus of gallbladder without cholecystitis without obstruction     Cholecystitis     Gastroesophageal reflux disease without esophagitis     Depression     Congenital anomaly of spleen     Disorder of lipoid metabolism     Other lipoprotein metabolism disorders     Malignant neoplasm of thyroid gland (H)     Tremor     Hypothyroidism, postsurgical     Ametropia     Cupping of optic disc, left     Flat affect     Lesion of left upper eyelid     Nuclear sclerotic cataract of both eyes     Other conjunctivitis of both eyes     Presbyopia       Past Surgical History:   Procedure Laterality Date     basal cell  carcinoma  2017     CHOLECYSTECTOMY  11/23/2019     COLONOSCOPY - HIM SCAN  12/01/2012    Colonoscopy due to bleeding, no polyps 12-12     HERNIA REPAIR  2014       Current Outpatient Medications   Medication     allopurinol (ZYLOPRIM) 300 MG tablet     ascorbic acid (VITAMIN C) 500 MG tablet     B-D U/F insulin pen needle     blood glucose monitoring (ACCU-CHEK FASTCLIX) lancets     Cholecalciferol (VITAMIN D3) 50 MCG (2000 UT) CAPS     ciclopirox (LOPROX) 0.77 % cream     CONTOUR NEXT TEST test strip     FEROSUL 325 (65 Fe) MG tablet     gabapentin (NEURONTIN) 300 MG capsule     GNP TERBINAFINE HYDROCHLORIDE 1 % external cream     horse chestnut 300 MG CAPS     insulin glargine (BASAGLAR KWIKPEN) 100 UNIT/ML pen     insulin glargine (LANTUS PEN) 100 UNIT/ML pen     insulin pen needle (32G X 6 MM) 32G X 6 MM miscellaneous     levothyroxine (SYNTHROID/LEVOTHROID) 300 MCG tablet     metFORMIN (GLUCOPHAGE) 1000 MG tablet     metoprolol succinate ER (TOPROL XL) 100 MG 24 hr tablet     Multiple Vitamin (MULTI VITAMIN PO)     naproxen (NAPROSYN) 500 MG tablet     pantoprazole (PROTONIX) 40 MG EC tablet     potassium chloride ER (KLOR-CON M) 20 MEQ CR tablet     predniSONE (DELTASONE) 20 MG tablet     QUEtiapine (SEROQUEL) 50 MG tablet     tamsulosin (FLOMAX) 0.4 MG capsule     torsemide (DEMADEX) 20 MG tablet     VICTOZA PEN 18 MG/3ML soln     No current facility-administered medications for this visit.          Allergies   Allergen Reactions     Food      Onions, peppers, olives        Family History   Problem Relation Age of Onset     Unknown/Adopted Sister        Social History     Socioeconomic History     Marital status:      Spouse name: Not on file     Number of children: Not on file     Years of education: Not on file     Highest education level: Not on file   Occupational History     Not on file   Social Needs     Financial resource strain: Not on file     Food insecurity:     Worry: Not on file      Inability: Not on file     Transportation needs:     Medical: Not on file     Non-medical: Not on file   Tobacco Use     Smoking status: Never Smoker     Smokeless tobacco: Never Used   Substance and Sexual Activity     Alcohol use: Yes     Comment: beer daily      Drug use: No     Sexual activity: Not Currently     Partners: Female   Lifestyle     Physical activity:     Days per week: Not on file     Minutes per session: Not on file     Stress: Not on file   Relationships     Social connections:     Talks on phone: Not on file     Gets together: Not on file     Attends Jainism service: Not on file     Active member of club or organization: Not on file     Attends meetings of clubs or organizations: Not on file     Relationship status: Not on file     Intimate partner violence:     Fear of current or ex partner: Not on file     Emotionally abused: Not on file     Physically abused: Not on file     Forced sexual activity: Not on file   Other Topics Concern     Parent/sibling w/ CABG, MI or angioplasty before 65F 55M? Not Asked   Social History Narrative     Not on file       ROS: 10 point ROS neg other than the symptoms noted above in the HPI.  EXAM  Constitutional: healthy, alert and no distress    Psychiatric: mentation appears normal and affect normal/bright    VASCULAR:  -Dorsalis pedis pulse +1/4 bilaterally   -Posterior tibial pulse +1/4 bilaterally   -Capillary refill time < 3 seconds to hallux bilaterally   -Hair growth Absent to b/l anterior leg and ankle bilaterally   -Varicosities and telangiectasias to bilateral foot bilaterally   -Mild 1+ pitting edema to bilateral foot and ankle bilaterally   NEURO:  -Epicritic and protective sensation ABSENT to the bilateral foot.  DERM:  -Diffusely dry skin with flaking of skin to the RIGHT lateral ankle  -Toenails elongated, thickened, dystrophic and discolored x 10  -Dry, scaling skin on the RIGHT lateral ankle  MSK:  -Muscle strength of ankles +5/5 for  dorsiflexion, plantarflexion, ABDUction and ADDuction b/l    RIGHT FOOT RADIOGRAPH 03/17/2021  IMPRESSION: Findings suspicious for osteomyelitis of the middle and distal phalanxes of the right second toe.    PHILLY CARLSON MD  ============================================================      ASSESSMENT:    (L60.3) Onychodystrophy    (E11.9,  Z79.4) Diabetes mellitus type 2, insulin dependent (H)    (E11.42) Diabetic polyneuropathy associated with type 2 diabetes mellitus (H)    (M21.969,  R20.8) Loss of protective sensation of skin of deformed foot      PLAN:  -Patient evaluated and examined. Treatment options discussed with no educational barriers noted.    -High risk toenail debridement x 10 toenails without incident     -Diabetic Foot Education provided. This included checking the feet daily looking for new new blisters or wounds, wearing shoes at all times when walking including around the house, and avoiding lotion application between the toes. If there are any signs of infection, the patient should present to the ED as soon as possible. Infections of the foot can be life threatening or lead to amputations of the foot or leg.    Diabetes Mellitus: Patient's DM is managed by their PCP. The DM appears to be stable. Patient's last HbA1C was 6.0% on 04/05/2023.    -Patient in agreement with the above treatment plan and all of patient's questions were answered.      Return to clinic six months in [facility] for diabetic foot exam and high risk nail debridement per patient request        Genesis Bay DPM

## 2023-04-17 ENCOUNTER — MEDICAL CORRESPONDENCE (OUTPATIENT)
Dept: HEALTH INFORMATION MANAGEMENT | Facility: HOSPITAL | Age: 73
End: 2023-04-17

## 2023-04-17 DIAGNOSIS — G47.00 INSOMNIA, UNSPECIFIED TYPE: ICD-10-CM

## 2023-04-17 RX ORDER — QUETIAPINE FUMARATE 50 MG/1
TABLET, FILM COATED ORAL
Qty: 90 TABLET | Refills: 3 | Status: SHIPPED | OUTPATIENT
Start: 2023-04-17 | End: 2024-04-09

## 2023-04-18 DIAGNOSIS — N40.0 BENIGN PROSTATIC HYPERPLASIA WITHOUT LOWER URINARY TRACT SYMPTOMS: ICD-10-CM

## 2023-04-18 DIAGNOSIS — M1A.00X0 IDIOPATHIC CHRONIC GOUT WITHOUT TOPHUS, UNSPECIFIED SITE: ICD-10-CM

## 2023-04-18 DIAGNOSIS — E87.6 HYPOKALEMIA: ICD-10-CM

## 2023-04-19 RX ORDER — TAMSULOSIN HYDROCHLORIDE 0.4 MG/1
CAPSULE ORAL
Qty: 90 CAPSULE | Refills: 2 | Status: SHIPPED | OUTPATIENT
Start: 2023-04-19 | End: 2024-03-07

## 2023-04-19 RX ORDER — POTASSIUM CHLORIDE 1500 MG/1
TABLET, EXTENDED RELEASE ORAL
Qty: 360 TABLET | Refills: 1 | Status: SHIPPED | OUTPATIENT
Start: 2023-04-19 | End: 2023-07-17

## 2023-04-19 RX ORDER — ALLOPURINOL 300 MG/1
300 TABLET ORAL DAILY
Qty: 90 TABLET | Refills: 1 | Status: SHIPPED | OUTPATIENT
Start: 2023-04-19 | End: 2023-07-17

## 2023-04-19 NOTE — TELEPHONE ENCOUNTER
Allopurinol (Zyloprim) 300 MG tablet    Last Written Prescription Date:  10/19/2022  Last Fill Quantity: 90,   # refills: 1  Last Office Visit:  06/21/2022  Routing refill request to provider for review/approval because:  Protocol Failed        Potassium Chloride ER (Klor-Con M) 20 MEQ CR tablet    Last Written Prescription Date:  01/18/2023  Last Fill Quantity: 360,   # refills: 1        Tamsulosin (Flomax) 0.4mg capsule    Last Written Prescription Date:  07/22/2022  Last Fill Quantity: 90,   # refills: 2

## 2023-05-26 ENCOUNTER — TRANSFERRED RECORDS (OUTPATIENT)
Dept: HEALTH INFORMATION MANAGEMENT | Facility: HOSPITAL | Age: 73
End: 2023-05-26

## 2023-06-03 ENCOUNTER — HEALTH MAINTENANCE LETTER (OUTPATIENT)
Age: 73
End: 2023-06-03

## 2023-06-07 ENCOUNTER — TELEPHONE (OUTPATIENT)
Dept: FAMILY MEDICINE | Facility: OTHER | Age: 73
End: 2023-06-07

## 2023-06-07 DIAGNOSIS — B35.3 TINEA PEDIS OF BOTH FEET: ICD-10-CM

## 2023-06-07 DIAGNOSIS — E11.9 TYPE 2 DIABETES, HBA1C GOAL < 8% (H): ICD-10-CM

## 2023-06-07 NOTE — TELEPHONE ENCOUNTER
Call from patient stating he is due for annual visit.     Patient reports having labs completed 4/5/23, inquiring when Dr. Bethea thinks patient should come in for annual exam.     Notified a message will be sent to Dr. Bethea for review, RN CC will return call to patient with an update.    Please advise.

## 2023-06-08 NOTE — TELEPHONE ENCOUNTER
Call placed to patient, appt scheduled:    Next 5 appointments (look out 90 days)    Jul 17, 2023  1:30 PM  (Arrive by 1:15 PM)  Office Visit with You eBthea DO  [facility] ([facility] ) [address]  [place] [id number] [phone]

## 2023-06-10 NOTE — TELEPHONE ENCOUNTER
Terbinafine, Victoza      Last Written Prescription Date:  6.8.22, 6.7.23  Last Fill Quantity: #29, #6,   # refills: 0  Last Office Visit: 6.21.22  Future Office visit:    Next 5 appointments (look out 90 days)    Jul 17, 2023  1:30 PM  (Arrive by 1:15 PM)  Office Visit with You Bethea DO  [facility] ([facility] ) [address]  [facility] [id number] [phone]           Routing refill request to provider for review/approval because:

## 2023-06-12 RX ORDER — LIRAGLUTIDE 6 MG/ML
INJECTION SUBCUTANEOUS
Qty: 6 ML | Refills: 1 | Status: SHIPPED | OUTPATIENT
Start: 2023-06-12 | End: 2023-07-17

## 2023-06-12 RX ORDER — TERBINAFINE HYDROCHLORIDE 1 G/100G
CREAM TOPICAL
Qty: 28.4 G | Refills: 3 | Status: SHIPPED | OUTPATIENT
Start: 2023-06-12 | End: 2024-09-11

## 2023-07-06 ENCOUNTER — MYC MEDICAL ADVICE (OUTPATIENT)
Dept: INTERNAL MEDICINE | Facility: OTHER | Age: 73
End: 2023-07-06

## 2023-07-17 ENCOUNTER — OFFICE VISIT (OUTPATIENT)
Dept: INTERNAL MEDICINE | Facility: OTHER | Age: 73
End: 2023-07-17
Attending: INTERNAL MEDICINE
Payer: COMMERCIAL

## 2023-07-17 VITALS
BODY MASS INDEX: 42.24 KG/M2 | DIASTOLIC BLOOD PRESSURE: 74 MMHG | WEIGHT: 315 LBS | RESPIRATION RATE: 28 BRPM | OXYGEN SATURATION: 95 % | SYSTOLIC BLOOD PRESSURE: 130 MMHG | TEMPERATURE: 97.7 F | HEART RATE: 70 BPM

## 2023-07-17 DIAGNOSIS — E03.9 ACQUIRED HYPOTHYROIDISM: ICD-10-CM

## 2023-07-17 DIAGNOSIS — R10.9 FLANK PAIN: Primary | ICD-10-CM

## 2023-07-17 DIAGNOSIS — E11.9 TYPE 2 DIABETES, HBA1C GOAL < 8% (H): ICD-10-CM

## 2023-07-17 DIAGNOSIS — E87.6 HYPOKALEMIA: ICD-10-CM

## 2023-07-17 DIAGNOSIS — M1A.00X0 IDIOPATHIC CHRONIC GOUT WITHOUT TOPHUS, UNSPECIFIED SITE: ICD-10-CM

## 2023-07-17 LAB
EST. AVERAGE GLUCOSE BLD GHB EST-MCNC: 120 MG/DL
HBA1C MFR BLD: 5.8 %
T4 FREE SERPL-MCNC: 1.83 NG/DL (ref 0.9–1.7)
TSH SERPL DL<=0.005 MIU/L-ACNC: 0.39 UIU/ML (ref 0.3–4.2)
URATE SERPL-MCNC: 7 MG/DL (ref 2.4–7)

## 2023-07-17 PROCEDURE — 84443 ASSAY THYROID STIM HORMONE: CPT | Mod: ZL | Performed by: INTERNAL MEDICINE

## 2023-07-17 PROCEDURE — 81001 URINALYSIS AUTO W/SCOPE: CPT | Mod: ZL | Performed by: INTERNAL MEDICINE

## 2023-07-17 PROCEDURE — 99214 OFFICE O/P EST MOD 30 MIN: CPT | Performed by: INTERNAL MEDICINE

## 2023-07-17 PROCEDURE — 81003 URINALYSIS AUTO W/O SCOPE: CPT | Mod: ZL | Performed by: INTERNAL MEDICINE

## 2023-07-17 PROCEDURE — 83036 HEMOGLOBIN GLYCOSYLATED A1C: CPT | Mod: ZL | Performed by: INTERNAL MEDICINE

## 2023-07-17 PROCEDURE — G0463 HOSPITAL OUTPT CLINIC VISIT: HCPCS

## 2023-07-17 PROCEDURE — 84439 ASSAY OF FREE THYROXINE: CPT | Mod: ZL | Performed by: INTERNAL MEDICINE

## 2023-07-17 PROCEDURE — 84550 ASSAY OF BLOOD/URIC ACID: CPT | Mod: ZL | Performed by: INTERNAL MEDICINE

## 2023-07-17 PROCEDURE — 36415 COLL VENOUS BLD VENIPUNCTURE: CPT | Mod: ZL | Performed by: INTERNAL MEDICINE

## 2023-07-17 RX ORDER — POTASSIUM CHLORIDE 1500 MG/1
40 TABLET, EXTENDED RELEASE ORAL 2 TIMES DAILY
Qty: 360 TABLET | Refills: 3 | Status: SHIPPED | OUTPATIENT
Start: 2023-07-17

## 2023-07-17 RX ORDER — LIRAGLUTIDE 6 MG/ML
INJECTION SUBCUTANEOUS
Qty: 18 ML | Refills: 1 | Status: SHIPPED | OUTPATIENT
Start: 2023-07-17 | End: 2024-03-07

## 2023-07-17 RX ORDER — ALLOPURINOL 300 MG/1
300 TABLET ORAL DAILY
Qty: 90 TABLET | Refills: 3 | Status: SHIPPED | OUTPATIENT
Start: 2023-07-17

## 2023-07-17 ASSESSMENT — PAIN SCALES - GENERAL: PAINLEVEL: NO PAIN (0)

## 2023-07-17 NOTE — PROGRESS NOTES
SUBJECTIVE:   Juan F is a 73 year old who presents for Preventive Visit.       No data to display              Are you in the first 12 months of your Medicare coverage?  No    HPI    Juan F presents today for routine follow up of his HTN, type 2 DM, thyroid cancer s/p resection, Neuropathy and gout.    He reports ongoing neuropathy in his feet and hands.  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  He also reports some back pain at times.  His recent labs were reviewed today and he is doing fairly well.  He is due for an A1C.          Have you ever done Advance Care Planning? (For example, a Health Directive, POLST, or a discussion with a medical provider or your loved ones about your wishes): Yes, advance care planning is on file.       Fall risk  Fallen 2 or more times in the past year?: No  Any fall with injury in the past year?: No    Cognitive Screening   1) Repeat 3 items (Leader, Season, Table)    2) Clock draw: NORMAL  3) 3 item recall: Recalls 3 objects  Results: 3 items recalled: COGNITIVE IMPAIRMENT LESS LIKELY    Mini-CogTM Copyright S Jimmy. Licensed by the author for use in [facility]; reprinted with permission ([email or web address]). All rights reserved.      Do you have sleep apnea, excessive snoring or daytime drowsiness?: yes    Reviewed and updated as needed this visit by clinical staff   Tobacco  Allergies  Meds              Reviewed and updated as needed this visit by Provider                 Social History     Tobacco Use     Smoking status: Never     Smokeless tobacco: Never   Substance Use Topics     Alcohol use: Yes     Comment: beer daily               No data to display              Do you have a current opioid prescription? No  Do you use any other controlled substances or medications that are not prescribed by a provider? None      Current providers sharing in care for this patient  include:   Patient Care Team:  You Bethea DO as PCP - General (Internal Medicine)  You Bethea DO as Assigned PCP  Genesis [place] DPM as Assigned Musculoskeletal Provider    The following health maintenance items are reviewed in Epic and correct as of today:  Health Maintenance   Topic Date Due     DEXA  Never done     COVID-19 Vaccine (1) Never done     Pneumococcal Vaccine: 65+ Years (1 - PCV) Never done     ZOSTER IMMUNIZATION (1 of 2) Never done     DTAP/TDAP/TD IMMUNIZATION (2 - Td or Tdap) 12/07/2010     MEDICARE ANNUAL WELLNESS VISIT  Never done     INFLUENZA VACCINE (1) 09/01/2023     A1C  10/05/2023     MICROALBUMIN  12/22/2023     BMP  04/05/2024     LIPID  04/05/2024     TSH W/FREE T4 REFLEX  04/05/2024     DIABETIC FOOT EXAM  04/05/2024     FALL RISK ASSESSMENT  07/17/2024     ADVANCE CARE PLANNING  07/17/2028     COLORECTAL CANCER SCREENING  10/09/2028     HEPATITIS C SCREENING  Completed     PHQ-2 (once per calendar year)  Completed     AORTIC ANEURYSM SCREENING (SYSTEM ASSIGNED)  Completed     IPV IMMUNIZATION  Aged Out     MENINGITIS IMMUNIZATION  Aged Out     EYE EXAM  Discontinued     Lab work is in process  Labs reviewed in EPIC  BP Readings from Last 3 Encounters:   07/17/23 130/74   04/05/23 125/75   10/05/22 135/70    Wt Readings from Last 3 Encounters:   07/17/23 149.2 kg (329 lb)   10/05/22 (!) 152.4 kg (336 lb)   06/21/22 149.2 kg (329 lb)                  Patient Active Problem List   Diagnosis     ACP (advance care planning)     Type 2 diabetes mellitus with diabetic neuropathy (H)     Morbid obesity due to excess calories (H)     Acquired hypothyroidism     Benign essential hypertension     Calculus of gallbladder without cholecystitis without obstruction     Cholecystitis     Gastroesophageal reflux disease without esophagitis     Depression     Congenital anomaly of spleen     Disorder of lipoid metabolism     Other lipoprotein metabolism disorders      Malignant neoplasm of thyroid gland (H)     Tremor     Hypothyroidism, postsurgical     Ametropia     Cupping of optic disc, left     Flat affect     Lesion of left upper eyelid     Nuclear sclerotic cataract of both eyes     Other conjunctivitis of both eyes     Presbyopia     Past Surgical History:   Procedure Laterality Date     basal cell carcinoma  2017     CHOLECYSTECTOMY  11/23/2019     COLONOSCOPY - HIM SCAN  12/01/2012    Colonoscopy due to bleeding, no polyps 12-12     HERNIA REPAIR  2014       Social History     Tobacco Use     Smoking status: Never     Smokeless tobacco: Never   Substance Use Topics     Alcohol use: Yes     Comment: beer daily      Family History   Problem Relation Age of Onset     Unknown/Adopted Sister          Current Outpatient Medications   Medication Sig Dispense Refill     allopurinol (ZYLOPRIM) 300 MG tablet Take 1 tablet (300 mg) by mouth daily Take one tablet daily 90 tablet 3     ascorbic acid (VITAMIN C) 500 MG tablet Take 500 mg by mouth (With damaso hips)       B-D U/F insulin pen needle BY DEVICE ROUTE 2 TIMES DAILY 100 each 11     blood glucose monitoring (ACCU-CHEK FASTCLIX) lancets USE TO TEST BLOOD SUGAR TWICE A  each 2     Cholecalciferol (VITAMIN D3) 50 MCG (2000 UT) CAPS Take 1 capsule by mouth daily       ciclopirox (LOPROX) 0.77 % cream APPLY TWICE A DAY AS DIRECTED 30 g 4     CONTOUR NEXT TEST test strip USE TO TEST BLOOD SUGARS 2 TIMES DAILY OR AS DIRECTED 100 strip 3     FEROSUL 325 (65 Fe) MG tablet TAKE 1 TABLET BY MOUTH ONCE DAILY  BEST IF TAKEN WITH SOME FOOD  3     gabapentin (NEURONTIN) 300 MG capsule TAKE 1 CAPSULE (300 MG) BY MOUTH 3 TIMES DAILY 270 capsule 3     GNP TERBINAFINE HYDROCHLORIDE 1 % external cream APPLY TO AFFECTED AREA TOPICALLY TWICE A DAY. 28.4 g 3     horse chestnut 300 MG CAPS Take 300 mg by mouth 2 times daily        insulin glargine (LANTUS PEN) 100 UNIT/ML pen Inject 18 Units Subcutaneous At Bedtime 15 mL 3     insulin pen  "needle (32G X 6 MM) 32G X 6 MM miscellaneous Use 1 pen needles daily with Victoza - NOVOFINE 100 each 3     levothyroxine (SYNTHROID/LEVOTHROID) 300 MCG tablet TAKE 1 TABLET (300 MCG) BY MOUTH DAILY 90 tablet 3     liraglutide (VICTOZA PEN) 18 MG/3ML solution INJECT 1.2 MG SUBCUTANEOUS DAILY 18MG/3ML PREFILLED PEN 18 mL 1     metFORMIN (GLUCOPHAGE) 1000 MG tablet TAKE 1 TABLET (1,000 MG) BY MOUTH 2 TIMES DAILY (WITH MEALS) 180 tablet 1     metoprolol succinate ER (TOPROL XL) 100 MG 24 hr tablet TAKE 0.5 TABLETS (50 MG) BY MOUTH DAILY 45 tablet 1     Multiple Vitamin (MULTI VITAMIN PO) Take 1 tablet by mouth daily (has iron in it)       naproxen (NAPROSYN) 500 MG tablet TAKE 1 TABLET (500 MG) BY MOUTH 2 TIMES DAILY (WITH MEALS) 180 tablet 3     pantoprazole (PROTONIX) 40 MG EC tablet TAKE 1 TABLET BY MOUTH EVERY DAY 30-60 MIN BEFORE A MEAL 90 tablet 3     potassium chloride ER (KLOR-CON M) 20 MEQ CR tablet Take 2 tablets (40 mEq) by mouth 2 times daily 360 tablet 3     predniSONE (DELTASONE) 20 MG tablet Take 1 tablet (20 mg) by mouth daily X 5 days 10 tablet 4     QUEtiapine (SEROQUEL) 50 MG tablet Take 1/2-1 tablet at QHS 90 tablet 3     tamsulosin (FLOMAX) 0.4 MG capsule TAKE ONE CAPSULE BY MOUTH EVERY DAY. SWALLOW WHOLE. DO NOT CRUSH OR CHEW 90 capsule 2     torsemide (DEMADEX) 20 MG tablet TAKE 2 TABLETS (40 MG) BY MOUTH 2 TIMES DAILY 360 tablet 1     Allergies   Allergen Reactions     Food      Onions, peppers, olives              Review of Systems  Constitutional, HEENT, cardiovascular, pulmonary, gi and gu systems are negative, except as otherwise noted.    OBJECTIVE:   /74 (BP Location: Left arm, Patient Position: Sitting, Cuff Size: Adult Large)   Pulse 70   Temp 97.7  F (36.5  C) (Tympanic)   Resp 28   Wt 149.2 kg (329 lb)   SpO2 95%   BMI 42.24 kg/m   Estimated body mass index is 42.24 kg/m  as calculated from the following:    Height as of 9/20/21: 1.88 m (6' 2\").    Weight as of this " encounter: 149.2 kg (329 lb).  Physical Exam  GENERAL: alert and no distress  EYES: Eyes grossly normal to inspection, PERRL and conjunctivae and sclerae normal  HENT: ear canals and TM's normal, nose and mouth without ulcers or lesions  RESP: lungs clear to auscultation - no rales, rhonchi or wheezes  CV: regular rate and rhythm, normal S1 S2, no S3 or S4, no murmur, click or rub, no peripheral edema and peripheral pulses strong  MS: +2 LE edema   PSYCH: mentation appears normal, affect normal/bright    Diagnostic Test Results:  Labs reviewed in Epic    ASSESSMENT / PLAN:   Gunnar was seen today for physical.    Diagnoses and all orders for this visit:    Flank pain  -     UA with Microscopic reflex to Culture - [facility]; Future  -     UA with Microscopic reflex to Culture - [facility]    Idiopathic chronic gout without tophus, unspecified site  -     allopurinol (ZYLOPRIM) 300 MG tablet; Take 1 tablet (300 mg) by mouth daily Take one tablet daily  -     Uric acid; Future  -     Uric acid    Hypokalemia  -     potassium chloride ER (KLOR-CON M) 20 MEQ CR tablet; Take 2 tablets (40 mEq) by mouth 2 times daily    Type 2 diabetes, HbA1C goal < 8% (H)  -     liraglutide (VICTOZA PEN) 18 MG/3ML solution; INJECT 1.2 MG SUBCUTANEOUS DAILY 18MG/3ML PREFILLED PEN  -     Hemoglobin A1c; Future  -     Hemoglobin A1c    Acquired hypothyroidism  -     T4, free  -     TSH        Patient has been advised of split billing requirements and indicates understanding: Yes      COUNSELING:  Reviewed preventive health counseling, as reflected in patient instructions       Healthy diet/nutrition       Vision screening       Hearing screening       Dental care       Bladder control       Colon cancer screening       Prostate cancer screening        He reports that he has never smoked. He has never used smokeless tobacco.      Appropriate preventive services were discussed with this patient, including applicable screening as  appropriate for cardiovascular disease, diabetes, osteopenia/osteoporosis, and glaucoma.  As appropriate for age/gender, discussed screening for colorectal cancer, prostate cancer, breast cancer, and cervical cancer. Checklist reviewing preventive services available has been given to the patient.    Reviewed patients plan of care and provided an AVS. The Intermediate Care Plan ( asthma action plan, low back pain action plan, and migraine action plan) for Gunnar meets the Care Plan requirement. This Care Plan has been established and reviewed with the Patient.      You Bethea,   [facility]    Identified Health Risks:    I have reviewed Opioid Use Disorder and Substance Use Disorder risk factors and made any needed referrals.

## 2023-07-18 LAB
ALBUMIN UR-MCNC: NEGATIVE MG/DL
APPEARANCE UR: CLEAR
BACTERIA #/AREA URNS HPF: ABNORMAL /HPF
BILIRUB UR QL STRIP: NEGATIVE
COLOR UR AUTO: YELLOW
GLUCOSE UR STRIP-MCNC: NEGATIVE MG/DL
HGB UR QL STRIP: NEGATIVE
KETONES UR STRIP-MCNC: NEGATIVE MG/DL
LEUKOCYTE ESTERASE UR QL STRIP: NEGATIVE
MUCOUS THREADS #/AREA URNS LPF: PRESENT /LPF
NITRATE UR QL: NEGATIVE
PH UR STRIP: 6 [PH] (ref 5–7)
RBC #/AREA URNS AUTO: ABNORMAL /HPF
SP GR UR STRIP: 1.01 (ref 1–1.03)
SQUAMOUS #/AREA URNS AUTO: ABNORMAL /LPF
UROBILINOGEN UR STRIP-ACNC: 0.2 E.U./DL
WBC #/AREA URNS AUTO: ABNORMAL /HPF

## 2023-08-02 ENCOUNTER — PREP FOR PROCEDURE (OUTPATIENT)
Dept: CARE COORDINATION | Facility: OTHER | Age: 73
End: 2023-08-02

## 2023-08-02 DIAGNOSIS — K31.7 GASTRIC POLYPS: Primary | ICD-10-CM

## 2023-08-07 DIAGNOSIS — I10 BENIGN ESSENTIAL HYPERTENSION: ICD-10-CM

## 2023-08-09 RX ORDER — METOPROLOL SUCCINATE 100 MG/1
50 TABLET, EXTENDED RELEASE ORAL DAILY
Qty: 45 TABLET | Refills: 1 | Status: SHIPPED | OUTPATIENT
Start: 2023-08-09 | End: 2024-02-01

## 2023-08-09 NOTE — TELEPHONE ENCOUNTER
Metoprolol Succinate ER (Toprol XL) 100 MG 24 hr tablet    Last Written Prescription Date:  02/10/2023  Last Fill Quantity: 45,   # refills: 1  Last Office Visit: 07/17/2023

## 2023-08-27 NOTE — NURSING NOTE
"No chief complaint on file.      Initial BP (!) 158/76 (BP Location: Right arm, Cuff Size: Adult Large)   Pulse 76   Temp 98  F (36.7  C) (Tympanic)   Resp 22   Ht 1.88 m (6' 2\")   Wt (!) 150.1 kg (331 lb)   SpO2 96%   BMI 42.50 kg/m   Estimated body mass index is 42.5 kg/m  as calculated from the following:    Height as of this encounter: 1.88 m (6' 2\").    Weight as of this encounter: 150.1 kg (331 lb).  Medication Reconciliation: complete  Namita Quevedo LPN    " Other

## 2023-08-28 NOTE — OR NURSING
Instructed to hold Victoza starting 8/28, Hold NSAIDs, ASA, vitamins & supplements starting 8/28, Continue other prescribed medications as usual up to day before procedure, Take levothyroxine, metoprolol &  gabapentin only day of procedure.

## 2023-08-31 ENCOUNTER — ANESTHESIA EVENT (OUTPATIENT)
Dept: SURGERY | Facility: HOSPITAL | Age: 73
End: 2023-08-31
Payer: MEDICARE

## 2023-08-31 NOTE — ANESTHESIA PREPROCEDURE EVALUATION
Anesthesia Pre-Procedure Evaluation    Patient: Gunnar Granados   MRN: [id number] : 1950        Procedure : Procedure(s):  ESOPHAGOGASTRODUODENOSCOPY          Past Medical History:   Diagnosis Date     Alcohol abuse      Diabetes mellitus with neuropathy (H)      Gastric polyps      Gout      HTN (hypertension)      Hx of thyroid cancer      Neuropathy      Obesity      Osteoarthritis       Past Surgical History:   Procedure Laterality Date     basal cell carcinoma  2017     CHOLECYSTECTOMY  2019     COLONOSCOPY - HIM SCAN  2012    Colonoscopy due to bleeding, no polyps      HERNIA REPAIR        Allergies   Allergen Reactions     Food      Onions, peppers, olives      Trazodone      Other reaction(s): Dizziness      Social History     Tobacco Use     Smoking status: Never     Smokeless tobacco: Never   Substance Use Topics     Alcohol use: Yes     Comment: beer daily       Wt Readings from Last 1 Encounters:   23 149.2 kg (329 lb)        Anesthesia Evaluation   Pt has had prior anesthetic. Type: General.    No history of anesthetic complications       ROS/MED HX  ENT/Pulmonary:     (+) sleep apnea, uses CPAP,   CALLIE risk factors,  hypertension, obese,                               Neurologic: Comment: tremors    (+)    peripheral neuropathy, - both legs and arms.                           Cardiovascular:     (+)  hypertension- -   -  - -                                 Previous cardiac testing   Echo: Date:  Results:  No pericardial effusion is present.  Mild left ventricular dilation is present.  The Ejection Fraction is estimated at 60-65%.  Grade I or early diastolic dysfunction.  The right ventricle is normal size.  Global right ventricular function is normal.  Mild left atrial enlargement is present.  Mild mitral insufficiency is present.  Mild aortic valve sclerosis is present.  The tricuspid valve is normal.  The pulmonic valve is normal.  The aorta root is  normal.    Stress Test:  Date: Results:    ECG Reviewed:  Date: 2019 Results:  Sr 1st degree   Left axis deviation  Inerior infarct  Cath:  Date: Results:      METS/Exercise Tolerance: 1 - Eating, dressing Comment: Able to get to toilet and transfer self   Hematologic:       Musculoskeletal:   (+)  arthritis,             GI/Hepatic: Comment: Per patient has had many polyps removed before and is here today because Chilel called him per patient    (+) GERD, Asymptomatic on medication,                  Renal/Genitourinary:       Endo: Comment: Congenital anomaly of spleen  Disorder of lipoid metabolism    (+)  type II DM,   Using insulin,    Diabetic complications: neuropathy. thyroid problem, hypothyroidism,    Obesity,       Psychiatric/Substance Use:     (+) psychiatric history depression alcohol abuse (patient denies)      Infectious Disease:  - neg infectious disease ROS     Malignancy:   (+) Malignancy, History of Other.Other CA thyroid status post.    Other:      (+)  , ,, other significant disability Disability list: uses walker to get around house, does not drive anymore.         Physical Exam    Airway        Mallampati: I   TM distance: > 3 FB   Neck ROM: full   Mouth opening: > 3 cm    Respiratory Devices and Support         Dental     Comment: Two front teeth removed, waiting for implants    (+) Modest Abnormalities - crowns, retainers, 1 or 2 missing teeth      Cardiovascular   cardiovascular exam normal       Rhythm and rate: regular and normal     Pulmonary   pulmonary exam normal        breath sounds clear to auscultation       OUTSIDE LABS:  CBC:   Lab Results   Component Value Date    WBC 7.2 04/05/2023    WBC 7.8 04/06/2022    HGB 14.3 04/05/2023    HGB 14.8 04/06/2022    HCT 42.8 04/05/2023    HCT 43.0 04/06/2022     04/05/2023     04/06/2022     BMP:   Lab Results   Component Value Date     04/05/2023     04/06/2022    POTASSIUM 4.4 04/05/2023    POTASSIUM 4.1 04/06/2022     CHLORIDE 98 04/05/2023    CHLORIDE 99 04/06/2022    CO2 29 04/05/2023    CO2 29 04/06/2022    BUN 21.0 04/05/2023    BUN 20 04/06/2022    CR 1.16 04/05/2023    CR 1.05 04/06/2022     (H) 04/05/2023     (H) 04/06/2022     COAGS:   Lab Results   Component Value Date    INR 0.97 10/04/2018     POC:   Lab Results   Component Value Date     (H) 11/20/2019     HEPATIC:   Lab Results   Component Value Date    ALBUMIN 3.8 04/05/2023    PROTTOTAL 6.6 04/05/2023    ALT 21 04/05/2023    AST 16 04/05/2023    ALKPHOS 56 04/05/2023    BILITOTAL 0.7 04/05/2023    MAURICE <10 (L) 11/20/2019     OTHER:   Lab Results   Component Value Date    LACT 1.9 11/20/2019    A1C 5.8 (H) 07/17/2023    JONNIE 8.9 04/05/2023    LIPASE 231 09/14/2018    AMYLASE 55 09/14/2018    TSH 0.39 07/17/2023    T4 1.83 (H) 07/17/2023    T3 60 04/06/2022    CRP 13.9 (H) 09/14/2018       Anesthesia Plan    ASA Status:  4    NPO Status:  NPO Appropriate (11pm yesterday)    Anesthesia Type: MAC.     - Reason for MAC: straight local not clinically adequate   Induction: Intravenous, Propofol.   Maintenance: Balanced.        Consents            Postoperative Care            Comments:    [facility]  S[facility] Jit[facility] in July no changes in medications             Isabel Crocker, APRN CRNA

## 2023-09-01 DIAGNOSIS — E11.40 TYPE 2 DIABETES MELLITUS WITH DIABETIC NEUROPATHY, UNSPECIFIED WHETHER LONG TERM INSULIN USE (H): ICD-10-CM

## 2023-09-01 NOTE — TELEPHONE ENCOUNTER
Metformin       Last Written Prescription Date:  12/08/22  Last Fill Quantity: 180,   # refills: 1  Last Office Visit: 07/17/23  Future Office visit:    Next 5 appointments (look out 90 days)      Oct 04, 2023  2:00 PM  (Arrive by 1:45 PM)  Return Visit with Genesis Bay DPM  [facility] ([facility] [address] [address]  [phone]             Routing refill request to provider for review/approval because:    Contour next test strip       Last Written Prescription Date:  12/12/22  Last Fill Quantity: 100 strips ,   # refills: 3  Last Office Visit: 07/17/23  Future Office visit:    Next 5 appointments (look out 90 days)      Oct 04, 2023  2:00 PM  (Arrive by 1:45 PM)  Return Visit with Genesis Bay DPM  [facility] ([facility] ) [address] [address]  [phone]             Routing refill request to provider for review/approval because:

## 2023-09-06 ENCOUNTER — ANESTHESIA (OUTPATIENT)
Dept: SURGERY | Facility: HOSPITAL | Age: 73
End: 2023-09-06
Payer: MEDICARE

## 2023-09-06 ENCOUNTER — HOSPITAL ENCOUNTER (OUTPATIENT)
Facility: HOSPITAL | Age: 73
Discharge: HOME | End: 2023-09-06
Attending: INTERNAL MEDICINE | Admitting: INTERNAL MEDICINE
Payer: MEDICARE

## 2023-09-06 VITALS
SYSTOLIC BLOOD PRESSURE: 135 MMHG | HEART RATE: 69 BPM | DIASTOLIC BLOOD PRESSURE: 67 MMHG | HEIGHT: 74 IN | BODY MASS INDEX: 40.43 KG/M2 | RESPIRATION RATE: 18 BRPM | TEMPERATURE: 96.9 F | WEIGHT: 315 LBS | OXYGEN SATURATION: 96 %

## 2023-09-06 PROCEDURE — 258N000003 HC RX IP 258 OP 636: Performed by: NURSE ANESTHETIST, CERTIFIED REGISTERED

## 2023-09-06 PROCEDURE — 710N000012 HC RECOVERY PHASE 2, PER MINUTE: Performed by: INTERNAL MEDICINE

## 2023-09-06 PROCEDURE — 370N000017 HC ANESTHESIA TECHNICAL FEE, PER MIN: Performed by: INTERNAL MEDICINE

## 2023-09-06 PROCEDURE — 250N000011 HC RX IP 250 OP 636: Performed by: NURSE ANESTHETIST, CERTIFIED REGISTERED

## 2023-09-06 PROCEDURE — 88305 TISSUE EXAM BY PATHOLOGIST: CPT | Mod: TC | Performed by: INTERNAL MEDICINE

## 2023-09-06 PROCEDURE — 99100 ANES PT EXTEME AGE<1 YR&>70: CPT | Performed by: NURSE ANESTHETIST, CERTIFIED REGISTERED

## 2023-09-06 PROCEDURE — 360N000075 HC SURGERY LEVEL 2, PER MIN: Performed by: INTERNAL MEDICINE

## 2023-09-06 PROCEDURE — 272N000001 HC OR GENERAL SUPPLY STERILE: Performed by: INTERNAL MEDICINE

## 2023-09-06 PROCEDURE — 43251 EGD REMOVE LESION SNARE: CPT | Performed by: NURSE ANESTHETIST, CERTIFIED REGISTERED

## 2023-09-06 PROCEDURE — 88305 TISSUE EXAM BY PATHOLOGIST: CPT | Mod: 26 | Performed by: PATHOLOGY

## 2023-09-06 PROCEDURE — 999N000141 HC STATISTIC PRE-PROCEDURE NURSING ASSESSMENT: Performed by: INTERNAL MEDICINE

## 2023-09-06 RX ORDER — EPINEPHRINE 1 MG/ML
INJECTION, SOLUTION INTRAMUSCULAR; SUBCUTANEOUS
Status: DISCONTINUED
Start: 2023-09-06 | End: 2023-09-06 | Stop reason: WASHOUT

## 2023-09-06 RX ORDER — LIDOCAINE 40 MG/G
CREAM TOPICAL
Status: DISCONTINUED | OUTPATIENT
Start: 2023-09-06 | End: 2023-09-06 | Stop reason: HOSPADM

## 2023-09-06 RX ORDER — ONDANSETRON 2 MG/ML
4 INJECTION INTRAMUSCULAR; INTRAVENOUS
Status: DISCONTINUED | OUTPATIENT
Start: 2023-09-06 | End: 2023-09-06 | Stop reason: HOSPADM

## 2023-09-06 RX ORDER — ONDANSETRON 4 MG/1
4 TABLET, ORALLY DISINTEGRATING ORAL EVERY 30 MIN PRN
Status: DISCONTINUED | OUTPATIENT
Start: 2023-09-06 | End: 2023-09-06 | Stop reason: HOSPADM

## 2023-09-06 RX ORDER — SODIUM CHLORIDE, SODIUM LACTATE, POTASSIUM CHLORIDE, CALCIUM CHLORIDE 600; 310; 30; 20 MG/100ML; MG/100ML; MG/100ML; MG/100ML
INJECTION, SOLUTION INTRAVENOUS CONTINUOUS
Status: DISCONTINUED | OUTPATIENT
Start: 2023-09-06 | End: 2023-09-06 | Stop reason: HOSPADM

## 2023-09-06 RX ORDER — PROPOFOL 10 MG/ML
INJECTION, EMULSION INTRAVENOUS PRN
Status: DISCONTINUED | OUTPATIENT
Start: 2023-09-06 | End: 2023-09-06

## 2023-09-06 RX ORDER — ONDANSETRON 2 MG/ML
4 INJECTION INTRAMUSCULAR; INTRAVENOUS EVERY 30 MIN PRN
Status: DISCONTINUED | OUTPATIENT
Start: 2023-09-06 | End: 2023-09-06 | Stop reason: HOSPADM

## 2023-09-06 RX ORDER — OXYCODONE HYDROCHLORIDE 5 MG/1
5 TABLET ORAL
Status: DISCONTINUED | OUTPATIENT
Start: 2023-09-06 | End: 2023-09-06 | Stop reason: HOSPADM

## 2023-09-06 RX ORDER — PANTOPRAZOLE SODIUM 40 MG/1
40 TABLET, DELAYED RELEASE ORAL
Status: DISCONTINUED | OUTPATIENT
Start: 2023-09-07 | End: 2023-09-06 | Stop reason: HOSPADM

## 2023-09-06 RX ORDER — OXYCODONE HYDROCHLORIDE 5 MG/1
10 TABLET ORAL
Status: DISCONTINUED | OUTPATIENT
Start: 2023-09-06 | End: 2023-09-06 | Stop reason: HOSPADM

## 2023-09-06 RX ORDER — PROPOFOL 10 MG/ML
INJECTION, EMULSION INTRAVENOUS CONTINUOUS PRN
Status: DISCONTINUED | OUTPATIENT
Start: 2023-09-06 | End: 2023-09-06

## 2023-09-06 RX ADMIN — PROPOFOL 200 MCG/KG/MIN: 10 INJECTION, EMULSION INTRAVENOUS at 11:57

## 2023-09-06 RX ADMIN — PROPOFOL 20 MG: 10 INJECTION, EMULSION INTRAVENOUS at 12:23

## 2023-09-06 RX ADMIN — PROPOFOL 30 MG: 10 INJECTION, EMULSION INTRAVENOUS at 12:11

## 2023-09-06 RX ADMIN — PROPOFOL 20 MG: 10 INJECTION, EMULSION INTRAVENOUS at 12:06

## 2023-09-06 RX ADMIN — SODIUM CHLORIDE, POTASSIUM CHLORIDE, SODIUM LACTATE AND CALCIUM CHLORIDE: 600; 310; 30; 20 INJECTION, SOLUTION INTRAVENOUS at 11:19

## 2023-09-06 RX ADMIN — PROPOFOL 50 MG: 10 INJECTION, EMULSION INTRAVENOUS at 12:21

## 2023-09-06 RX ADMIN — PROPOFOL 90 MG: 10 INJECTION, EMULSION INTRAVENOUS at 11:57

## 2023-09-06 RX ADMIN — PROPOFOL 20 MG: 10 INJECTION, EMULSION INTRAVENOUS at 12:12

## 2023-09-06 RX ADMIN — PROPOFOL 30 MG: 10 INJECTION, EMULSION INTRAVENOUS at 11:59

## 2023-09-06 ASSESSMENT — ACTIVITIES OF DAILY LIVING (ADL)
ADLS_ACUITY_SCORE: 35
ADLS_ACUITY_SCORE: 35

## 2023-09-06 NOTE — OR NURSING
Patient and responsible adult given discharge instructions with no questions regarding instructions. Brando score 20/20. Pain level 0/10.  Discharged from unit via wheelchair. Patient discharged to home with wife. Tolerating PO intake.

## 2023-09-06 NOTE — DISCHARGE INSTRUCTIONS
Post-Anesthesia Patient Instructions    IMMEDIATELY FOLLOWING SURGERY:  Do not drive or operate machinery for the first twenty four hours after surgery.  Do not make any important decisions for twenty four hours after surgery or while taking narcotic pain medications or sedatives.  If you develop intractable nausea and vomiting or a severe headache please notify your doctor immediately.    FOLLOW-UP:  Please make an appointment with your surgeon as instructed. You do not need to follow up with anesthesia unless specifically instructed to do so.    WOUND CARE INSTRUCTIONS (if applicable):  Keep a dry clean dressing on the anesthesia/puncture wound site if there is drainage.  Once the wound has quit draining you may leave it open to air.  Generally you should leave the bandage intact for twenty four hours unless there is drainage.  If the epidural site drains for more than 36-48 hours please call the anesthesia department.    QUESTIONS?:  Please feel free to call your physician or the hospital  if you have any questions, and they will be happy to assist you.         Pt to double Protonix for 2 weeks per Dr. Chilel

## 2023-09-06 NOTE — H&P
[facility]    History and Physical  General Surgery     Date of Admission:  9/6/2023    Assessment & Plan   Gunnar Granados is a 73 year old adult who presents with history of gastric polyps    Active Problems:    * No active hospital problems. *      Tru Hahn MD    Code Status   Full Code    Primary Care Physician   You Bethea    Chief Complaint   History of gastric polyps    History is obtained from the patient    History of Present Illness   Gunnar Granados is a 73 year old adult who presents with history of gastric polyps    Past Medical History    I have reviewed this patient's medical history and updated it with pertinent information if needed.   Past Medical History:   Diagnosis Date    Alcohol abuse     Diabetes mellitus with neuropathy (H)     Gastric polyps     Gout     HTN (hypertension)     Hx of thyroid cancer     Neuropathy     Obesity     Osteoarthritis        Past Surgical History   I have reviewed this patient's surgical history and updated it with pertinent information if needed.  Past Surgical History:   Procedure Laterality Date    basal cell carcinoma  2017    CHOLECYSTECTOMY  11/23/2019    COLONOSCOPY - HIM SCAN  12/01/2012    Colonoscopy due to bleeding, no polyps 12-12    HERNIA REPAIR  2014       Prior to Admission Medications   Prior to Admission Medications   Prescriptions Last Dose Informant Patient Reported? Taking?   B-D U/F insulin pen needle   No No   Sig: BY DEVICE ROUTE 2 TIMES DAILY   CONTOUR NEXT TEST test strip   No No   Sig: USE TO TEST BLOOD SUGARS 2 TIMES DAILY OR AS DIRECTED   Cholecalciferol (VITAMIN D3) 50 MCG (2000 UT) CAPS   Yes No   Sig: Take 1 capsule by mouth daily   GNP TERBINAFINE HYDROCHLORIDE 1 % external cream   No No   Sig: APPLY TO AFFECTED AREA TOPICALLY TWICE A DAY.   Multiple Vitamin (MULTI VITAMIN PO)  Self Yes No   Sig: Take 1 tablet by mouth daily (has iron in it)   QUEtiapine (SEROQUEL) 50 MG tablet   No No   Sig: Take  1/2-1 tablet at [facility]   allopurinol (ZYLOPRIM) 300 MG tablet   No No   Sig: Take 1 tablet (300 mg) by mouth daily Take one tablet daily   ascorbic acid (VITAMIN C) 500 MG tablet  Self Yes No   Sig: Take 500 mg by mouth (With damaso hips)   blood glucose monitoring (ACCU-CHEK FASTCLIX) lancets   No No   Sig: USE TO TEST BLOOD SUGAR TWICE A DAY   ciclopirox (LOPROX) 0.77 % cream   No No   Sig: APPLY TWICE A DAY AS DIRECTED   gabapentin (NEURONTIN) 300 MG capsule   No No   Sig: TAKE 1 CAPSULE (300 MG) BY MOUTH 3 TIMES DAILY   horse chestnut 300 MG CAPS  Self Yes No   Sig: Take 300 mg by mouth 2 times daily    insulin glargine (LANTUS PEN) 100 UNIT/ML pen   No No   Sig: Inject 18 Units Subcutaneous At Bedtime   insulin pen needle (32G X 6 MM) 32G X 6 MM miscellaneous   No No   Sig: Use 1 pen needles daily with Victoza - NOVOFINE   levothyroxine (SYNTHROID/LEVOTHROID) 300 MCG tablet   No No   Sig: TAKE 1 TABLET (300 MCG) BY MOUTH DAILY   liraglutide (VICTOZA PEN) 18 MG/3ML solution   No No   Sig: INJECT 1.2 MG SUBCUTANEOUS DAILY 18MG/3ML PREFILLED PEN   metFORMIN (GLUCOPHAGE) 1000 MG tablet   No No   Sig: TAKE 1 TABLET (1,000 MG) BY MOUTH 2 TIMES DAILY (WITH MEALS)   metoprolol succinate ER (TOPROL XL) 100 MG 24 hr tablet   No No   Sig: TAKE 0.5 TABLETS (50 MG) BY MOUTH DAILY   naproxen (NAPROSYN) 500 MG tablet   No No   Sig: TAKE 1 TABLET (500 MG) BY MOUTH 2 TIMES DAILY (WITH MEALS)   pantoprazole (PROTONIX) 40 MG EC tablet   No No   Sig: TAKE 1 TABLET BY MOUTH EVERY DAY 30-60 MIN BEFORE A MEAL   potassium chloride ER (KLOR-CON M) 20 MEQ CR tablet   No No   Sig: Take 2 tablets (40 mEq) by mouth 2 times daily   predniSONE (DELTASONE) 20 MG tablet Not Taking  No No   Sig: Take 1 tablet (20 mg) by mouth daily X 5 days   Patient not taking: Reported on 8/28/2023   tamsulosin (FLOMAX) 0.4 MG capsule   No No   Sig: TAKE ONE CAPSULE BY MOUTH EVERY DAY. SWALLOW WHOLE. DO NOT CRUSH OR CHEW   torsemide (DEMADEX) 20 MG tablet   No No    Sig: TAKE 2 TABLETS (40 MG) BY MOUTH 2 TIMES DAILY      Facility-Administered Medications: None     Allergies   Allergies   Allergen Reactions    Food      Onions, peppers, olives     Trazodone      Other reaction(s): Dizziness       Social History   I have reviewed this patient's social history and updated it with pertinent information if needed. Gunnar Granados  reports that he has never smoked. He has never used smokeless tobacco. He reports current alcohol use. He reports that he does not use drugs.    Family History   I have reviewed this patient's family history and updated it with pertinent information if needed.   Family History   Problem Relation Age of Onset    Unknown/Adopted Sister        Review of Systems   CONSTITUTIONAL: NEGATIVE for fever, chills, change in weight  ENT/MOUTH: NEGATIVE for ear, mouth and throat problems  RESP: NEGATIVE for significant cough or SOB  CV: NEGATIVE for chest pain, palpitations or peripheral edema    Physical Exam                      Vital Signs with Ranges     0 lbs 0 oz    Respiratory: No increased work of breathing, good air exchange, clear to auscultation bilaterally, no crackles or wheezing  Cardiovascular: Normal apical impulse, regular rate and rhythm, normal S1 and S2, no S3 or S4, and no murmur noted    Data   No results found for this or any previous visit (from the past 24 hour(s)).

## 2023-09-06 NOTE — OP NOTE
[facility]   Operative Note    Pre-operative diagnosis: H/o gasdtric polyps     Post-operative diagnosis 2 large gastric polyps  removed with snare  Irregular esophageal mucosa s/p bx   Procedure: Procedure(s):  ESOPHAGOGASTRODUODENOSCOPY with polypectomy and application of resolution clip X3,biopsies mid esophagus   Surgeon(s): Surgeon(s) and Role:     * Tru Chilel MD - Primary   Estimated blood loss: * No values recorded between 9/6/2023 11:59 AM and 9/6/2023 12:33 PM *    Specimens: ID Type Source Tests Collected by Time Destination   1 : Gastric Polyp Polyp Stomach, Fundus SURGICAL PATHOLOGY EXAM Tru Chilel MD 9/6/2023 12:03 PM    2 : Mid Esophagus Biopsies Biopsy Esophagus, Mid SURGICAL PATHOLOGY EXAM Tru Chilel MD 9/6/2023 12:29 PM         Withdrawal time:  Findings: na  2 large gastric polyps s/p snare removal.  Post polypectomy bleeding controlled with endoclips.  There was nonspecific esophageal irregularity in mid esophagus s/p bx         Description of procedure: having reviewed the risks of bleeding and perforation the pt was sedated and the gif 190 guided to the postereior pharynx and passed to the esophagus there is a slight irregularity to the mid esophageal mucosa which was bx. There were two large mid body gastric polyps each were pedumculated and removed with cold snare.  Bleeding controllled with endoclips.  The small bowel was normal. The largest polyp was grasped with a snare and recovered      As above    Tru Chilel MD on 9/6/2023 at 12:39 PM

## 2023-09-06 NOTE — ANESTHESIA POSTPROCEDURE EVALUATION
Patient: Gunnar Granados    Procedure: Procedure(s):  ESOPHAGOGASTRODUODENOSCOPY with polypectomy and application of resolution clip X3,biopsies mid esophagus       Anesthesia Type:  MAC    Note:  Disposition: Outpatient   Postop Pain Control: Uneventful            Sign Out: Well controlled pain   PONV: No   Neuro/Psych: Uneventful            Sign Out: Acceptable/Baseline neuro status   Airway/Respiratory: Uneventful            Sign Out: Acceptable/Baseline resp. status   CV/Hemodynamics: Uneventful            Sign Out: Acceptable CV status; No obvious hypovolemia; No obvious fluid overload   Other NRE: NONE   DID A NON-ROUTINE EVENT OCCUR? No       Last vitals:  Vitals Value Taken Time   /67 09/06/23 1250   Temp     Pulse 69 09/06/23 1250   Resp     SpO2 91 % 09/06/23 1253   Vitals shown include unvalidated device data.    Electronically Signed By: ZEKE Fountain CRNA  September 6, 2023  1:18 PM

## 2023-09-06 NOTE — ANESTHESIA CARE TRANSFER NOTE
Patient: Gunnar Granados    Procedure: Procedure(s):  ESOPHAGOGASTRODUODENOSCOPY with polypectomy and application of resolution clip X3       Diagnosis: Gastric polyps [K31.7]  Diagnosis Additional Information: No value filed.    Anesthesia Type:   MAC     Note:    Oropharynx: spontaneously breathing  Level of Consciousness: awake  Oxygen Supplementation: room air    Independent Airway: airway patency satisfactory and stable  Dentition: dentition unchanged  Vital Signs Stable: post-procedure vital signs reviewed and stable  Report to RN Given: handoff report given  Patient transferred to: Phase II    Handoff Report: Identifed the Patient, Identified the Reponsible Provider, Reviewed the pertinent medical history, Discussed the surgical course, Reviewed Intra-OP anesthesia mangement and issues during anesthesia, Set expectations for post-procedure period and Allowed opportunity for questions and acknowledgement of understanding  Vitals:  Vitals Value Taken Time   BP     Temp     Pulse     Resp     SpO2         Electronically Signed By: ZEKE Parra CRNA  September 6, 2023  12:31 PM

## 2023-09-08 LAB
PATH REPORT.COMMENTS IMP SPEC: NORMAL
PATH REPORT.COMMENTS IMP SPEC: NORMAL
PATH REPORT.FINAL DX SPEC: NORMAL
PATH REPORT.GROSS SPEC: NORMAL
PATH REPORT.MICROSCOPIC SPEC OTHER STN: NORMAL
PATH REPORT.RELEVANT HX SPEC: NORMAL
PHOTO IMAGE: NORMAL

## 2023-09-13 DIAGNOSIS — Q39.8 SEVERE ESOPHAGEAL DYSPLASIA: Primary | ICD-10-CM

## 2023-10-04 ENCOUNTER — OFFICE VISIT (OUTPATIENT)
Dept: PODIATRY | Facility: OTHER | Age: 73
End: 2023-10-04
Attending: PODIATRIST
Payer: MEDICARE

## 2023-10-04 DIAGNOSIS — Z13.89 SCREENING FOR DIABETIC PERIPHERAL NEUROPATHY: ICD-10-CM

## 2023-10-04 DIAGNOSIS — Z79.4 DIABETES MELLITUS TYPE 2, INSULIN DEPENDENT (H): ICD-10-CM

## 2023-10-04 DIAGNOSIS — L60.3 ONYCHODYSTROPHY: Primary | ICD-10-CM

## 2023-10-04 DIAGNOSIS — E11.9 DIABETES MELLITUS TYPE 2, INSULIN DEPENDENT (H): ICD-10-CM

## 2023-10-04 DIAGNOSIS — E11.42 DIABETIC POLYNEUROPATHY ASSOCIATED WITH TYPE 2 DIABETES MELLITUS (H): ICD-10-CM

## 2023-10-04 PROCEDURE — 11721 DEBRIDE NAIL 6 OR MORE: CPT | Performed by: PODIATRIST

## 2023-10-04 PROCEDURE — G0463 HOSPITAL OUTPT CLINIC VISIT: HCPCS

## 2023-10-04 ASSESSMENT — PAIN SCALES - GENERAL: PAINLEVEL: EXTREME PAIN (8)

## 2023-10-05 NOTE — PROGRESS NOTES
Chief complaint: Patient presents with:  Toenail: DFE      History of Present Illness: This 73 year old IDDM II is seen with his wife, Yazmin, for follow-up management for a diabetic foot exam and high risk nail debridement.     His wife is applying lotion to his feet every day, but not between the toes.    He has continued to take Allopurinol 300mg daily since his last appointment. He has not complained of any recent gout flares.    The patient is requesting high risk nail debridement every six months.    No further pedal complaints today.     Lab Results   Component Value Date    A1C 5.8 07/17/2023    A1C 6.0 04/05/2023    A1C 5.6 04/06/2022    A1C 6.1 09/01/2021    A1C 6.0 01/20/2021    A1C 6.0 03/20/2020    A1C 5.7 09/04/2019    A1C 5.6 04/11/2019    A1C 6.1 08/14/2018         /85 (BP Location: Left arm, Patient Position: Sitting, Cuff Size: Adult Regular)   Pulse 72   Temp (!) 96.3  F (35.7  C) (Tympanic)   SpO2 98%     Patient Active Problem List   Diagnosis    ACP (advance care planning)    Type 2 diabetes mellitus with diabetic neuropathy (H)    Morbid obesity due to excess calories (H)    Acquired hypothyroidism    Benign essential hypertension    Calculus of gallbladder without cholecystitis without obstruction    Cholecystitis    Gastroesophageal reflux disease without esophagitis    Depression    Congenital anomaly of spleen    Disorder of lipoid metabolism    Other lipoprotein metabolism disorders    Malignant neoplasm of thyroid gland (H)    Tremor    Hypothyroidism, postsurgical    Ametropia    Cupping of optic disc, left    Flat affect    Lesion of left upper eyelid    Nuclear sclerotic cataract of both eyes    Other conjunctivitis of both eyes    Presbyopia       Past Surgical History:   Procedure Laterality Date    basal cell carcinoma  2017    CHOLECYSTECTOMY  11/23/2019    COLONOSCOPY - HIM SCAN  12/01/2012    Colonoscopy due to bleeding, no polyps 12-12    ESOPHAGOSCOPY, GASTROSCOPY,  DUODENOSCOPY (EGD), COMBINED N/A 9/6/2023    Procedure: ESOPHAGOGASTRODUODENOSCOPY with polypectomy and application of resolution clip X3,biopsies mid esophagus;  Surgeon: Tru Chilel MD;  Location: [facility] OR    HERNIA REPAIR  2014       Current Outpatient Medications   Medication    allopurinol (ZYLOPRIM) 300 MG tablet    ascorbic acid (VITAMIN C) 500 MG tablet    B-D U/F insulin pen needle    blood glucose monitoring (ACCU-CHEK FASTCLIX) lancets    Cholecalciferol (VITAMIN D3) 50 MCG (2000 UT) CAPS    ciclopirox (LOPROX) 0.77 % cream    CONTOUR NEXT TEST test strip    gabapentin (NEURONTIN) 300 MG capsule    GNP TERBINAFINE HYDROCHLORIDE 1 % external cream    horse chestnut 300 MG CAPS    insulin glargine (LANTUS PEN) 100 UNIT/ML pen    insulin pen needle (32G X 6 MM) 32G X 6 MM miscellaneous    levothyroxine (SYNTHROID/LEVOTHROID) 300 MCG tablet    liraglutide (VICTOZA PEN) 18 MG/3ML solution    metFORMIN (GLUCOPHAGE) 1000 MG tablet    metoprolol succinate ER (TOPROL XL) 100 MG 24 hr tablet    Multiple Vitamin (MULTI VITAMIN PO)    naproxen (NAPROSYN) 500 MG tablet    pantoprazole (PROTONIX) 40 MG EC tablet    potassium chloride ER (KLOR-CON M) 20 MEQ CR tablet    predniSONE (DELTASONE) 20 MG tablet    QUEtiapine (SEROQUEL) 50 MG tablet    tamsulosin (FLOMAX) 0.4 MG capsule    torsemide (DEMADEX) 20 MG tablet     No current facility-administered medications for this visit.          Allergies   Allergen Reactions    Food      Onions, peppers, olives, mushrooms    Trazodone      Other reaction(s): Dizziness       Family History   Problem Relation Age of Onset    Unknown/Adopted Sister        Social History     Socioeconomic History    Marital status:      Spouse name: Not on file    Number of children: Not on file    Years of education: Not on file    Highest education level: Not on file   Occupational History    Not on file   Social Needs    Financial resource strain: Not on file    Food insecurity:      Worry: Not on file     Inability: Not on file    Transportation needs:     Medical: Not on file     Non-medical: Not on file   Tobacco Use    Smoking status: Never Smoker    Smokeless tobacco: Never Used   Substance and Sexual Activity    Alcohol use: Yes     Comment: beer daily     Drug use: No    Sexual activity: Not Currently     Partners: Female   Lifestyle    Physical activity:     Days per week: Not on file     Minutes per session: Not on file    Stress: Not on file   Relationships    Social connections:     Talks on phone: Not on file     Gets together: Not on file     Attends Zoroastrianism service: Not on file     Active member of club or organization: Not on file     Attends meetings of clubs or organizations: Not on file     Relationship status: Not on file    Intimate partner violence:     Fear of current or ex partner: Not on file     Emotionally abused: Not on file     Physically abused: Not on file     Forced sexual activity: Not on file   Other Topics Concern    Parent/sibling w/ CABG, MI or angioplasty before 65F 55M? Not Asked   Social History Narrative    Not on file       ROS: 10 point ROS neg other than the symptoms noted above in the HPI.  EXAM  Constitutional: healthy, alert and no distress    Psychiatric: mentation appears normal and affect normal/bright    VASCULAR:  -Dorsalis pedis pulse +1/4 bilaterally   -Posterior tibial pulse +1/4 bilaterally   -Capillary refill time < 3 seconds to hallux bilaterally   -Hair growth Absent to b/l anterior leg and ankle bilaterally   -Varicosities and telangiectasias to bilateral foot bilaterally   -Mild 1+ pitting edema to bilateral foot and ankle bilaterally   NEURO:  -Epicritic and protective sensation ABSENT to the bilateral foot.  DERM:  -Mildly dry skin on bilateral foot  -Toenails elongated, thickened, dystrophic and discolored x 10  MSK:  -Muscle strength of ankles +5/5 for dorsiflexion, plantarflexion, ABDUction and ADDuction b/l    RIGHT FOOT  RADIOGRAPH 03/17/2021  IMPRESSION: Findings suspicious for osteomyelitis of the middle and distal phalanxes of the right second toe.    PHILLY CARLSON MD  ============================================================      ASSESSMENT:    (L60.3) Onychodystrophy    (E11.9,  Z79.4) Diabetes mellitus type 2, insulin dependent (H)    (E11.42) Diabetic polyneuropathy associated with type 2 diabetes mellitus (H)    (Z13.89) Screening for diabetic peripheral neuropathy      PLAN:  -Patient evaluated and examined. Treatment options discussed with no educational barriers noted.    -High risk toenail debridement x 10 toenails without incident     -Diabetic Foot Education provided. This included checking the feet daily looking for new new blisters or wounds, wearing shoes at all times when walking including around the house, and avoiding lotion application between the toes. If there are any signs of infection, the patient should present to the ED as soon as possible. Infections of the foot can be life threatening or lead to amputations of the foot or leg.    Diabetes Mellitus: Patient's DM is managed by their PCP. The DM appears to be stable. Patient's last HbA1C was 5.8% on 07/17/2023.    -Patient in agreement with the above treatment plan and all of patient's questions were answered.      Return to clinic six months in [facility] for diabetic foot exam and high risk nail debridement per patient request        Gen[place] Bay DPM

## 2023-10-12 ENCOUNTER — ANESTHESIA EVENT (OUTPATIENT)
Dept: SURGERY | Facility: HOSPITAL | Age: 73
End: 2023-10-12
Payer: MEDICARE

## 2023-10-12 NOTE — ANESTHESIA PREPROCEDURE EVALUATION
Anesthesia Pre-Procedure Evaluation    Patient: Gunnar Granados   MRN: [id number] : 1950        Procedure : Procedure(s):  ESOPHAGOGASTRODUODENOSCOPY diagnostic          Past Medical History:   Diagnosis Date     Alcohol abuse      Diabetes mellitus with neuropathy (H)      Gastric polyps      Gout      HTN (hypertension)      Hx of thyroid cancer      Neuropathy      Obesity      Osteoarthritis       Past Surgical History:   Procedure Laterality Date     basal cell carcinoma  2017     CHOLECYSTECTOMY  2019     COLONOSCOPY - HIM SCAN  2012    Colonoscopy due to bleeding, no polyps 12-     ESOPHAGOSCOPY, GASTROSCOPY, DUODENOSCOPY (EGD), COMBINED N/A 2023    Procedure: ESOPHAGOGASTRODUODENOSCOPY with polypectomy and application of resolution clip X3,biopsies mid esophagus;  Surgeon: Tru Chilel MD;  Location: [facility]     HERNIA REPAIR        Allergies   Allergen Reactions     Food      Onions, peppers, olives, mushrooms     Trazodone      Other reaction(s): Dizziness      Social History     Tobacco Use     Smoking status: Never     Smokeless tobacco: Never   Substance Use Topics     Alcohol use: Yes     Comment: beer daily       Wt Readings from Last 1 Encounters:   23 149.2 kg (329 lb)        Anesthesia Evaluation   Pt has had prior anesthetic. Type: General and MAC.    No history of anesthetic complications       ROS/MED HX  ENT/Pulmonary:     (+) sleep apnea, uses CPAP,   CALLIE risk factors,  hypertension, obese,                               Neurologic: Comment: tremor    (+)    peripheral neuropathy, - both legs, feet, and hands.                           Cardiovascular:     (+)  hypertension-range: 149/76/ -   -  - -                                 Previous cardiac testing   Echo: Date:  Results:  No pericardial effusion is present.  Mild left ventricular dilation is present.  The Ejection Fraction is estimated at 60-65%.  Grade I or early diastolic dysfunction.  The  right ventricle is normal size.  Global right ventricular function is normal.  Mild left atrial enlargement is present.  Mild mitral insufficiency is present.  Mild aortic valve sclerosis is present.  The tricuspid valve is normal.  The pulmonic valve is normal.  The aorta root is normal.    Stress Test:  Date: Results:    ECG Reviewed:  Date: 2019 Results:  Sr 1st degree   Left axis deviation  Inerior infarct  Cath:  Date: Results:      METS/Exercise Tolerance: 1 - Eating, dressing Comment: Able to get to toilet and transfer self   Hematologic:       Musculoskeletal: Comment: Gout    Back (lumbar) pain   (+)  arthritis,             GI/Hepatic:     (+) GERD (none today), Asymptomatic on medication,        cholecystitis/cholelithiasis,          Renal/Genitourinary:  - neg Renal ROS     Endo: Comment: Congenital anomaly of spleen    (+)  type II DM, Last HgA1c: 5.8, date: 7/2023, Using insulin,    Diabetic complications: neuropathy. thyroid problem, hypothyroidism this am last replacement taken, Chronic steroid usage for Arthritis. Date most recently used: last taken bedtime last night. Obesity,       Psychiatric/Substance Use: Comment: Flat affect    (+) psychiatric history depression alcohol abuse (no use now)      Infectious Disease:       Malignancy:   (+) Malignancy, History of Other.Other CA thyroid status post Surgery and Radiation.    Other:      (+)  , ,, other significant disability Disability list: uses walker to get around house, does not drive anymore.         Physical Exam    Airway      Comment: Kyphosis   History of neck radiation     Mallampati: III   TM distance: > 3 FB   Neck ROM: limited   Mouth opening: > 3 cm    Respiratory Devices and Support         Dental       (+) Modest Abnormalities - crowns, retainers, 1 or 2 missing teeth      Cardiovascular          Rhythm and rate: regular and normal     Pulmonary           breath sounds clear to auscultation         OUTSIDE LABS:  CBC:   Lab Results    Component Value Date    WBC 7.2 04/05/2023    WBC 7.8 04/06/2022    HGB 14.3 04/05/2023    HGB 14.8 04/06/2022    HCT 42.8 04/05/2023    HCT 43.0 04/06/2022     04/05/2023     04/06/2022     BMP:   Lab Results   Component Value Date     04/05/2023     04/06/2022    POTASSIUM 4.4 04/05/2023    POTASSIUM 4.1 04/06/2022    CHLORIDE 98 04/05/2023    CHLORIDE 99 04/06/2022    CO2 29 04/05/2023    CO2 29 04/06/2022    BUN 21.0 04/05/2023    BUN 20 04/06/2022    CR 1.16 04/05/2023    CR 1.05 04/06/2022     (H) 04/05/2023     (H) 04/06/2022     COAGS:   Lab Results   Component Value Date    INR 0.97 10/04/2018     POC:   Lab Results   Component Value Date     (H) 11/20/2019     HEPATIC:   Lab Results   Component Value Date    ALBUMIN 3.8 04/05/2023    PROTTOTAL 6.6 04/05/2023    ALT 21 04/05/2023    AST 16 04/05/2023    ALKPHOS 56 04/05/2023    BILITOTAL 0.7 04/05/2023    MAURICE <10 (L) 11/20/2019     OTHER:   Lab Results   Component Value Date    LACT 1.9 11/20/2019    A1C 5.8 (H) 07/17/2023    JONNIE 8.9 04/05/2023    LIPASE 231 09/14/2018    AMYLASE 55 09/14/2018    TSH 0.39 07/17/2023    T4 1.83 (H) 07/17/2023    T3 60 04/06/2022    CRP 13.9 (H) 09/14/2018       Anesthesia Plan    ASA Status:  3    NPO Status:  NPO Appropriate (water with am pills 1000; 2359 for last food intake)    Anesthesia Type: MAC.     - Reason for MAC: straight local not clinically adequate              Consents    Anesthesia Plan(s) and associated risks, benefits, and realistic alternatives discussed. Questions answered and patient/representative(s) expressed understanding.     - Discussed:     - Discussed with:  Patient      - Extended Intubation/Ventilatory Support Discussed: No.      - Patient is DNR/DNI Status: No     Use of blood products discussed: No .     Postoperative Care            Comments:    Other Comments: Getachew day of HP     Risks and benefits of MAC anesthetic discussed including  dental damage, aspiration, loss of airway, conversion to general anesthetic, CV complications, MI, stroke, death. Pt wishes to proceed.               ZEKE Fountain CRNA

## 2023-10-17 ENCOUNTER — HOSPITAL ENCOUNTER (OUTPATIENT)
Facility: HOSPITAL | Age: 73
Discharge: HOME | End: 2023-10-17
Attending: INTERNAL MEDICINE | Admitting: INTERNAL MEDICINE
Payer: MEDICARE

## 2023-10-17 ENCOUNTER — ANESTHESIA (OUTPATIENT)
Dept: SURGERY | Facility: HOSPITAL | Age: 73
End: 2023-10-17
Payer: MEDICARE

## 2023-10-17 VITALS
BODY MASS INDEX: 40.55 KG/M2 | OXYGEN SATURATION: 98 % | SYSTOLIC BLOOD PRESSURE: 135 MMHG | TEMPERATURE: 96.3 F | HEART RATE: 72 BPM | WEIGHT: 315 LBS | DIASTOLIC BLOOD PRESSURE: 85 MMHG

## 2023-10-17 VITALS
BODY MASS INDEX: 41.75 KG/M2 | OXYGEN SATURATION: 95 % | WEIGHT: 315 LBS | HEIGHT: 73 IN | DIASTOLIC BLOOD PRESSURE: 60 MMHG | RESPIRATION RATE: 16 BRPM | HEART RATE: 71 BPM | SYSTOLIC BLOOD PRESSURE: 126 MMHG | TEMPERATURE: 97 F

## 2023-10-17 DIAGNOSIS — I10 BENIGN ESSENTIAL HYPERTENSION: ICD-10-CM

## 2023-10-17 PROCEDURE — 360N000075 HC SURGERY LEVEL 2, PER MIN: Performed by: INTERNAL MEDICINE

## 2023-10-17 PROCEDURE — 43239 EGD BIOPSY SINGLE/MULTIPLE: CPT | Performed by: NURSE ANESTHETIST, CERTIFIED REGISTERED

## 2023-10-17 PROCEDURE — 250N000009 HC RX 250: Performed by: NURSE ANESTHETIST, CERTIFIED REGISTERED

## 2023-10-17 PROCEDURE — 272N000001 HC OR GENERAL SUPPLY STERILE: Performed by: INTERNAL MEDICINE

## 2023-10-17 PROCEDURE — 710N000012 HC RECOVERY PHASE 2, PER MINUTE: Performed by: INTERNAL MEDICINE

## 2023-10-17 PROCEDURE — 88305 TISSUE EXAM BY PATHOLOGIST: CPT | Mod: 26 | Performed by: PATHOLOGY

## 2023-10-17 PROCEDURE — 999N000141 HC STATISTIC PRE-PROCEDURE NURSING ASSESSMENT: Performed by: INTERNAL MEDICINE

## 2023-10-17 PROCEDURE — 258N000003 HC RX IP 258 OP 636: Performed by: NURSE ANESTHETIST, CERTIFIED REGISTERED

## 2023-10-17 PROCEDURE — 88305 TISSUE EXAM BY PATHOLOGIST: CPT | Mod: TC | Performed by: INTERNAL MEDICINE

## 2023-10-17 PROCEDURE — 99100 ANES PT EXTEME AGE<1 YR&>70: CPT | Performed by: NURSE ANESTHETIST, CERTIFIED REGISTERED

## 2023-10-17 PROCEDURE — 250N000011 HC RX IP 250 OP 636: Performed by: NURSE ANESTHETIST, CERTIFIED REGISTERED

## 2023-10-17 PROCEDURE — 370N000017 HC ANESTHESIA TECHNICAL FEE, PER MIN: Performed by: INTERNAL MEDICINE

## 2023-10-17 RX ORDER — LIDOCAINE HYDROCHLORIDE 20 MG/ML
INJECTION, SOLUTION INFILTRATION; PERINEURAL PRN
Status: DISCONTINUED | OUTPATIENT
Start: 2023-10-17 | End: 2023-10-17

## 2023-10-17 RX ORDER — PROPOFOL 10 MG/ML
INJECTION, EMULSION INTRAVENOUS PRN
Status: DISCONTINUED | OUTPATIENT
Start: 2023-10-17 | End: 2023-10-17

## 2023-10-17 RX ORDER — LIDOCAINE 40 MG/G
CREAM TOPICAL
Status: DISCONTINUED | OUTPATIENT
Start: 2023-10-17 | End: 2023-10-17 | Stop reason: HOSPADM

## 2023-10-17 RX ORDER — SODIUM CHLORIDE, SODIUM LACTATE, POTASSIUM CHLORIDE, CALCIUM CHLORIDE 600; 310; 30; 20 MG/100ML; MG/100ML; MG/100ML; MG/100ML
INJECTION, SOLUTION INTRAVENOUS CONTINUOUS
Status: DISCONTINUED | OUTPATIENT
Start: 2023-10-17 | End: 2023-10-17 | Stop reason: HOSPADM

## 2023-10-17 RX ORDER — ONDANSETRON 2 MG/ML
4 INJECTION INTRAMUSCULAR; INTRAVENOUS
Status: DISCONTINUED | OUTPATIENT
Start: 2023-10-17 | End: 2023-10-17 | Stop reason: HOSPADM

## 2023-10-17 RX ORDER — ONDANSETRON 2 MG/ML
4 INJECTION INTRAMUSCULAR; INTRAVENOUS EVERY 30 MIN PRN
Status: DISCONTINUED | OUTPATIENT
Start: 2023-10-17 | End: 2023-10-17 | Stop reason: HOSPADM

## 2023-10-17 RX ORDER — ONDANSETRON 4 MG/1
4 TABLET, ORALLY DISINTEGRATING ORAL EVERY 30 MIN PRN
Status: DISCONTINUED | OUTPATIENT
Start: 2023-10-17 | End: 2023-10-17 | Stop reason: HOSPADM

## 2023-10-17 RX ADMIN — PROPOFOL 50 MG: 10 INJECTION, EMULSION INTRAVENOUS at 13:38

## 2023-10-17 RX ADMIN — PROPOFOL 50 MG: 10 INJECTION, EMULSION INTRAVENOUS at 13:31

## 2023-10-17 RX ADMIN — PROPOFOL 50 MG: 10 INJECTION, EMULSION INTRAVENOUS at 13:22

## 2023-10-17 RX ADMIN — PROPOFOL 50 MG: 10 INJECTION, EMULSION INTRAVENOUS at 13:42

## 2023-10-17 RX ADMIN — PROPOFOL 50 MG: 10 INJECTION, EMULSION INTRAVENOUS at 13:20

## 2023-10-17 RX ADMIN — PROPOFOL 50 MG: 10 INJECTION, EMULSION INTRAVENOUS at 13:35

## 2023-10-17 RX ADMIN — PROPOFOL 50 MG: 10 INJECTION, EMULSION INTRAVENOUS at 13:25

## 2023-10-17 RX ADMIN — SODIUM CHLORIDE, POTASSIUM CHLORIDE, SODIUM LACTATE AND CALCIUM CHLORIDE: 600; 310; 30; 20 INJECTION, SOLUTION INTRAVENOUS at 13:08

## 2023-10-17 RX ADMIN — PROPOFOL 50 MG: 10 INJECTION, EMULSION INTRAVENOUS at 13:28

## 2023-10-17 RX ADMIN — PROPOFOL 50 MG: 10 INJECTION, EMULSION INTRAVENOUS at 13:18

## 2023-10-17 RX ADMIN — PROPOFOL 50 MG: 10 INJECTION, EMULSION INTRAVENOUS at 13:47

## 2023-10-17 RX ADMIN — LIDOCAINE HYDROCHLORIDE 100 MG: 20 INJECTION, SOLUTION INFILTRATION; PERINEURAL at 13:18

## 2023-10-17 ASSESSMENT — ACTIVITIES OF DAILY LIVING (ADL): ADLS_ACUITY_SCORE: 35

## 2023-10-17 NOTE — H&P
[facility]    History and Physical  General Surgery     Date of Admission:  10/17/2023    Assessment & Plan   Gunnar Granados is a 73 year old adult who presents with history of gastric polyps    Active Problems:    * No active hospital problems. *      Tru Hahn MD    Code Status   Full Code    Primary Care Physician   You Bethea    Chief Complaint   Gastric polyps    History is obtained from the patient    History of Present Illness   Gunnar Granados is a 73 year old adult who presents with gastric polyps    Past Medical History    I have reviewed this patient's medical history and updated it with pertinent information if needed.   Past Medical History:   Diagnosis Date    Alcohol abuse     Diabetes mellitus with neuropathy (H)     Gastric polyps     Gout     HTN (hypertension)     Hx of thyroid cancer     Neuropathy     Obesity     Osteoarthritis        Past Surgical History   I have reviewed this patient's surgical history and updated it with pertinent information if needed.  Past Surgical History:   Procedure Laterality Date    basal cell carcinoma  2017    CHOLECYSTECTOMY  11/23/2019    COLONOSCOPY - HIM SCAN  12/01/2012    Colonoscopy due to bleeding, no polyps 12-12    ESOPHAGOSCOPY, GASTROSCOPY, DUODENOSCOPY (EGD), COMBINED N/A 9/6/2023    Procedure: ESOPHAGOGASTRODUODENOSCOPY with polypectomy and application of resolution clip X3,biopsies mid esophagus;  Surgeon: Tru Hahn MD;  Location: [facility]    HERNIA REPAIR  2014       Prior to Admission Medications   Prior to Admission Medications   Prescriptions Last Dose Informant Patient Reported? Taking?   B-D U/F insulin pen needle   No No   Sig: BY DEVICE ROUTE 2 TIMES DAILY   CONTOUR NEXT TEST test strip   No No   Sig: USE TO TEST BLOOD SUGARS 2 TIMES DAILY OR AS DIRECTED   Cholecalciferol (VITAMIN D3) 50 MCG (2000 UT) CAPS   Yes No   Sig: Take 1 capsule by mouth daily   GNP TERBINAFINE HYDROCHLORIDE 1 % external cream    No No   Sig: APPLY TO AFFECTED AREA TOPICALLY TWICE A DAY.   Multiple Vitamin (MULTI VITAMIN PO)  Self Yes No   Sig: Take 1 tablet by mouth daily (has iron in it)   QUEtiapine (SEROQUEL) 50 MG tablet   No No   Sig: Take 1/2-1 tablet at [facility]   allopurinol (ZYLOPRIM) 300 MG tablet   No No   Sig: Take 1 tablet (300 mg) by mouth daily Take one tablet daily   ascorbic acid (VITAMIN C) 500 MG tablet  Self Yes No   Sig: Take 500 mg by mouth (With damaso hips)   blood glucose monitoring (ACCU-CHEK FASTCLIX) lancets   No No   Sig: USE TO TEST BLOOD SUGAR TWICE A DAY   ciclopirox (LOPROX) 0.77 % cream   No No   Sig: APPLY TWICE A DAY AS DIRECTED   gabapentin (NEURONTIN) 300 MG capsule   No No   Sig: TAKE 1 CAPSULE (300 MG) BY MOUTH 3 TIMES DAILY   horse chestnut 300 MG CAPS  Self Yes No   Sig: Take 300 mg by mouth 2 times daily    insulin glargine (LANTUS PEN) 100 UNIT/ML pen   No No   Sig: Inject 18 Units Subcutaneous At Bedtime   insulin pen needle (32G X 6 MM) 32G X 6 MM miscellaneous   No No   Sig: Use 1 pen needles daily with Victoza - NOVOFINE   levothyroxine (SYNTHROID/LEVOTHROID) 300 MCG tablet   No No   Sig: TAKE 1 TABLET (300 MCG) BY MOUTH DAILY   liraglutide (VICTOZA PEN) 18 MG/3ML solution   No No   Sig: INJECT 1.2 MG SUBCUTANEOUS DAILY 18MG/3ML PREFILLED PEN   metFORMIN (GLUCOPHAGE) 1000 MG tablet   No No   Sig: TAKE 1 TABLET (1,000 MG) BY MOUTH 2 TIMES DAILY (WITH MEALS)   metoprolol succinate ER (TOPROL XL) 100 MG 24 hr tablet   No No   Sig: TAKE 0.5 TABLETS (50 MG) BY MOUTH DAILY   naproxen (NAPROSYN) 500 MG tablet   No No   Sig: TAKE 1 TABLET (500 MG) BY MOUTH 2 TIMES DAILY (WITH MEALS)   pantoprazole (PROTONIX) 40 MG EC tablet   No No   Sig: TAKE 1 TABLET BY MOUTH EVERY DAY 30-60 MIN BEFORE A MEAL   potassium chloride ER (KLOR-CON M) 20 MEQ CR tablet   No No   Sig: Take 2 tablets (40 mEq) by mouth 2 times daily   predniSONE (DELTASONE) 20 MG tablet   No No   Sig: Take 1 tablet (20 mg) by mouth daily X 5 days    tamsulosin (FLOMAX) 0.4 MG capsule   No No   Sig: TAKE ONE CAPSULE BY MOUTH EVERY DAY. SWALLOW WHOLE. DO NOT CRUSH OR CHEW   torsemide (DEMADEX) 20 MG tablet   No No   Sig: TAKE 2 TABLETS (40 MG) BY MOUTH 2 TIMES DAILY      Facility-Administered Medications: None     Allergies   Allergies   Allergen Reactions    Food      Onions, peppers, olives, mushrooms    Trazodone      Other reaction(s): Dizziness       Social History   I have reviewed this patient's social history and updated it with pertinent information if needed. Gunnar Granados  reports that he has never smoked. He has never used smokeless tobacco. He reports current alcohol use. He reports that he does not use drugs.    Family History   I have reviewed this patient's family history and updated it with pertinent information if needed.   Family History   Problem Relation Age of Onset    Unknown/Adopted Sister        Review of Systems   CONSTITUTIONAL: NEGATIVE for fever, chills, change in weight  ENT/MOUTH: NEGATIVE for ear, mouth and throat problems  RESP: NEGATIVE for significant cough or SOB  CV: NEGATIVE for chest pain, palpitations or peripheral edema    Physical Exam                      Vital Signs with Ranges     0 lbs 0 oz    Respiratory: No increased work of breathing, good air exchange, clear to auscultation bilaterally, no crackles or wheezing  Cardiovascular: Normal apical impulse, regular rate and rhythm, normal S1 and S2, no S3 or S4, and no murmur noted    Data   No results found for this or any previous visit (from the past 24 hour(s)).

## 2023-10-17 NOTE — ANESTHESIA POSTPROCEDURE EVALUATION
Patient: Gunnar Granados    Procedure: Procedure(s):  ESOPHAGOGASTRODUODENOSCOPY diagnostic with hot biopsy, resolution clip placement x2 in greater curvature body gastric       Anesthesia Type:  MAC    Note:  Disposition: Outpatient   Postop Pain Control: Uneventful            Sign Out: Well controlled pain   PONV: No   Neuro/Psych: Uneventful            Sign Out: Acceptable/Baseline neuro status   Airway/Respiratory: Uneventful            Sign Out: Acceptable/Baseline resp. status   CV/Hemodynamics: Uneventful            Sign Out: Acceptable CV status; No obvious hypovolemia; No obvious fluid overload   Other NRE: NONE   DID A NON-ROUTINE EVENT OCCUR? No           Last vitals:  Vitals Value Taken Time   /60 10/17/23 1400   Temp 97  F (36.1  C) 10/17/23 1357   Pulse 71 10/17/23 1400   Resp 16 10/17/23 1400   SpO2 95 % 10/17/23 1409   Vitals shown include unfiled device data.    Electronically Signed By: ZEKE URENA CRNA  October 17, 2023  2:39 PM

## 2023-10-17 NOTE — CARE PLAN
Patient and responsible adult given discharge instructions with no questions regarding instructions. Brando score 20/20. Pain level 0/10.  Discharged from unit via wheelchair. Patient discharged to home with wife.

## 2023-10-17 NOTE — OP NOTE
[facility]   Operative Note    Pre-operative diagnosis: High-grade esophageal dysplasia   Post-operative diagnosis  high-grade dysplasia and gastric polypectomy site same   Procedure: Procedure(s):  ESOPHAGOGASTRODUODENOSCOPY diagnostic with hot biopsy, resolution clip placement x2 in greater curvature body gastric   Surgeon(s): Surgeon(s) and Role:     * Tru Chilel MD - Primary You Bethea, ND   Estimated blood loss: * No values recorded between 10/17/2023  1:20 PM and 10/17/2023  1:54 PM *    Specimens: ID Type Source Tests Collected by Time Destination   1 : biopsies @ 31 cm, hx of high grade dysplasia Biopsy Esophagus SURGICAL PATHOLOGY EXAM Tru Chilel MD 10/17/2023  1:23 PM    2 : biopsies @ 30 cm, hx of high grade dysplasia Biopsy Esophagus SURGICAL PATHOLOGY EXAM Tru Chilel MD 10/17/2023  1:25 PM    3 : distal polyp from greater curvature body Polyp Stomach, Body SURGICAL PATHOLOGY EXAM Tru Chilel MD 10/17/2023  1:42 PM    4 : proximal polyp from greater curvature body Tissue Stomach, Body SURGICAL PATHOLOGY EXAM Tru Chilel MD 10/17/2023  1:43 PM         Withdrawal time:  Findings: NA  Hemicircumferential patch of irregular mucosa between 30 and 31 cm from incisors and esophagus status post 8 biopsies previous polypectomy sites and gastric mucosa status post snare biopsies and replacement of endoclips see below         Description of procedure:   After obtaining informed consent reviewing risks of bleeding perforation infection the patient placed left lateral decubitus the  guided to the posterior pharynx examination of the esophagus revealed slightly irregular mucosa lying into circular patch which is almost semicircumferential located 30 to 31 cm from the incisors appearance is distinctly not malignant 8 biopsies were obtained for at 30 cm from the incisors for 31 cm from the incisors there is no Joseph's epithelium noted.  Examination  of the stomach reveals the 2 previously placed endoclips the mucosa surrounding the clips have small ulcerations presumably from the clips themselves but the mucosa appears unremarkable the 2 clips are each removed by using a snare grasping the clips and gently tugging subsequently a snare with blended current is used to transect the mucosa overlying the clipped area and this mucosa is then aspirated to a collection chamber the proximal polyp is placed in a container separate from the distal in each case all tissue surrounding the clipped area was resected with a snare and recovered endoclips were then replaced to both allow for future marking of the area and to prevent rebleeding and the endoscope was withdrawn    Recommendations check biopsy results the esophageal mucosa with high-grade dysplasia may be amenable to endoscopic mucosal resection or RFA      Tru Hahn MD on 10/17/2023 at 1:54 PM

## 2023-10-17 NOTE — ANESTHESIA CARE TRANSFER NOTE
Patient: Gunnar Granados    Procedure: Procedure(s):  ESOPHAGOGASTRODUODENOSCOPY diagnostic with hot biopsy, resolution clip placement x2 in greater curvature body gastric       Diagnosis: Severe esophageal dysplasia [Q39.9]  Diagnosis Additional Information: No value filed.    Anesthesia Type:   MAC     Note:    Oropharynx: oropharynx clear of all foreign objects  Level of Consciousness: drowsy  Oxygen Supplementation: nasal cannula  Level of Supplemental Oxygen (L/min / FiO2): 4  Independent Airway: airway patency satisfactory and stable  Dentition: dentition unchanged  Vital Signs Stable: post-procedure vital signs reviewed and stable  Report to RN Given: handoff report given  Patient transferred to: Phase II    Handoff Report: Identifed the Patient, Identified the Reponsible Provider, Reviewed the pertinent medical history, Discussed the surgical course, Reviewed Intra-OP anesthesia mangement and issues during anesthesia, Set expectations for post-procedure period and Allowed opportunity for questions and acknowledgement of understanding    Vitals:  Vitals Value Taken Time   BP     Temp     Pulse     Resp     SpO2         Electronically Signed By: ZEKE Benjmain CRNA  October 17, 2023  1:53 PM

## 2023-10-17 NOTE — TELEPHONE ENCOUNTER
Torsemide (Demadex) 20 MG tablet    Last Written Prescription Date:  01/18/2023  Last Fill Quantity: 360,   # refills: 1  Last Office Visit: 07/17/2023

## 2023-10-18 ENCOUNTER — MYC MEDICAL ADVICE (OUTPATIENT)
Dept: INTERNAL MEDICINE | Facility: OTHER | Age: 73
End: 2023-10-18

## 2023-10-18 DIAGNOSIS — I10 BENIGN ESSENTIAL HYPERTENSION: ICD-10-CM

## 2023-10-18 RX ORDER — TORSEMIDE 20 MG/1
40 TABLET ORAL 2 TIMES DAILY
Qty: 360 TABLET | Refills: 1 | Status: SHIPPED | OUTPATIENT
Start: 2023-10-18 | End: 2024-04-09

## 2023-10-18 RX ORDER — TORSEMIDE 20 MG/1
40 TABLET ORAL 2 TIMES DAILY
Qty: 360 TABLET | Refills: 1 | OUTPATIENT
Start: 2023-10-18

## 2023-10-18 NOTE — CONFIDENTIAL NOTE
I will refill now.  For the record, I have not received any request from the refill line team as of this time.

## 2023-10-20 LAB
PATH REPORT.COMMENTS IMP SPEC: NORMAL
PATH REPORT.FINAL DX SPEC: NORMAL
PATH REPORT.GROSS SPEC: NORMAL
PATH REPORT.MICROSCOPIC SPEC OTHER STN: NORMAL
PATH REPORT.RELEVANT HX SPEC: NORMAL
PHOTO IMAGE: NORMAL

## 2023-10-31 ENCOUNTER — TRANSFERRED RECORDS (OUTPATIENT)
Dept: HEALTH INFORMATION MANAGEMENT | Facility: HOSPITAL | Age: 73
End: 2023-10-31

## 2023-11-06 DIAGNOSIS — E03.4 HYPOTHYROIDISM DUE TO ACQUIRED ATROPHY OF THYROID: ICD-10-CM

## 2023-11-06 DIAGNOSIS — K21.9 GASTROESOPHAGEAL REFLUX DISEASE WITHOUT ESOPHAGITIS: ICD-10-CM

## 2023-11-06 DIAGNOSIS — G62.1 ALCOHOL-INDUCED POLYNEUROPATHY (H): ICD-10-CM

## 2023-11-06 RX ORDER — LEVOTHYROXINE SODIUM 300 UG/1
300 TABLET ORAL DAILY
Qty: 90 TABLET | Refills: 1 | Status: SHIPPED | OUTPATIENT
Start: 2023-11-06 | End: 2024-04-30

## 2023-11-06 RX ORDER — PANTOPRAZOLE SODIUM 40 MG/1
TABLET, DELAYED RELEASE ORAL
Qty: 90 TABLET | Refills: 1 | Status: SHIPPED | OUTPATIENT
Start: 2023-11-06 | End: 2024-04-09

## 2023-11-06 NOTE — TELEPHONE ENCOUNTER
Gabapentin      Last Written Prescription Date:  8.7.23  Last Fill Quantity: #270,   # refills: 0  Last Office Visit: 7.17.23  Future Office visit:       Routing refill request to provider for review/approval because:  Drug not on the [facility] or [facility] refill protocol or controlled substance

## 2023-11-07 RX ORDER — GABAPENTIN 300 MG/1
300 CAPSULE ORAL 3 TIMES DAILY
Qty: 270 CAPSULE | Refills: 0 | Status: SHIPPED | OUTPATIENT
Start: 2023-11-07 | End: 2024-02-01

## 2023-12-08 DIAGNOSIS — G89.29 CHRONIC PAIN OF BOTH KNEES: ICD-10-CM

## 2023-12-08 DIAGNOSIS — M25.561 CHRONIC PAIN OF BOTH KNEES: ICD-10-CM

## 2023-12-08 DIAGNOSIS — M25.562 CHRONIC PAIN OF BOTH KNEES: ICD-10-CM

## 2023-12-08 DIAGNOSIS — E11.40 TYPE 2 DIABETES MELLITUS WITH DIABETIC NEUROPATHY, UNSPECIFIED WHETHER LONG TERM INSULIN USE (H): ICD-10-CM

## 2023-12-08 RX ORDER — NAPROXEN 500 MG/1
500 TABLET ORAL 2 TIMES DAILY WITH MEALS
Qty: 180 TABLET | Refills: 0 | Status: SHIPPED | OUTPATIENT
Start: 2023-12-08 | End: 2024-08-13

## 2023-12-08 NOTE — TELEPHONE ENCOUNTER
metFORMIN (GLUCOPHAGE) 1000 MG tablet 180 tablet 1 9/1/2023     naproxen (NAPROSYN) 500 MG tablet not on EMR  Last Office Visit: 7.17.23     Future Office visit:       Routing refill request to provider for review/approval because:

## 2023-12-11 ENCOUNTER — TRANSFERRED RECORDS (OUTPATIENT)
Dept: HEALTH INFORMATION MANAGEMENT | Facility: CLINIC | Age: 73
End: 2023-12-11

## 2023-12-15 DIAGNOSIS — M10.00 IDIOPATHIC GOUT, UNSPECIFIED CHRONICITY, UNSPECIFIED SITE: ICD-10-CM

## 2023-12-15 NOTE — TELEPHONE ENCOUNTER
Prednisone      Last Written Prescription Date:  6/21/22  Last Fill Quantity: 10,   # refills: 4  Last Office Visit: 7/17/23  Future Office visit:       Routing refill request to provider for review/approval because:

## 2023-12-19 RX ORDER — PREDNISONE 20 MG/1
20 TABLET ORAL DAILY
Qty: 10 TABLET | Refills: 0 | Status: SHIPPED | OUTPATIENT
Start: 2023-12-19 | End: 2024-03-07

## 2023-12-28 ENCOUNTER — DOCUMENTATION ONLY (OUTPATIENT)
Dept: INTERNAL MEDICINE | Facility: OTHER | Age: 73
End: 2023-12-28

## 2023-12-28 DIAGNOSIS — G47.33 OBSTRUCTIVE SLEEP APNEA (ADULT) (PEDIATRIC): Primary | ICD-10-CM

## 2024-02-01 DIAGNOSIS — E11.40 TYPE 2 DIABETES MELLITUS WITH DIABETIC NEUROPATHY, UNSPECIFIED WHETHER LONG TERM INSULIN USE (H): ICD-10-CM

## 2024-02-01 DIAGNOSIS — I10 BENIGN ESSENTIAL HYPERTENSION: ICD-10-CM

## 2024-02-01 DIAGNOSIS — G62.1 ALCOHOL-INDUCED POLYNEUROPATHY (H): ICD-10-CM

## 2024-02-01 RX ORDER — METOPROLOL SUCCINATE 100 MG/1
50 TABLET, EXTENDED RELEASE ORAL DAILY
Qty: 45 TABLET | Refills: 1 | Status: SHIPPED | OUTPATIENT
Start: 2024-02-01 | End: 2024-07-23

## 2024-02-01 RX ORDER — FLURBIPROFEN SODIUM 0.3 MG/ML
SOLUTION/ DROPS OPHTHALMIC 2 TIMES DAILY
Qty: 100 EACH | Refills: 11 | Status: SHIPPED | OUTPATIENT
Start: 2024-02-01

## 2024-02-01 RX ORDER — GABAPENTIN 300 MG/1
300 CAPSULE ORAL 3 TIMES DAILY
Qty: 270 CAPSULE | Refills: 0 | Status: SHIPPED | OUTPATIENT
Start: 2024-02-01 | End: 2024-04-30

## 2024-02-01 NOTE — TELEPHONE ENCOUNTER
Toprol      Last Written Prescription Date:  8/9/23  Last Fill Quantity: 45,   # refills: 1  Last Office Visit: 7/17/23  Future Office visit:    Next 5 appointments (look out 90 days)      Apr 03, 2024  2:00 PM  (Arrive by 1:45 PM)  Return Visit with Genesis Bay DPM  [facility] ([facility] ) [address] [address]  [phone]             Routing refill request to provider for review/approval because:      Gabapentin      Last Written Prescription Date:  11/7/23  Last Fill Quantity: 270,   # refills: 0  Last Office Visit: 7/17/23  Future Office visit:    Next 5 appointments (look out 90 days)      Apr 03, 2024  2:00 PM  (Arrive by 1:45 PM)  Return Visit with Genesis Bay DPM  [facility] ([facility] ) [address] [address]  [phone]             Routing refill request to provider for review/approval because:      Pen Needle      Last Written Prescription Date:  1/18/23  Last Fill Quantity: 100,   # refills: 11  Last Office Visit: 7/17/23  Future Office visit:    Next 5 appointments (look out 90 days)      Apr 03, 2024  2:00 PM  (Arrive by 1:45 PM)  Return Visit with Genesis Bay DPM  [facility] ([facility] ) [address] [address]  [phone]             Routing refill request to provider for review/approval because:

## 2024-02-01 NOTE — TELEPHONE ENCOUNTER
GABAPENTIN 300 MG CAPS 300 Capsule         Routing refill request to provider for review/approval because:    Drug not on the [facility], [facility] or  Health refill protocol or controlled substance    Diabetic Supplies Protocol [id number]/01/2024 10:00 AM   Protocol Details Recent (6 mo) or future (30 days) visit within the authorizing provider's specialty

## 2024-02-05 ENCOUNTER — TRANSFERRED RECORDS (OUTPATIENT)
Dept: HEALTH INFORMATION MANAGEMENT | Facility: CLINIC | Age: 74
End: 2024-02-05

## 2024-02-12 ENCOUNTER — TRANSFERRED RECORDS (OUTPATIENT)
Dept: HEALTH INFORMATION MANAGEMENT | Facility: CLINIC | Age: 74
End: 2024-02-12

## 2024-02-24 ENCOUNTER — HEALTH MAINTENANCE LETTER (OUTPATIENT)
Age: 74
End: 2024-02-24

## 2024-03-06 DIAGNOSIS — E11.40 TYPE 2 DIABETES MELLITUS WITH DIABETIC NEUROPATHY, UNSPECIFIED WHETHER LONG TERM INSULIN USE (H): ICD-10-CM

## 2024-03-06 DIAGNOSIS — E11.9 TYPE 2 DIABETES, HBA1C GOAL < 8% (H): ICD-10-CM

## 2024-03-06 DIAGNOSIS — M10.00 IDIOPATHIC GOUT, UNSPECIFIED CHRONICITY, UNSPECIFIED SITE: ICD-10-CM

## 2024-03-06 DIAGNOSIS — E11.9 TYPE 2 DIABETES, HBA1C GOAL < 7% (H): ICD-10-CM

## 2024-03-06 DIAGNOSIS — N40.0 BENIGN PROSTATIC HYPERPLASIA WITHOUT LOWER URINARY TRACT SYMPTOMS: ICD-10-CM

## 2024-03-06 NOTE — TELEPHONE ENCOUNTER
HgbA1C in past 3 or 6 months    Recent (6 mo) or future (30 days) visit within the authorizing provider's specialty       Hemoglobin A1C   Date Value Ref Range Status   07/17/2023 5.8 (H) <5.7 % Final     Comment:     Normal <5.7%   Prediabetes 5.7-6.4%    Diabetes 6.5% or higher     Note: Adopted from ADA consensus guidelines.   01/20/2021 6.0 (H) 4.0 - 5.6 % Final     Call placed to patient to schedule a follow up visit with Dr. Bethea, no answer, message left requesting a return call.

## 2024-03-06 NOTE — TELEPHONE ENCOUNTER
Test Strips       Last Written Prescription Date:  9/01/2023  Last Fill Quantity: 100,   # refills: 3  Last Office Visit: 7/17/2023      Lantus       Last Written Prescription Date:  2/27/2023  Last Fill Quantity: 15mL,   # refills: 3  Last Office Visit: 7/17/2023    Vicotza       Last Written Prescription Date:  7/17/23  Last Fill Quantity: 18mL,   # refills: 1  Last Office Visit: 7/17/2023    Prednisone       Last Written Prescription Date:  12/19/2023  Last Fill Quantity: 10,   # refills: 0  Last Office Visit: 7/17/2023    Flomax       Last Written Prescription Date:  4/19/2023  Last Fill Quantity: 90,   # refills: 2  Last Office Visit: 7/17/2023      Future Office visit:    Next 5 appointments (look out 90 days)      Apr 03, 2024  2:00 PM  (Arrive by 1:45 PM)  Return Visit with Genesis Bay DPM  [facility] ([facility] ) [address] [address] [phone]

## 2024-03-07 ENCOUNTER — TELEPHONE (OUTPATIENT)
Dept: INTERNAL MEDICINE | Facility: OTHER | Age: 74
End: 2024-03-07

## 2024-03-07 RX ORDER — INSULIN GLARGINE 100 [IU]/ML
18 INJECTION, SOLUTION SUBCUTANEOUS AT BEDTIME
Qty: 15 ML | Refills: 0 | Status: SHIPPED | OUTPATIENT
Start: 2024-03-07 | End: 2024-06-25

## 2024-03-07 RX ORDER — PREDNISONE 20 MG/1
20 TABLET ORAL DAILY
Qty: 5 TABLET | Refills: 0 | Status: SHIPPED | OUTPATIENT
Start: 2024-03-07 | End: 2024-04-30

## 2024-03-07 RX ORDER — LIRAGLUTIDE 6 MG/ML
INJECTION SUBCUTANEOUS
Qty: 6 ML | Refills: 0 | Status: SHIPPED | OUTPATIENT
Start: 2024-03-07 | End: 2024-04-09

## 2024-03-07 RX ORDER — TAMSULOSIN HYDROCHLORIDE 0.4 MG/1
CAPSULE ORAL
Qty: 90 CAPSULE | Refills: 0 | Status: SHIPPED | OUTPATIENT
Start: 2024-03-07 | End: 2024-06-06

## 2024-03-07 NOTE — TELEPHONE ENCOUNTER
Call from patient to discuss lab appointment being cancelled and the process for follow ups as patient reports he received a notice via [employer] that he was due for an A1C, therefore patient scheduled an appointment for lab only for A1C. Patient was then contacted to schedule a follow up appointment with Dr. Bethea and the lab appointment was cancelled.     RN CC explained the follow up process to the patient as the patient stated he has never had this process explained to him before.  Patient updated annually Medicare Wellness Visits are required the patients PCP, based upon a patients diagnosis (I.e. diabetes), prescribed medications etc, the patient may be in need of following up with his PCP more often than annually, in order for patient to continue to received the prescribed medication and/or refills throughout the year.     Patient notified he may discuss the process further with Dr. Bethea as his upcoming scheduled visit.     Next 5 appointments (look out 90 days)      Apr 03, 2024  2:00 PM  (Arrive by 1:45 PM)  Return Visit with Genesis Bay DPM  [facility] ([facility] ) [address] [place] [id number]  [phone]     Apr 24, 2024 11:30 AM  (Arrive by 11:15 AM)  PHYSICAL with You Bethea DO  [facility] ([facility] ) [address] [place] [phone]  [phone]

## 2024-03-07 NOTE — TELEPHONE ENCOUNTER
Patient's wife called to schedule appointment:  Future Appointments 3/7/2024 - 9/3/2024        Date Visit Type Length Department Provider     4/3/2024  2:00 PM RETURN 15 min [place] PODIATRY [place] DPM    Location Instructions:     From [place] - follow [address]nbsp; Take a right at the intersection across from [employer].&nbsp; The clinic will be on your right.  From [place] - follow [address]nbsp; Take a right onto [address].&nbsp; Take a left at the intersection across from [employer].&nbsp; The clinic will be on your right.  From [place] - follow [address]nbsp; Take a left onto [address].&nbsp; Take a left at the intersection across from [employer].&nbsp; The clinic will be on your right.              4/24/2024 11:30 AM PHYSICAL 30 min MT INTERNAL MEDICINE You Bethea DO    Location Instructions:     From [place] - follow [address]nbsp; Take a right at the intersection across from [employer].&nbsp; The clinic will be on your right.  From [place] - follow [address]nbsp; Take a right onto [address].&nbsp; Take a left at the intersection across from [employer].&nbsp; The clinic will be on your right.  From [place] - follow [address]nbsp; Take a left onto [address].&nbsp; Take a left at the intersection across from [employer].&nbsp; The clinic will be on your right.

## 2024-04-03 ENCOUNTER — OFFICE VISIT (OUTPATIENT)
Dept: PODIATRY | Facility: OTHER | Age: 74
End: 2024-04-03
Attending: PODIATRIST
Payer: MEDICARE

## 2024-04-03 VITALS
DIASTOLIC BLOOD PRESSURE: 70 MMHG | HEART RATE: 90 BPM | SYSTOLIC BLOOD PRESSURE: 135 MMHG | OXYGEN SATURATION: 98 % | TEMPERATURE: 97.4 F

## 2024-04-03 DIAGNOSIS — Z13.89 SCREENING FOR DIABETIC PERIPHERAL NEUROPATHY: ICD-10-CM

## 2024-04-03 DIAGNOSIS — E11.42 DIABETIC POLYNEUROPATHY ASSOCIATED WITH TYPE 2 DIABETES MELLITUS (H): ICD-10-CM

## 2024-04-03 DIAGNOSIS — E11.9 DIABETES MELLITUS TYPE 2, NONINSULIN DEPENDENT (H): ICD-10-CM

## 2024-04-03 DIAGNOSIS — L60.3 ONYCHODYSTROPHY: Primary | ICD-10-CM

## 2024-04-03 PROCEDURE — 11721 DEBRIDE NAIL 6 OR MORE: CPT | Performed by: PODIATRIST

## 2024-04-03 PROCEDURE — 99207 PR FOOT EXAM NO CHARGE: CPT | Performed by: PODIATRIST

## 2024-04-03 PROCEDURE — G0463 HOSPITAL OUTPT CLINIC VISIT: HCPCS

## 2024-04-03 ASSESSMENT — PAIN SCALES - GENERAL: PAINLEVEL: EXTREME PAIN (8)

## 2024-04-03 NOTE — PROGRESS NOTES
Chief complaint: Patient presents with:  Toenail: DFE      History of Present Illness: This 74 year old IDDM II is seen with his wife, Yazmin, for follow-up management for a diabetic foot exam and high risk nail debridement.     His wife is still applying lotion to his feet every day, but not between the toes.    He has continued to take Allopurinol 300mg daily since his last appointment. He has not complained of any recent gout flares.    The patient is requesting high risk nail debridement every six months.    No further pedal complaints today.       Lab Results   Component Value Date    A1C 5.8 07/17/2023    A1C 6.0 04/05/2023    A1C 5.6 04/06/2022    A1C 6.1 09/01/2021    A1C 6.0 01/20/2021    A1C 6.0 03/20/2020    A1C 5.7 09/04/2019    A1C 5.6 04/11/2019    A1C 6.1 08/14/2018         /70 (BP Location: Left arm, Patient Position: Sitting, Cuff Size: Adult Regular)   Pulse 90   Temp 97.4  F (36.3  C) (Tympanic)   SpO2 98%     Patient Active Problem List   Diagnosis    ACP (advance care planning)    Type 2 diabetes mellitus with diabetic neuropathy (H)    Morbid obesity due to excess calories (H)    Acquired hypothyroidism    Benign essential hypertension    Calculus of gallbladder without cholecystitis without obstruction    Cholecystitis    Gastroesophageal reflux disease without esophagitis    Depression    Congenital anomaly of spleen    Disorder of lipoid metabolism    Other lipoprotein metabolism disorders    Malignant neoplasm of thyroid gland (H)    Tremor    Hypothyroidism, postsurgical    Ametropia    Cupping of optic disc, left    Flat affect    Lesion of left upper eyelid    Nuclear sclerotic cataract of both eyes    Other conjunctivitis of both eyes    Presbyopia       Past Surgical History:   Procedure Laterality Date    basal cell carcinoma  2017    CHOLECYSTECTOMY  11/23/2019    COLONOSCOPY - HIM SCAN  12/01/2012    Colonoscopy due to bleeding, no polyps 12-12    ESOPHAGOSCOPY,  GASTROSCOPY, DUODENOSCOPY (EGD), COMBINED N/A 9/6/2023    Procedure: ESOPHAGOGASTRODUODENOSCOPY with polypectomy and application of resolution clip X3,biopsies mid esophagus;  Surgeon: Tru Chilel MD;  Location: [facility]    ESOPHAGOSCOPY, GASTROSCOPY, DUODENOSCOPY (EGD), COMBINED N/A 10/17/2023    Procedure: ESOPHAGOGASTRODUODENOSCOPY diagnostic with hot biopsy, resolution clip placement x2 in greater curvature body gastric;  Surgeon: Tru Chilel MD;  Location: [facility] OR    HERNIA REPAIR  2014       Current Outpatient Medications   Medication Sig Dispense Refill    allopurinol (ZYLOPRIM) 300 MG tablet Take 1 tablet (300 mg) by mouth daily Take one tablet daily 90 tablet 3    ascorbic acid (VITAMIN C) 500 MG tablet Take 500 mg by mouth (With damaso hips)      B-D U/F insulin pen needle BY DEVICE ROUTE 2 TIMES DAILY 100 each 11    blood glucose monitoring (ACCU-CHEK FASTCLIX) lancets USE TO TEST BLOOD SUGAR TWICE A  each 2    Cholecalciferol (VITAMIN D3) 50 MCG (2000 UT) CAPS Take 1 capsule by mouth daily      ciclopirox (LOPROX) 0.77 % cream APPLY TWICE A DAY AS DIRECTED 30 g 4    CONTOUR NEXT TEST test strip USE TO TEST BLOOD SUGARS 2 TIMES DAILY OR AS DIRECTED 100 strip 0    gabapentin (NEURONTIN) 300 MG capsule TAKE 1 CAPSULE (300 MG) BY MOUTH 3 TIMES DAILY 270 capsule 0    GNP TERBINAFINE HYDROCHLORIDE 1 % external cream APPLY TO AFFECTED AREA TOPICALLY TWICE A DAY. 28.4 g 3    horse chestnut 300 MG CAPS Take 300 mg by mouth 2 times daily       insulin pen needle (32G X 6 MM) 32G X 6 MM miscellaneous Use 1 pen needles daily with Victoza - NOVOFINE 100 each 3    LANTUS SOLOSTAR 100 UNIT/ML soln INJECT 18 UNITS SUBCUTANEOUS AT BEDTIME 15 mL 0    levothyroxine (SYNTHROID/LEVOTHROID) 300 MCG tablet TAKE 1 TABLET (300 MCG) BY MOUTH DAILY 90 tablet 1    metFORMIN (GLUCOPHAGE) 1000 MG tablet TAKE 1 TABLET (1,000 MG) BY MOUTH 2 TIMES DAILY (WITH MEALS) 180 tablet 0    metoprolol succinate ER (TOPROL XL) 100  MG 24 hr tablet TAKE 0.5 TABLETS (50 MG) BY MOUTH DAILY 45 tablet 1    Multiple Vitamin (MULTI VITAMIN PO) Take 1 tablet by mouth daily (has iron in it)      naproxen (NAPROSYN) 500 MG tablet TAKE 1 TABLET (500 MG) BY MOUTH 2 TIMES DAILY (WITH MEALS) 180 tablet 0    pantoprazole (PROTONIX) 40 MG EC tablet TAKE 1 TABLET BY MOUTH EVERY DAY 30-60 MIN BEFORE A MEAL 90 tablet 1    potassium chloride ER (KLOR-CON M) 20 MEQ CR tablet Take 2 tablets (40 mEq) by mouth 2 times daily 360 tablet 3    predniSONE (DELTASONE) 20 MG tablet TAKE 1 TABLET (20 MG) BY MOUTH DAILY X 5 DAYS 5 tablet 0    QUEtiapine (SEROQUEL) 50 MG tablet Take 1/2-1 tablet at QHS 90 tablet 3    tamsulosin (FLOMAX) 0.4 MG capsule TAKE ONE CAPSULE BY MOUTH EVERY DAY. SWALLOW WHOLE. DO NOT CRUSH OR CHEW 90 capsule 0    torsemide (DEMADEX) 20 MG tablet Take 2 tablets (40 mg) by mouth 2 times daily 360 tablet 1    VICTOZA PEN 18 MG/3ML soln INJECT 1.2 MG SUBCUTANEOUS DAILY 18MG/3ML PREFILLED PEN 6 mL 0     No current facility-administered medications for this visit.          Allergies   Allergen Reactions    Food      Onions, peppers, olives, mushrooms    Trazodone      Other reaction(s): Dizziness       Family History   Problem Relation Age of Onset    Unknown/Adopted Sister        Social History     Socioeconomic History    Marital status:      Spouse name: Not on file    Number of children: Not on file    Years of education: Not on file    Highest education level: Not on file   Occupational History    Not on file   Social Needs    Financial resource strain: Not on file    Food insecurity:     Worry: Not on file     Inability: Not on file    Transportation needs:     Medical: Not on file     Non-medical: Not on file   Tobacco Use    Smoking status: Never Smoker    Smokeless tobacco: Never Used   Substance and Sexual Activity    Alcohol use: Yes     Comment: beer daily     Drug use: No    Sexual activity: Not Currently     Partners: Female   Lifestyle     Physical activity:     Days per week: Not on file     Minutes per session: Not on file    Stress: Not on file   Relationships    Social connections:     Talks on phone: Not on file     Gets together: Not on file     Attends Orthodox service: Not on file     Active member of club or organization: Not on file     Attends meetings of clubs or organizations: Not on file     Relationship status: Not on file    Intimate partner violence:     Fear of current or ex partner: Not on file     Emotionally abused: Not on file     Physically abused: Not on file     Forced sexual activity: Not on file   Other Topics Concern    Parent/sibling w/ CABG, MI or angioplasty before 65F 55M? Not Asked   Social History Narrative    Not on file       ROS: 10 point ROS neg other than the symptoms noted above in the HPI.  EXAM  Constitutional: healthy, alert and no distress    Psychiatric: mentation appears normal and affect normal/bright    VASCULAR:  -Dorsalis pedis pulse +1/4 bilaterally   -Posterior tibial pulse +1/4 bilaterally   -Capillary refill time < 3 seconds to hallux bilaterally   -Hair growth Absent to b/l anterior leg and ankle bilaterally   -Varicosities and telangiectasias to bilateral foot bilaterally   -Mild 1+ pitting edema to bilateral foot and ankle bilaterally   NEURO:  -Epicritic and protective sensation ABSENT to the bilateral foot.  DERM:  -Toenails elongated, thickened, dystrophic and discolored x 10  MSK:  -Muscle strength of ankles +5/5 for dorsiflexion, plantarflexion, ABDUction and ADDuction b/l    RIGHT FOOT RADIOGRAPH 03/17/2021  IMPRESSION: Findings suspicious for osteomyelitis of the middle and distal phalanxes of the right second toe.    PHILLY CARLSON MD  ============================================================      ASSESSMENT:    (L60.3) Onychodystrophy    (E11.9,  Z79.4) Diabetes mellitus type 2, insulin dependent (H)    (E11.42) Diabetic polyneuropathy associated with type 2 diabetes  mellitus (H)    (Z13.89) Screening for diabetic peripheral neuropathy      PLAN:  -Patient evaluated and examined. Treatment options discussed with no educational barriers noted.    -High risk toenail debridement x 10 toenails without incident     -Diabetic Foot Education provided. This included checking the feet daily looking for new new blisters or wounds, wearing shoes at all times when walking including around the house, and avoiding lotion application between the toes. If there are any signs of infection, the patient should present to the ED as soon as possible. Infections of the foot can be life threatening or lead to amputations of the foot or leg.    Diabetes Mellitus: Patient's DM is managed by their PCP. The DM appears to be stable. Patient's last HbA1C was 5.8% on 07/17/2023.    -Patient in agreement with the above treatment plan and all of patient's questions were answered.      Return to clinic six months in [facility] for diabetic foot exam and high risk nail debridement per patient request        [facility] DPM

## 2024-04-08 DIAGNOSIS — E11.9 TYPE 2 DIABETES, HBA1C GOAL < 8% (H): ICD-10-CM

## 2024-04-08 DIAGNOSIS — I10 BENIGN ESSENTIAL HYPERTENSION: ICD-10-CM

## 2024-04-08 DIAGNOSIS — K21.9 GASTROESOPHAGEAL REFLUX DISEASE WITHOUT ESOPHAGITIS: ICD-10-CM

## 2024-04-08 DIAGNOSIS — G47.00 INSOMNIA, UNSPECIFIED TYPE: ICD-10-CM

## 2024-04-08 NOTE — TELEPHONE ENCOUNTER
Disp Refills Start End PAM   pantoprazole (PROTONIX) 40 MG EC tablet 90 tablet 1 11/6/2023 -- No      Disp Refills Start End PAM   QUEtiapine (SEROQUEL) 50 MG tablet 90 tablet 3 4/17/2023 -- No      Disp Refills Start End PAM   VICTOZA PEN 18 MG/3ML soln 6 mL 0 3/7/2024 -- No      Disp Refills Start End PAM   torsemide (DEMADEX) 20 MG tablet 360 tablet 1 10/18/2023 -- No     Last Office Visit: 07/17/2023  Future Office visit:    Next 5 appointments (look out 90 days)      Apr 24, 2024 11:30 AM  (Arrive by 11:15 AM)  Adult Preventative Visit with You Bethea DO  [facility] ([facility] ) [address]  [place] [id number] [phone]             Routing refill request to provider for review/approval because:

## 2024-04-09 RX ORDER — PANTOPRAZOLE SODIUM 40 MG/1
TABLET, DELAYED RELEASE ORAL
Qty: 90 TABLET | Refills: 0 | Status: SHIPPED | OUTPATIENT
Start: 2024-04-09 | End: 2024-07-03

## 2024-04-09 RX ORDER — TORSEMIDE 20 MG/1
40 TABLET ORAL 2 TIMES DAILY
Qty: 360 TABLET | Refills: 0 | Status: SHIPPED | OUTPATIENT
Start: 2024-04-09 | End: 2024-07-03

## 2024-04-09 RX ORDER — LIRAGLUTIDE 6 MG/ML
INJECTION SUBCUTANEOUS
Qty: 6 ML | Refills: 0 | Status: SHIPPED | OUTPATIENT
Start: 2024-04-09 | End: 2024-04-30

## 2024-04-09 RX ORDER — QUETIAPINE FUMARATE 50 MG/1
TABLET, FILM COATED ORAL
Qty: 90 TABLET | Refills: 0 | Status: SHIPPED | OUTPATIENT
Start: 2024-04-09 | End: 2024-07-03

## 2024-04-09 NOTE — TELEPHONE ENCOUNTER
"QUETIAPINE 50MG TABLET 50 Tablet     Antipsychotic Medications Dvyfsr1804/08/2024 09:26 AM   Protocol Details Lipid panel on file within the past 12 months     Lab Results   Component Value Date    CHOL 191 04/05/2023    CHOL 179 01/20/2021     Lab Results   Component Value Date    HDL 47 04/05/2023    HDL 40 01/20/2021     Lab Results   Component Value Date     04/05/2023    LDL 65 01/20/2021     Lab Results   Component Value Date    TRIG 170 04/05/2023    TRIG 371 01/20/2021     No results found for: \"CHOLHDLRATIO\"     TORSEMIDE 20 MG TABS 20 Tablet   Diuretics (Including Combos) Protocol Zmyjgr3204/08/2024 09:26 AM   Protocol Details Has GFR on file in past 12 months and most recent value is normal       GFR Estimate   Date Value Ref Range Status   04/05/2023 67 >60 mL/min/1.73m2 Final     Comment:     GFR not calculated when sex unspecified or nonbinary.  eGFR calculated using 2021 CKD-EPI equation.   01/20/2021 >60 >60 ml/min/1.73m2 Final     GFR Estimate If Black   Date Value Ref Range Status   11/20/2019 41 (L) >60 mL/min/[1.73_m2] Final     Comment:      GFR Calc  Starting 12/18/2018, serum creatinine based estimated GFR (eGFR) will be   calculated using the Chronic Kidney Disease Epidemiology Collaboration   (CKD-EPI) equation.           VICTOZA 18 MG/3 ML INJECT P 18 Solution Pen-injector       GLP-1 Agonists Protocol Rmyodu3904/08/2024 09:26 AM   Protocol Details HgbA1C in past 3 or 6 months    Has GFR on file in past 12 months and most recent value is normal     Hemoglobin A1C   Date Value Ref Range Status   07/17/2023 5.8 (H) <5.7 % Final     Comment:     Normal <5.7%   Prediabetes 5.7-6.4%    Diabetes 6.5% or higher     Note: Adopted from ADA consensus guidelines.   01/20/2021 6.0 (H) 4.0 - 5.6 % Final      GFR see above   "

## 2024-04-17 ASSESSMENT — SOCIAL DETERMINANTS OF HEALTH (SDOH): HOW OFTEN DO YOU GET TOGETHER WITH FRIENDS OR RELATIVES?: TWICE A WEEK

## 2024-04-17 NOTE — COMMUNITY RESOURCES LIST (PATIENT PREFERRED LANGUAGE)
April 17, 2024           JOSE LLAMAS               Medical Transportation (NEMT)      [facility] - Transportation to medical appointments  [address] [place] [address] (Etäisyys: 23.1 mailia)  [place]: [phone]  Devonteeli: Aristidesjoel Patrick: emily Jordan vakuutus      Social Service [facility] - Neighbor to Neighbor Program  [place]: [phone]  Mikeköposti: [email or web address]  Verkkosivusto: https://www.[email or web address]/services/older-adults/-services/neighbor-to-neighbor  Devonteeli: Morgan Hooper: Maa 8.00?17.00 Tii 8.00?17.00 Kes 8.00?17.00 Tor 8.00?17.00 Per 8.00?17.00  Celina: sharmaine Javier: Liang sanchez, Sorobin majoitus, Sharadännöspalvelut          - Dislocated Worker/Adult [employer] Employment Program  [place]: [phone]  Froyposjoel: [email or web address]  Verkkosivusto: https://Quintiles.org/services/employment-services/dislocated-worker-program/  Salomón: VamsiDanny cason: Maa 8.00?16.30 Tii 8.00?16.30 Kes 8.00?16.30 Tor 8.00?16.30 Per 8.00?16.30  [facility]: Paul Gironys: Rachel peacock      Trans[facility] - Dial-A-Ride  [address] [place] [address] (Etäisyys: 0.4 mailia)  [place]: [phone]  Kaelao: [email or web address]/dial-a-ride/  Devonteeli: Morgan  Celina: [facility]      [employer] - Dial-A-Ride  [address] [place] [address] (Etäisyys: 0.4 mailia)  Salomón: Morgan  Celina: Adebayowilliam marin      - Services  [place]bert[place]: [phone]  [place]: https://Biomatrica/#howitworks-section  [place]: Sun 12.00?23.45 Maa 12.00?23.45 Tii 12.00?23.45 Kes 12.00?23.45 Tor 12.00?23.45 Per 12.00?23.45 Mitchell 12.00?23.45  Ezekielsb: Ayden Hdezömyys: Wil Huber Kuuro tai huonokuuloinen TÄRKEITÄ NUMEROJA JA  VERKKOSIVUSTOTOD        [address]  911  .   [employer]  211 http://211unitedway.org  .   Poison Control  (813) 740-2420 http://[place]poison.org http://wis[place]poison.org  .     Talia linn  988 http://988lifeline.org  .   Childhelp National Child Abuse Hotline  740.328.5986 http://Childhelphotline.org   .   National Sexual Assault Hotline  (780) 128-3548 (HOPE) http://PayParrotn.org   .     National Runaway Safeline  (965) 205-5107 (RUNAWAY) http://1800runaBuyers Edge.org  .   Sloane Osullivan/tekstiviesti [phone] [place]: http://ppsupportmn.org [place]: http://[employer]ial Pharmaceuticals/wi  .   Lee pulliam ([facility])  646-910-HELP (8802) http://Findtreatment.gov   .                VASTSANGVAPAUSLAUSEKE: Nämä resurssit on luotu Unite Us -alustan kautta. [employer] ei tue mitään tässä resurssiluettelossa mainittuja palveluntarjoajia. [employer] ei takaa, että tässä resurssiluettelossa mainitut palvelut ovat käytettävissäsi ronaldo parantavat terveyttäsi ronaldo hyvinvointiasi.    [facility]

## 2024-04-17 NOTE — COMMUNITY RESOURCES LIST (ENGLISH)
April 17, 2024           YOUR PERSONALIZED LIST OF SERVICES & PROGRAMS               Medical Transportation, (NEMT)      [facility] - Transportation to medical appointments  [address] [place] [address] (Distance: 23.1 miles)  Phone: [phone]  Language: English  Fee: Sliding scale, Self pay, Insurance      Social Service [facility] - Neighbor to Neighbor Program  Phone: [phone]  Email: [email or web address]  Website: https://www.[facility].org/services/older-adults/-services/neighbor-to-neighbor  Language: English  Hours: Mon 8:00 AM - 5:00 PM Tue 8:00 AM - 5:00 PM Wed 8:00 AM - 5:00 PM Thu 8:00 AM - 5:00 PM Fri 8:00 AM - 5:00 PM  Fee: Insurance, Self pay  Accessibility: Deaf or hard of hearing, Blind accommodation, Translation services    Expense Assistance      - Dislocated Worker/Adult [employer] Employment Program  Phone: [phone]  Email: [email or web address] [email or web address]  Website: https://ValveXchangemnWirelessGate/services/employment-services/dislocated-worker-program/  Language: English, Jordanian  Hours: Mon 8:00 AM - 4:30 PM Tue 8:00 AM - 4:30 PM Wed 8:00 AM - 4:30 PM Thu 8:00 AM - 4:30 PM Fri 8:00 AM - 4:30 PM  Fee: Free  Accessibility: Ada accessible    Coordination      [facility] - Dial-A-Ride  [address] [place] [address] (Distance: 0.4 miles)  Phone: [phone]  Website: https://Webydo./services/dial-a-ride/  Language: English  Fee: Self pay      [employer] - Dial-A-Ride  [address] [place] [address] (Distance: 0.4 miles)  Language: English  Fee: Self pay      - Services  Phone: [phone]) [phone]  Website: https://LiveMinutes/#howitworks-section  Hours: Sun 12:00 AM - 11:45 PM Mon 12:00 AM - 11:45 PM Tue 12:00 AM - 11:45 PM Wed 12:00 AM - 11:45 PM Thu 12:00 AM - 11:45 PM Fri 12:00 AM - 11:45 PM Sat 12:00 AM - 11:45 PM  Fee: Self pay  Accessibility: Ada accessible, Blind accommodation, Deaf or hard of hearing                IMPORTANT NUMBERS & WEBSITES        Emergency Services  911  .   [facility]  211 http://211unitedway.org  .   Poison Control  (676) 128-2788 http://[place]poison.org http://wisconsinpoison.org  .     Suicide and Crisis Lifeline  988 http://988lifeline.org  .   Childhelp [place] Child Abuse Hotline  949.248.1298 http://Childhelphotline.org   .   [place] Sexual Assault Hotline  (158) 592-6580 (HOPE) http://Leonar3Don.org   .     [place] Runaway Safeline  (466) 125-9660 (RUNAWAY) http://Redox Power SystemsruWangdaizhijia.[employer] International  .   Pregnancy & Postpartum Support  Call/text [phone]  [place]: http://ppsupportmn.org  [place]: http://EyesBot.com/wi  .   Substance Abuse National Helpline ([facility])  105-198-HELP (3765) http://Findtreatment.gov   .                DISCLAIMER: These resources have been generated via the [employer] Platform. [employer] does not endorse any service providers mentioned in this resource list. [employer] does not guarantee that the services mentioned in this resource list will be available to you or will improve your health or wellness.    [facility]

## 2024-04-22 ENCOUNTER — TRANSFERRED RECORDS (OUTPATIENT)
Dept: HEALTH INFORMATION MANAGEMENT | Facility: CLINIC | Age: 74
End: 2024-04-22

## 2024-04-24 ENCOUNTER — OFFICE VISIT (OUTPATIENT)
Dept: INTERNAL MEDICINE | Facility: OTHER | Age: 74
End: 2024-04-24
Attending: INTERNAL MEDICINE
Payer: MEDICARE

## 2024-04-24 ENCOUNTER — APPOINTMENT (OUTPATIENT)
Dept: LAB | Facility: OTHER | Age: 74
End: 2024-04-24
Attending: INTERNAL MEDICINE
Payer: MEDICARE

## 2024-04-24 ENCOUNTER — TRANSFERRED RECORDS (OUTPATIENT)
Dept: HEALTH INFORMATION MANAGEMENT | Facility: CLINIC | Age: 74
End: 2024-04-24

## 2024-04-24 VITALS
OXYGEN SATURATION: 99 % | TEMPERATURE: 97.7 F | WEIGHT: 315 LBS | RESPIRATION RATE: 22 BRPM | DIASTOLIC BLOOD PRESSURE: 76 MMHG | BODY MASS INDEX: 43.63 KG/M2 | HEART RATE: 62 BPM | SYSTOLIC BLOOD PRESSURE: 124 MMHG

## 2024-04-24 DIAGNOSIS — E03.9 ACQUIRED HYPOTHYROIDISM: ICD-10-CM

## 2024-04-24 DIAGNOSIS — E11.9 TYPE 2 DIABETES, HBA1C GOAL < 7% (H): ICD-10-CM

## 2024-04-24 DIAGNOSIS — E11.40 TYPE 2 DIABETES MELLITUS WITH DIABETIC NEUROPATHY, WITH LONG-TERM CURRENT USE OF INSULIN (H): Primary | ICD-10-CM

## 2024-04-24 DIAGNOSIS — Z12.5 SCREENING FOR PROSTATE CANCER: ICD-10-CM

## 2024-04-24 DIAGNOSIS — Z79.4 TYPE 2 DIABETES MELLITUS WITH DIABETIC NEUROPATHY, WITH LONG-TERM CURRENT USE OF INSULIN (H): Primary | ICD-10-CM

## 2024-04-24 LAB
ALBUMIN SERPL BCG-MCNC: 4.2 G/DL (ref 3.5–5.2)
ALP SERPL-CCNC: 60 U/L (ref 40–150)
ALT SERPL W P-5'-P-CCNC: 12 U/L (ref 0–70)
ANION GAP SERPL CALCULATED.3IONS-SCNC: 16 MMOL/L (ref 7–15)
AST SERPL W P-5'-P-CCNC: 13 U/L (ref 0–45)
BASOPHILS # BLD AUTO: 0 10E3/UL (ref 0–0.2)
BASOPHILS NFR BLD AUTO: 1 %
BILIRUB SERPL-MCNC: 0.6 MG/DL
BUN SERPL-MCNC: 14 MG/DL (ref 8–23)
CALCIUM SERPL-MCNC: 8.9 MG/DL (ref 8.8–10.2)
CHLORIDE SERPL-SCNC: 97 MMOL/L (ref 98–107)
CHOLEST SERPL-MCNC: 195 MG/DL
CREAT SERPL-MCNC: 1.05 MG/DL (ref 0.51–1.17)
DEPRECATED HCO3 PLAS-SCNC: 28 MMOL/L (ref 22–29)
EGFRCR SERPLBLD CKD-EPI 2021: 75 ML/MIN/1.73M2
EOSINOPHIL # BLD AUTO: 0.1 10E3/UL (ref 0–0.7)
EOSINOPHIL NFR BLD AUTO: 1 %
ERYTHROCYTE [DISTWIDTH] IN BLOOD BY AUTOMATED COUNT: 13.8 % (ref 10–15)
EST. AVERAGE GLUCOSE BLD GHB EST-MCNC: 120 MG/DL
FASTING STATUS PATIENT QL REPORTED: YES
GLUCOSE SERPL-MCNC: 131 MG/DL (ref 70–99)
HBA1C MFR BLD: 5.8 %
HCT VFR BLD AUTO: 43.5 % (ref 35–53)
HDLC SERPL-MCNC: 50 MG/DL
HGB BLD-MCNC: 14.4 G/DL (ref 11.7–17.7)
IMM GRANULOCYTES # BLD: 0 10E3/UL
IMM GRANULOCYTES NFR BLD: 0 %
LDLC SERPL CALC-MCNC: 110 MG/DL
LYMPHOCYTES # BLD AUTO: 1.8 10E3/UL (ref 0.8–5.3)
LYMPHOCYTES NFR BLD AUTO: 27 %
MCH RBC QN AUTO: 29.8 PG (ref 26.5–33)
MCHC RBC AUTO-ENTMCNC: 33.1 G/DL (ref 31.5–36.5)
MCV RBC AUTO: 90 FL (ref 78–100)
MONOCYTES # BLD AUTO: 0.5 10E3/UL (ref 0–1.3)
MONOCYTES NFR BLD AUTO: 7 %
NEUTROPHILS # BLD AUTO: 4.2 10E3/UL (ref 1.6–8.3)
NEUTROPHILS NFR BLD AUTO: 63 %
NONHDLC SERPL-MCNC: 145 MG/DL
PLATELET # BLD AUTO: 204 10E3/UL (ref 150–450)
POTASSIUM SERPL-SCNC: 4.7 MMOL/L (ref 3.4–5.3)
PROT SERPL-MCNC: 6.9 G/DL (ref 6.4–8.3)
PSA SERPL DL<=0.01 NG/ML-MCNC: 2.72 NG/ML (ref 0–6.5)
RBC # BLD AUTO: 4.84 10E6/UL (ref 3.8–5.9)
RETINOPATHY: NEGATIVE
SODIUM SERPL-SCNC: 141 MMOL/L (ref 135–145)
T4 FREE SERPL-MCNC: 1.75 NG/DL (ref 0.9–1.7)
TRIGL SERPL-MCNC: 174 MG/DL
TSH SERPL DL<=0.005 MIU/L-ACNC: 0.34 UIU/ML (ref 0.3–4.2)
WBC # BLD AUTO: 6.5 10E3/UL (ref 4–11)

## 2024-04-24 PROCEDURE — 84443 ASSAY THYROID STIM HORMONE: CPT | Mod: ZL | Performed by: INTERNAL MEDICINE

## 2024-04-24 PROCEDURE — 84155 ASSAY OF PROTEIN SERUM: CPT | Mod: ZL | Performed by: INTERNAL MEDICINE

## 2024-04-24 PROCEDURE — 84478 ASSAY OF TRIGLYCERIDES: CPT | Mod: ZL | Performed by: INTERNAL MEDICINE

## 2024-04-24 PROCEDURE — 83036 HEMOGLOBIN GLYCOSYLATED A1C: CPT | Mod: ZL | Performed by: INTERNAL MEDICINE

## 2024-04-24 PROCEDURE — 99214 OFFICE O/P EST MOD 30 MIN: CPT | Performed by: INTERNAL MEDICINE

## 2024-04-24 PROCEDURE — 84439 ASSAY OF FREE THYROXINE: CPT | Mod: ZL | Performed by: INTERNAL MEDICINE

## 2024-04-24 PROCEDURE — 82565 ASSAY OF CREATININE: CPT | Mod: ZL | Performed by: INTERNAL MEDICINE

## 2024-04-24 PROCEDURE — 82374 ASSAY BLOOD CARBON DIOXIDE: CPT | Mod: ZL | Performed by: INTERNAL MEDICINE

## 2024-04-24 PROCEDURE — G0463 HOSPITAL OUTPT CLINIC VISIT: HCPCS

## 2024-04-24 PROCEDURE — 36415 COLL VENOUS BLD VENIPUNCTURE: CPT | Mod: ZL | Performed by: INTERNAL MEDICINE

## 2024-04-24 PROCEDURE — 82570 ASSAY OF URINE CREATININE: CPT | Mod: ZL | Performed by: INTERNAL MEDICINE

## 2024-04-24 PROCEDURE — 85041 AUTOMATED RBC COUNT: CPT | Mod: ZL | Performed by: INTERNAL MEDICINE

## 2024-04-24 PROCEDURE — G0103 PSA SCREENING: HCPCS | Mod: ZL | Performed by: INTERNAL MEDICINE

## 2024-04-24 ASSESSMENT — PAIN SCALES - GENERAL: PAINLEVEL: EXTREME PAIN (8)

## 2024-04-24 NOTE — LETTER
May 9, 2024      Juan F Granados  [address] [place] [id number]        Dear ,    As requested, here are your recent lab results from your appointment with Dr. Bethea. We are writing to inform you of your test results.    Per Dr. Bethea: Microalbumin is slightly elevated, could consider low dose Lisinopril to protect kidneys if willing.    High blood sugar levels can cause damage to the blood vessels in the kidney and cause protein to spill out into your urine.  Taking Lisinopril can slow down kidney damage with people with Diabetes.    Please contact the clinic by phone if you have any further questions and also to provide us with your decision on whether or not you would like to start medication recommended by your doctor.      Resulted Orders   Hemoglobin A1c   Result Value Ref Range    Estimated Average Glucose 120 mg/dL    Hemoglobin A1C 5.8 (H) <5.7 %      Comment:      Normal <5.7%   Prediabetes 5.7-6.4%    Diabetes 6.5% or higher     Note: Adopted from ADA consensus guidelines.   Comprehensive metabolic panel (BMP + Alb, Alk Phos, ALT, AST, Total. Bili, TP)   Result Value Ref Range    Sodium 141 135 - 145 mmol/L      Comment:      Reference intervals for this test were updated on 09/26/2023 to more accurately reflect our healthy population. There may be differences in the flagging of prior results with similar values performed with this method. Interpretation of those prior results can be made in the context of the updated reference intervals.     Potassium 4.7 3.4 - 5.3 mmol/L    Carbon Dioxide (CO2) 28 22 - 29 mmol/L    Anion Gap 16 (H) 7 - 15 mmol/L    Urea Nitrogen 14.0 8.0 - 23.0 mg/dL    Creatinine 1.05 0.51 - 1.17 mg/dL      Comment:      Male and Female  0-2 Months    0.31-0.88 mg/dL  2-12 Months   0.16-0.39 mg/dL  1-2 Years     0.18-0.35 mg/dL  3-4 Years     0.26-0.42 mg/dL  5-6 Years     0.29-0.47 mg/dL  7-8 Years     0.34-0.53 mg/dL  9-10 Years    0.33-0.64 mg/dL  11-12 Years   " 0.44-0.68 mg/dL  13-14 Years   0.46-0.77 mg/dL    Female  15 Years and older  0.51-0.95 mg/dL    Male  15 Years and older  0.67-1.17 mg/dL        GFR Estimate 75 >60 mL/min/1.73m2      Comment:      The generation of the estimated GFR is currently based on binary male or female sex. If the electronic health record information indicates another gender identity or if Legal Sex is recorded as \"Unknown\", GFR estimates are not automatically calculated, and application of GFR equations or a direct GFR measurement should be considered according to the individual's appropriate clinical context.    Calcium 8.9 8.8 - 10.2 mg/dL    Chloride 97 (L) 98 - 107 mmol/L    Glucose 131 (H) 70 - 99 mg/dL    Alkaline Phosphatase 60 40 - 150 U/L      Comment:      Female:    0-15 days     U/L  15d-1 year   122-469 U/L  1-10 years   142-335 U/L  10-13 years  129-417 U/L  13-15 years   U/L  15-17 years   U/L  17-19 years  45-87 U/L  19 years and older   U/L      Male:  0-15 days     U/L  15d-1 year   122-469 U/L  1-10 years   142-335 U/L  10-13 years  129-417 U/L  13-15 years  116-468 U/L  15-17 years   U/L  17-19 years   U/L  19 years and older   U/L    Reference intervals for this test were updated on 11/14/2023 to more accurately reflect our healthy population. There may be differences in the flagging of prior results with similar values performed with this method. Interpretation of those prior results can be made in the context of the updated reference intervals.    AST 13 0 - 45 U/L      Comment:      Reference intervals for this test were updated on 6/12/2023 to more accurately reflect our healthy population. There may be differences in the flagging of prior results with similar values performed with this method. Interpretation of those prior results can be made in the context of the updated reference intervals.    ALT 12 0 - 70 U/L      Comment:      Female   All ages       0-50 U/L " "    Male   0-20 Years     0-50 U/L  20-Unsp. Years 0-70 U/L      Reference intervals for this test were updated on 6/12/2023 to more accurately reflect our healthy population. There may be differences in the flagging of prior results with similar values performed with this method. Interpretation of those prior results can be made in the context of the updated reference intervals.      Protein Total 6.9 6.4 - 8.3 g/dL    Albumin 4.2 3.5 - 5.2 g/dL    Bilirubin Total 0.6 <=1.2 mg/dL    Narrative    The generation of reference intervals for this test is currently based on binary male or female sex. If the electronic health record information indicates another gender identity or if Legal Sex is recorded as \"Unknown\", both male and female reference intervals are provided where applicable, and should be considered according to the individual's appropriate clinical context.   PSA, screen   Result Value Ref Range    Prostate Specific Antigen Screen 2.72 0.00 - 6.50 ng/mL    Narrative    The generation of reference intervals for this test is currently based on binary male or female sex. If the electronic health record information indicates another gender identity or if Legal Sex is recorded as \"Unknown\", both male and female reference intervals are provided where applicable, and should be considered according to the individual's appropriate clinical context.  This result is obtained using the Roche Elecsys total PSA method on the nicho [id number] immunoassay analyzer. Results obtained with different assay methods or kits cannot be used interchangeably.   TSH   Result Value Ref Range    TSH 0.34 0.30 - 4.20 uIU/mL   T4, free   Result Value Ref Range    Free T4 1.75 (H) 0.90 - 1.70 ng/dL   Lipid Profile (Chol, Trig, HDL, LDL calc)   Result Value Ref Range    Cholesterol 195 <200 mg/dL    Triglycerides 174 (H) <150 mg/dL    Direct Measure HDL 50 >=40 mg/dL    LDL Cholesterol Calculated 110 (H) <=100 mg/dL    Non HDL Cholesterol 145 " (H) <130 mg/dL    Patient Fasting > 8hrs? Yes     Narrative    Cholesterol  Desirable:  <200 mg/dL    Triglycerides  Normal:  Less than 150 mg/dL  Borderline High:  150-199 mg/dL  High:  200-499 mg/dL  Very High:  Greater than or equal to 500 mg/dL    Direct Measure HDL  Female:  Greater than or equal to 50 mg/dL   Male:  Greater than or equal to 40 mg/dL    LDL Cholesterol  Desirable:  <100mg/dL  Above Desirable:  100-129 mg/dL   Borderline High:  130-159 mg/dL   High:  160-189 mg/dL   Very High:  >= 190 mg/dL    Non HDL Cholesterol  Desirable:  130 mg/dL  Above Desirable:  130-159 mg/dL  Borderline High:  160-189 mg/dL  High:  190-219 mg/dL  Very High:  Greater than or equal to 220 mg/dL   CBC with platelets and differential   Result Value Ref Range    WBC Count 6.5 4.0 - 11.0 10e3/uL    RBC Count 4.84 3.80 - 5.90 10e6/uL      Comment:      Reference Range:                                                     Female 3.80-5.20 10e6/uL                                      Male 4.40-5.90 10e6u/L    Hemoglobin 14.4 11.7 - 17.7 g/dL      Comment:      Reference Range:                                                     Female 11.7-15.7 g/dL                                      Male 13.3-17.7 g/dL    Hematocrit 43.5 35.0 - 53.0 %      Comment:      Reference Range:                                                     Female 35.0-47.0 %                                            Male 40.0-54.0 %    MCV 90 78 - 100 fL    MCH 29.8 26.5 - 33.0 pg    MCHC 33.1 31.5 - 36.5 g/dL    RDW 13.8 10.0 - 15.0 %    Platelet Count 204 150 - 450 10e3/uL    % Neutrophils 63 %    % Lymphocytes 27 %    % Monocytes 7 %    % Eosinophils 1 %    % Basophils 1 %    % Immature Granulocytes 0 %    Absolute Neutrophils 4.2 1.6 - 8.3 10e3/uL    Absolute Lymphocytes 1.8 0.8 - 5.3 10e3/uL    Absolute Monocytes 0.5 0.0 - 1.3 10e3/uL    Absolute Eosinophils 0.1 0.0 - 0.7 10e3/uL    Absolute Basophils 0.0 0.0 - 0.2 10e3/uL    Absolute Immature  "Granulocytes 0.0 <=0.4 10e3/uL    Narrative    The generation of reference intervals for this test is currently based on binary male or female sex. If the electronic health record information indicates another gender identity or if Legal Sex is recorded as \"Unknown\", both male and female reference intervals are provided where applicable, and should be considered according to the individual's appropriate clinical context.   Albumin Random Urine Quantitative with Creat Ratio   Result Value Ref Range    Creatinine Urine mg/dL 43.2 mg/dL      Comment:      The reference ranges have not been established in urine creatinine. The results should be integrated into the clinical context for interpretation.    Albumin Urine mg/L 16.7 mg/L      Comment:      The reference ranges have not been established in urine albumin. The results should be integrated into the clinical context for interpretation.    Albumin Urine mg/g Cr 38.66 (H) 0.00 - 25.00 mg/g Cr      Comment:      Microalbuminuria is defined as an albumin:creatinine ratio of 17 to 299 for males and 25 to 299 for females. A ratio of albumin:creatinine of 300 or higher is indicative of overt proteinuria.  Due to biologic variability, positive results should be confirmed by a second, first-morning random or 24-hour timed urine specimen. If there is discrepancy, a third specimen is recommended. When 2 out of 3 results are in the microalbuminuria range, this is evidence for incipient nephropathy and warrants increased efforts at glucose control, blood pressure control, and institution of therapy with an angiotensin-converting-enzyme (ACE) inhibitor (if the patient can tolerate it).         If you have any questions or concerns, please call the clinic at the number listed above.       Sincerely,      You Bethea, DO            "

## 2024-04-24 NOTE — PROGRESS NOTES
"Preventive Care Visit  RANGE MT RAFA Orta GIGI Bethea DO, Internal Medicine  Apr 24, 2024      Assessment & Plan   Problem List Items Addressed This Visit          Nervous and Auditory    Type 2 diabetes mellitus with diabetic neuropathy (H) - Primary    Relevant Orders    Hemoglobin A1c    Albumin Random Urine Quantitative with Creat Ratio    Comprehensive metabolic panel (BMP + Alb, Alk Phos, ALT, AST, Total. Bili, TP)    CBC with platelets and differential    Lipid Profile (Chol, Trig, HDL, LDL calc)       Endocrine    Acquired hypothyroidism    Relevant Orders    TSH    T4, free     Other Visit Diagnoses       Screening for prostate cancer        Relevant Orders    PSA, screen    Type 2 diabetes, HbA1c goal < 7% (H)        Relevant Orders    Hemoglobin A1c    Albumin Random Urine Quantitative with Creat Ratio    Lipid Profile (Chol, Trig, HDL, LDL calc)             Patient has been advised of split billing requirements and indicates understanding: Yes  Review of prior external note(s) from - Outside records from [facility]  30 minutes spent by me on the date of the encounter doing chart review, review of outside records, review of test results, interpretation of tests, patient visit, documentation, and discussion with family      BMI  Estimated body mass index is 43.63 kg/m  as calculated from the following:    Height as of 10/17/23: 1.854 m (6' 1\").    Weight as of this encounter: 150 kg (330 lb 11.2 oz).       Counseling  Appropriate preventive services were discussed with this patient, including applicable screening as appropriate for fall prevention, nutrition, physical activity, Tobacco-use cessation, weight loss and cognition.  Checklist reviewing preventive services available has been given to the patient.  Reviewed patient's diet, addressing concerns and/or questions.   Updated plan of care.  Patient reported difficulty with activities of daily living were addressed today.Addressed any concerns " about safety while driving.  The patient was provided with written information regarding signs of hearing loss.   Information on urinary incontinence and treatment options given to patient.         No follow-ups on file.      Subjective   Juan F is a 74 year old, presenting for the following:  Physical          Health Care Directive  Patient has a Health Care Directive on file      HPI    Juan F is today for routine follow-up.  He has a history of type 2 diabetes.  He also has a history of hypothyroidism.  Patient reports about a 15 pound weight gain over the course of the last month or so.  There is no clear reason as to why this occurred however there may be some dietary indiscretion as to fluid intake.  He is fasting this morning for routine labs.  Denies any chest pain or shortness of breath.  He continues to ambulate with the use of walker however states that when he did have some weight loss he was able to ambulate without assistance.  His wife brings him various questions surrounding the fact that if she were to become in capacitated due to illness injury or surgery what would need to be in place to get him home care.  Questions were asked and answered in the clinic visit today.          4/17/2024   General Health   How would you rate your overall physical health? (!) FAIR   Feel stress (tense, anxious, or unable to sleep) Rather much   (!) STRESS CONCERN      4/17/2024   Nutrition   Diet: Low salt    Low fat/cholesterol    Diabetic    Carbohydrate counting    Other   If other, please elaborate: No onions, peppers, olives or mushrooms.          No data to display                  4/17/2024   Social Factors   Frequency of gathering with friends or relatives Twice a week   Worry food won't last until get money to buy more Patient declined   Food not last or not have enough money for food? Patient declined   Do you have housing?  Yes   Are you worried about losing your housing? No   Lack of transportation? Yes    Unable to get utilities (heat,electricity)? No    (!) TRANSPORTATION CONCERN PRESENT      4/24/2024   Fall Risk   Reason Gait Speed Test Not Completed Patient declines          4/17/2024   Activities of Daily Living- Home Safety   Needs help with the following daily activites Transportation    Preparing meals    Housework    Bathing    Medication administration    Toileting    Dressing   Safety concerns in the home None of the above         4/17/2024   Dental   Dentist two times every year? Yes         4/17/2024   Hearing Screening   Hearing concerns? (!) I FEEL THAT PEOPLE ARE MUMBLING OR NOT SPEAKING CLEARLY.    (!) I NEED TO ASK PEOPLE TO SPEAK UP OR REPEAT THEMSELVES.    (!) IT'S HARDER TO UNDERSTAND WOMEN'S VOICES THAN MEN'S VOICES.    (!) TROUBLE UNDERSTANDING SOFT OR WHISPERED SPEECH.         4/17/2024   Driving Risk Screening   Patient/family members have concerns about driving (!) YES          4/17/2024   General Alertness/Fatigue Screening   Have you been more tired than usual lately? No         4/17/2024   Urinary Incontinence Screening   Bothered by leaking urine in past 6 months Yes         4/17/2024   TB Screening   Were you born outside of the [place]? No         Today's PHQ-2 Score:       4/23/2024     2:23 PM   PHQ-2 ( 1999 [employer])   Q1: Little interest or pleasure in doing things 2   Q2: Feeling down, depressed or hopeless 0   PHQ-2 Score 2   Q1: Little interest or pleasure in doing things More than half the days   Q2: Feeling down, depressed or hopeless Not at all   PHQ-2 Score 2           4/17/2024   Substance Use   Alcohol more than 3/day or more than 7/wk No   Do you have a current opioid prescription? No   How severe/bad is pain from 1 to 10? 8/10   Do you use any other substances recreationally? No     Social History     Tobacco Use    Smoking status: Never    Smokeless tobacco: Never   Substance Use Topics    Alcohol use: Yes     Comment: beer daily     Drug use: No           4/17/2024   AAA  Screening   Family history of Abdominal Aortic Aneurysm (AAA)? Unsure   ASCVD Risk   The 10-year ASCVD risk score (Tomas MATAMOROS, et al., 2019) is: 46.2%    Values used to calculate the score:      Age: 74 years      Sex: Male      Is Non- : No      Diabetic: Yes      Tobacco smoker: No      Systolic Blood Pressure: 135 mmHg      Is BP treated: Yes      HDL Cholesterol: 47 mg/dL      Total Cholesterol: 191 mg/dL    Fracture Risk Assessment Tool  Link to Frax Calculator  Use the information below to complete the Frax calculator  : 1950  Sex: adult  Weight (kg): 150 kg (actual weight)  Height (cm): 0 cm  Previous Fragility Fracture:  No  History of parent with fractured hip:  No  Current Smoking:  No  Patient has been on glucocorticoids for more than 3 months (5mg/day or more): No  Rheumatoid Arthritis on Problem List:  No  Secondary Osteoporosis on Problem List:  No  Consumes 3 or more units of alcohol per day: No  Femoral Neck BMD (g/cm2)            Reviewed and updated as needed this visit by Provider                    Past Medical History:   Diagnosis Date    Alcohol abuse     Diabetes mellitus with neuropathy (H)     Gastric polyps     Gout     HTN (hypertension)     Hx of thyroid cancer     Neuropathy     Obesity     Osteoarthritis      Past Surgical History:   Procedure Laterality Date    basal cell carcinoma  2017    CHOLECYSTECTOMY  2019    COLONOSCOPY - HIM SCAN  2012    Colonoscopy due to bleeding, no polyps 12-12    ESOPHAGOSCOPY, GASTROSCOPY, DUODENOSCOPY (EGD), COMBINED N/A 2023    Procedure: ESOPHAGOGASTRODUODENOSCOPY with polypectomy and application of resolution clip X3,biopsies mid esophagus;  Surgeon: Tru Chilel MD;  Location: [facility]    ESOPHAGOSCOPY, GASTROSCOPY, DUODENOSCOPY (EGD), COMBINED N/A 10/17/2023    Procedure: ESOPHAGOGASTRODUODENOSCOPY diagnostic with hot biopsy, resolution clip placement x2 in greater curvature body gastric;   Surgeon: Tru Chilel MD;  Location: [facility] OR    HERNIA REPAIR  2014     Lab work is in process  Labs reviewed in EPIC  BP Readings from Last 3 Encounters:   04/24/24 124/76   04/03/24 135/70   10/17/23 126/60    Wt Readings from Last 3 Encounters:   04/24/24 (!) 150 kg (330 lb 11.2 oz)   10/17/23 142.9 kg (315 lb)   10/04/23 143.2 kg (315 lb 12.8 oz)                  Patient Active Problem List   Diagnosis    ACP (advance care planning)    Type 2 diabetes mellitus with diabetic neuropathy (H)    Morbid obesity due to excess calories (H)    Acquired hypothyroidism    Benign essential hypertension    Calculus of gallbladder without cholecystitis without obstruction    Cholecystitis    Gastroesophageal reflux disease without esophagitis    Depression    Congenital anomaly of spleen    Disorder of lipoid metabolism    Other lipoprotein metabolism disorders    Malignant neoplasm of thyroid gland (H)    Tremor    Hypothyroidism, postsurgical    Ametropia    Cupping of optic disc, left    Flat affect    Lesion of left upper eyelid    Nuclear sclerotic cataract of both eyes    Other conjunctivitis of both eyes    Presbyopia     Past Surgical History:   Procedure Laterality Date    basal cell carcinoma  2017    CHOLECYSTECTOMY  11/23/2019    COLONOSCOPY - HIM SCAN  12/01/2012    Colonoscopy due to bleeding, no polyps 12-12    ESOPHAGOSCOPY, GASTROSCOPY, DUODENOSCOPY (EGD), COMBINED N/A 9/6/2023    Procedure: ESOPHAGOGASTRODUODENOSCOPY with polypectomy and application of resolution clip X3,biopsies mid esophagus;  Surgeon: Tru Chilel MD;  Location: [facility]    ESOPHAGOSCOPY, GASTROSCOPY, DUODENOSCOPY (EGD), COMBINED N/A 10/17/2023    Procedure: ESOPHAGOGASTRODUODENOSCOPY diagnostic with hot biopsy, resolution clip placement x2 in greater curvature body gastric;  Surgeon: Tru Chilel MD;  Location: [facility]    HERNIA REPAIR  2014       Social History     Tobacco Use    Smoking status: Never    Smokeless  tobacco: Never   Substance Use Topics    Alcohol use: Yes     Comment: beer daily      Family History   Problem Relation Age of Onset    Unknown/Adopted Sister          Current Outpatient Medications   Medication Sig Dispense Refill    allopurinol (ZYLOPRIM) 300 MG tablet Take 1 tablet (300 mg) by mouth daily Take one tablet daily 90 tablet 3    ascorbic acid (VITAMIN C) 500 MG tablet Take 500 mg by mouth (With damaso hips)      B-D U/F insulin pen needle BY DEVICE ROUTE 2 TIMES DAILY 100 each 11    blood glucose monitoring (ACCU-CHEK FASTCLIX) lancets USE TO TEST BLOOD SUGAR TWICE A  each 2    Cholecalciferol (VITAMIN D3) 50 MCG (2000 UT) CAPS Take 1 capsule by mouth daily      ciclopirox (LOPROX) 0.77 % cream APPLY TWICE A DAY AS DIRECTED 30 g 4    CONTOUR NEXT TEST test strip USE TO TEST BLOOD SUGARS 2 TIMES DAILY OR AS DIRECTED 100 strip 0    gabapentin (NEURONTIN) 300 MG capsule TAKE 1 CAPSULE (300 MG) BY MOUTH 3 TIMES DAILY 270 capsule 0    GNP TERBINAFINE HYDROCHLORIDE 1 % external cream APPLY TO AFFECTED AREA TOPICALLY TWICE A DAY. 28.4 g 3    horse chestnut 300 MG CAPS Take 300 mg by mouth 2 times daily       insulin pen needle (32G X 6 MM) 32G X 6 MM miscellaneous Use 1 pen needles daily with Victoza - NOVOFINE 100 each 3    LANTUS SOLOSTAR 100 UNIT/ML soln INJECT 18 UNITS SUBCUTANEOUS AT BEDTIME 15 mL 0    levothyroxine (SYNTHROID/LEVOTHROID) 300 MCG tablet TAKE 1 TABLET (300 MCG) BY MOUTH DAILY 90 tablet 1    metFORMIN (GLUCOPHAGE) 1000 MG tablet TAKE 1 TABLET (1,000 MG) BY MOUTH 2 TIMES DAILY (WITH MEALS) 180 tablet 0    metoprolol succinate ER (TOPROL XL) 100 MG 24 hr tablet TAKE 0.5 TABLETS (50 MG) BY MOUTH DAILY 45 tablet 1    Multiple Vitamin (MULTI VITAMIN PO) Take 1 tablet by mouth daily (has iron in it)      naproxen (NAPROSYN) 500 MG tablet TAKE 1 TABLET (500 MG) BY MOUTH 2 TIMES DAILY (WITH MEALS) 180 tablet 0    pantoprazole (PROTONIX) 40 MG EC tablet TAKE 1 TABLET BY MOUTH EVERY DAY  30-60 MIN BEFORE A MEAL 90 tablet 0    potassium chloride ER (KLOR-CON M) 20 MEQ CR tablet Take 2 tablets (40 mEq) by mouth 2 times daily 360 tablet 3    predniSONE (DELTASONE) 20 MG tablet TAKE 1 TABLET (20 MG) BY MOUTH DAILY X 5 DAYS 5 tablet 0    QUEtiapine (SEROQUEL) 50 MG tablet TAKE 1/2-1 TABLET AT AT BEDTIME 90 tablet 0    tamsulosin (FLOMAX) 0.4 MG capsule TAKE ONE CAPSULE BY MOUTH EVERY DAY. SWALLOW WHOLE. DO NOT CRUSH OR CHEW 90 capsule 0    torsemide (DEMADEX) 20 MG tablet TAKE 2 TABLETS (40 MG) BY MOUTH 2 TIMES DAILY 360 tablet 0    VICTOZA PEN 18 MG/3ML soln INJECT 1.2 MG SUBCUTANEOUS DAILY 18MG/3ML PREFILLED PEN 6 mL 0     Allergies   Allergen Reactions    Food      Onions, peppers, olives, mushrooms    Trazodone      Other reaction(s): Dizziness     Recent Labs   Lab Test 07/17/23  1420 04/05/23  1426 04/06/22  1356 09/01/21  1446 01/20/21  0000 03/20/20  0000 11/20/19  0503 09/04/19  1413   A1C 5.8* 6.0* 5.6   < > 6.0*   < >  --  5.7*   LDL  --  110* 90  --  65  --   --  84   HDL  --  47 49  --  40  --   --  50   TRIG  --  170* 268*  --  371*  --   --  274*   ALT  --  21 26  --   --   --  303* 37   CR  --  1.16 1.05   < > 1.05  --  1.88* 0.91   GFRESTIMATED  --  67 76   < > >60  --  36* 86   GFRESTBLACK  --   --   --   --   --   --  41* >90   POTASSIUM  --  4.4 4.1   < > 4.5  --  4.5 4.0   TSH 0.39 1.24 0.28*  --   --   --   --  0.86    < > = values in this interval not displayed.      Current providers sharing in care for this patient include:  Patient Care Team:  You Bethea DO as PCP - General (Internal Medicine)  You Bethea DO as Assigned PCP  Genesis Bay DPM as Assigned Musculoskeletal Provider    The following health maintenance items are reviewed in Epic and correct as of today:  Health Maintenance   Topic Date Due    DEXA  Never done    COVID-19 Vaccine (1) Never done    Pneumococcal Vaccine: 65+ Years (1 of 2 - PCV) Never done    ZOSTER IMMUNIZATION (1 of 2)  "Never done    DTAP/TDAP/TD IMMUNIZATION (2 - Td or Tdap) 12/07/2010    RSV VACCINE (Pregnancy & 60+) (1 - 1-dose 60+ series) Never done    MEDICARE ANNUAL WELLNESS VISIT  Never done    INFLUENZA VACCINE (1) 09/01/2023    MICROALBUMIN  12/22/2023    A1C  01/17/2024    BMP  04/05/2024    LIPID  04/05/2024    TSH W/FREE T4 REFLEX  07/17/2024    EGD  02/05/2025    DIABETIC FOOT EXAM  04/03/2025    FALL RISK ASSESSMENT  04/24/2025    ADVANCE CARE PLANNING  07/17/2028    COLORECTAL CANCER SCREENING  10/09/2028    HEPATITIS C SCREENING  Completed    PHQ-2 (once per calendar year)  Completed    IPV IMMUNIZATION  Aged Out    HPV IMMUNIZATION  Aged Out    MENINGITIS IMMUNIZATION  Aged Out    RSV MONOCLONAL ANTIBODY  Aged Out    EYE EXAM  Discontinued         Review of Systems  Constitutional, HEENT, cardiovascular, pulmonary, gi and gu systems are negative, except as otherwise noted.     Objective    Exam  There were no vitals taken for this visit.   Estimated body mass index is 41.56 kg/m  as calculated from the following:    Height as of 10/17/23: 1.854 m (6' 1\").    Weight as of 10/17/23: 142.9 kg (315 lb).    Physical Exam  GENERAL: alert and no distress  EYES: Eyes grossly normal to inspection, PERRL and conjunctivae and sclerae normal  RESP: lungs clear to auscultation - no rales, rhonchi or wheezes  CV: regular rate and rhythm, normal S1 S2, no S3 or S4, no murmur, click or rub, no peripheral edema  MS: 4 lower extremity edema bilaterally.  Venous stasis insufficiency is present bilaterally.  SKIN: no suspicious lesions or rashes  PSYCH: mentation appears normal, affect normal/bright        4/24/2024   Mini Cog   Mini-Cog Not Completed (choose reason) Patient declines       Patient declines, there are NO concerns for cognitive deficits.           Signed Electronically by: You Bethea,     "

## 2024-04-25 LAB
CREAT UR-MCNC: 43.2 MG/DL
MICROALBUMIN UR-MCNC: 16.7 MG/L
MICROALBUMIN/CREAT UR: 38.66 MG/G CR (ref 0–25)

## 2024-04-30 DIAGNOSIS — G62.1 ALCOHOL-INDUCED POLYNEUROPATHY (H): ICD-10-CM

## 2024-04-30 DIAGNOSIS — E03.4 HYPOTHYROIDISM DUE TO ACQUIRED ATROPHY OF THYROID: ICD-10-CM

## 2024-04-30 DIAGNOSIS — M10.00 IDIOPATHIC GOUT, UNSPECIFIED CHRONICITY, UNSPECIFIED SITE: ICD-10-CM

## 2024-04-30 DIAGNOSIS — E11.9 TYPE 2 DIABETES, HBA1C GOAL < 8% (H): ICD-10-CM

## 2024-04-30 RX ORDER — LEVOTHYROXINE SODIUM 300 UG/1
300 TABLET ORAL DAILY
Qty: 90 TABLET | Refills: 1 | Status: SHIPPED | OUTPATIENT
Start: 2024-04-30

## 2024-04-30 RX ORDER — LIRAGLUTIDE 6 MG/ML
INJECTION SUBCUTANEOUS
Qty: 6 ML | Refills: 2 | Status: SHIPPED | OUTPATIENT
Start: 2024-04-30 | End: 2024-08-13

## 2024-04-30 RX ORDER — GABAPENTIN 300 MG/1
300 CAPSULE ORAL 3 TIMES DAILY
Qty: 270 CAPSULE | Refills: 0 | Status: SHIPPED | OUTPATIENT
Start: 2024-04-30 | End: 2024-07-23

## 2024-04-30 RX ORDER — PREDNISONE 20 MG/1
20 TABLET ORAL DAILY
Qty: 5 TABLET | Refills: 0 | Status: SHIPPED | OUTPATIENT
Start: 2024-04-30 | End: 2024-06-06

## 2024-04-30 NOTE — TELEPHONE ENCOUNTER
Gabapentin      Last Written Prescription Date:  2/01/2024  Last Fill Quantity: 270,   # refills: 0  Last Office Visit: 4/24/2024  Future Office visit:         Levothyorxine       Last Written Prescription Date:  11/06/2023  Last Fill Quantity: 90,   # refills: 1  Last Office Visit: 4/24/2024  Future Office visit:         Prednisone       Last Written Prescription Date:  3/07/2024  Last Fill Quantity: 5,   # refills: 0  Last Office Visit: 4/24/2024  Future Office visit:       Victoza       Last Written Prescription Date:  4/09/2024  Last Fill Quantity: 6mL,   # refills: 0  Last Office Visit: 4/24/2024  Future Office visit:

## 2024-04-30 NOTE — TELEPHONE ENCOUNTER
Gabapentin and prednisone    Routing refill request to provider for review/approval because:  Drug not on the [facility]  [place] refill protocol or controlled substance    LEVOTHYROXINE SODIUM 300  Tablet     Thyroid Protocol [id number]/30/2024 09:10 AM   Protocol Details Medication indicated for associated diagnosis

## 2024-05-08 DIAGNOSIS — E11.40 TYPE 2 DIABETES MELLITUS WITH DIABETIC NEUROPATHY, UNSPECIFIED WHETHER LONG TERM INSULIN USE (H): ICD-10-CM

## 2024-05-08 NOTE — TELEPHONE ENCOUNTER
Test Strips      Last Written Prescription Date:  3/7/24  Last Fill Quantity: 100,   # refills: 0  Last Office Visit: 4/24/24  Future Office visit:       Routing refill request to provider for review/approval because:

## 2024-05-09 NOTE — TELEPHONE ENCOUNTER
Diabetic Supplies Protocol [id number]/08/2024 09:14 AM   Protocol Details Recent (6 mo) or future (30 days) visit within the authorizing provider's specialty       Patient was in on 4/24/24

## 2024-05-24 ENCOUNTER — TRANSFERRED RECORDS (OUTPATIENT)
Dept: HEALTH INFORMATION MANAGEMENT | Facility: CLINIC | Age: 74
End: 2024-05-24

## 2024-06-03 ENCOUNTER — TRANSFERRED RECORDS (OUTPATIENT)
Dept: HEALTH INFORMATION MANAGEMENT | Facility: CLINIC | Age: 74
End: 2024-06-03

## 2024-06-05 ENCOUNTER — MYC MEDICAL ADVICE (OUTPATIENT)
Dept: INTERNAL MEDICINE | Facility: OTHER | Age: 74
End: 2024-06-05

## 2024-06-05 DIAGNOSIS — M10.00 IDIOPATHIC GOUT, UNSPECIFIED CHRONICITY, UNSPECIFIED SITE: ICD-10-CM

## 2024-06-05 DIAGNOSIS — N40.0 BENIGN PROSTATIC HYPERPLASIA WITHOUT LOWER URINARY TRACT SYMPTOMS: ICD-10-CM

## 2024-06-05 DIAGNOSIS — E11.9 TYPE 2 DIABETES, HBA1C GOAL < 7% (H): Primary | ICD-10-CM

## 2024-06-05 NOTE — TELEPHONE ENCOUNTER
Disp Refills Start End PAM   tamsulosin (FLOMAX) 0.4 MG capsule 90 capsule 0 3/7/2024 -- No      Disp Refills Start End PAM   predniSONE (DELTASONE) 20 MG tablet 5 tablet 0 4/30/2024 -- No     Last Office Visit: 04/24/2024  Future Office visit:       Routing refill request to provider for review/approval because:

## 2024-06-06 RX ORDER — PREDNISONE 20 MG/1
20 TABLET ORAL DAILY
Qty: 5 TABLET | Refills: 0 | Status: SHIPPED | OUTPATIENT
Start: 2024-06-06 | End: 2024-07-23

## 2024-06-06 RX ORDER — TAMSULOSIN HYDROCHLORIDE 0.4 MG/1
CAPSULE ORAL
Qty: 90 CAPSULE | Refills: 0 | Status: SHIPPED | OUTPATIENT
Start: 2024-06-06 | End: 2024-09-11

## 2024-06-07 NOTE — CONFIDENTIAL NOTE
My error   It did improve with the test from yesterday.    As no microalb detected.  I was looking at result from 1 month ago.    The ginger ale is likely having no impact on this outside of the fact the lab result can vary with hydration status.

## 2024-06-10 ENCOUNTER — LAB (OUTPATIENT)
Dept: LAB | Facility: OTHER | Age: 74
End: 2024-06-10
Payer: MEDICARE

## 2024-06-10 DIAGNOSIS — E11.9 TYPE 2 DIABETES, HBA1C GOAL < 7% (H): ICD-10-CM

## 2024-06-10 LAB
CREAT UR-MCNC: 29.6 MG/DL
MICROALBUMIN UR-MCNC: <12 MG/L
MICROALBUMIN/CREAT UR: NORMAL MG/G{CREAT}

## 2024-06-10 PROCEDURE — 82570 ASSAY OF URINE CREATININE: CPT | Mod: ZL

## 2024-06-25 DIAGNOSIS — E11.9 TYPE 2 DIABETES, HBA1C GOAL < 7% (H): ICD-10-CM

## 2024-06-25 RX ORDER — INSULIN GLARGINE 100 [IU]/ML
18 INJECTION, SOLUTION SUBCUTANEOUS AT BEDTIME
Qty: 15 ML | Refills: 2 | Status: SHIPPED | OUTPATIENT
Start: 2024-06-25

## 2024-06-25 NOTE — TELEPHONE ENCOUNTER
Bernardino      Last Written Prescription Date:  3/7/24  Last Fill Quantity: 15mL,   # refills: 0  Last Office Visit: 4/24/24  Future Office visit:    Next 5 appointments (look out 90 days)      Sep 11, 2024 3:30 PM  (Arrive by 3:15 PM)  Pre-Operative Physical with You Behtea DO  [facility] ([facility] ) [address]  [place] [id number] [phone]             Routing refill request to provider for review/approval because:

## 2024-07-02 DIAGNOSIS — E11.40 TYPE 2 DIABETES MELLITUS WITH DIABETIC NEUROPATHY, UNSPECIFIED WHETHER LONG TERM INSULIN USE (H): ICD-10-CM

## 2024-07-02 DIAGNOSIS — G47.00 INSOMNIA, UNSPECIFIED TYPE: ICD-10-CM

## 2024-07-02 DIAGNOSIS — I10 BENIGN ESSENTIAL HYPERTENSION: ICD-10-CM

## 2024-07-02 DIAGNOSIS — K21.9 GASTROESOPHAGEAL REFLUX DISEASE WITHOUT ESOPHAGITIS: ICD-10-CM

## 2024-07-02 NOTE — TELEPHONE ENCOUNTER
Disp Refills Start End PAM   metoprolol succinate ER (TOPROL XL) 100 MG 24 hr tablet 45 tablet 1 2/1/2024 -- No      Disp Refills Start End PAM   torsemide (DEMADEX) 20 MG tablet 360 tablet 0 4/9/2024 -- No      Disp Refills Start End PAM   QUEtiapine (SEROQUEL) 50 MG tablet 90 tablet 0 4/9/2024 -- No      Disp Refills Start End PAM   pantoprazole (PROTONIX) 40 MG EC tablet 90 tablet 0 4/9/2024 -- No     Last Office Visit: 04/24/2024  Future Office visit:    Next 5 appointments (look out 90 days)      Sep 11, 2024 3:30 PM  (Arrive by 3:15 PM)  Pre-Operative Physical with You Bethea DO  [facility] ([facility] ) [address]  [place] [phone]             Routing refill request to provider for review/approval because:

## 2024-07-03 RX ORDER — TORSEMIDE 20 MG/1
40 TABLET ORAL 2 TIMES DAILY
Qty: 360 TABLET | Refills: 0 | Status: SHIPPED | OUTPATIENT
Start: 2024-07-03

## 2024-07-03 RX ORDER — QUETIAPINE FUMARATE 50 MG/1
TABLET, FILM COATED ORAL
Qty: 90 TABLET | Refills: 0 | Status: SHIPPED | OUTPATIENT
Start: 2024-07-03

## 2024-07-03 RX ORDER — PANTOPRAZOLE SODIUM 40 MG/1
TABLET, DELAYED RELEASE ORAL
Qty: 90 TABLET | Refills: 0 | Status: SHIPPED | OUTPATIENT
Start: 2024-07-03

## 2024-07-13 ENCOUNTER — HEALTH MAINTENANCE LETTER (OUTPATIENT)
Age: 74
End: 2024-07-13

## 2024-07-23 DIAGNOSIS — G62.1 ALCOHOL-INDUCED POLYNEUROPATHY (H): ICD-10-CM

## 2024-07-23 DIAGNOSIS — I10 BENIGN ESSENTIAL HYPERTENSION: ICD-10-CM

## 2024-07-23 DIAGNOSIS — M10.00 IDIOPATHIC GOUT, UNSPECIFIED CHRONICITY, UNSPECIFIED SITE: ICD-10-CM

## 2024-07-23 RX ORDER — PREDNISONE 20 MG/1
20 TABLET ORAL DAILY
Qty: 5 TABLET | Refills: 0 | Status: SHIPPED | OUTPATIENT
Start: 2024-07-23 | End: 2024-08-13

## 2024-07-23 RX ORDER — METOPROLOL SUCCINATE 100 MG/1
50 TABLET, EXTENDED RELEASE ORAL DAILY
Qty: 45 TABLET | Refills: 1 | Status: SHIPPED | OUTPATIENT
Start: 2024-07-23

## 2024-07-23 RX ORDER — GABAPENTIN 300 MG/1
300 CAPSULE ORAL 3 TIMES DAILY
Qty: 270 CAPSULE | Refills: 0 | Status: SHIPPED | OUTPATIENT
Start: 2024-07-23

## 2024-07-23 NOTE — TELEPHONE ENCOUNTER
Prednisone      Last Written Prescription Date:  6/6/24  Last Fill Quantity: 5,   # refills: 0  Last Office Visit: 4/24/24  Future Office visit:    Next 5 appointments (look out 90 days)      Sep 11, 2024 3:30 PM  (Arrive by 3:15 PM)  Pre-Operative Physical with You Bethea DO  [facility] ([facility] [place] ) [address]  [place] [address]  [phone]     Oct 16, 2024 2:00 PM  (Arrive by 1:45 PM)  Return Visit with Genesis Bay DPM  [facility] [place] ([facility] [place] ) [address]  [place] [address]  [phone]             Routing refill request to provider for review/approval because:      Toprol      Last Written Prescription Date:  2/1/24  Last Fill Quantity: 45,   # refills: 1  Last Office Visit: 4/24/24  Future Office visit:    Next 5 appointments (look out 90 days)      Sep 11, 2024 3:30 PM  (Arrive by 3:15 PM)  Pre-Operative Physical with You Bethea DO  [facility] ([facility] [place] ) [address]  [place] [address]  [phone]     Oct 16, 2024 2:00 PM  (Arrive by 1:45 PM)  Return Visit with Genesis Bay DPM  [facility] ([facility] [place] ) [address]  [place] [address]  [phone]             Routing refill request to provider for review/approval because:      Gabapentin      Last Written Prescription Date:  4/30/24  Last Fill Quantity: 270,   # refills: 0  Last Office Visit: 4/24/24  Future Office visit:    Next 5 appointments (look out 90 days)      Sep 11, 2024 3:30 PM  (Arrive by 3:15 PM)  Pre-Operative Physical with You Bethea DO  [facility] ([facility] [place] ) [address]  [place] [address]  [phone]     Oct 16, 2024 2:00  PM  (Arrive by 1:45 PM)  Return Visit with Genesis Bay DPM  [facility] ([facility] ) [address] [facility] [address] [phone]             Routing refill request to provider for review/approval because:

## 2024-08-12 ENCOUNTER — TRANSFERRED RECORDS (OUTPATIENT)
Dept: HEALTH INFORMATION MANAGEMENT | Facility: CLINIC | Age: 74
End: 2024-08-12

## 2024-08-13 DIAGNOSIS — G89.29 CHRONIC PAIN OF BOTH KNEES: ICD-10-CM

## 2024-08-13 DIAGNOSIS — M25.562 CHRONIC PAIN OF BOTH KNEES: ICD-10-CM

## 2024-08-13 DIAGNOSIS — M10.00 IDIOPATHIC GOUT, UNSPECIFIED CHRONICITY, UNSPECIFIED SITE: ICD-10-CM

## 2024-08-13 DIAGNOSIS — M25.561 CHRONIC PAIN OF BOTH KNEES: ICD-10-CM

## 2024-08-13 DIAGNOSIS — E11.9 TYPE 2 DIABETES, HBA1C GOAL < 8% (H): ICD-10-CM

## 2024-08-13 RX ORDER — NAPROXEN 500 MG/1
500 TABLET ORAL 2 TIMES DAILY WITH MEALS
Qty: 180 TABLET | Refills: 1 | Status: SHIPPED | OUTPATIENT
Start: 2024-08-13

## 2024-08-13 RX ORDER — PREDNISONE 20 MG/1
20 TABLET ORAL DAILY
Qty: 5 TABLET | Refills: 0 | Status: SHIPPED | OUTPATIENT
Start: 2024-08-13

## 2024-08-13 RX ORDER — LIRAGLUTIDE 6 MG/ML
INJECTION SUBCUTANEOUS
Qty: 6 ML | Refills: 2 | Status: SHIPPED | OUTPATIENT
Start: 2024-08-13

## 2024-08-13 NOTE — TELEPHONE ENCOUNTER
NAPROXEN 500 MG TABS 500 Tablet         Last Written Prescription Date:  12/8/23  Last Fill Quantity: 180,   # refills: 0  Last Office Visit: 4/24/24  Future Office visit:    Next 5 appointments (look out 90 days)      Sep 11, 2024 3:30 PM  (Arrive by 3:15 PM)  Pre-Operative Physical with You Bethea DO  [facility] ([facility] ) [address] [facility] [id number] [phone]     Oct 16, 2024 2:00 PM  (Arrive by 1:45 PM)  Return Visit with Genesis Bay DPM  [facility] ([facility] ) [address] [id number] [phone]             Routing refill request to provider for review/approval because:    NSAID Medications [id number]/13/2024 10:43 AM   Protocol Details Patient is age 6-64 years    Always Fail Criteria - Chart Review Required    Recent (12 mo) or future (90 days) visit within the authorizing provider's specialty     LOV was on 4/24/24

## 2024-08-13 NOTE — TELEPHONE ENCOUNTER
PREDNISONE 20 MG TABS 20 Tablet         Last Written Prescription Date:  7/23/24  Last Fill Quantity: 5,   # refills: 0  Last Office Visit: 4/24/24  Future Office visit:    Next 5 appointments (look out 90 days)      Sep 11, 2024 3:30 PM  (Arrive by 3:15 PM)  Pre-Operative Physical with You Bethea DO  [facility] ([facility] ) [address] [facility] [id number] [phone]     Oct 16, 2024 2:00 PM  (Arrive by 1:45 PM)  Return Visit with Genesis Bay DPM  [facility] ([facility] ) [address] [facility] [id number] [phone]             Routing refill request to provider for review/approval because:  Drug not on the G, P or [facility] refill protocol or controlled substance

## 2024-08-27 ENCOUNTER — TELEPHONE (OUTPATIENT)
Dept: INTERNAL MEDICINE | Facility: OTHER | Age: 74
End: 2024-08-27

## 2024-08-27 NOTE — TELEPHONE ENCOUNTER
Call placed to patient with no answer, non-urgent message left a call will be returned to patient.     Patient is scheduled for pre-op with Dr. Bethea on 9/11/24- in need of updating patient the pre-op will not cover both eye surgeries as the pre op must be within 30 days and the RT eye surgery is scheduled for 9/25/24 and LT eye surgery is scheduled for 10/30/24.    Next 5 appointments (look out 90 days)      Sep 11, 2024 3:30 PM  (Arrive by 3:15 PM)  Pre-Operative Physical with You Bethea,   [facility] ([facility] ) [address] [place] [phone]     Oct 16, 2024 2:00 PM  (Arrive by 1:45 PM)  Return Visit with Genesis Bay DPM  [facility] ([facility] ) [address] [facility] [id number] [phone]

## 2024-08-30 NOTE — TELEPHONE ENCOUNTER
Spoke with patient, update provided patient may be in need of 2 separate Pre-Op appointments due to surgery dates, the current scheduled pre-op will not be within 30 days of the eye surgery scheduled on 10/30/24.    Patient will reach out to Ophthalmologist Office to discuss.

## 2024-09-05 NOTE — TELEPHONE ENCOUNTER
Voicemail message received from patient providing an update [facility] Ophthalmology is not in need of having a second pre-op completed for the 2nd surgery on 10/30/24.    Call returned to patient with no answer, non - urgent message left the patients voicemail message has been received, if patient is in need of additional information/updates, patient may return call to RN.     Patient is scheduled for upcoming appointment with Dr. Bethea, may discuss at the time of appointment as well.     Next 5 appointments (look out 90 days)      Sep 12, 2024 1:30 PM  (Arrive by 1:15 PM)  Pre-Operative Physical with You Bethea,   [facility] ([facility] ) [address] [facility] [id number] [phone]     Oct 16, 2024 2:00 PM  (Arrive by 1:45 PM)  Return Visit with Genesis Bay DPM  [facility] ([facility] ) [address] [facility] [id number] [phone]

## 2024-09-10 DIAGNOSIS — B35.3 TINEA PEDIS OF BOTH FEET: ICD-10-CM

## 2024-09-10 DIAGNOSIS — N40.0 BENIGN PROSTATIC HYPERPLASIA WITHOUT LOWER URINARY TRACT SYMPTOMS: ICD-10-CM

## 2024-09-10 DIAGNOSIS — L60.3 ONYCHODYSTROPHY: ICD-10-CM

## 2024-09-10 RX ORDER — CICLOPIROX OLAMINE 7.7 MG/G
CREAM TOPICAL
Qty: 30 G | Refills: 4 | Status: SHIPPED | OUTPATIENT
Start: 2024-09-10

## 2024-09-10 NOTE — TELEPHONE ENCOUNTER
CICLOPIROX 0.77% CREAM 0.77 Cream         Last Written Prescription Date:  3/30/23  Last Fill Quantity: 30 g,   # refills: 4  Last Office Visit: 4/24/24  Future Office visit:    Next 5 appointments (look out 90 days)      Sep 12, 2024 1:30 PM  (Arrive by 1:15 PM)  Pre-Operative Physical with You Bethea DO  [facility] ([facility] ) [address] [facility] [id number] [phone]     Oct 16, 2024 2:00 PM  (Arrive by 1:45 PM)  Return Visit with Genesis Bay DPM  [facility] ([facility] ) [address] [facility] [id number] [phone]             Routing refill request to provider for review/approval because:  Drug not on the [facility], P or [facility] refill protocol or controlled substance

## 2024-09-10 NOTE — TELEPHONE ENCOUNTER
Terbinafine      Last Written Prescription Date:  6/12/23  Last Fill Quantity: 28.4g,   # refills: 3  Last Office Visit: 4/24/24  Future Office visit:    Next 5 appointments (look out 90 days)      Sep 12, 2024 1:30 PM  (Arrive by 1:15 PM)  Pre-Operative Physical with You Bethea DO  [facility] ([facility] ) [address]  [place] [address]  [phone]     Oct 16, 2024 2:00 PM  (Arrive by 1:45 PM)  Return Visit with Genesis Bay DPM  [facility] ([facility] ) [address] [facility] [address]  [phone]             Routing refill request to provider for review/approval because:      Flomax      Last Written Prescription Date:  6/6/24  Last Fill Quantity: 90,   # refills: 0  Last Office Visit: 4/24/24  Future Office visit:    Next 5 appointments (look out 90 days)      Sep 12, 2024 1:30 PM  (Arrive by 1:15 PM)  Pre-Operative Physical with You Bethea DO  [facility] ([facility] ) [address]  [place] [address]  [phone]     Oct 16, 2024 2:00 PM  (Arrive by 1:45 PM)  Return Visit with Genesis Bay DPM  [facility] ([facility] ) [address] [facility] [address]  [phone]             Routing refill request to provider for review/approval because:

## 2024-09-11 RX ORDER — TERBINAFINE HYDROCHLORIDE 1 G/100G
CREAM TOPICAL
Qty: 28.4 G | Refills: 3 | Status: SHIPPED | OUTPATIENT
Start: 2024-09-11

## 2024-09-11 RX ORDER — TAMSULOSIN HYDROCHLORIDE 0.4 MG/1
CAPSULE ORAL
Qty: 90 CAPSULE | Refills: 1 | Status: SHIPPED | OUTPATIENT
Start: 2024-09-11

## 2024-09-11 NOTE — TELEPHONE ENCOUNTER
TERBINAFINE 1% CREAM 1 Cream       Routing refill request to provider for review/approval because:  Drug not on the FM, P or [facility] refill protocol or controlled substance

## 2024-10-07 DIAGNOSIS — K21.9 GASTROESOPHAGEAL REFLUX DISEASE WITHOUT ESOPHAGITIS: ICD-10-CM

## 2024-10-08 RX ORDER — PANTOPRAZOLE SODIUM 40 MG/1
TABLET, DELAYED RELEASE ORAL
Qty: 90 TABLET | Refills: 0 | Status: SHIPPED | OUTPATIENT
Start: 2024-10-08

## 2024-10-09 DIAGNOSIS — M1A.00X0 IDIOPATHIC CHRONIC GOUT WITHOUT TOPHUS, UNSPECIFIED SITE: ICD-10-CM

## 2024-10-09 DIAGNOSIS — I10 BENIGN ESSENTIAL HYPERTENSION: ICD-10-CM

## 2024-10-09 DIAGNOSIS — G62.1 ALCOHOL-INDUCED POLYNEUROPATHY (H): ICD-10-CM

## 2024-10-09 DIAGNOSIS — E87.6 HYPOKALEMIA: ICD-10-CM

## 2024-10-09 DIAGNOSIS — E03.4 HYPOTHYROIDISM DUE TO ACQUIRED ATROPHY OF THYROID: ICD-10-CM

## 2024-10-09 DIAGNOSIS — E11.9 TYPE 2 DIABETES, HBA1C GOAL < 8% (H): ICD-10-CM

## 2024-10-09 DIAGNOSIS — E11.40 TYPE 2 DIABETES MELLITUS WITH DIABETIC NEUROPATHY, UNSPECIFIED WHETHER LONG TERM INSULIN USE (H): ICD-10-CM

## 2024-10-09 DIAGNOSIS — K21.9 GASTROESOPHAGEAL REFLUX DISEASE WITHOUT ESOPHAGITIS: ICD-10-CM

## 2024-10-09 NOTE — TELEPHONE ENCOUNTER
Gabapentin  Last Written Prescription Date: 7/23/24  Last Fill Quantity: 270 # of Refills: 0  Last Office Visit: 4/24/24    Allopurinol  Last Written Prescription Date: 7/17/23  Last Fill Quantity: 90 # of Refills: 3  Last Office Visit: 4/24/24    Victoza  Last Written Prescription Date: 8/13/24  Last Fill Quantity: 6 ml # of Refills: 2  Last Office Visit: 4/24/24

## 2024-10-10 RX ORDER — TORSEMIDE 20 MG/1
40 TABLET ORAL 2 TIMES DAILY
Qty: 360 TABLET | Refills: 1 | Status: SHIPPED | OUTPATIENT
Start: 2024-10-10

## 2024-10-10 RX ORDER — PANTOPRAZOLE SODIUM 40 MG/1
TABLET, DELAYED RELEASE ORAL
Qty: 90 TABLET | Refills: 0 | OUTPATIENT
Start: 2024-10-10

## 2024-10-10 RX ORDER — METOPROLOL SUCCINATE 100 MG/1
TABLET, EXTENDED RELEASE ORAL
Qty: 45 TABLET | Refills: 1 | OUTPATIENT
Start: 2024-10-10

## 2024-10-10 RX ORDER — POTASSIUM CHLORIDE 1500 MG/1
40 TABLET, EXTENDED RELEASE ORAL 2 TIMES DAILY
Qty: 360 TABLET | Refills: 1 | Status: SHIPPED | OUTPATIENT
Start: 2024-10-10

## 2024-10-10 RX ORDER — LEVOTHYROXINE SODIUM 100 UG/1
TABLET ORAL
Qty: 90 TABLET | Refills: 0 | OUTPATIENT
Start: 2024-10-10

## 2024-10-11 RX ORDER — LIRAGLUTIDE 6 MG/ML
INJECTION SUBCUTANEOUS
Qty: 6 ML | Refills: 2 | Status: SHIPPED | OUTPATIENT
Start: 2024-10-11

## 2024-10-11 RX ORDER — ALLOPURINOL 300 MG/1
300 TABLET ORAL DAILY
Qty: 90 TABLET | Refills: 3 | Status: SHIPPED | OUTPATIENT
Start: 2024-10-11

## 2024-10-11 RX ORDER — GABAPENTIN 300 MG/1
300 CAPSULE ORAL 3 TIMES DAILY
Qty: 270 CAPSULE | Refills: 0 | Status: SHIPPED | OUTPATIENT
Start: 2024-10-11

## 2024-10-16 ENCOUNTER — OFFICE VISIT (OUTPATIENT)
Dept: PODIATRY | Facility: OTHER | Age: 74
End: 2024-10-16
Attending: PODIATRIST
Payer: COMMERCIAL

## 2024-10-16 VITALS
SYSTOLIC BLOOD PRESSURE: 131 MMHG | TEMPERATURE: 97.8 F | HEART RATE: 83 BPM | OXYGEN SATURATION: 94 % | DIASTOLIC BLOOD PRESSURE: 80 MMHG

## 2024-10-16 DIAGNOSIS — Z13.89 SCREENING FOR DIABETIC PERIPHERAL NEUROPATHY: ICD-10-CM

## 2024-10-16 DIAGNOSIS — E11.621 DIABETIC ULCER OF TOE OF LEFT FOOT ASSOCIATED WITH TYPE 2 DIABETES MELLITUS, LIMITED TO BREAKDOWN OF SKIN (H): ICD-10-CM

## 2024-10-16 DIAGNOSIS — L60.3 ONYCHODYSTROPHY: Primary | ICD-10-CM

## 2024-10-16 DIAGNOSIS — E11.42 DIABETIC POLYNEUROPATHY ASSOCIATED WITH TYPE 2 DIABETES MELLITUS (H): ICD-10-CM

## 2024-10-16 DIAGNOSIS — L97.521 DIABETIC ULCER OF TOE OF LEFT FOOT ASSOCIATED WITH TYPE 2 DIABETES MELLITUS, LIMITED TO BREAKDOWN OF SKIN (H): ICD-10-CM

## 2024-10-16 DIAGNOSIS — E11.9 DIABETES MELLITUS TYPE 2, NONINSULIN DEPENDENT (H): ICD-10-CM

## 2024-10-16 PROCEDURE — 99207 PR FOOT EXAM NO CHARGE: CPT | Performed by: PODIATRIST

## 2024-10-16 PROCEDURE — 99213 OFFICE O/P EST LOW 20 MIN: CPT | Mod: 25 | Performed by: PODIATRIST

## 2024-10-16 PROCEDURE — G0463 HOSPITAL OUTPT CLINIC VISIT: HCPCS | Mod: 25

## 2024-10-16 PROCEDURE — 11721 DEBRIDE NAIL 6 OR MORE: CPT | Performed by: PODIATRIST

## 2024-10-16 PROCEDURE — G0463 HOSPITAL OUTPT CLINIC VISIT: HCPCS

## 2024-10-16 ASSESSMENT — PAIN SCALES - GENERAL: PAINLEVEL: EXTREME PAIN (8)

## 2024-10-16 NOTE — PROGRESS NOTES
Chief complaint: Patient presents with:  Toenail: Trimming      History of Present Illness: This 74 year old IDDM II is seen with his wife, Yazmin, for follow-up management for a diabetic foot exam and high risk nail debridement.     His wife is still applying lotion to his feet every day, but not between the toes.    He has continued to take Allopurinol 300mg daily since his last appointment. He has not complained of any recent gout flares.    The patient is requesting high risk nail debridement every six months. He says he no longer has help at home and he is living on his own, so he has not been caring for his feet. He is unable to reach his feet to treat them.     No further pedal complaints today.       Lab Results   Component Value Date    A1C 5.8 07/17/2023    A1C 6.0 04/05/2023    A1C 5.6 04/06/2022    A1C 6.1 09/01/2021    A1C 6.0 01/20/2021    A1C 6.0 03/20/2020    A1C 5.7 09/04/2019    A1C 5.6 04/11/2019    A1C 6.1 08/14/2018         /80 (BP Location: Left arm, Patient Position: Sitting, Cuff Size: Adult Large)   Pulse 83   Temp 97.8  F (36.6  C) (Tympanic)   SpO2 94%     Patient Active Problem List   Diagnosis    ACP (advance care planning)    Type 2 diabetes mellitus with diabetic neuropathy (H)    Morbid obesity due to excess calories (H)    Acquired hypothyroidism    Benign essential hypertension    Calculus of gallbladder without cholecystitis without obstruction    Cholecystitis    Gastroesophageal reflux disease without esophagitis    Depression    Congenital anomaly of spleen    Disorder of lipoid metabolism    Other lipoprotein metabolism disorders    Malignant neoplasm of thyroid gland (H)    Tremor    Hypothyroidism, postsurgical    Ametropia    Cupping of optic disc, left    Flat affect    Lesion of left upper eyelid    Nuclear sclerotic cataract of both eyes    Other conjunctivitis of both eyes    Presbyopia       Past Surgical History:   Procedure Laterality Date    basal cell  carcinoma  2017    CHOLECYSTECTOMY  11/23/2019    COLONOSCOPY - HIM SCAN  12/01/2012    Colonoscopy due to bleeding, no polyps 12-12    ESOPHAGOSCOPY, GASTROSCOPY, DUODENOSCOPY (EGD), COMBINED N/A 9/6/2023    Procedure: ESOPHAGOGASTRODUODENOSCOPY with polypectomy and application of resolution clip X3,biopsies mid esophagus;  Surgeon: Tru Chilel MD;  Location: [facility] OR    ESOPHAGOSCOPY, GASTROSCOPY, DUODENOSCOPY (EGD), COMBINED N/A 10/17/2023    Procedure: ESOPHAGOGASTRODUODENOSCOPY diagnostic with hot biopsy, resolution clip placement x2 in greater curvature body gastric;  Surgeon: Tru Chilel MD;  Location: [facility] OR    HERNIA REPAIR  2014       Current Outpatient Medications   Medication Sig Dispense Refill    allopurinol (ZYLOPRIM) 300 MG tablet TAKE 1 TABLET (300 MG) BY MOUTH DAILY TAKE ONE TABLET DAILY 90 tablet 3    ascorbic acid (VITAMIN C) 500 MG tablet Take 500 mg by mouth (With damaso hips)      B-D U/F insulin pen needle BY DEVICE ROUTE 2 TIMES DAILY 100 each 11    blood glucose monitoring (ACCU-CHEK FASTCLIX) lancets USE TO TEST BLOOD SUGAR TWICE A  each 2    Cholecalciferol (VITAMIN D3) 50 MCG (2000 UT) CAPS Take 1 capsule by mouth daily      ciclopirox (LOPROX) 0.77 % cream APPLY TWICE A DAY AS DIRECTED 30 g 4    CONTOUR NEXT TEST test strip USE TO TEST BLOOD SUGARS 2 TIMES DAILY OR AS DIRECTED 100 strip 5    gabapentin (NEURONTIN) 300 MG capsule TAKE 1 CAPSULE (300 MG) BY MOUTH 3 TIMES DAILY 270 capsule 0    GNP TERBINAFINE HYDROCHLORIDE 1 % external cream APPLY TO AFFECTED AREA TOPICALLY TWICE A DAY. 28.4 g 3    horse chestnut 300 MG CAPS Take 300 mg by mouth 2 times daily       insulin pen needle (32G X 6 MM) 32G X 6 MM miscellaneous Use 1 pen needles daily with Victoza - NOVOFINE 100 each 3    LANTUS SOLOSTAR 100 UNIT/ML soln INJECT 18 UNITS SUBCUTANEOUS AT BEDTIME 15 mL 2    levothyroxine (SYNTHROID/LEVOTHROID) 300 MCG tablet TAKE 1 TABLET (300 MCG) BY MOUTH DAILY 90 tablet 1     metFORMIN (GLUCOPHAGE) 1000 MG tablet TAKE 1 TABLET (1,000 MG) BY MOUTH 2 TIMES DAILY (WITH MEALS) 180 tablet 1    metoprolol succinate ER (TOPROL XL) 100 MG 24 hr tablet TAKE 0.5 TABLETS (50 MG) BY MOUTH DAILY 45 tablet 1    Multiple Vitamin (MULTI VITAMIN PO) Take 1 tablet by mouth daily (has iron in it)      naproxen (NAPROSYN) 500 MG tablet TAKE 1 TABLET (500 MG) BY MOUTH 2 TIMES DAILY (WITH MEALS) 180 tablet 1    pantoprazole (PROTONIX) 40 MG EC tablet TAKE 1 TABLET BY MOUTH EVERY DAY 30-60 MIN BEFORE A MEAL 90 tablet 0    potassium chloride kajal ER (KLOR-CON M20) 20 MEQ CR tablet TAKE 2 TABLETS (40 MEQ) BY MOUTH 2 TIMES DAILY 360 tablet 1    predniSONE (DELTASONE) 20 MG tablet TAKE 1 TABLET (20 MG) BY MOUTH DAILY X 5 DAYS 5 tablet 0    QUEtiapine (SEROQUEL) 50 MG tablet TAKE 1/2-1 TABLET AT AT BEDTIME 90 tablet 0    tamsulosin (FLOMAX) 0.4 MG capsule TAKE ONE CAPSULE BY MOUTH EVERY DAY. SWALLOW WHOLE. DO NOT CRUSH OR CHEW 90 capsule 1    torsemide (DEMADEX) 20 MG tablet TAKE 2 TABLETS (40 MG) BY MOUTH 2 TIMES DAILY 360 tablet 1    VICTOZA PEN 18 MG/3ML soln INJECT 1.2 MG SUBCUTANEOUS DAILY 18MG/3ML PREFILLED PEN 6 mL 2     No current facility-administered medications for this visit.          Allergies   Allergen Reactions    Food      Onions, peppers, olives, mushrooms    Trazodone      Other reaction(s): Dizziness       Family History   Problem Relation Age of Onset    Unknown/Adopted Sister        Social History     Socioeconomic History    Marital status:      Spouse name: Not on file    Number of children: Not on file    Years of education: Not on file    Highest education level: Not on file   Occupational History    Not on file   Social Needs    Financial resource strain: Not on file    Food insecurity:     Worry: Not on file     Inability: Not on file    Transportation needs:     Medical: Not on file     Non-medical: Not on file   Tobacco Use    Smoking status: Never Smoker    Smokeless tobacco:  Never Used   Substance and Sexual Activity    Alcohol use: Yes     Comment: beer daily     Drug use: No    Sexual activity: Not Currently     Partners: Female   Lifestyle    Physical activity:     Days per week: Not on file     Minutes per session: Not on file    Stress: Not on file   Relationships    Social connections:     Talks on phone: Not on file     Gets together: Not on file     Attends Sabianism service: Not on file     Active member of club or organization: Not on file     Attends meetings of clubs or organizations: Not on file     Relationship status: Not on file    Intimate partner violence:     Fear of current or ex partner: Not on file     Emotionally abused: Not on file     Physically abused: Not on file     Forced sexual activity: Not on file   Other Topics Concern    Parent/sibling w/ CABG, MI or angioplasty before 65F 55M? Not Asked   Social History Narrative    Not on file       ROS: 10 point ROS neg other than the symptoms noted above in the HPI.  EXAM  Constitutional: healthy, alert and no distress    Psychiatric: mentation appears normal and affect normal/bright    VASCULAR:  -Dorsalis pedis pulse +1/4 bilaterally   -Posterior tibial pulse +1/4 bilaterally   -Capillary refill time < 3 seconds to hallux bilaterally   -Hair growth Absent to b/l anterior leg and ankle bilaterally   -Varicosities and telangiectasias to bilateral foot bilaterally   -Mild 1+ pitting edema to bilateral foot and ankle bilaterally   NEURO:  -Epicritic and protective sensation ABSENT to the bilateral foot.  DERM:  -Toenails elongated, thickened, dystrophic and discolored x 10  -Open wound on the dorsal LEFT fourth toe PIPJ  ---0.3cm x 0.3cm x superficial through skin layer only  ---Serous drainage  ---No severe erythema, no ascending erythema, no calor, no purulence, no malodor, no other signs of infection.   MSK:  -Muscle strength of ankles +5/5 for dorsiflexion, plantarflexion, ABDUction and ADDuction b/l    RIGHT FOOT  RADIOGRAPH 03/17/2021  IMPRESSION: Findings suspicious for osteomyelitis of the middle and distal phalanxes of the right second toe.    PHILLY CARLSON MD  ============================================================      ASSESSMENT:    (L60.3) Onychodystrophy  (primary encounter diagnosis)    (E11.621,  L97.521) Diabetic ulcer of toe of left foot associated with type 2 diabetes mellitus, limited to breakdown of skin (H)    (E11.9,  Z79.4) Diabetes mellitus type 2, insulin dependent (H)    (E11.42) Diabetic polyneuropathy associated with type 2 diabetes mellitus (H)    (Z13.89) Screening for diabetic peripheral neuropathy      PLAN:  -Patient evaluated and examined. Treatment options discussed with no educational barriers noted.    -High risk toenail debridement x 10 toenails without incident     -Diabetic Foot Education provided. This included checking the feet daily looking for new new blisters or wounds, wearing shoes at all times when walking including around the house, and avoiding lotion application between the toes. If there are any signs of infection, the patient should present to the ED as soon as possible. Infections of the foot can be life threatening or lead to amputations of the foot or leg.    Diabetes Mellitus: Patient's DM is managed by their PCP. The DM appears to be stable. Patient's last HbA1C was 5.8% on 04/24/2023.    ------------------------    Diabetic foot wound:  -Outer callus cared for and dead skin removed with nipper.  -Applied a dressing to the wound with betadine, gauze and tape.  -Patient to change the dressing every two days if the wound is not healed in two days.   -Patient is refusing a follow-up for this wound and he does not want to be seen more frequently than six months. He says he will call if the wound worsens.  -Patient was instructed to look for signs of infection (redness, swelling, pain, purulence, fever, chills, nausea, vomiting) and to return to podiatry or the  emergency department immediately if there are any signs of infection.   -This is an acute, uncomplicated illness/injury with OTC treatment options reviewed.     -Patient in agreement with the above treatment plan and all of patient's questions were answered.      Return to clinic six months in [facility] for diabetic foot exam and high risk nail debridement per patient request        Genesis Bay DPM

## 2024-10-21 DIAGNOSIS — G47.00 INSOMNIA, UNSPECIFIED TYPE: ICD-10-CM

## 2024-10-21 RX ORDER — QUETIAPINE FUMARATE 50 MG/1
TABLET, FILM COATED ORAL
Qty: 90 TABLET | Refills: 0 | Status: SHIPPED | OUTPATIENT
Start: 2024-10-21

## 2024-10-21 NOTE — TELEPHONE ENCOUNTER
QUETIAPINE 50MG TABLET 50 Tablet         Last Written Prescription Date:  7/3/24  Last Fill Quantity: 90,   # refills: 0  Last Office Visit: 4/24/24  Future Office visit:       Routing refill request to provider for review/approval because:    Antipsychotic Medications Aatzlv33/21/2024 10:07 AM   Protocol Details Medication indicated for associated diagnosis

## 2024-11-19 ENCOUNTER — TELEPHONE (OUTPATIENT)
Dept: INTERNAL MEDICINE | Facility: OTHER | Age: 74
End: 2024-11-19

## 2024-11-19 NOTE — TELEPHONE ENCOUNTER
Voicemail message received from patient reporting receiving letter from [place] on Dr. Bethea's transition to new position at [facility].     Patient inquiring on receiving one year supply on medications.     A call was returned to the patient with no answer, message left RN CC will return call to discuss.

## 2024-11-22 ENCOUNTER — TRANSFERRED RECORDS (OUTPATIENT)
Dept: HEALTH INFORMATION MANAGEMENT | Facility: CLINIC | Age: 74
End: 2024-11-22

## 2024-11-30 ENCOUNTER — HEALTH MAINTENANCE LETTER (OUTPATIENT)
Age: 74
End: 2024-11-30

## 2024-12-18 ENCOUNTER — TELEPHONE (OUTPATIENT)
Dept: INTERNAL MEDICINE | Facility: OTHER | Age: 74
End: 2024-12-18

## 2024-12-18 NOTE — TELEPHONE ENCOUNTER
Call returned to patient.     Patient is currently residing at [facility] requesting Medication List to be faxed to [facility] as the current medication is not accurate.     Fax # [phone].

## 2024-12-18 NOTE — TELEPHONE ENCOUNTER
8:36 AM    Reason for Call: Phone Call    Description: Juan F is in [facility] and would like his medication list sent over there also would like a call from Elisa please call pt    Was an appointment offered for this call? No  If yes : Appointment type              Date    Preferred method for responding to this message: Telephone Call  What is your phone number ? [phone]    If we cannot reach you directly, may we leave a detailed response at the number you provided? No    Can this message wait until your PCP/provider returns, if available today? Almita Barnard

## 2024-12-19 ENCOUNTER — TELEPHONE (OUTPATIENT)
Dept: INTERNAL MEDICINE | Facility: OTHER | Age: 74
End: 2024-12-19

## 2024-12-19 NOTE — TELEPHONE ENCOUNTER
11:23 AM    Reason for Call: Phone Call    Description: Lore with [facility] called regarding questions on medications that were discontented while in hospital, pt wants them restarted, pt also called yesterday and got insulin restarted but she needs clarification the dosing. Would like to talk to nurse, please call Lore back today, she will be out tomorrow.     Was an appointment offered for this call? No  If yes : Appointment type              Date    Preferred method for responding to this message: Telephone Call  What is your phone number ? [phone] (Lore has meeting from 12-1pm)    If we cannot reach you directly, may we leave a detailed response at the number you provided? Yes    Can this message wait until your PCP/provider returns, if available today? Almita Garcia

## 2024-12-20 NOTE — TELEPHONE ENCOUNTER
Call returned to Lore, currently driving, will return call to the clinic.     RN CC will discuss the following upon return call per Hospital Discharging Provider note 12/13/24:    Repeat hemoglobin A1c 5.2 is low, it appears patient's diabetes is possibly cured, so home Lantus was discontinued, and metformin dose reduced. PT/OT was consulted, patient is now to be discharged to subacute rehab SNF, he is to follow-up with his PCP and general surgery/wound care in few days.

## 2025-01-06 ENCOUNTER — TRANSFERRED RECORDS (OUTPATIENT)
Dept: HEALTH INFORMATION MANAGEMENT | Facility: CLINIC | Age: 75
End: 2025-01-06

## 2025-01-15 ENCOUNTER — TRANSFERRED RECORDS (OUTPATIENT)
Dept: HEALTH INFORMATION MANAGEMENT | Facility: CLINIC | Age: 75
End: 2025-01-15

## 2025-01-29 DIAGNOSIS — I10 BENIGN ESSENTIAL HYPERTENSION: ICD-10-CM

## 2025-01-29 DIAGNOSIS — E11.9 TYPE 2 DIABETES, HBA1C GOAL < 7% (H): ICD-10-CM

## 2025-01-29 DIAGNOSIS — G47.00 INSOMNIA, UNSPECIFIED TYPE: ICD-10-CM

## 2025-01-29 NOTE — TELEPHONE ENCOUNTER
Quetiapine (Seroquel) 50 mg tablet  Take 1/2 - 1 tablet at bedtime  Last Written Prescription Date:  10-21-24  Last Fill Quantity: 90 tablet,   # refills: 0  Last Office Visit: 4-24-24  Future Office visit:    Next 5 appointments (look out 90 days)      Apr 16, 2025 2:00 PM  (Arrive by 1:45 PM)  Return Visit with Genesis Bay DPM  [facility] ([facility] ) [address] [place] [id number] [phone]             Metoprolol succinate ER (Toprol XL) 100 mg 24 hr tablet  Take 0.5 tablets (50 mg) yb mouth daily    Last Written Prescription Date:  7-23-24  Last Fill Quantity: 45 tablet,   # refills: 1  Last Office Visit: 4-24-24  Future Office visit:    Next 5 appointments (look out 90 days)      Apr 16, 2025 2:00 PM  (Arrive by 1:45 PM)  Return Visit with Genesis Bay DPM  [facility] ([facility] ) [address] [facility] [id number] [phone]           Lantus Solostar 100 unit / ML soln  Inject 18 units subcutaneous at bedtime     Last Written Prescription Date:  6-25-24  Last Fill Quantity: 15 mL,   # refills: 2  Last Office Visit: 4-24-24  Future Office visit:    Next 5 appointments (look out 90 days)      Apr 16, 2025 2:00 PM  (Arrive by 1:45 PM)  Return Visit with Genesis Bay DPM  [facility] ([facility] ) [address] [facility] [id number] [phone]

## 2025-01-30 RX ORDER — INSULIN GLARGINE 100 [IU]/ML
18 INJECTION, SOLUTION SUBCUTANEOUS AT BEDTIME
Qty: 15 ML | Refills: 0 | Status: SHIPPED | OUTPATIENT
Start: 2025-01-30

## 2025-01-30 RX ORDER — QUETIAPINE FUMARATE 50 MG/1
TABLET, FILM COATED ORAL
Qty: 90 TABLET | Refills: 0 | Status: SHIPPED | OUTPATIENT
Start: 2025-01-30

## 2025-01-30 RX ORDER — METOPROLOL SUCCINATE 100 MG/1
TABLET, EXTENDED RELEASE ORAL
Qty: 45 TABLET | Refills: 0 | Status: SHIPPED | OUTPATIENT
Start: 2025-01-30

## 2025-01-30 NOTE — TELEPHONE ENCOUNTER
Call placed to patient with no answer, message left requesting a return call to [phone] option 6.    In need of inquiring if patient has an upcoming appointment scheduled to Establish Care with New PCP.

## 2025-02-19 ENCOUNTER — TELEPHONE (OUTPATIENT)
Dept: FAMILY MEDICINE | Facility: OTHER | Age: 75
End: 2025-02-19

## 2025-02-19 DIAGNOSIS — K21.9 GASTROESOPHAGEAL REFLUX DISEASE WITHOUT ESOPHAGITIS: ICD-10-CM

## 2025-02-19 DIAGNOSIS — E11.9 TYPE 2 DIABETES, HBA1C GOAL < 8% (H): ICD-10-CM

## 2025-02-19 NOTE — TELEPHONE ENCOUNTER
Call received from Angelo with [employer] requesting an alternative medication to be sent to [facility] as Victoza is no longer on the formulary.     Alternative medications on the formulary are listed below:  -mounjaro  -ozempic  -trulicity      Previous prescription sent to [facility] [place] as per below:      VICTOZA PEN 18 MG/3ML soln 6 mL 2 10/11/2024 -- No   Sig: INJECT 1.2 MG SUBCUTANEOUS DAILY 18MG/3ML PREFILLED PEN   Sent to pharmacy as: Victoza 18 MG/3ML Subcutaneous Solution Pen-injector (liraglutide)   Class: E-Prescribe   Order: [id number]   E-Prescribing Status: Receipt confirmed by pharmacy (10/11/2024  9:09 AM CDT)       PCP - Dr. Bethea no longer seeing patients at [facility].     RN CC has provided multiple updates for patient to Establish Care with a New PCP, upon review, patient has not scheduled an Establish Care Appointment as of 2/19/25.    Please advise.

## 2025-02-20 NOTE — TELEPHONE ENCOUNTER
Jered, Keke Hidalgo CNP4 hours ago (8:47 AM)     TI  Pt is scheduled to establish with you on 2/25       Sending to Keke Grubbs CNP to review alternative medication as requested.

## 2025-02-21 NOTE — TELEPHONE ENCOUNTER
Call placed to patient reporting he is currently out of [facility], has been out for 3 weeks.     Patient is currently working on transportation to the upcoming appointment with Keke Grubbs CNP.     Alternative medications on the formulary are listed below:  -mounjaro  -ozempic  -trulicity     Notified an update will be sent to Keke Grubbs CNP to send alternative to the pharmacy.     [facility] [place]     Patient requesting to leave a detailed message with update on voicemail.

## 2025-02-24 ENCOUNTER — TELEPHONE (OUTPATIENT)
Dept: FAMILY MEDICINE | Facility: OTHER | Age: 75
End: 2025-02-24

## 2025-02-24 NOTE — TELEPHONE ENCOUNTER
Clinic Care Coordination Contact  Care Team Conversations    RN CC placed call to patient to discuss transportation concerns to upcoming appointment.     No answer, message left requesting a return call to [phone] option 6.    Elisa CANCINO RN, Care Coordinator  [facility]  [phone]

## 2025-02-24 NOTE — TELEPHONE ENCOUNTER
A second call has been placed to patient to inquire on transportation to appointment with no answer. A message has been left on patients voicemail at 1124 am requesting a return call.     Awaiting return call.

## 2025-02-25 RX ORDER — LIRAGLUTIDE 6 MG/ML
INJECTION SUBCUTANEOUS
Qty: 6 ML | Refills: 0 | Status: SHIPPED | OUTPATIENT
Start: 2025-02-25 | End: 2025-02-25 | Stop reason: ALTCHOICE

## 2025-02-25 NOTE — TELEPHONE ENCOUNTER
"Voicemail message received from patient returning call to RN CC.     A call was returned to patient, update provided Keke Grubbs CNP has sent in a prescription for ozempic x 1 month - patient needs an Establish Care visit in person.      Keke Grubbs CNP  You4 hours ago (11:55 AM)     RT  See note. Needs est care visit ion person.   Patient reports cancelling the appointment with Keke Grubbs CNP due lack of transportation as his friend and son were both unavailable to assist him with getting dressed, shoes on and getting out of the house to the appointment. Patient reports being unable to take the dial a ride/bus for transportation as he has to be ready and outside waiting when the bus comes. Patient stating, \"If [facility] can find someone to provide him free transportation, come pick me up and dress me up and bring me to appointment then I will be able to come\".    RN CC inquired if patient has home care assistance with dressing, bathing/grooming needs etc.     Patient does have a PCA coming in to the home on Mondays and Fridays from [facility]. However, patient stating, \"I thought she would be alright to start with but the effort to please isn't coming, there is a lot of things we are going through a learning curve on, she learned in the beginning, there are items that have to be done on a regular basis, I try hard to maintain continuity, I am willing to teach people if you demonstrate the ability and willingness to learn, I will teach you.\" \"This is a matter of getting to know the needs of a customer in this situation, I am pretty easy until I can't be easy anymore.\"    RN CC expressed an understanding, again re-iterating the importance of Establishing Care with a PCP as LOV was in April of 2024 with Dr. Bethea.  Advised patient to discuss these concerns and needs at appointment with new PCP.     Patient stating he will call and reschedule the Establish Care Appointment. "

## 2025-02-25 NOTE — TELEPHONE ENCOUNTER
RN CC is awaiting return call from patient to provide update and discuss rescheduling Establish Care Appointment.

## 2025-02-25 NOTE — TELEPHONE ENCOUNTER
Upon review, patient has cancelled appointment scheduled for 2/25/25 with Keke Grubbs CNP.     Call placed to patient, 3rd attempt with no answer, closing encounter as a message has been left requesting a return call.

## 2025-03-06 ENCOUNTER — TELEPHONE (OUTPATIENT)
Dept: FAMILY MEDICINE | Facility: OTHER | Age: 75
End: 2025-03-06

## 2025-03-06 NOTE — TELEPHONE ENCOUNTER
Received a PA request for semaglutide (OZEMPIC) 2 MG/3ML pen. Submitted epa, waiting for a response.

## 2025-03-19 NOTE — PROGRESS NOTES
{PROVIDER CHARTING PREFERENCE:230032}    Subjective   Juan F is a 75 year old, presenting for the following health issues:  Establish Care    HPI    Establish care   Diabetes Follow-up    How often are you checking your blood sugar? Two times daily  Blood sugar testing frequency justification:  Adjustment of medication(s)  What time of day are you checking your blood sugars (select all that apply)?  Before and after meals  Have you had any blood sugars above 200?  No  Have you had any blood sugars below 70?  No  What symptoms do you notice when your blood sugar is low?  None  What concerns do you have today about your diabetes? None   Do you have any of these symptoms? (Select all that apply)  Numbness in feet, Burning in feet, Excessive thirst, and Blurry vision    ***    Hypertension Follow-up  Elevated in clinic.   Do you check your blood pressure regularly outside of the clinic? No   Are you following a low salt diet? Yes  Are your blood pressures ever more than 140 on the top number (systolic) OR more   than 90 on the bottom number (diastolic), for example 140/90? No does not check outside of clinic    Hypothyroidism Follow-up  Since last visit, patient describes the following symptoms: Weight stable, no hair loss, no skin changes, no constipation, no loose stools      BP Readings from Last 2 Encounters:   03/21/25 (!) 163/93   10/16/24 131/80     Hemoglobin A1C (%)   Date Value   04/24/2024 5.8 (H)   07/17/2023 5.8 (H)   01/20/2021 6.0 (H)   03/20/2020 6.0 (H)     LDL Cholesterol Calculated (mg/dL)   Date Value   04/24/2024 110 (H)   04/05/2023 110 (H)   01/20/2021 65   09/04/2019 84           Objective    BP (!) 163/93 (BP Location: Right arm, Patient Position: Sitting, Cuff Size: Adult Large)   Pulse 85   Temp 97.4  F (36.3  C) (Tympanic)   Resp 22   SpO2 96%   Breastfeeding No   There is no height or weight on file to calculate BMI.  Physical Exam   {Exam List (Optional):543509}          Signed  Electronically by: Keke Grubbs, CNP     Excessive thirst, and Blurry vision        Hypertension Follow-up  Elevated in clinic. Previously, BP has been at goal over the past year.   Do you check your blood pressure regularly outside of the clinic? No   Are you following a low salt diet? Yes  Are your blood pressures ever more than 140 on the top number (systolic) OR more   than 90 on the bottom number (diastolic), for example 140/90? No does not check outside of clinic        Hypothyroidism Follow-up  Reports thyroidectomy in 4/1996 due to thyroid cancer.  Pre-dates electronic charting system. This, however, is noted in the history at [facility].   TSH has been stable except during acute illness in November. Normal value since in January. Takes levothyroxine as ordered.   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 Tolerating well.    Insomnia- takes Seroquel and tolerate well.     Gout- reports having plan with prior primary provider to use prednisone for gout flares. Requests refill. States rarely using. Discussed risk for overuse and complications from frequent use as well as increase blood glucose while taking. Reports being well aware of the risks. Declines discussion about alternative options at this time.     All medications needing that were previously managed by Dr. Bethea needing to be reordered as Dr. Bethea is no longer practicing in the clinic since moving to hospitalist role. Jua nF reports tolerating all medications well.       BP Readings from Last 2 Encounters:   03/21/25 (!) 163/93   10/16/24 131/80     Hemoglobin A1C (%)   Date Value   04/24/2024 5.8 (H)   07/17/2023 5.8 (H)   01/20/2021 6.0 (H)   03/20/2020 6.0 (H)     LDL Cholesterol Calculated (mg/dL)   Date Value   04/24/2024 110 (H)   04/05/2023 110 (H)   01/20/2021 65   09/04/2019 84            Objective    BP (!) 163/93 (BP Location: Right arm, Patient Position: Sitting, Cuff Size: Adult Large)   Pulse 85   Temp 97.4  F (36.3  C) (Tympanic)   Resp 22   SpO2 96%   Breastfeeding No   There is no height or weight on file to calculate BMI.        Physical Exam   GENERAL: alert and no distress  EYES: Eyes grossly normal to inspection  NECK: no adenopathy and well healed scar.   RESP: lungs clear to auscultation - no rales, rhonchi or wheezes  CV: regular rates and rhythm, normal S1 S2, no S3 or S4, and no murmur, click or rub. Trace pitting dependent edema bilaterally  ABDOMEN: soft, non-tender. Exam limited by habitus. Bowel sounds active. Seated in wheelchair during exam.   MS: Using wheelchair. No acute finding.   SKIN: ecchymosis to left forearm. Reports this was due to prior fall as noted in chart. No open area.s  NEURO: Normal strength and tone, mentation intact and speech normal  PSYCH: mentation appears normal, judgement and insight intact, appearance well groomed, and verbalizes frustration/anger over care received at [facility].           Signed Electronically by: Keke Grubbs, CNP

## 2025-03-21 ENCOUNTER — OFFICE VISIT (OUTPATIENT)
Dept: FAMILY MEDICINE | Facility: OTHER | Age: 75
End: 2025-03-21
Attending: NURSE PRACTITIONER
Payer: COMMERCIAL

## 2025-03-21 VITALS
RESPIRATION RATE: 22 BRPM | OXYGEN SATURATION: 96 % | DIASTOLIC BLOOD PRESSURE: 93 MMHG | SYSTOLIC BLOOD PRESSURE: 163 MMHG | TEMPERATURE: 97.4 F | HEART RATE: 85 BPM

## 2025-03-21 DIAGNOSIS — E03.4 HYPOTHYROIDISM DUE TO ACQUIRED ATROPHY OF THYROID: ICD-10-CM

## 2025-03-21 DIAGNOSIS — G89.29 CHRONIC PAIN OF BOTH KNEES: ICD-10-CM

## 2025-03-21 DIAGNOSIS — N40.0 BENIGN PROSTATIC HYPERPLASIA WITHOUT LOWER URINARY TRACT SYMPTOMS: ICD-10-CM

## 2025-03-21 DIAGNOSIS — E11.40 TYPE 2 DIABETES MELLITUS WITH DIABETIC NEUROPATHY, WITH LONG-TERM CURRENT USE OF INSULIN (H): ICD-10-CM

## 2025-03-21 DIAGNOSIS — E87.6 HYPOKALEMIA: ICD-10-CM

## 2025-03-21 DIAGNOSIS — M25.561 CHRONIC PAIN OF BOTH KNEES: ICD-10-CM

## 2025-03-21 DIAGNOSIS — E89.0 HYPOTHYROIDISM, POSTSURGICAL: ICD-10-CM

## 2025-03-21 DIAGNOSIS — I10 BENIGN ESSENTIAL HYPERTENSION: Primary | ICD-10-CM

## 2025-03-21 DIAGNOSIS — E03.9 ACQUIRED HYPOTHYROIDISM: ICD-10-CM

## 2025-03-21 DIAGNOSIS — E11.40 TYPE 2 DIABETES MELLITUS WITH DIABETIC NEUROPATHY, UNSPECIFIED WHETHER LONG TERM INSULIN USE (H): ICD-10-CM

## 2025-03-21 DIAGNOSIS — K21.9 GASTROESOPHAGEAL REFLUX DISEASE WITHOUT ESOPHAGITIS: ICD-10-CM

## 2025-03-21 DIAGNOSIS — M25.562 CHRONIC PAIN OF BOTH KNEES: ICD-10-CM

## 2025-03-21 DIAGNOSIS — G62.1 ALCOHOL-INDUCED POLYNEUROPATHY: ICD-10-CM

## 2025-03-21 DIAGNOSIS — Z76.89 ENCOUNTER TO ESTABLISH CARE: ICD-10-CM

## 2025-03-21 DIAGNOSIS — E11.9 TYPE 2 DIABETES, HBA1C GOAL < 7% (H): ICD-10-CM

## 2025-03-21 DIAGNOSIS — Z79.4 TYPE 2 DIABETES MELLITUS WITH DIABETIC NEUROPATHY, WITH LONG-TERM CURRENT USE OF INSULIN (H): ICD-10-CM

## 2025-03-21 DIAGNOSIS — E11.9 TYPE 2 DIABETES, HBA1C GOAL < 8% (H): ICD-10-CM

## 2025-03-21 DIAGNOSIS — G47.00 INSOMNIA, UNSPECIFIED TYPE: ICD-10-CM

## 2025-03-21 DIAGNOSIS — M10.00 IDIOPATHIC GOUT, UNSPECIFIED CHRONICITY, UNSPECIFIED SITE: ICD-10-CM

## 2025-03-21 PROBLEM — H53.71 GLARE SENSITIVITY: Status: ACTIVE | Noted: 2024-04-24

## 2025-03-21 PROBLEM — K80.20 CALCULUS OF GALLBLADDER WITHOUT CHOLECYSTITIS WITHOUT OBSTRUCTION: Status: RESOLVED | Noted: 2019-05-01 | Resolved: 2025-03-21

## 2025-03-21 PROBLEM — S92.501B: Status: ACTIVE | Noted: 2024-11-22

## 2025-03-21 PROBLEM — R94.31 PROLONGED Q-T INTERVAL ON ECG: Status: ACTIVE | Noted: 2024-12-03

## 2025-03-21 PROBLEM — Z91.81 AT HIGH RISK FOR FALLS: Status: ACTIVE | Noted: 2024-11-22

## 2025-03-21 PROBLEM — R53.81 PHYSICAL DECONDITIONING: Status: ACTIVE | Noted: 2024-12-03

## 2025-03-21 PROBLEM — K81.9 CHOLECYSTITIS: Status: RESOLVED | Noted: 2019-11-20 | Resolved: 2025-03-21

## 2025-03-21 PROBLEM — H25.813 COMBINED FORMS OF AGE-RELATED CATARACT, BILATERAL: Status: ACTIVE | Noted: 2024-04-24

## 2025-03-21 PROBLEM — H04.123 DRY EYE SYNDROME OF BOTH EYES: Status: ACTIVE | Noted: 2024-04-24

## 2025-03-21 PROBLEM — H54.3 DECREASED VISION IN BOTH EYES: Status: ACTIVE | Noted: 2022-06-21
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
## 2025-03-21 PROBLEM — S50.12XA TRAUMATIC HEMATOMA OF LEFT FOREARM: Status: ACTIVE | Noted: 2024-12-05

## 2025-03-21 PROBLEM — H25.093 SENILE INCIPIENT CATARACT OF BOTH EYES: Status: ACTIVE | Noted: 2023-04-25

## 2025-03-21 PROBLEM — S40.022A HEMATOMA OF ARM, LEFT, INITIAL ENCOUNTER: Status: ACTIVE | Noted: 2024-12-03

## 2025-03-21 PROCEDURE — 99215 OFFICE O/P EST HI 40 MIN: CPT | Performed by: NURSE PRACTITIONER

## 2025-03-21 PROCEDURE — G0463 HOSPITAL OUTPT CLINIC VISIT: HCPCS

## 2025-03-21 PROCEDURE — 3080F DIAST BP >= 90 MM HG: CPT | Performed by: NURSE PRACTITIONER

## 2025-03-21 PROCEDURE — 3077F SYST BP >= 140 MM HG: CPT | Performed by: NURSE PRACTITIONER

## 2025-03-21 PROCEDURE — 1125F AMNT PAIN NOTED PAIN PRSNT: CPT | Performed by: NURSE PRACTITIONER

## 2025-03-21 RX ORDER — TORSEMIDE 20 MG/1
40 TABLET ORAL 2 TIMES DAILY
Qty: 360 TABLET | Refills: 1 | Status: SHIPPED | OUTPATIENT
Start: 2025-03-21

## 2025-03-21 RX ORDER — TAMSULOSIN HYDROCHLORIDE 0.4 MG/1
0.4 CAPSULE ORAL DAILY
Qty: 90 CAPSULE | Refills: 1 | Status: SHIPPED | OUTPATIENT
Start: 2025-03-21

## 2025-03-21 RX ORDER — INSULIN GLARGINE 100 [IU]/ML
18 INJECTION, SOLUTION SUBCUTANEOUS AT BEDTIME
Qty: 15 ML | Refills: 1 | Status: SHIPPED | OUTPATIENT
Start: 2025-03-21

## 2025-03-21 RX ORDER — LEVOTHYROXINE SODIUM 300 UG/1
300 TABLET ORAL DAILY
Qty: 90 TABLET | Refills: 1 | Status: SHIPPED | OUTPATIENT
Start: 2025-03-21

## 2025-03-21 RX ORDER — PREDNISONE 20 MG/1
20 TABLET ORAL DAILY PRN
Qty: 5 TABLET | Refills: 0 | Status: SHIPPED | OUTPATIENT
Start: 2025-03-21

## 2025-03-21 RX ORDER — NAPROXEN 500 MG/1
500 TABLET ORAL 2 TIMES DAILY WITH MEALS
Qty: 180 TABLET | Refills: 1 | Status: SHIPPED | OUTPATIENT
Start: 2025-03-21

## 2025-03-21 RX ORDER — PANTOPRAZOLE SODIUM 40 MG/1
40 TABLET, DELAYED RELEASE ORAL DAILY
Qty: 90 TABLET | Refills: 1 | Status: SHIPPED | OUTPATIENT
Start: 2025-03-21

## 2025-03-21 RX ORDER — METOPROLOL SUCCINATE 50 MG/1
1 TABLET, EXTENDED RELEASE ORAL
COMMUNITY
Start: 2025-01-30 | End: 2025-03-21

## 2025-03-21 RX ORDER — QUETIAPINE FUMARATE 50 MG/1
TABLET, FILM COATED ORAL
Qty: 90 TABLET | Refills: 1 | Status: SHIPPED | OUTPATIENT
Start: 2025-03-21

## 2025-03-21 RX ORDER — GABAPENTIN 300 MG/1
300 CAPSULE ORAL 3 TIMES DAILY
Qty: 270 CAPSULE | Refills: 1 | Status: SHIPPED | OUTPATIENT
Start: 2025-03-21

## 2025-03-21 RX ORDER — METOPROLOL SUCCINATE 50 MG/1
50 TABLET, EXTENDED RELEASE ORAL DAILY
Qty: 90 TABLET | Refills: 3 | Status: SHIPPED | OUTPATIENT
Start: 2025-03-21

## 2025-03-21 RX ORDER — POTASSIUM CHLORIDE 1500 MG/1
40 TABLET, EXTENDED RELEASE ORAL 2 TIMES DAILY
Qty: 360 TABLET | Refills: 1 | Status: SHIPPED | OUTPATIENT
Start: 2025-03-21

## 2025-03-21 ASSESSMENT — PAIN SCALES - GENERAL: PAINLEVEL_OUTOF10: SEVERE PAIN (7)

## 2025-03-21 NOTE — Clinical Note
Please remove Jeanne Granados as contact/healthcare agent. . Patient may need paperwork to sign for this to happen.

## 2025-03-24 ENCOUNTER — TELEPHONE (OUTPATIENT)
Dept: INTERNAL MEDICINE | Facility: OTHER | Age: 75
End: 2025-03-24

## 2025-03-24 NOTE — TELEPHONE ENCOUNTER
Received a PA request for QUEtiapine (SEROQUEL) 50 MG tablet. Submitted via epa, waiting for a response.

## 2025-03-25 NOTE — TELEPHONE ENCOUNTER
Received an APPROVAL from [employer] for QUEtiapine (SEROQUEL) 50 MG tablet. Effective dates 1/1/25-3/24/26.

## 2025-03-26 ENCOUNTER — DOCUMENTATION ONLY (OUTPATIENT)
Dept: OTHER | Facility: CLINIC | Age: 75
End: 2025-03-26

## 2025-04-11 ENCOUNTER — TRANSFERRED RECORDS (OUTPATIENT)
Dept: HEALTH INFORMATION MANAGEMENT | Facility: CLINIC | Age: 75
End: 2025-04-11

## 2025-04-14 ENCOUNTER — TELEPHONE (OUTPATIENT)
Dept: FAMILY MEDICINE | Facility: OTHER | Age: 75
End: 2025-04-14

## 2025-04-14 NOTE — TELEPHONE ENCOUNTER
Attempt to contact patient regarding results on Enteric Pathogen and Blood Culture testing.  No answer, left message.  Jorge A Kumar LPN

## 2025-04-21 ENCOUNTER — TELEPHONE (OUTPATIENT)
Dept: FAMILY MEDICINE | Facility: OTHER | Age: 75
End: 2025-04-21

## 2025-04-24 ENCOUNTER — TELEPHONE (OUTPATIENT)
Dept: FAMILY MEDICINE | Facility: OTHER | Age: 75
End: 2025-04-24

## 2025-04-24 NOTE — TELEPHONE ENCOUNTER
Patient notified of results from [facility] that was faxed over per pcp patient states having some incontinence noted that he should follow up with PCP but does not have ride to get to appointments, does not have ability to get help into house after appointments needs assistance with all that including undressing. He does state that some times he can get a friend to take him. Friend is out of state at this time.  He does have home health care come but feels they do not help enough.  States was at [facility] and care not much better.

## 2025-05-05 ENCOUNTER — TELEPHONE (OUTPATIENT)
Dept: CARE COORDINATION | Facility: OTHER | Age: 75
End: 2025-05-05

## 2025-05-05 NOTE — TELEPHONE ENCOUNTER
Voicemail message received from patient requesting a refill for loperamide.    Patient reports receiving a prescription from Dr. Kebede at [facility] in the past, which helped with loose stools.     Requesting refill to be sent to [facility].     Medication is not on patient medication list.      Xeljanz Counseling: I discussed with the patient the risks of Xeljanz therapy including increased risk of infection, liver issues, headache, diarrhea, or cold symptoms. Live vaccines should be avoided. They were instructed to call if they have any problems.

## 2025-05-05 NOTE — TELEPHONE ENCOUNTER
Call placed to patient with no answer, message left the patients request will be sent to Keke Grubbs CNP. An update will be provided to the patient.

## 2025-05-06 NOTE — TELEPHONE ENCOUNTER
Voicemail message received from patient inquiring if there are any updates on request for loperamide script.    Update sent to Keke Grubbs.

## 2025-05-06 NOTE — TELEPHONE ENCOUNTER
Reviewed request. Our system indicates this is not covered by their insurance. It is OTC if they choose to try it. However, does have history of slightly prolonged QT and so would use with caution and not exceed OTC dosing. Question the ongoing diarrhea. Overdue for hospital follow up.   Keke Grubbs, ZEKE, CNP

## 2025-05-07 NOTE — TELEPHONE ENCOUNTER
"Patient returning call leaving a message requesting a call back to discuss medications as patient reported he is close to running out. This writer has made several attempts to return a call to patient, leaving messages requesting a return call to discuss the request for loperamide.     This writer returned call to patient, was able to reach patient via telephone today, update provided per Keke Grubbs's message below:  Keke Grubbs CNP Nurse Practitioner SignedYesterday       Reviewed request. Our system indicates this is not covered by their insurance. It is OTC if they choose to try it. However, does have history of slightly prolonged QT and so would use with caution and not exceed OTC dosing. Question the ongoing diarrhea. Overdue for hospital follow up.   ZEKE Guerin CNP        Patient reports having a prescription for loperamide with a prescription number on it and does not understand why Keke is not refilling this medication. This writer again re-iterated the update from Keke Grubbs as per above. This writer has also recommended following up with Keke Grubbs as patient is overdue for a hospital follow up visit.     Patient stating, \"I will schedule a visit, she can come here to see me as she can get around and I am not able to get around\". \"When people don't do what I say bad things happen\".    \"If she lacks the will that is a weakness on her part, not mine, she can refill the loperamide, she just does not want to do it, I am hoping she has some compassion\".     The patient is requesting Keke Grubbs to call him to discuss with him personally over the phone and not through the nurse.     Notified an update will be sent to Keke Grubbs CNP for review and possibly schedule a Video Visit with the patient.     Please advise.                                "

## 2025-05-23 ENCOUNTER — TELEPHONE (OUTPATIENT)
Dept: PODIATRY | Facility: OTHER | Age: 75
End: 2025-05-23

## 2025-05-23 NOTE — TELEPHONE ENCOUNTER
LUIS-This writer received a call stating he is sorry he has not been able to get to his appointments lately due to his weakness. He states he is confined to his recliner or computer chair mostly as he is not strong enough. He is frustrated that he has to rely on his friends so much and they are not able to help him anymore. He states he is a man of his word and when he makes appointments he trys to be there. This writer did ask patient if he is safe in his home and if he is able to get to food/water and or the bathroom. Patient states he is safe and if he falls or is not able to get to food/water he would call someone or ambulance. He states he does have a commode by his chairs. Patient does state he has blisters on his feet and he does have PCAs coming in from [facility] to do dressing but they are not always doing a good job. They have put dressings on the wrong toe where there is no wounds. He states he wants to cut ties with [facility] as the competency of the PCAs is not good or they do not even show up. I was able to get patient rescheduled for 6-4-25 and he was going to work on finding a friend who can bring him to his appointment. If he is not able to make his appointment he will call to cancel. He also stated if his blisters get worse or concerning he would call the ambulance and go to ER.       PCP (Keke Grubbs)- I am CCing you in this message to see if you feel he would benefit from extra resources through one of our social workers.

## 2025-05-28 NOTE — TELEPHONE ENCOUNTER
"Keke Grubbs CNP to Me (Selected Message)  RT      5/27/25 11:55 AM  Please see if patient would like referral for social work  ZEKE Guerin CNP      This writer placed a call to the patient to inquire if patient would like a referral for Social Work, patient states, \"absolutely not no communists are allowed in this house, so far 99% of SW are communist oriented. I have been through it already\".     Notified the update will be provided to Keke Grubbs.   "

## 2025-05-29 NOTE — TELEPHONE ENCOUNTER
"This writer returned call to the patient. An update was provided that the patient reached out to Keke Grubbs initially on 5/23/25 speaking with Laura MUELLER MA.     Keke Grubbs suggested the referral for  in which the patient declined and has the right to decline the referral.     Patient stating, \"I don't wanting them crawling around my house, they are all eyeballs, they do not respect people, they have a mission other than helping people.\"    This writer reiterated and attempted to reeducate on the Social Workers role and benefit with assisting the patient with needs/concerns. Patient then stating, \"if they really want to help me, tell them to bring their boots, coveralls and gloves and I have plenty of work for them to do.\"    Patient then expressing concerns with workers coming in to his home in the past with their laptops and he has no idea what they are typing, states he has asked to look at the notes in the laptop and was not allowed to. This writer educated the patient on the Release of Information Department, the patient may reach out at any time to receive copies of his records and/or may view records via [employer] as well.     This writer reiterated Keke was offering the assistance and the patient has declined, which is his choice. The patient then stating, \"well I will talk to them if I can do this over the phone and they don't have to come into my house.\"    Notified the update will be provided to Keke Grubbs CNP.         "

## 2025-05-29 NOTE — TELEPHONE ENCOUNTER
Voicemail message has been received from patient on 5/29/25 at 0540 am requesting a return call to discuss what provoked Keke Grubbs to suggest a  referral, inquiring if there is a third party getting involved and what provoked Keke to suggest this out of the clear blue.

## 2025-06-04 ENCOUNTER — OFFICE VISIT (OUTPATIENT)
Dept: PODIATRY | Facility: OTHER | Age: 75
End: 2025-06-04
Attending: PODIATRIST
Payer: COMMERCIAL

## 2025-06-04 VITALS
DIASTOLIC BLOOD PRESSURE: 86 MMHG | TEMPERATURE: 98 F | OXYGEN SATURATION: 92 % | HEART RATE: 81 BPM | RESPIRATION RATE: 20 BRPM | SYSTOLIC BLOOD PRESSURE: 142 MMHG

## 2025-06-04 DIAGNOSIS — L97.812 VENOUS STASIS ULCER OF OTHER PART OF RIGHT LOWER LEG WITH FAT LAYER EXPOSED WITH VARICOSE VEINS (H): ICD-10-CM

## 2025-06-04 DIAGNOSIS — L60.3 ONYCHODYSTROPHY: ICD-10-CM

## 2025-06-04 DIAGNOSIS — Z13.89 SCREENING FOR DIABETIC PERIPHERAL NEUROPATHY: ICD-10-CM

## 2025-06-04 DIAGNOSIS — E11.42 DIABETIC POLYNEUROPATHY ASSOCIATED WITH TYPE 2 DIABETES MELLITUS (H): Primary | ICD-10-CM

## 2025-06-04 DIAGNOSIS — I83.018 VENOUS STASIS ULCER OF OTHER PART OF RIGHT LOWER LEG WITH FAT LAYER EXPOSED WITH VARICOSE VEINS (H): ICD-10-CM

## 2025-06-04 DIAGNOSIS — E11.9 DIABETES MELLITUS TYPE 2, INSULIN DEPENDENT (H): ICD-10-CM

## 2025-06-04 DIAGNOSIS — Z79.4 DIABETES MELLITUS TYPE 2, INSULIN DEPENDENT (H): ICD-10-CM

## 2025-06-04 DIAGNOSIS — I83.028 VENOUS STASIS ULCER OF OTHER PART OF LEFT LOWER LEG WITH FAT LAYER EXPOSED WITH VARICOSE VEINS (H): ICD-10-CM

## 2025-06-04 DIAGNOSIS — L97.822 VENOUS STASIS ULCER OF OTHER PART OF LEFT LOWER LEG WITH FAT LAYER EXPOSED WITH VARICOSE VEINS (H): ICD-10-CM

## 2025-06-04 PROCEDURE — 3079F DIAST BP 80-89 MM HG: CPT | Performed by: PODIATRIST

## 2025-06-04 PROCEDURE — 1125F AMNT PAIN NOTED PAIN PRSNT: CPT | Performed by: PODIATRIST

## 2025-06-04 PROCEDURE — 99207 PR FOOT EXAM NO CHARGE: CPT | Performed by: PODIATRIST

## 2025-06-04 PROCEDURE — 3077F SYST BP >= 140 MM HG: CPT | Performed by: PODIATRIST

## 2025-06-04 PROCEDURE — 99213 OFFICE O/P EST LOW 20 MIN: CPT | Mod: 25 | Performed by: PODIATRIST

## 2025-06-04 PROCEDURE — 11721 DEBRIDE NAIL 6 OR MORE: CPT | Performed by: PODIATRIST

## 2025-06-04 PROCEDURE — G0463 HOSPITAL OUTPT CLINIC VISIT: HCPCS | Mod: 25

## 2025-06-04 ASSESSMENT — PAIN SCALES - GENERAL: PAINLEVEL_OUTOF10: SEVERE PAIN (10)

## 2025-06-05 ENCOUNTER — TELEPHONE (OUTPATIENT)
Dept: FAMILY MEDICINE | Facility: OTHER | Age: 75
End: 2025-06-05

## 2025-06-05 NOTE — TELEPHONE ENCOUNTER
Received call from Selene Haley  [facility] Vascular surgery/general surgery regarding pain control. Pt reported taking gabapentin only twice a day. Pain not controlled. I reached out to let him know he should increase this to the ordered three times a day but there was no answer or personalized VM.   I will see if nursing can reach out tomorrow.  Keke Grubbs, APRN, CNP

## 2025-06-06 NOTE — TELEPHONE ENCOUNTER
Call placed to patient to provide an update the patient is to be taking gabapentin 3 times per day as prescribed per Keke Grubbs.     No answer, message left requesting a return call to this writer at [phone] option 6.

## 2025-06-07 ENCOUNTER — HEALTH MAINTENANCE LETTER (OUTPATIENT)
Age: 75
End: 2025-06-07

## 2025-06-07 NOTE — PROGRESS NOTES
Chief complaint: Patient presents with:  WOUND CARE      History of Present Illness: This 75 year old IDDM II is seen for follow-up management for a diabetic foot exam and high risk nail debridement.     The patient is living at home by himself. He has bilateral leg ulcerations and he has been following up with the wound care department at [facility] in [place], [place]. He is scheduled to go to wound care following his podiatry appointment. His socks and dressings are heavily saturated with wound drainage. He would like the wounds evaluated.    The patient is requesting high risk nail debridement every six months. He is unable to reach his feet to treat them.     No further pedal complaints today.     Lab Results   Component Value Date    A1C 5.8 04/24/2024    A1C 5.8 07/17/2023    A1C 6.0 04/05/2023    A1C 5.6 04/06/2022    A1C 6.1 09/01/2021    A1C 6.0 01/20/2021    A1C 6.0 03/20/2020    A1C 5.7 09/04/2019    A1C 5.6 04/11/2019    A1C 6.1 08/14/2018       BP (!) 142/86   Pulse 81   Temp 98  F (36.7  C) (Tympanic)   Resp 20   SpO2 92%     Patient Active Problem List   Diagnosis    ACP (advance care planning)    Morbid obesity due to excess calories (H)    Benign essential hypertension    Gastroesophageal reflux disease without esophagitis    Congenital anomaly of spleen    Disorder of lipoid metabolism    Other lipoprotein metabolism disorders    Malignant neoplasm of thyroid gland (H)    Tremor    Hypothyroidism, postsurgical    Ametropia    Cupping of optic disc, left    Flat affect    Lesion of left upper eyelid    Nuclear sclerotic cataract of both eyes    Other conjunctivitis of both eyes    Presbyopia    Combined forms of age-related cataract, bilateral    Decreased vision in both eyes    Dry eye syndrome of both eyes    At high risk for falls    Glare sensitivity    Hematoma of arm, left, initial encounter    Physical deconditioning    Prolonged Q-T interval on ECG    Senile incipient cataract of both eyes     Traumatic hematoma of left forearm    Open fracture of phalanx of right fourth toe, initial encounter       Past Surgical History:   Procedure Laterality Date    basal cell carcinoma  2017    CHOLECYSTECTOMY  11/23/2019    COLONOSCOPY - HIM SCAN  12/01/2012    Colonoscopy due to bleeding, no polyps 12-12    ESOPHAGOSCOPY, GASTROSCOPY, DUODENOSCOPY (EGD), COMBINED N/A 09/06/2023    Procedure: ESOPHAGOGASTRODUODENOSCOPY with polypectomy and application of resolution clip X3,biopsies mid esophagus;  Surgeon: Tru Chilel MD;  Location: [facility] OR    ESOPHAGOSCOPY, GASTROSCOPY, DUODENOSCOPY (EGD), COMBINED N/A 10/17/2023    Procedure: ESOPHAGOGASTRODUODENOSCOPY diagnostic with hot biopsy, resolution clip placement x2 in greater curvature body gastric;  Surgeon: Tru Chilel MD;  Location: [facility] OR    HERNIA REPAIR  2014    THYROIDECTOMY   04/1996    per pt and noted in [facility] Chart. due to thryoid cancer       Current Outpatient Medications   Medication Sig Dispense Refill    allopurinol (ZYLOPRIM) 300 MG tablet TAKE 1 TABLET (300 MG) BY MOUTH DAILY TAKE ONE TABLET DAILY 90 tablet 3    ascorbic acid (VITAMIN C) 500 MG tablet Take 500 mg by mouth (With damaso hips)      B-D U/F insulin pen needle BY DEVICE ROUTE 2 TIMES DAILY 100 each 11    blood glucose monitoring (ACCU-CHEK FASTCLIX) lancets USE TO TEST BLOOD SUGAR TWICE A  each 2    Cholecalciferol (VITAMIN D3) 50 MCG (2000 UT) CAPS Take 1 capsule by mouth daily      CONTOUR NEXT TEST test strip USE TO TEST BLOOD SUGARS 2 TIMES DAILY OR AS DIRECTED 100 strip 5    gabapentin (NEURONTIN) 300 MG capsule Take 1 capsule (300 mg) by mouth 3 times daily. 270 capsule 1    GNP TERBINAFINE HYDROCHLORIDE 1 % external cream APPLY TO AFFECTED AREA TOPICALLY TWICE A DAY. 28.4 g 3    horse chestnut 300 MG CAPS Take 300 mg by mouth 2 times daily       insulin glargine (LANTUS SOLOSTAR) 100 UNIT/ML pen Inject 18 Units subcutaneously at bedtime. 15 mL 1    insulin pen  needle (32G X 6 MM) 32G X 6 MM miscellaneous Use 1 pen needles daily with Victoza - NOVOFINE 100 each 3    levothyroxine (SYNTHROID/LEVOTHROID) 300 MCG tablet Take 1 tablet (300 mcg) by mouth daily. 90 tablet 1    metFORMIN (GLUCOPHAGE) 1000 MG tablet Take 1 tablet (1,000 mg) by mouth 2 times daily (with meals). 180 tablet 1    metoprolol succinate ER (TOPROL XL) 50 MG 24 hr tablet Take 1 tablet (50 mg) by mouth daily. 90 tablet 3    Multiple Vitamin (MULTI VITAMIN PO) Take 1 tablet by mouth daily (has iron in it)      naproxen (NAPROSYN) 500 MG tablet Take 1 tablet (500 mg) by mouth 2 times daily (with meals). 180 tablet 1    pantoprazole (PROTONIX) 40 MG EC tablet Take 1 tablet (40 mg) by mouth daily. 90 tablet 1    potassium chloride kajal ER (KLOR-CON M20) 20 MEQ CR tablet Take 2 tablets (40 mEq) by mouth 2 times daily. 360 tablet 1    predniSONE (DELTASONE) 20 MG tablet Take 1 tablet (20 mg) by mouth daily as needed (FLARE OF GOUT). X 5 days 5 tablet 0    QUEtiapine (SEROQUEL) 50 MG tablet TAKE 1/2-1 TABLET AT AT BEDTIME 90 tablet 1    semaglutide (OZEMPIC) 2 MG/3ML pen 0.25 mg weekly x 2 and then increase to 0.5 mg weekly 3 mL 0    tamsulosin (FLOMAX) 0.4 MG capsule Take 1 capsule (0.4 mg) by mouth daily. 90 capsule 1    torsemide (DEMADEX) 20 MG tablet Take 2 tablets (40 mg) by mouth 2 times daily. 360 tablet 1    semaglutide (OZEMPIC) 2 MG/3ML pen Inject 0.25 mg subcutaneously every 7 days. (Patient not taking: Reported on 6/4/2025) 3 mL 0     No current facility-administered medications for this visit.          Allergies   Allergen Reactions    Food      Onions, peppers, olives, mushrooms    Trazodone      Other reaction(s): Dizziness       Family History   Problem Relation Age of Onset    Unknown/Adopted Sister        Social History     Socioeconomic History    Marital status:      Spouse name: Not on file    Number of children: Not on file    Years of education: Not on file    Highest education  level: Not on file   Occupational History    Not on file   Social Needs    Financial resource strain: Not on file    Food insecurity:     Worry: Not on file     Inability: Not on file    Transportation needs:     Medical: Not on file     Non-medical: Not on file   Tobacco Use    Smoking status: Never Smoker    Smokeless tobacco: Never Used   Substance and Sexual Activity    Alcohol use: Yes     Comment: beer daily     Drug use: No    Sexual activity: Not Currently     Partners: Female   Lifestyle    Physical activity:     Days per week: Not on file     Minutes per session: Not on file    Stress: Not on file   Relationships    Social connections:     Talks on phone: Not on file     Gets together: Not on file     Attends [employer] service: Not on file     Active member of club or organization: Not on file     Attends meetings of clubs or organizations: Not on file     Relationship status: Not on file    Intimate partner violence:     Fear of current or ex partner: Not on file     Emotionally abused: Not on file     Physically abused: Not on file     Forced sexual activity: Not on file   Other Topics Concern    Parent/sibling w/ CABG, MI or angioplasty before 65F 55M? Not Asked   Social History Narrative    Not on file       ROS: 10 point ROS neg other than the symptoms noted above in the HPI.  EXAM  Constitutional: healthy, alert and no distress    Psychiatric: mentation appears normal and affect normal/bright    VASCULAR:  -Dorsalis pedis pulse +1/4 bilaterally   -Posterior tibial pulse +1/4 bilaterally   -Capillary refill time < 3 seconds to hallux bilaterally   -Hair growth Absent to b/l anterior leg and ankle bilaterally   -Varicosities and telangiectasias to bilateral foot bilaterally   -Mild 3+ pitting edema to bilateral foot and ankle bilaterally   NEURO:  -Epicritic and protective sensation ABSENT to the bilateral foot.  DERM:  -Toenails elongated, thickened, dystrophic and discolored x 10  -Open wounds  diffusely on the bilateral anterior distal half of the leg and multiple lesser does  ---Skin breakdown intermittently mixed with areas of subcutaneous tissue breakdown  ---Significant serous drainage to all wounds  ---No severe erythema, no ascending erythema, no calor, no purulence, no malodor, no other signs of infection.   MSK:  -Muscle strength of ankles +5/5 for dorsiflexion, plantarflexion, ABDUction and ADDuction b/l    RIGHT FOOT RADIOGRAPH 03/17/2021  IMPRESSION: Findings suspicious for osteomyelitis of the middle and distal phalanxes of the right second toe.    PHILLY CARLSON MD  ============================================================      ASSESSMENT:    (E11.42) Diabetic polyneuropathy associated with type 2 diabetes mellitus (H)  (primary encounter diagnosis)    (L60.3) Onychodystrophy    (I83.018,  L97.812) Venous stasis ulcer of other part of right lower leg with fat layer exposed with varicose veins (H)    (I83.028,  L97.822) Venous stasis ulcer of other part of left lower leg with fat layer exposed with varicose veins (H)    (E11.9,  Z79.4) Diabetes mellitus type 2, insulin dependent (H)    (Z13.89) Screening for diabetic peripheral neuropathy      PLAN:  -Patient evaluated and examined. Treatment options discussed with no educational barriers noted.    -High risk toenail debridement x 10 toenails without incident     -Diabetic Foot Education provided. This included checking the feet daily looking for new new blisters or wounds, wearing shoes at all times when walking including around the house, and avoiding lotion application between the toes. If there are any signs of infection, the patient should present to the ED as soon as possible. Infections of the foot can be life threatening or lead to amputations of the foot or leg.    Diabetes Mellitus: Patient's DM is managed by their PCP. The DM appears to be stable. Patient's last HbA1C was 4.8% on  04/24/2024.    ------------------------    Bilateral venous stasis ulcers:  -Patient is going to wound care at [facility] after this where they can dispense to him more advanced wound care dressings.   However, the wounds are heavily draining through his dressings, socks and shoes. Discussed HHC with him and he says it has been a challenge for him because they are all different in how they apply the dressings.  -The dressings were removed and there were moderate amounts of wet, non-viable, flaking skin. The wet, dead skin was removed with a tissue nipper. The diffuse anterior leg wounds were changed with Xeroform. The legs were covered with gauze, ABD pads, Kerlix and a loose stockinette to replace his sock (but tight enough to stay up on his leg) until he is able to get to wound care today.   -This is an acute, uncomplicated illness/injury with OTC treatment options reviewed.     -Patient is refusing a follow-up for this wound and he does not want to be seen more frequently than six months. He says he will call if the wound worsens.    -Patient in agreement with the above treatment plan and all of patient's questions were answered.      Return to clinic six months in [facility] for diabetic foot exam and high risk nail debridement per patient request        Genesis Bay DPM

## 2025-06-09 DIAGNOSIS — M10.00 IDIOPATHIC GOUT, UNSPECIFIED CHRONICITY, UNSPECIFIED SITE: ICD-10-CM

## 2025-06-09 NOTE — TELEPHONE ENCOUNTER
PREDNISONE 20 MG TABS 20 Tablet         Last Written Prescription Date:  3/21/25  Last Fill Quantity: 5,   # refills: 0  Last Office Visit: 3/21/25  Future Office visit:       Routing refill request to provider for review/approval because:  Drug not on the [facility], [facility] or [facility] refill protocol or controlled substance

## 2025-06-10 RX ORDER — PREDNISONE 20 MG/1
20 TABLET ORAL DAILY PRN
Qty: 5 TABLET | Refills: 0 | OUTPATIENT
Start: 2025-06-10

## 2025-06-11 ENCOUNTER — TELEPHONE (OUTPATIENT)
Dept: PODIATRY | Facility: OTHER | Age: 75
End: 2025-06-11

## 2025-06-11 DIAGNOSIS — M10.00 IDIOPATHIC GOUT, UNSPECIFIED CHRONICITY, UNSPECIFIED SITE: ICD-10-CM

## 2025-06-11 NOTE — TELEPHONE ENCOUNTER
"Pt called clinic asking for a refill on his Prednisone 20 mg tablets. Pt reported that \"I have to take it for my legs. It helps me get to my appointments. Without it I have a hard time getting out and making my appointments.\" RN CC asked if pt is having any redness or gout related symptoms because per chart review prednisone was ordered on 3/21/2025 for gout to which pt denies at this time. Pt denied coming in for an appointment at time of call. Pt is requesting to have prednisone refilled so he can \"make it to appointments\" Pt is hoping to either have Keke Grubbs or Dr. Bay call him personally so he can explain more per pt \"I hate all the in between crap that is happening the providers should be calling me themselves\" RN CC did explain that the providers are busy most days but that a message would be sent to them with the information.     Please advise if there is anything further you would like.     Yael Herr RN on 6/11/2025 at 3:20 PM    "

## 2025-06-12 NOTE — TELEPHONE ENCOUNTER
This writer placed a call to the patient to provide an update Keke Grubbs has denied the refill request received for prednisone, reason for denial: single burst therapy completed per Keke Grubbs, CNP.     Patient to schedule a Virtual Visit with Keke Grubbs to discuss the medication request. No answer, a voicemail message has been left requesting a return call to this writer at [phone] option 6.     Awaiting return call.

## 2025-06-12 NOTE — TELEPHONE ENCOUNTER
"Patient returned called. This writer provided an update a call was placed to the patient to discuss the recent denial on the prednisone refill request. Keke Grubbs denied the refill for prednisone as the previous prescription on 3/21/25 for prednisone #5 tablets was a single burst therapy - meaning a one time prescription. Keke Grubbs is requesting for patient to schedule a Virtual Visit to discuss the medication further.     Patient states, \"Do you know why I take the prednisone, I take the prednisone to provide strength so I can get in to your facility, it provides strength in my thighs, I get the feeling that people are thinking that I am abusing the medication\". \"If you look at my consumption you will find that I am pretty disciplined, it seems like it is an argument every time I try to get something from her\".    This writer reiterated the telephone visit being a great opportunity for the patient to discuss this medication, along with the patient concerns with Keke Grubbs directly via telephone.     Patient agreed with a telephone visit, is unable to do a video visit.     Appointment scheduled on 6/17/25 at 200 pm with Keke Grubbs CNP.     This writer will reach out to the patient approximately at 145 pm to complete rooming details prior to the appointment at 200 pm. Patient verbalized understanding, thanking this writer for the assistance.             "

## 2025-06-13 NOTE — TELEPHONE ENCOUNTER
I have nothing more to add. Has visit scheduled with me next week. I generally avoid chronic use of prednisone and so we need to have this conversation again and develop a long term strategy. I guess if Dr. Bay , who may know him better, wishes to order, she may but my plan is to discuss with him next week.   ZEKE Guerin, CNP

## 2025-06-17 ENCOUNTER — VIRTUAL VISIT (OUTPATIENT)
Dept: FAMILY MEDICINE | Facility: OTHER | Age: 75
End: 2025-06-17
Attending: NURSE PRACTITIONER
Payer: COMMERCIAL

## 2025-06-17 DIAGNOSIS — M10.00 IDIOPATHIC GOUT, UNSPECIFIED CHRONICITY, UNSPECIFIED SITE: ICD-10-CM

## 2025-06-17 DIAGNOSIS — Z71.89 COMPLEX CARE COORDINATION: Primary | ICD-10-CM

## 2025-06-17 PROBLEM — K52.9 GASTROENTERITIS: Status: ACTIVE | Noted: 2025-04-11

## 2025-06-17 PROBLEM — R74.8 ELEVATED CK: Status: ACTIVE | Noted: 2025-04-10

## 2025-06-17 PROBLEM — I87.8 VENOUS STASIS OF BOTH LOWER EXTREMITIES: Status: ACTIVE | Noted: 2025-04-11

## 2025-06-17 PROBLEM — N17.9 AKI (ACUTE KIDNEY INJURY): Status: ACTIVE | Noted: 2025-04-10

## 2025-06-17 RX ORDER — LOPERAMIDE HYDROCHLORIDE 2 MG/1
2 CAPSULE ORAL 4 TIMES DAILY PRN
COMMUNITY

## 2025-06-17 RX ORDER — AMOXICILLIN 250 MG
1 CAPSULE ORAL DAILY PRN
COMMUNITY

## 2025-06-17 RX ORDER — PREDNISONE 20 MG/1
20 TABLET ORAL DAILY PRN
Qty: 5 TABLET | Refills: 0 | Status: SHIPPED | OUTPATIENT
Start: 2025-06-17

## 2025-06-17 NOTE — PROGRESS NOTES
Juan F is a 75 year old who is being evaluated via a billable video visit.    What phone number would you like to be contacted at? home  How would you like to obtain your AVS? MyChart      Assessment & Plan     Idiopathic gout, unspecified chronicity, unspecified site  Continue taking allopurinol. Due for lab which can be combined with your next blood draw at [place].   Order sent in for prednisone with the understanding that you will use as little as possible. I anticipate reordering once every 3 months or so. He is pretty addiment about being able to use prednisone as needed for appointments but does agree to plan at this time.  - predniSONE (DELTASONE) 20 MG tablet; Take 1 tablet (20 mg) by mouth daily as needed (FLARE OF GOUT). X 5 days    Complex care coordination  Referral placed. Patient would like grouped in office visits as much as possible. We will accommodate as we are able.   - Primary Care - Care Coordination Referral            Follow-up   No follow-ups on file.        Subjective   Juan F is a 75 year old, presenting for the following health issues:  No chief complaint on file.        6/17/2025     1:52 PM   Additional Questions   Roomed by Elisa REGALADO rN   Accompanied by via telephone         Video Start Time: declined video. Telephone call started at:1417    HPI      Juan F is a 75-year-old patient presenting via telephone visit after having refused an in person visit or a virtual visit.  He is requesting a refill of prednisone.  Comorbidities include postsurgical hypothyroidism, hypertension, diabetes type 2 insulin-dependent, sleep apnea with CPAP, and diabetic neuropathy.    This visit was scheduled to discuss the prednisone refill request that was declined by me.  Denies expressed to nursing staff has frustration with not being prescribed prednisone on an as requested basis.    I have seen Juan F once to establish care back in March of this year.  At that time he requested a refill of prednisone in case of  a gout flare which he appears to have used.  There there was no notification to this clinic that he had needed that dose of prednisone.  During our conversation today, Juan F reports that he uses it for gout or inflammation/pain in his had, which he attributes to gout and general pain. He will start taking 20 mg a day or two prior to appointments to decrease inflammation and pain.   Is taking allopurinol but is due for uric acid level. This can be collected when he next has labs here at [facility].     Per chart review April 10 he went into the ED At [facility] and diagnosed with deconditioning, severe sepsis, stasis dermatitis, and lower extremity edema.  He was hospitalized from April 10 -14.  There has been no follow-up with me since. We reviewed this hospitalization during our discussion and he states was not aware of the diagnosis of sepsis. I expressed my concern with frequent steroid use and risk for infection. He verbalizes full understanding of the risks associated with steroids including infection, bone density loss, and endocrine (hormone) changes and wants to continue using prednisone as needed.     Other Providers involved with care:   - ZEKE Schilling. [facility] wound/general surgery/vascular surgery  - Dr. Bay. [facility].  - Family Practice appointment scheduled with Hayley Sy  at [facility].       A1C : 12/23/24 5.4% @ [facility]  TSH: 1/14/25 3.49 @ [facility]   Uric Acid 7/17/2023 7.0 (normal) @ [facility]      Patient Active Problem List   Diagnosis    ACP (advance care planning)    Morbid obesity due to excess calories (H)    Benign essential hypertension    Gastroesophageal reflux disease without esophagitis    Congenital anomaly of spleen    Disorder of lipoid metabolism    Other lipoprotein metabolism disorders    Malignant neoplasm of thyroid gland (H)    Tremor    Hypothyroidism, postsurgical    Ametropia    Cupping of optic disc, left    Flat affect    Lesion of  left upper eyelid    Nuclear sclerotic cataract of both eyes    Other conjunctivitis of both eyes    Presbyopia    Combined forms of age-related cataract, bilateral    Decreased vision in both eyes    Dry eye syndrome of both eyes    At high risk for falls    Glare sensitivity    Hematoma of arm, left, initial encounter    Physical deconditioning    Prolonged Q-T interval on ECG    Senile incipient cataract of both eyes    Traumatic hematoma of left forearm    Open fracture of phalanx of right fourth toe, initial encounter     Past Surgical History:   Procedure Laterality Date    basal cell carcinoma  2017    CHOLECYSTECTOMY  11/23/2019    COLONOSCOPY - HIM SCAN  12/01/2012    Colonoscopy due to bleeding, no polyps 12-12    ESOPHAGOSCOPY, GASTROSCOPY, DUODENOSCOPY (EGD), COMBINED N/A 09/06/2023    Procedure: ESOPHAGOGASTRODUODENOSCOPY with polypectomy and application of resolution clip X3,biopsies mid esophagus;  Surgeon: Tru Chilel MD;  Location: [facility] OR    ESOPHAGOSCOPY, GASTROSCOPY, DUODENOSCOPY (EGD), COMBINED N/A 10/17/2023    Procedure: ESOPHAGOGASTRODUODENOSCOPY diagnostic with hot biopsy, resolution clip placement x2 in greater curvature body gastric;  Surgeon: Tru Chilel MD;  Location: [facility] OR    HERNIA REPAIR  2014    THYROIDECTOMY   04/1996    per pt and noted in [facility] Chart. due to thryoid cancer       Social History     Tobacco Use    Smoking status: Never    Smokeless tobacco: Never   Substance Use Topics    Alcohol use: Yes     Comment: beer daily      Family History   Problem Relation Age of Onset    Unknown/Adopted Sister            Current Outpatient Medications   Medication Sig Dispense Refill    allopurinol (ZYLOPRIM) 300 MG tablet TAKE 1 TABLET (300 MG) BY MOUTH DAILY TAKE ONE TABLET DAILY 90 tablet 3    ascorbic acid (VITAMIN C) 500 MG tablet Take 500 mg by mouth (With damaso hips)      B-D U/F insulin pen needle BY DEVICE ROUTE 2 TIMES DAILY 100 each 11    blood glucose  monitoring (ACCU-CHEK FASTCLIX) lancets USE TO TEST BLOOD SUGAR TWICE A  each 2    Cholecalciferol (VITAMIN D3) 50 MCG (2000 UT) CAPS Take 1 capsule by mouth daily      CONTOUR NEXT TEST test strip USE TO TEST BLOOD SUGARS 2 TIMES DAILY OR AS DIRECTED 100 strip 5    gabapentin (NEURONTIN) 300 MG capsule Take 1 capsule (300 mg) by mouth 3 times daily. 270 capsule 1    GNP TERBINAFINE HYDROCHLORIDE 1 % external cream APPLY TO AFFECTED AREA TOPICALLY TWICE A DAY. 28.4 g 3    horse chestnut 300 MG CAPS Take 300 mg by mouth 2 times daily       insulin glargine (LANTUS SOLOSTAR) 100 UNIT/ML pen Inject 18 Units subcutaneously at bedtime. 15 mL 1    insulin pen needle (32G X 6 MM) 32G X 6 MM miscellaneous Use 1 pen needles daily with Victoza - NOVOFINE 100 each 3    levothyroxine (SYNTHROID/LEVOTHROID) 300 MCG tablet Take 1 tablet (300 mcg) by mouth daily. 90 tablet 1    metFORMIN (GLUCOPHAGE) 1000 MG tablet Take 1 tablet (1,000 mg) by mouth 2 times daily (with meals). 180 tablet 1    metoprolol succinate ER (TOPROL XL) 50 MG 24 hr tablet Take 1 tablet (50 mg) by mouth daily. 90 tablet 3    Multiple Vitamin (MULTI VITAMIN PO) Take 1 tablet by mouth daily (has iron in it)      naproxen (NAPROSYN) 500 MG tablet Take 1 tablet (500 mg) by mouth 2 times daily (with meals). 180 tablet 1    pantoprazole (PROTONIX) 40 MG EC tablet Take 1 tablet (40 mg) by mouth daily. 90 tablet 1    potassium chloride kajal ER (KLOR-CON M20) 20 MEQ CR tablet Take 2 tablets (40 mEq) by mouth 2 times daily. 360 tablet 1    predniSONE (DELTASONE) 20 MG tablet Take 1 tablet (20 mg) by mouth daily as needed (FLARE OF GOUT). X 5 days 5 tablet 0    QUEtiapine (SEROQUEL) 50 MG tablet TAKE 1/2-1 TABLET AT AT BEDTIME 90 tablet 1    semaglutide (OZEMPIC) 2 MG/3ML pen 0.25 mg weekly x 2 and then increase to 0.5 mg weekly 3 mL 0    semaglutide (OZEMPIC) 2 MG/3ML pen Inject 0.25 mg subcutaneously every 7 days. (Patient not taking: Reported on 6/4/2025) 3  mL 0    tamsulosin (FLOMAX) 0.4 MG capsule Take 1 capsule (0.4 mg) by mouth daily. 90 capsule 1    torsemide (DEMADEX) 20 MG tablet Take 2 tablets (40 mg) by mouth 2 times daily. 360 tablet 1     Allergies   Allergen Reactions    Food      Onions, peppers, olives, mushrooms    Trazodone      Other reaction(s): Dizziness     Recent Labs   Lab Test 04/24/24  1203 07/17/23  1420 04/05/23  1426 04/06/22  1356 04/06/22  1356 03/20/20  0000 11/20/19  0503 09/04/19  1413   A1C 5.8* 5.8* 6.0*  --  5.6   < >  --  5.7*   *  --  110*  --  90   < >  --  84   HDL 50  --  47  --  49   < >  --  50   TRIG 174*  --  170*  --  268*   < >  --  274*   ALT 12  --  21  --  26  --  303* 37   CR 1.05  --  1.16  --  1.05   < > 1.88* 0.91   GFRESTIMATED 75  --  67  --  76   < > 36* 86   GFRESTBLACK  --   --   --   --   --   --  41* >90   POTASSIUM 4.7  --  4.4  --  4.1   < > 4.5 4.0   TSH 0.34 0.39 1.24   < > 0.28*  --   --  0.86    < > = values in this interval not displayed.        BP Readings from Last 3 Encounters:   06/04/25 (!) 142/86   03/21/25 (!) 163/93   10/16/24 131/80    Wt Readings from Last 3 Encounters:   04/24/24 (!) 150 kg (330 lb 11.2 oz)   10/17/23 142.9 kg (315 lb)   10/04/23 143.2 kg (315 lb 12.8 oz)                      Objective           Vitals:  No vitals were obtained today due to virtual visit.    Physical Exam   GENERAL: alert and no obvious distress via phone.  RESP: No audible wheeze, cough, or visible cyanosis.    NEURO: speech clear.   PSYCH: mentation appears normal, affect normal/bright, and judgement and insight impaired        Video-Visit Details    Type of service:  Video Visit   Video End Time:Telephone end time 1443  Originating Location (pt. Location): Home    Distant Location (provider location):  On-site  Platform used for Video Visit: Telephone  Signed Electronically by: Keke Grubbs CNP

## 2025-07-19 ENCOUNTER — HEALTH MAINTENANCE LETTER (OUTPATIENT)
Age: 75
End: 2025-07-19

## 2025-07-29 ENCOUNTER — DOCUMENTATION ONLY (OUTPATIENT)
Dept: OTHER | Facility: CLINIC | Age: 75
End: 2025-07-29

## 2025-07-31 ENCOUNTER — MEDICAL CORRESPONDENCE (OUTPATIENT)
Dept: HEALTH INFORMATION MANAGEMENT | Facility: HOSPITAL | Age: 75
End: 2025-07-31

## 2025-08-04 ENCOUNTER — MEDICAL CORRESPONDENCE (OUTPATIENT)
Dept: HEALTH INFORMATION MANAGEMENT | Facility: HOSPITAL | Age: 75
End: 2025-08-04

## 2025-08-29 ENCOUNTER — OFFICE VISIT (OUTPATIENT)
Dept: FAMILY MEDICINE | Facility: OTHER | Age: 75
End: 2025-08-29
Attending: NURSE PRACTITIONER
Payer: MEDICARE

## 2025-08-29 VITALS
RESPIRATION RATE: 18 BRPM | DIASTOLIC BLOOD PRESSURE: 74 MMHG | SYSTOLIC BLOOD PRESSURE: 132 MMHG | TEMPERATURE: 98.4 F | HEART RATE: 62 BPM | OXYGEN SATURATION: 96 %

## 2025-08-29 DIAGNOSIS — I10 BENIGN ESSENTIAL HYPERTENSION: ICD-10-CM

## 2025-08-29 DIAGNOSIS — Z09 HOSPITAL DISCHARGE FOLLOW-UP: Primary | ICD-10-CM

## 2025-08-29 DIAGNOSIS — E89.0 HYPOTHYROIDISM, POSTSURGICAL: ICD-10-CM

## 2025-08-29 DIAGNOSIS — I87.8 VENOUS STASIS OF BOTH LOWER EXTREMITIES: ICD-10-CM

## 2025-08-29 PROBLEM — N17.9 AKI (ACUTE KIDNEY INJURY): Status: RESOLVED | Noted: 2025-04-10 | Resolved: 2025-08-29

## 2025-08-29 PROBLEM — L97.201: Status: ACTIVE | Noted: 2025-04-11

## 2025-08-29 PROBLEM — R74.8 ELEVATED CK: Status: RESOLVED | Noted: 2025-04-10 | Resolved: 2025-08-29

## 2025-08-29 PROBLEM — I87.2 VENOUS STASIS DERMATITIS OF BOTH LOWER EXTREMITIES: Status: ACTIVE | Noted: 2025-04-11

## 2025-08-29 PROBLEM — H40.003 GLAUCOMA SUSPECT OF BOTH EYES: Status: ACTIVE | Noted: 2025-08-21

## 2025-08-29 PROBLEM — R62.7 FTT (FAILURE TO THRIVE) IN ADULT: Status: ACTIVE | Noted: 2025-06-14

## 2025-08-29 PROBLEM — I50.32 CHRONIC HEART FAILURE WITH PRESERVED EJECTION FRACTION (H): Status: ACTIVE | Noted: 2025-06-14

## 2025-08-29 PROBLEM — L03.115 CELLULITIS OF RIGHT LOWER EXTREMITY: Status: ACTIVE | Noted: 2025-08-01

## 2025-08-29 PROBLEM — E83.42 HYPOMAGNESEMIA: Status: ACTIVE | Noted: 2025-08-05

## 2025-08-29 PROBLEM — R53.1 GENERALIZED WEAKNESS: Status: ACTIVE | Noted: 2025-06-14

## 2025-08-29 PROBLEM — I87.2: Status: ACTIVE | Noted: 2025-04-11

## 2025-08-29 PROCEDURE — G0463 HOSPITAL OUTPT CLINIC VISIT: HCPCS

## 2025-08-29 RX ORDER — OXYCODONE HYDROCHLORIDE 5 MG/1
5 TABLET ORAL EVERY 6 HOURS PRN
Qty: 15 TABLET | Refills: 0 | Status: SHIPPED | OUTPATIENT
Start: 2025-08-29

## 2025-08-29 RX ORDER — OXYCODONE HYDROCHLORIDE 5 MG/1
5 TABLET ORAL EVERY 6 HOURS PRN
COMMUNITY
Start: 2025-07-17 | End: 2025-08-29

## 2025-08-29 ASSESSMENT — PAIN SCALES - GENERAL: PAINLEVEL_OUTOF10: SEVERE PAIN (8)

## (undated) DEVICE — TUBING SUCTION 20FT N620A

## (undated) DEVICE — SNARE-EXACTO COLD 9MM

## (undated) DEVICE — PREP CHLORAPREP 26ML TINTED HI-LITE ORANGE 930815

## (undated) DEVICE — SNARE-ROTATABLE 20MM MINI OVAL

## (undated) DEVICE — CANISTER SUCTION MEDI-VAC GUARDIAN 2000ML 90D 65651-220

## (undated) DEVICE — CLIP ENDO RESOLUTION 360 2.8,,X235CM M00521230

## (undated) DEVICE — ENDO SNARE EXACTO COLD 9MM LOOP 2.4MMX230CM 00711115

## (undated) DEVICE — WATER STERILE 1000ML STERILE UROMATIC 2B-71-14

## (undated) DEVICE — ENDO BITE BLOCK ADULT OLYMPUS LATEX FREE MAJ-1632

## (undated) DEVICE — ENDO TRAP POLYP E-TRAP 00711099